# Patient Record
Sex: MALE | Race: BLACK OR AFRICAN AMERICAN | NOT HISPANIC OR LATINO | Employment: OTHER | URBAN - METROPOLITAN AREA
[De-identification: names, ages, dates, MRNs, and addresses within clinical notes are randomized per-mention and may not be internally consistent; named-entity substitution may affect disease eponyms.]

---

## 2017-01-26 ENCOUNTER — HOSPITAL ENCOUNTER (EMERGENCY)
Facility: MEDICAL CENTER | Age: 72
End: 2017-01-26
Attending: EMERGENCY MEDICINE
Payer: MEDICARE

## 2017-01-26 VITALS
HEART RATE: 84 BPM | DIASTOLIC BLOOD PRESSURE: 84 MMHG | WEIGHT: 143.08 LBS | RESPIRATION RATE: 17 BRPM | TEMPERATURE: 98.4 F | BODY MASS INDEX: 21.12 KG/M2 | SYSTOLIC BLOOD PRESSURE: 124 MMHG | OXYGEN SATURATION: 96 %

## 2017-01-26 DIAGNOSIS — Z76.0 MEDICATION REFILL: ICD-10-CM

## 2017-01-26 PROCEDURE — 99282 EMERGENCY DEPT VISIT SF MDM: CPT

## 2017-01-26 NOTE — ED AVS SNAPSHOT
1/26/2017          Fede Parikh  [address] [address]    Dear Fede:    [facility] wants to ensure your discharge home is safe and you or your loved ones have had all your questions answered regarding your care after you leave the hospital.    You may receive a telephone call within two days of your discharge.  This call is to make certain you understand your discharge instructions as well as ensure we provided you with the best care possible during your stay with us.     The call will only last approximately 3-5 minutes and will be done by a nurse.    Once again, we want to ensure your discharge home is safe and that you have a clear understanding of any next steps in your care.  If you have any questions or concerns, please do not hesitate to contact us, we are here for you.  Thank you for choosing [facility] for your healthcare needs.    Sincerely,    Inocencio Ojeda    [facility]

## 2017-01-26 NOTE — ED AVS SNAPSHOT
Chevia Access Code: [email or web address]  Expires: 2/25/2017 11:54 AM    Your email address is not on file at [facility].  Email Addresses are required for you to sign up for Chevia, please contact [phone] to verify your personal information and to provide your email address prior to attempting to register for Chevia.    Fede Parikh  [address] [place] [address], [place] [address]    [employer]  A secure, online tool to manage your health information     [facility]’s Chevia® is a secure, online tool that connects you to your personalized health information from the privacy of your home -- day or night - making it very easy for you to manage your healthcare. Once the activation process is completed, you can even access your medical information using the Chevia juan alberto, which is available for free in the Apple Juan Alberto store or Google Play store.     To learn more about Chevia, visit [email or web address]    There are two levels of access available (as shown below):   My Chart Features  [facility] Primary Care Doctor [facility]  Specialists [facility]  Urgent  Care Non-[facility] Primary Care Doctor   Email your healthcare team securely and privately 24/7 X X X    Manage appointments: schedule your next appointment; view details of past/upcoming appointments X      Request prescription refills. X      View recent personal medical records, including lab and immunizations X X X X   View health record, including health history, allergies, medications X X X X   Read reports about your outpatient visits, procedures, consult and ER notes X X X X   See your discharge summary, which is a recap of your hospital and/or ER visit that includes your diagnosis, lab results, and care plan X X  X     How to register for Chevia:  Once your e-mail address has been verified, follow the following steps to sign up for Chevia.     1. Go to  [email or web address]  2. Click on the Sign Up Now box, which takes you to the New Member Sign Up page. You  will need to provide the following information:  a. Enter your eGifter Access Code exactly as it appears at the top of this page. (You will not need to use this code after you’ve completed the sign-up process. If you do not sign up before the expiration date, you must request a new code.)   b. Enter your date of birth.   c. Enter your home email address.   d. Click Submit, and follow the next screen’s instructions.  3. Create a [email or web address] ID. This will be your eGifter login ID and cannot be changed, so think of one that is secure and easy to remember.  4. Create a eGifter password. You can change your password at any time.  5. Enter your Password Reset Question and Answer. This can be used at a later time if you forget your password.   6. Enter your e-mail address. This allows you to receive e-mail notifications when new information is available in eGifter.  7. Click Sign Up. You can now view your health information.    For assistance activating your eGifter account, call [phone]

## 2017-01-26 NOTE — ED NOTES
"All discharge instructions given to pt. Pt verbalized understanding of all discharge instructions.  All questions answered. Pt refusing VS on D/C states \"I got to go.\"    "

## 2017-01-26 NOTE — ED PROVIDER NOTES
ED Provider Note    CHIEF COMPLAINT  Chief Complaint   Patient presents with   • Medication Refill       HPI  Fede Parikh is a 71 y.o. male who presents to the emergency department asking for medication refills. The patient had an appointment scheduled on 12 January with the [facility] internal medicine residents. They called him and canceled his appointment due to the snow and scheduled him for February 18. The patient does not have any of his medication. He presents in a symptomatic state.    REVIEW OF SYSTEMS  No chest pain or difficulty breathing    PHYSICAL EXAM  VITAL SIGNS: /84 mmHg  Pulse 84  Temp(Src) 36.9 °C (98.4 °F)  Resp 17  Wt 64.9 kg (143 lb 1.3 oz)  SpO2 96%  In general the patient does not appear toxic  Pulmonary chest clear to auscultation bilaterally  Cardiovascular S1 and S2 with regular rate and rhythm  Neurologically the patient's grossly intact    COURSE & MEDICAL DECISION MAKING  Pertinent Labs & Imaging studies reviewed. (See chart for details)  This a 71-year-old male who presents to the emergency department asking for medication refills. The patient is on a lot of medication and I have contacted the [facility] internal medicine residents to come down and refill the medication that is appropriate for the patient as they cannot see him in the clinic until 16 February. The patient's a symptomatic at this time.     FINAL IMPRESSION  1. Medication refill      Electronically signed by: Jean Pierce, 1/26/2017 11:05 AM

## 2017-01-26 NOTE — ED AVS SNAPSHOT
After Visit Summary                                                                                                                Fede Parikh   MRN: [id number]    Department:  [facility], Emergency Dept   Date of Visit:  1/26/2017            [facility], Emergency Dept    [address]    [place] [address]    Phone:  [phone]      You were seen by     Jean Pierce M.D.      Your Diagnosis Was     Medication refill     Z76.0       Medication Information     Review all of your home medications and newly ordered medications with your primary doctor and/or pharmacist as soon as possible. Follow medication instructions as directed by your doctor and/or pharmacist.     Please keep your complete medication list with you and share with your physician. Update the information when medications are discontinued, doses are changed, or new medications (including over-the-counter products) are added; and carry medication information at all times in the event of emergency situations.               Medication List      ASK your doctor about these medications        Instructions    albuterol 108 (90 BASE) MCG/ACT Aers inhalation aerosol    Inhale 2 Puffs by mouth every 6 hours as needed for Shortness of Breath.   Dose:  2 Puff       amlodipine 5 MG Tabs   Commonly known as:  NORVASC    Take 5 mg by mouth every morning.   Dose:  5 mg       aspirin 81 MG Chew chewable tablet   Commonly known as:  ASA    Take 1 Tab by mouth every day.   Dose:  81 mg       beclomethasone 40 MCG/ACT inhaler   Commonly known as:  QVAR    Inhale 1 Puff by mouth 2 Times a Day.   Dose:  1 Puff       folic acid 1 MG Tabs   Commonly known as:  FOLVITE    Take 1 Tab by mouth every day.   Dose:  1 mg       loratadine 10 MG Tabs   Commonly known as:  CLARITIN    Take 1 Tab by mouth every day.   Dose:  10 mg       magnesium oxide 400 (241.3 MG) MG Tabs tablet   Commonly known as:  MAG-OX    Take 1 Tab by  mouth 2 Times a Day.   Dose:  400 mg       multivitamin Tabs    Take 1 Tab by mouth every day.   Dose:  1 Tab       omeprazole 20 MG delayed-release capsule   Commonly known as:  PRILOSEC    Take 20 mg by mouth every day.   Dose:  20 mg       pravastatin 20 MG Tabs   Commonly known as:  PRAVACHOL    Take 20 mg by mouth every evening.   Dose:  20 mg       thiamine 100 MG tablet   Commonly known as:  THIAMINE    Take 1 Tab by mouth every day.   Dose:  100 mg       tiotropium 18 MCG Caps   Commonly known as:  SPIRIVA    Inhale 1 Cap by mouth every day.   Dose:  18 mcg                 Discharge Instructions       Follow-up as per internal medicine          Patient Information     Patient Information    Following emergency treatment: all patient requiring follow-up care must return either to a private physician or a clinic if your condition worsens before you are able to obtain further medical attention, please return to the emergency room.     Billing Information    At [facility], we work to make the billing process streamlined for our patients.  Our Representatives are here to answer any questions you may have regarding your hospital bill.  If you have insurance coverage and have supplied your insurance information to us, we will submit a claim to your insurer on your behalf.  Should you have any questions regarding your bill, we can be reached online or by phone as follows:  Online: You are able pay your bills online or live chat with our representatives about any billing questions you may have. We are here to help Monday - Friday from 8:00am to 7:30pm and 9:00am - 12:00pm on Saturdays.  Please visit [email or web address]  for more information.   Phone:  [phone] or [phone]    Please note that your emergency physician, surgeon, pathologist, radiologist, anesthesiologist, and other specialists are not employed by [facility] and will therefore bill separately for their services.   Please contact them directly for any questions concerning their bills at the numbers below:     Emergency Physician Services:  [phone]  [facility]:  [phone]  [facility]:  [phone]  [facility]:  [phone]    1. Your final bill may vary from the amount quoted upon discharge if all procedures are not complete at that time, or if your doctor has additional procedures of which we are not aware. You will receive an additional bill if you return to the Emergency Department at [facility] for suture removal regardless of the facility of which the sutures were placed.     2. Please arrange for settlement of this account at the emergency registration.    3. All self-pay accounts are due in full at the time of treatment.  If you are unable to meet this obligation then payment is expected within 4-5 days.     4. If you have had radiology studies (CT, X-ray, Ultrasound, MRI), you have received a preliminary result during your emergency department visit. Please contact the radiology department [phone] to receive a copy of your final result. Please discuss the Final result with your primary physician or with the follow up physician provided.     Crisis Hotline:  [place] [facility] Crisis Hotline:  1-117-GJCASDZ or 1-462.783.8661  [place] Crisis Hotline:    1-180.529.8122 or 040-417-8670         ED Discharge Follow Up Questions    1. In order to provide you with very good care, we would like to follow up with a phone call in the next few days.  May we have your permission to contact you?     YES /  NO    2. What is the best phone number to call you? (       )_____-__________    3. What is the best time to call you?      Morning  /  Afternoon  /  Evening                   Patient Signature:  ____________________________________________________________    Date:  ____________________________________________________________      Your appointments     Feb 16, 2017  1:45  PM   New Patient with Mynor Mckay M.D.   [facility] - Internal Medicine (--)    [address] [place] [address]  [address]  [place] [id number] [phone]           Please bring Photo ID, Insurance Cards, All Medication Bottles and copies of any legal documents (such as Living Will, Power of ) If speaking a language besides English please bring an adult . Please arrive 30 minutes prior for check in and registration. You will be receiving a confirmation call a few days before your appointment from our automated call confirmation system.

## 2017-01-26 NOTE — ED NOTES
Chief Complaint   Patient presents with   • Medication Refill     Agree with triage RN. Chart up for ERP.

## 2017-01-26 NOTE — ED NOTES
Amb to triage w/ request for a med refill, ran out of his meds 3 days ago.  Pt reports that he has no PMD and would like assistance in finding one.  Pt has no medical c/o at this time.

## 2017-02-15 ENCOUNTER — OFFICE VISIT (OUTPATIENT)
Dept: INTERNAL MEDICINE | Facility: MEDICAL CENTER | Age: 72
End: 2017-02-15
Payer: MEDICARE

## 2017-02-15 VITALS
DIASTOLIC BLOOD PRESSURE: 74 MMHG | SYSTOLIC BLOOD PRESSURE: 114 MMHG | HEART RATE: 111 BPM | OXYGEN SATURATION: 97 % | HEIGHT: 68 IN | TEMPERATURE: 97.3 F | BODY MASS INDEX: 22.13 KG/M2 | WEIGHT: 146 LBS

## 2017-02-15 DIAGNOSIS — I10 ESSENTIAL HYPERTENSION: ICD-10-CM

## 2017-02-15 DIAGNOSIS — Z23 NEED FOR INFLUENZA VACCINATION: ICD-10-CM

## 2017-02-15 DIAGNOSIS — J44.9 CHRONIC OBSTRUCTIVE PULMONARY DISEASE, UNSPECIFIED COPD TYPE (HCC): ICD-10-CM

## 2017-02-15 DIAGNOSIS — B18.2 CHRONIC HEPATITIS C WITHOUT HEPATIC COMA (HCC): ICD-10-CM

## 2017-02-15 DIAGNOSIS — Z78.9 ALCOHOL USE: ICD-10-CM

## 2017-02-15 DIAGNOSIS — K80.21 CALCULUS OF GALLBLADDER WITH BILIARY OBSTRUCTION BUT WITHOUT CHOLECYSTITIS: ICD-10-CM

## 2017-02-15 DIAGNOSIS — Z23 NEED FOR TDAP VACCINATION: ICD-10-CM

## 2017-02-15 DIAGNOSIS — E04.2 MULTIPLE THYROID NODULES: ICD-10-CM

## 2017-02-15 DIAGNOSIS — L29.9 GENERALIZED PRURITUS: ICD-10-CM

## 2017-02-15 DIAGNOSIS — Z72.0 TOBACCO USE: ICD-10-CM

## 2017-02-15 DIAGNOSIS — K21.9 GASTROESOPHAGEAL REFLUX DISEASE, ESOPHAGITIS PRESENCE NOT SPECIFIED: ICD-10-CM

## 2017-02-15 PROBLEM — K80.20 CALCULUS OF GALLBLADDER WITHOUT CHOLECYSTITIS: Status: ACTIVE | Noted: 2017-02-15

## 2017-02-15 PROCEDURE — 90662 IIV NO PRSV INCREASED AG IM: CPT | Performed by: INTERNAL MEDICINE

## 2017-02-15 PROCEDURE — 4004F PT TOBACCO SCREEN RCVD TLK: CPT | Mod: 8P,GC | Performed by: INTERNAL MEDICINE

## 2017-02-15 PROCEDURE — 90715 TDAP VACCINE 7 YRS/> IM: CPT | Performed by: INTERNAL MEDICINE

## 2017-02-15 PROCEDURE — G8482 FLU IMMUNIZE ORDER/ADMIN: HCPCS | Mod: GC | Performed by: INTERNAL MEDICINE

## 2017-02-15 PROCEDURE — 1101F PT FALLS ASSESS-DOCD LE1/YR: CPT | Mod: GC | Performed by: INTERNAL MEDICINE

## 2017-02-15 PROCEDURE — G8432 DEP SCR NOT DOC, RNG: HCPCS | Mod: GC | Performed by: INTERNAL MEDICINE

## 2017-02-15 PROCEDURE — G8419 CALC BMI OUT NRM PARAM NOF/U: HCPCS | Mod: GC | Performed by: INTERNAL MEDICINE

## 2017-02-15 PROCEDURE — G0008 ADMIN INFLUENZA VIRUS VAC: HCPCS | Performed by: INTERNAL MEDICINE

## 2017-02-15 PROCEDURE — 99204 OFFICE O/P NEW MOD 45 MIN: CPT | Mod: 25,GC | Performed by: INTERNAL MEDICINE

## 2017-02-15 PROCEDURE — 4040F PNEUMOC VAC/ADMIN/RCVD: CPT | Mod: 8P,GC | Performed by: INTERNAL MEDICINE

## 2017-02-15 PROCEDURE — 90472 IMMUNIZATION ADMIN EACH ADD: CPT | Performed by: INTERNAL MEDICINE

## 2017-02-15 PROCEDURE — 3017F COLORECTAL CA SCREEN DOC REV: CPT | Mod: GC | Performed by: INTERNAL MEDICINE

## 2017-02-15 RX ORDER — OMEPRAZOLE 20 MG/1
20 CAPSULE, DELAYED RELEASE ORAL DAILY
Qty: 30 CAP | Refills: 5 | Status: SHIPPED | OUTPATIENT
Start: 2017-02-15 | End: 2017-08-27

## 2017-02-15 RX ORDER — PRAVASTATIN SODIUM 20 MG
20 TABLET ORAL DAILY
Qty: 30 TAB | Refills: 5 | Status: SHIPPED | OUTPATIENT
Start: 2017-02-15 | End: 2017-08-27

## 2017-02-15 RX ORDER — ALBUTEROL SULFATE 90 UG/1
2 AEROSOL, METERED RESPIRATORY (INHALATION) EVERY 6 HOURS PRN
Qty: 8.5 G | Refills: 3 | Status: SHIPPED | OUTPATIENT
Start: 2017-02-15 | End: 2017-07-05 | Stop reason: SDUPTHER

## 2017-02-15 RX ORDER — LORATADINE 10 MG/1
10 TABLET ORAL DAILY
Qty: 30 TAB | Refills: 3 | Status: SHIPPED | OUTPATIENT
Start: 2017-02-15 | End: 2017-07-05 | Stop reason: SDUPTHER

## 2017-02-15 RX ORDER — ASPIRIN 81 MG/1
81 TABLET, CHEWABLE ORAL DAILY
Qty: 30 TAB | Refills: 11 | Status: SHIPPED | OUTPATIENT
Start: 2017-02-15 | End: 2017-08-27

## 2017-02-15 RX ORDER — TIOTROPIUM BROMIDE 18 UG/1
18 CAPSULE ORAL; RESPIRATORY (INHALATION) DAILY
Qty: 30 CAP | Refills: 5 | Status: SHIPPED | OUTPATIENT
Start: 2017-02-15 | End: 2017-08-27

## 2017-02-15 NOTE — PATIENT INSTRUCTIONS
- Lab work (Hepatitis C genotype, B12, folate)  - Call endocrinologist at [phone]  - Surgeon will call you for gallstones  - Infectious doctor call you for Hep C  - Pulmonary function test. They will call to schedule.

## 2017-02-15 NOTE — MR AVS SNAPSHOT
"        Fede Parikh   2/15/2017 10:45 AM   Office Visit   MRN: [id number]    Department:  [facility] - Internal Med   Dept Phone:  [phone]    Description:  Male : 1945   Provider:  Cortes Dubose M.D.           Reason for Visit     Establish Care     Referral Needed Endocrinology    Medication Refill all medications    Itchy Everywhere. [id number]. Since he was in the hospital      Allergies as of 2/15/2017     No Known Allergies      You were diagnosed with     Generalized pruritus   [504785]       Chronic obstructive pulmonary disease, unspecified COPD type (CMS-HCC)   [2606946]       Gastroesophageal reflux disease, esophagitis presence not specified   [1276657]       Essential hypertension   [7092818]       Multiple thyroid nodules   [555884]       Chronic hepatitis C without hepatic coma (CMS-HCC)   [120124]       Tobacco use   [406824]       Alcohol use ([facility])   [887987]       Calculus of gallbladder with biliary obstruction but without cholecystitis   [8413889]       Health care maintenance   [413760]         Vital Signs     Blood Pressure Pulse Temperature Height Weight Body Mass Index    114/74 mmHg 111 36.3 °C (97.3 °F) 1.734 m (5' 8.27\") 66.225 kg (146 lb) 22.03 kg/m2    Oxygen Saturation Smoking Status                97% Current Every Day Smoker          Basic Information     Date Of Birth Sex Race Ethnicity Preferred Language    1945 Male Black or  Non- English      Your appointments     Mar 22, 2017 11:00 AM   Established Patient with Cortes Dubose M.D.   [facility] - Internal Medicine (--)    [address] [place] [id number] [phone]           You will be receiving a confirmation call a few days before your appointment from our automated call confirmation system.              Problem List              ICD-10-CM Priority Class Noted - Resolved    Generalized pruritus L29.9   2016 - Present    COPD (chronic " obstructive pulmonary disease) (CMS-HCC) J44.9   12/13/2016 - Present    GERD (gastroesophageal reflux disease) K21.9   12/13/2016 - Present    Hypertension I10   12/13/2016 - Present    Substance abuse F19.10   12/13/2016 - Present    Multiple thyroid nodules E04.2   12/14/2016 - Present    Chronic hepatitis C without hepatic coma (CMS-HCC) B18.2   2/15/2017 - Present    Tobacco use Z72.0   2/15/2017 - Present    Alcohol use (HCC) Z78.9   2/15/2017 - Present    Calculus of gallbladder without cholecystitis K80.20   2/15/2017 - Present      Health Maintenance        Date Due Completion Dates    IMM DTaP/Tdap/Td Vaccine (1 - Tdap) 2/13/1962 ---    COLONOSCOPY 2/13/1993 ---    IMM ZOSTER VACCINE 2/13/2003 ---    IMM PNEUMOCOCCAL 65+ (ADULT) LOW/MEDIUM RISK SERIES (1 of 2 - PCV13) 2/13/2008 ---    IMM INFLUENZA (1) 9/1/2016 11/4/2014            Current Immunizations     Influenza Vaccine Adult HD  Incomplete    Influenza Vaccine Quad Inj (Preserved) 11/4/2014    Tdap Vaccine  Incomplete      Below and/or attached are the medications your provider expects you to take. Review all of your home medications and newly ordered medications with your provider and/or pharmacist. Follow medication instructions as directed by your provider and/or pharmacist. Please keep your medication list with you and share with your provider. Update the information when medications are discontinued, doses are changed, or new medications (including over-the-counter products) are added; and carry medication information at all times in the event of emergency situations     Allergies:  No Known Allergies          Medications  Valid as of: February 15, 2017 - 11:52 AM    Generic Name Brand Name Tablet Size Instructions for use    Albuterol Sulfate (Aero Soln) albuterol 108 (90 BASE) MCG/ACT Inhale 2 Puffs by mouth every 6 hours as needed for Shortness of Breath.        AmLODIPine Besylate (Tab) NORVASC 5 MG Take 5 mg by mouth every morning.         Aspirin (Chew Tab) ASA 81 MG Take 1 Tab by mouth every day.        Beclomethasone Dipropionate (Aero Soln) QVAR 40 MCG/ACT Inhale 1 Puff by mouth 2 Times a Day.        Loratadine (Tab) CLARITIN 10 MG Take 1 Tab by mouth every day.        Omeprazole (CAPSULE DELAYED RELEASE) PRILOSEC 20 MG Take 20 mg by mouth every day.        Pravastatin Sodium (Tab) PRAVACHOL 20 MG Take 20 mg by mouth every evening.        Tiotropium Bromide Monohydrate (Cap) SPIRIVA 18 MCG Inhale 1 Cap by mouth every day.        .                 Medicines prescribed today were sent to:     [id number] - [place], [place] - [address]    [address] [place] [address]    Phone: [phone] Fax: [phone]    Open 24 Hours?: No      Medication refill instructions:       If your prescription bottle indicates you have medication refills left, it is not necessary to call your provider’s office. Please contact your pharmacy and they will refill your medication.    If your prescription bottle indicates you do not have any refills left, you may request refills at any time through one of the following ways: The online [employer] system (except Urgent Care), by calling your provider’s office, or by asking your pharmacy to contact your provider’s office with a refill request. Medication refills are processed only during regular business hours and may not be available until the next business day. Your provider may request additional information or to have a follow-up visit with you prior to refilling your medication.   *Please Note: Medication refills are assigned a new Rx number when refilled electronically. Your pharmacy may indicate that no refills were authorized even though a new prescription for the same medication is available at the pharmacy. Please request the medicine by name with the pharmacy before contacting your provider for a refill.        Your To Do List     Future Labs/Procedures Complete By Expires    FOLATE  As directed  2/15/2018    VITAMIN B12  As directed 2/15/2018      Referral     A referral request has been sent to our patient care coordination department. Please allow 3-5 business days for us to process this request and contact you either by phone or mail. If you do not hear from us by the 5th business day, please call us at [phone].        Instructions    - Lab work (Hepatitis C genotype, B12, folate)  - Call endocrinologist at [phone]  - Surgeon will call you for gallstones  - Infectious doctor call you for Hep C  - Pulmonary function test. They will call to schedule.          [employer] Access Code: [email or web address]  Expires: 2/25/2017 11:54 AM    [employer]  A secure, online tool to manage your health information     [facility]’s [employer]® is a secure, online tool that connects you to your personalized health information from the privacy of your home -- day or night - making it very easy for you to manage your healthcare. Once the activation process is completed, you can even access your medical information using the [employer] juan alberto, which is available for free in the Apple Juan Alberto store or Google Play store.     [employer] provides the following levels of access (as shown below):   My Chart Features   [facility] Primary [facility] Doctor [facility]  Specialists [facility]  Urgent  Care Non-[facility]  Primary Care  Doctor   Email your healthcare team securely and privately 24/7 X X X    Manage appointments: schedule your next appointment; view details of past/upcoming appointments X      Request prescription refills. X      View recent personal medical records, including lab and immunizations X X X X   View health record, including health history, allergies, medications X X X X   Read reports about your outpatient visits, procedures, consult and ER notes X X X X   See your discharge summary, which is a recap of your hospital and/or ER visit that includes your diagnosis, lab results, and care plan. X X       How to register for [employer]:  1. Go  to  [email or web address].  2. Click on the Sign Up Now box, which takes you to the New Member Sign Up page. You will need to provide the following information:  a. Enter your [employer] Access Code exactly as it appears at the top of this page. (You will not need to use this code after you’ve completed the sign-up process. If you do not sign up before the expiration date, you must request a new code.)   b. Enter your date of birth.   c. Enter your home email address.   d. Click Submit, and follow the next screen’s instructions.  3. Create a [employer] ID. This will be your [employer] login ID and cannot be changed, so think of one that is secure and easy to remember.  4. Create a Revel Toucht password. You can change your password at any time.  5. Enter your Password Reset Question and Answer. This can be used at a later time if you forget your password.   6. Enter your e-mail address. This allows you to receive e-mail notifications when new information is available in [employer].  7. Click Sign Up. You can now view your health information.    For assistance activating your [employer] account, call [phone]

## 2017-02-27 ENCOUNTER — HOSPITAL ENCOUNTER (OUTPATIENT)
Dept: LAB | Facility: MEDICAL CENTER | Age: 72
End: 2017-02-27
Attending: INTERNAL MEDICINE
Payer: MEDICARE

## 2017-02-27 DIAGNOSIS — Z78.9 ALCOHOL USE: ICD-10-CM

## 2017-02-27 LAB
FOLATE SERPL-MCNC: >23.6 NG/ML
VIT B12 SERPL-MCNC: 461 PG/ML (ref 211–911)

## 2017-02-27 PROCEDURE — 36415 COLL VENOUS BLD VENIPUNCTURE: CPT

## 2017-02-27 PROCEDURE — 82607 VITAMIN B-12: CPT

## 2017-02-27 PROCEDURE — 82746 ASSAY OF FOLIC ACID SERUM: CPT

## 2017-02-27 PROCEDURE — 87902 NFCT AGT GNTYP ALYS HEP C: CPT

## 2017-03-03 LAB — HCV GENTYP SERPL NAA+PROBE: NORMAL

## 2017-03-13 ENCOUNTER — OFFICE VISIT (OUTPATIENT)
Dept: INTERNAL MEDICINE | Facility: MEDICAL CENTER | Age: 72
End: 2017-03-13
Payer: MEDICARE

## 2017-03-13 VITALS
WEIGHT: 144.2 LBS | HEART RATE: 93 BPM | BODY MASS INDEX: 21.86 KG/M2 | TEMPERATURE: 97.6 F | HEIGHT: 68 IN | SYSTOLIC BLOOD PRESSURE: 110 MMHG | DIASTOLIC BLOOD PRESSURE: 82 MMHG | OXYGEN SATURATION: 96 %

## 2017-03-13 DIAGNOSIS — B18.2 CHRONIC HEPATITIS C WITHOUT HEPATIC COMA (HCC): ICD-10-CM

## 2017-03-13 PROCEDURE — 99203 OFFICE O/P NEW LOW 30 MIN: CPT | Performed by: INTERNAL MEDICINE

## 2017-03-13 ASSESSMENT — ENCOUNTER SYMPTOMS
ABDOMINAL PAIN: 0
CONSTIPATION: 0
WHEEZING: 1
WEIGHT LOSS: 1
WEAKNESS: 0
POLYDIPSIA: 0
FEVER: 0
SHORTNESS OF BREATH: 1
CHILLS: 0
HEARTBURN: 1
VOMITING: 0

## 2017-03-13 NOTE — MR AVS SNAPSHOT
"        Fede Parikh   3/13/2017 9:40 AM   Office Visit   MRN: [id number]    Department:  [facility] Med - Internal Med   Dept Phone:  [phone]    Description:  Male : 1945   Provider:  Kevon Bella M.D.           Reason for Visit     New Patient Hep C       Allergies as of 3/13/2017     No Known Allergies      You were diagnosed with     Chronic hepatitis C without hepatic coma (CMS-HCC)   [270891]         Vital Signs     Blood Pressure Pulse Temperature Height Weight Body Mass Index    110/82 mmHg 93 36.4 °C (97.6 °F) 1.734 m (5' 8.27\") 65.409 kg (144 lb 3.2 oz) 21.75 kg/m2    Oxygen Saturation Smoking Status                96% Current Every Day Smoker          Basic Information     Date Of Birth Sex Race Ethnicity Preferred Language    1945 Male Black or  Non- English      Your appointments     Mar 22, 2017 11:00 AM   Established Patient with Cortes Dubose M.D.   [facility] Med - Internal Medicine (--)    [address] [place] [address]  [address]  [facility] [address] [phone]           You will be receiving a confirmation call a few days before your appointment from our automated call confirmation system.            2017 10:00 AM   Established Patient with Kevon Bella M.D.   [facility] / [facility] - Internal Medicine (--)    [address]  [address]  [facility] [address] [phone]           You will be receiving a confirmation call a few days before your appointment from our automated call confirmation system.              Problem List              ICD-10-CM Priority Class Noted - Resolved    Generalized pruritus L29.9   2016 - Present    COPD (chronic obstructive pulmonary disease) (CMS-HCC) J44.9   2016 - Present    GERD (gastroesophageal reflux disease) K21.9   2016 - Present    Hypertension I10   2016 - Present    Substance abuse F19.10   2016 - Present    Multiple thyroid nodules E04.2   " 12/14/2016 - Present    Chronic hepatitis C without hepatic coma (CMS-HCC) B18.2   2/15/2017 - Present    Tobacco use Z72.0   2/15/2017 - Present    Alcohol use (HCC) Z78.9   2/15/2017 - Present    Calculus of gallbladder without cholecystitis K80.20   2/15/2017 - Present      Health Maintenance        Date Due Completion Dates    IMM ZOSTER VACCINE 2/13/2003 ---    IMM PNEUMOCOCCAL 65+ (ADULT) LOW/MEDIUM RISK SERIES (1 of 2 - PCV13) 2/13/2008 ---    COLONOSCOPY 7/7/2026 7/7/2016    IMM DTaP/Tdap/Td Vaccine (2 - Td) 2/15/2027 2/15/2017            Current Immunizations     Influenza Vaccine Adult HD 2/15/2017    Influenza Vaccine Quad Inj (Preserved) 11/4/2014    Tdap Vaccine 2/15/2017      Below and/or attached are the medications your provider expects you to take. Review all of your home medications and newly ordered medications with your provider and/or pharmacist. Follow medication instructions as directed by your provider and/or pharmacist. Please keep your medication list with you and share with your provider. Update the information when medications are discontinued, doses are changed, or new medications (including over-the-counter products) are added; and carry medication information at all times in the event of emergency situations     Allergies:  No Known Allergies          Medications  Valid as of: March 13, 2017 - 10:30 AM    Generic Name Brand Name Tablet Size Instructions for use    Albuterol Sulfate (Aero Soln) albuterol 108 (90 BASE) MCG/ACT Inhale 2 Puffs by mouth every 6 hours as needed for Shortness of Breath.        Aspirin (Chew Tab) ASA 81 MG Take 1 Tab by mouth every day.        Beclomethasone Dipropionate (Aero Soln) QVAR 40 MCG/ACT Inhale 1 Puff by mouth 2 Times a Day.        Loratadine (Tab) CLARITIN 10 MG Take 1 Tab by mouth every day.        Omeprazole (CAPSULE DELAYED RELEASE) PRILOSEC 20 MG Take 1 Cap by mouth every day.        Pravastatin Sodium (Tab) PRAVACHOL 20 MG Take 1 Tab by mouth  every day.        Tiotropium Bromide Monohydrate (Cap) SPIRIVA 18 MCG Inhale 1 Cap by mouth every day.        .                 Medicines prescribed today were sent to:     [id number] - [place], [place] - [address] [address]    Phone: [phone] Fax: [phone]    Open 24 Hours?: No      Medication refill instructions:       If your prescription bottle indicates you have medication refills left, it is not necessary to call your provider’s office. Please contact your pharmacy and they will refill your medication.    If your prescription bottle indicates you do not have any refills left, you may request refills at any time through one of the following ways: The online Technical Machine system (except Urgent Care), by calling your provider’s office, or by asking your pharmacy to contact your provider’s office with a refill request. Medication refills are processed only during regular business hours and may not be available until the next business day. Your provider may request additional information or to have a follow-up visit with you prior to refilling your medication.   *Please Note: Medication refills are assigned a new Rx number when refilled electronically. Your pharmacy may indicate that no refills were authorized even though a new prescription for the same medication is available at the pharmacy. Please request the medicine by name with the pharmacy before contacting your provider for a refill.        Your To Do List     Future Labs/Procedures Complete By Expires    AFP SERUM TUMOR MARKER  As directed 3/13/2018    CBC WITHOUT DIFFERENTIAL  As directed 9/13/2017    COMP METABOLIC PANEL  As directed 3/13/2018    GAMMA GT (GGT)  As directed 3/13/2018         Technical Machine Access Code: [email or web address]  Expires: 4/12/2017 10:30 AM    Technical Machine  A secure, online tool to manage your health information     [employer]’s Technical Machine® is a secure, online tool that connects you to your  personalized health information from the privacy of your home -- day or night - making it very easy for you to manage your healthcare. Once the activation process is completed, you can even access your medical information using the FilmMe juan alberto, which is available for free in the Apple Juan Alberto store or Google Play store.     FilmMe provides the following levels of access (as shown below):   My Chart Features   [facility] Doctor [facility]  Specialists [facility]  Urgent  Care Non-[facility]  Primary Care  Doctor   Email your healthcare team securely and privately 24/7 X X X    Manage appointments: schedule your next appointment; view details of past/upcoming appointments X      Request prescription refills. X      View recent personal medical records, including lab and immunizations X X X X   View health record, including health history, allergies, medications X X X X   Read reports about your outpatient visits, procedures, consult and ER notes X X X X   See your discharge summary, which is a recap of your hospital and/or ER visit that includes your diagnosis, lab results, and care plan. X X       How to register for FilmMe:  1. Go to  [email or web address].  2. Click on the Sign Up Now box, which takes you to the New Member Sign Up page. You will need to provide the following information:  a. Enter your FilmMe Access Code exactly as it appears at the top of this page. (You will not need to use this code after you’ve completed the sign-up process. If you do not sign up before the expiration date, you must request a new code.)   b. Enter your date of birth.   c. Enter your home email address.   d. Click Submit, and follow the next screen’s instructions.  3. Create a FilmMe ID. This will be your FilmMe login ID and cannot be changed, so think of one that is secure and easy to remember.  4. Create a FilmMe password. You can change your password at any time.  5. Enter your Password Reset Question and Answer. This can  be used at a later time if you forget your password.   6. Enter your e-mail address. This allows you to receive e-mail notifications when new information is available in Yu Rong.  7. Click Sign Up. You can now view your health information.    For assistance activating your Yu Rong account, call [phone]

## 2017-03-14 NOTE — PROGRESS NOTES
"Subjective:      Fede Parikh is a 72 y.o.AA male who presents for evaluation of newly diagnosed HCV. Was recently diagnosed ar [facility] in December when admitted with abdominal pain.  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 Used iv drugs during this period. Has now been released, does drink. Is on parole. Has not been vaccinated . Feels fatigued Believes he can stop drinking   He is on PPI for acid reflux, may be related to his drinking.  PMH- COPD /htn/ acid reflux/gallbldder disease   MEDS- reviewed with patient           HPI    Review of Systems   Constitutional: Positive for weight loss and malaise/fatigue. Negative for fever and chills.   Respiratory: Positive for shortness of breath and wheezing.    Cardiovascular: Negative for leg swelling.   Gastrointestinal: Positive for heartburn. Negative for vomiting, abdominal pain and constipation.   Skin: Negative for itching and rash.   Neurological: Negative for weakness.   Endo/Heme/Allergies: Negative for polydipsia.          Objective:     /82 mmHg  Pulse 93  Temp(Src) 36.4 °C (97.6 °F)  Ht 1.734 m (5' 8.27\")  Wt 65.409 kg (144 lb 3.2 oz)  BMI 21.75 kg/m2  SpO2 96%     Physical Exam   Constitutional: He is oriented to person, place, and time.   Cardiovascular: Normal rate and regular rhythm.    Pulmonary/Chest:   I do not hear any wheezing   Abdominal: Soft. Bowel sounds are normal.   Liver edge palpable, no spleen, no ascites   Neurological: He is alert and oriented to person, place, and time.   Skin: Skin is warm. No rash noted. No erythema.   Psychiatric: He has a normal mood and affect. His behavior is normal.   Vitals reviewed.         Results for FEDE PARIKH (MRN [id number]) as of 3/13/2017 20:47   Ref. Range 12/14/2016 03:40   WBC Latest Ref Range: 4.8-10.8 K/uL 3.5 (L)   RBC Latest Ref Range: 4.70-6.10 M/uL 3.96 (L)   Hemoglobin Latest Ref Range: 14.0-18.0 g/dL 13.2 (L)   Hematocrit Latest Ref " Range: 42.0-52.0 % 36.7 (L)   MCV Latest Ref Range: 81.4-97.8 fL 92.7   MCH Latest Ref Range: 27.0-33.0 pg 33.3 (H)   MCHC Latest Ref Range: 33.7-35.3 g/dL 36.0 (H)   RDW Latest Ref Range: 35.9-50.0 fL 40.8   Platelet Count Latest Ref Range: 164-446 K/uL 131 (L)   MPV Latest Ref Range: 9.0-12.9 fL 11.0   Results for SADE ISSA (MRN [id number]) as of 3/13/2017 20:47   Ref. Range 12/13/2016 08:31 12/14/2016 03:40 2/27/2017 08:05   Free T-4 Latest Ref Range: 0.53-1.43 ng/dL  0.91    Hepatitis A Virus Ab, IgM Latest Ref Range: Negative  Negative     Hepatitis B Surface Antigen Latest Ref Range: Negative  Negative     Hepatitis B Cors Ab,IgM Latest Ref Range: Negative  Negative     EER HCV RNA Qnt, PCR Unknown See Note     Hepatitis C Antibody Latest Ref Range: Negative  Reactive (A)     Hepatitis C Genotyping Unknown   1a or 1b   Hepatitis C Rna By Pcr, Quanti Latest Units: IU/mL 1,900,000     HCV RNA PCR Qnt Log Latest Units: log IU 6.3     HCV RNA PCR Qnt Int Latest Ref Range: Not Detected  Detected (A)     HIV Ag/Ab Combo Assay Latest Ref Range: Non Reactive  Non Reactive       Ultrasound :The liver is normal in contour. There is no evidence of solid mass lesion. The liver measures 9.25 cm. The liver appears echogenic.    The gallbladder is contracted. Multiple shadowing stones are seen. The gallbladder wall thickness measures 4.1 mm.  There is no pericholecystic fluid.    The common duct measures 0.79 cm.   Results for SADE ISSA (MRN [id number]) as of 3/13/2017 20:47   Ref. Range 12/13/2016 20:02 12/14/2016 03:40   Bun Latest Ref Range: 8-22 mg/dL  17   Creatinine Latest Ref Range: 0.50-1.40 mg/dL  1.41 (H)   GFR If  Latest Ref Range: >60 mL/min/1.73 m 2  59 (A)   GFR If Non  Latest Ref Range: >60 mL/min/1.73 m 2  49 (A)   Calcium Latest Ref Range: 8.5-10.5 mg/dL  9.2   AST(SGOT) Latest Ref Range: 12-45 U/L  65 (H)   ALT(SGPT) Latest Ref Range: 2-50 U/L  41   Alkaline  Phosphatase Latest Ref Range: 30-99 U/L  57   Total Bilirubin Latest Ref Range: 0.1-1.5 mg/dL  1.2   Albumin Latest Ref Range: 3.2-4.9 g/dL  2.8 (L)   Total Protein Latest Ref Range: 6.0-8.2 g/dL  5.9 (L)   Globulin Latest Ref Range: 1.9-3.5 g/dL  3.1   A-G Ratio Latest Units: g/dL  0.9   Magnesium Latest Ref Range: 1.5-2.5 mg/dL  1.5     Assessment/Plan:     1. 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  He understands he must be completely abstinent of alcohol if he wants to live and to be treated for HCV.  Told him to return in 6 months, will get labs before hand to see if still drinking and document liver Fibrosis status, consider treatment at that tikme if alcohol free . He agrees     - COMP METABOLIC PANEL; Future  - AFP SERUM TUMOR MARKER; Future  - GAMMA GT (GGT); Future  - CBC WITHOUT DIFFERENTIAL; Future  - HCV FIBROSURE

## 2017-03-22 ENCOUNTER — OFFICE VISIT (OUTPATIENT)
Dept: INTERNAL MEDICINE | Facility: MEDICAL CENTER | Age: 72
End: 2017-03-22
Payer: MEDICARE

## 2017-03-22 VITALS
OXYGEN SATURATION: 99 % | DIASTOLIC BLOOD PRESSURE: 80 MMHG | SYSTOLIC BLOOD PRESSURE: 106 MMHG | HEART RATE: 89 BPM | WEIGHT: 139.4 LBS | HEIGHT: 68 IN | TEMPERATURE: 96 F | BODY MASS INDEX: 21.13 KG/M2 | RESPIRATION RATE: 16 BRPM

## 2017-03-22 DIAGNOSIS — B18.2 CHRONIC HEPATITIS C WITHOUT HEPATIC COMA (HCC): ICD-10-CM

## 2017-03-22 DIAGNOSIS — E04.2 MULTIPLE THYROID NODULES: ICD-10-CM

## 2017-03-22 DIAGNOSIS — J44.9 CHRONIC OBSTRUCTIVE PULMONARY DISEASE, UNSPECIFIED COPD TYPE (HCC): ICD-10-CM

## 2017-03-22 DIAGNOSIS — Z78.9 ALCOHOL USE: ICD-10-CM

## 2017-03-22 DIAGNOSIS — K80.21 CALCULUS OF GALLBLADDER WITH BILIARY OBSTRUCTION BUT WITHOUT CHOLECYSTITIS: ICD-10-CM

## 2017-03-22 DIAGNOSIS — Z72.0 TOBACCO USE: ICD-10-CM

## 2017-03-22 DIAGNOSIS — Z23 NEED FOR ZOSTER VACCINE: ICD-10-CM

## 2017-03-22 DIAGNOSIS — K21.9 GASTROESOPHAGEAL REFLUX DISEASE, ESOPHAGITIS PRESENCE NOT SPECIFIED: ICD-10-CM

## 2017-03-22 PROCEDURE — 99214 OFFICE O/P EST MOD 30 MIN: CPT | Mod: 25,GC | Performed by: INTERNAL MEDICINE

## 2017-03-22 PROCEDURE — G0009 ADMIN PNEUMOCOCCAL VACCINE: HCPCS | Performed by: INTERNAL MEDICINE

## 2017-03-22 PROCEDURE — 90670 PCV13 VACCINE IM: CPT | Performed by: INTERNAL MEDICINE

## 2017-03-22 ASSESSMENT — PATIENT HEALTH QUESTIONNAIRE - PHQ9: CLINICAL INTERPRETATION OF PHQ2 SCORE: 1

## 2017-03-22 NOTE — MR AVS SNAPSHOT
"        Fede Parikh   3/22/2017 11:00 AM   Office Visit   MRN: [id number]    Department:  [facility] Med - Internal Med   Dept Phone:  [phone]    Description:  Male : 1945   Provider:  Cortes Dubose M.D.           Reason for Visit     Thyroid Problem No call re: Endo       Allergies as of 3/22/2017     No Known Allergies      You were diagnosed with     Multiple thyroid nodules   [331612]       Chronic obstructive pulmonary disease, unspecified COPD type (CMS-HCC)   [3442417]       Need for zoster vaccine   [254282]         Vital Signs     Blood Pressure Pulse Temperature Respirations Height Weight    106/80 mmHg 89 35.6 °C (96 °F) 16 1.727 m (5' 7.99\") 63.231 kg (139 lb 6.4 oz)    Body Mass Index Oxygen Saturation Smoking Status             21.20 kg/m2 99% Current Every Day Smoker         Basic Information     Date Of Birth Sex Race Ethnicity Preferred Language    1945 Male Black or  Non- English      Your appointments     2017 11:00 AM   Established Patient with Cortes Dubose M.D.   [facility] Med - Internal Medicine (--)    [address] [place] [address]  [address]  [facility] [address] [phone]           You will be receiving a confirmation call a few days before your appointment from our automated call confirmation system.            2017 10:00 AM   Established Patient with Kevon Bella M.D.   [facility] (--)    [address]  [address]  [facility] [address] [phone]           You will be receiving a confirmation call a few days before your appointment from our automated call confirmation system.              Problem List              ICD-10-CM Priority Class Noted - Resolved    Generalized pruritus L29.9   2016 - Present    COPD (chronic obstructive pulmonary disease) (CMS-HCC) J44.9   2016 - Present    GERD (gastroesophageal reflux disease) K21.9   2016 - Present   " Hypertension I10   12/13/2016 - Present    Substance abuse F19.10   12/13/2016 - Present    Multiple thyroid nodules E04.2   12/14/2016 - Present    Chronic hepatitis C without hepatic coma (CMS-HCC) B18.2   2/15/2017 - Present    Tobacco use Z72.0   2/15/2017 - Present    Alcohol use (HCC) Z78.9   2/15/2017 - Present    Calculus of gallbladder without cholecystitis K80.20   2/15/2017 - Present      Health Maintenance        Date Due Completion Dates    IMM ZOSTER VACCINE 2/13/2003 ---    IMM PNEUMOCOCCAL 65+ (ADULT) LOW/MEDIUM RISK SERIES (1 of 2 - PCV13) 2/13/2008 ---    COLONOSCOPY 7/7/2026 7/7/2016    IMM DTaP/Tdap/Td Vaccine (2 - Td) 2/15/2027 2/15/2017            Current Immunizations     13-VALENT PCV PREVNAR  Incomplete    Influenza Vaccine Adult HD 2/15/2017    Influenza Vaccine Quad Inj (Preserved) 11/4/2014    Tdap Vaccine 2/15/2017      Below and/or attached are the medications your provider expects you to take. Review all of your home medications and newly ordered medications with your provider and/or pharmacist. Follow medication instructions as directed by your provider and/or pharmacist. Please keep your medication list with you and share with your provider. Update the information when medications are discontinued, doses are changed, or new medications (including over-the-counter products) are added; and carry medication information at all times in the event of emergency situations     Allergies:  No Known Allergies          Medications  Valid as of: March 22, 2017 - 11:34 AM    Generic Name Brand Name Tablet Size Instructions for use    Albuterol Sulfate (Aero Soln) albuterol 108 (90 BASE) MCG/ACT Inhale 2 Puffs by mouth every 6 hours as needed for Shortness of Breath.        Aspirin (Chew Tab) ASA 81 MG Take 1 Tab by mouth every day.        Beclomethasone Dipropionate (Aero Soln) QVAR 40 MCG/ACT Inhale 1 Puff by mouth 2 Times a Day.        Loratadine (Tab) CLARITIN 10 MG Take 1 Tab by mouth every  day.        Omeprazole (CAPSULE DELAYED RELEASE) PRILOSEC 20 MG Take 1 Cap by mouth every day.        Pravastatin Sodium (Tab) PRAVACHOL 20 MG Take 1 Tab by mouth every day.        Tiotropium Bromide Monohydrate (Cap) SPIRIVA 18 MCG Inhale 1 Cap by mouth every day.        .                 Medicines prescribed today were sent to:     [facility] - [place], [place] - [address] [place] [address]    Phone: [phone] Fax: [phone]    Open 24 Hours?: No      Medication refill instructions:       If your prescription bottle indicates you have medication refills left, it is not necessary to call your provider’s office. Please contact your pharmacy and they will refill your medication.    If your prescription bottle indicates you do not have any refills left, you may request refills at any time through one of the following ways: The online [employer] system (except Urgent Care), by calling your provider’s office, or by asking your pharmacy to contact your provider’s office with a refill request. Medication refills are processed only during regular business hours and may not be available until the next business day. Your provider may request additional information or to have a follow-up visit with you prior to refilling your medication.   *Please Note: Medication refills are assigned a new Rx number when refilled electronically. Your pharmacy may indicate that no refills were authorized even though a new prescription for the same medication is available at the pharmacy. Please request the medicine by name with the pharmacy before contacting your provider for a refill.        Referral     A referral request has been sent to our patient care coordination department. Please allow 3-5 business days for us to process this request and contact you either by phone or mail. If you do not hear from us by the 5th business day, please call us at [phone].        Instructions      I recommend a  Zostavax (shingles vaccine).  Get at pharmacy.             iKoa Access Code: [email or web address]  Expires: 4/12/2017 10:30 AM    iKoa  A secure, online tool to manage your health information     [employer]’s iKoa® is a secure, online tool that connects you to your personalized health information from the privacy of your home -- day or night - making it very easy for you to manage your healthcare. Once the activation process is completed, you can even access your medical information using the iKoa juan alberto, which is available for free in the Apple Juan Alberto store or Google Play store.     iKoa provides the following levels of access (as shown below):   My Chart Features   [facility] Primary [facility] Doctor [facility]  Specialists [facility]  Urgent  Care Non-[facility]  Primary Care  Doctor   Email your healthcare team securely and privately 24/7 X X X    Manage appointments: schedule your next appointment; view details of past/upcoming appointments X      Request prescription refills. X      View recent personal medical records, including lab and immunizations X X X X   View health record, including health history, allergies, medications X X X X   Read reports about your outpatient visits, procedures, consult and ER notes X X X X   See your discharge summary, which is a recap of your hospital and/or ER visit that includes your diagnosis, lab results, and care plan. X X       How to register for iKoa:  1. Go to  [email or web address].  2. Click on the Sign Up Now box, which takes you to the New Member Sign Up page. You will need to provide the following information:  a. Enter your iKoa Access Code exactly as it appears at the top of this page. (You will not need to use this code after you’ve completed the sign-up process. If you do not sign up before the expiration date, you must request a new code.)   b. Enter your date of birth.   c. Enter your home email address.   d. Click Submit, and follow the next screen’s  instructions.  3. Create a [facility] ID. This will be your TapDog login ID and cannot be changed, so think of one that is secure and easy to remember.  4. Create a [facility] password. You can change your password at any time.  5. Enter your Password Reset Question and Answer. This can be used at a later time if you forget your password.   6. Enter your e-mail address. This allows you to receive e-mail notifications when new information is available in TapDog.  7. Click Sign Up. You can now view your health information.    For assistance activating your TapDog account, call [phone]        Quit Tobacco Information     Do you want to quit using tobacco?    Quitting tobacco decreases risks of cancer, heart and lung disease, increases life expectancy, improves sense of taste and smell, and increases spending money, among other benefits.    If you are thinking about quitting, we can help.  • [facility] Quit Tobacco Program: 678.168.1865  o Program occurs weekly for four weeks and includes pharmacist consultation on products to support quitting smoking or chewing tobacco. A provider referral is needed for pharmacist consultation.  • Tobacco Users Help Hotline: 9-520-QUIT-NOW (263-3866) or https://[place].quitlogix.org/  o Free, confidential telephone and online coaching for [place] residents. Sessions are designed on a schedule that is convenient for you. Eligible clients receive free nicotine replacement therapy.  • Nationally: www.smokefree.gov  o Information and professional assistance to support both immediate and long-term needs as you become, and remain, a non-smoker. Smokefree.gov allows you to choose the help that best fits your needs.

## 2017-03-22 NOTE — PROGRESS NOTES
Established Patient    Fede presents today with the following:    CC: 5 week follow up    HPI: Mr. Parikh is a 72 year old male that presents for a 5 week follow up for management of his chronic conditions. He has no acute complaints at this time and feels well.    Chronic Hep C/Cirrhosis of liver/Alcohol use: Pt was seen by Dr. Bella on 3/13 for management of his Hep C. He was told that he needs to stop consuming alcohol for a minimum of 6 months before treatment would be considered. He was able to decrease his consumption of alcohol from 1/2 pint per day to 2 shots of whiskey per day. Dr. Bella ordered a CMP, AFP, GGT, CBC, HCV fibrosure to be drawn before his next appt on 6/26. He was offered help quitting alcohol at [place] at today's visit, but refused.    Tobacco use: Continues to smoke 3 cigarettes per day. Understands the risks associated with tobacco use and counseled to quit.    COPD: Under good control with his scheduled and PRN inhalers. Did not get his PFT's yet. Encouraged to schedule that test.    B/l thyroid nodule: A referral was placed to endocrinology upon hospital discharge from [facility] on 12/14. He has not heard anything or received a call about this referral. No overt symptoms of hyper/hypothyroid at todays visit.    Cholelithiasis: He was seen by Dr. White (General surgery) on 3/3 for assessment of cholelithiasis and possible elective cholecystectomy. He has experienced acute RUQ 2/2 cholelithiasis twice in the past and at this most recent visit with Dr. White was asymptomatic. No indication for elective, outpatient cholecystectomy at this time, but was instructed to follow up if he experiences any more episodes of abdominal pain.    Patient Active Problem List    Diagnosis Date Noted   • Chronic hepatitis C without hepatic coma (CMS-HCC) 02/15/2017   • Tobacco use 02/15/2017   • Alcohol use (HCC) 02/15/2017   • Calculus of gallbladder without cholecystitis 02/15/2017   • Multiple  "thyroid nodules 12/14/2016   • Generalized pruritus 12/13/2016   • COPD (chronic obstructive pulmonary disease) (CMS-[facility]) 12/13/2016   • GERD (gastroesophageal reflux disease) 12/13/2016       Current Outpatient Prescriptions   Medication Sig Dispense Refill   • aspirin (ASA) 81 MG Chew Tab chewable tablet Take 1 Tab by mouth every day. 30 Tab 11   • loratadine (CLARITIN) 10 MG Tab Take 1 Tab by mouth every day. 30 Tab 3   • albuterol 108 (90 BASE) MCG/ACT Aero Soln inhalation aerosol Inhale 2 Puffs by mouth every 6 hours as needed for Shortness of Breath. 8.5 g 3   • beclomethasone (QVAR) 40 MCG/ACT inhaler Inhale 1 Puff by mouth 2 Times a Day. 2 Inhaler 5   • tiotropium (SPIRIVA) 18 MCG Cap Inhale 1 Cap by mouth every day. 30 Cap 5   • omeprazole (PRILOSEC) 20 MG delayed-release capsule Take 1 Cap by mouth every day. 30 Cap 5   • pravastatin (PRAVACHOL) 20 MG Tab Take 1 Tab by mouth every day. 30 Tab 5     No current facility-administered medications for this visit.       ROS: As per HPI. Additional pertinent symptoms as noted below.      /80 mmHg  Pulse 89  Temp(Src) 35.6 °C (96 °F)  Resp 16  Ht 1.727 m (5' 7.99\")  Wt 63.231 kg (139 lb 6.4 oz)  BMI 21.20 kg/m2  SpO2 99%    Physical Exam  General:  male, Alert and oriented, No apparent distress.  Eyes: Pupils equal and reactive. No scleral icterus. EOMI, conjunctiva white non-injected  Throat: Clear no erythema or exudates noted.  Neck: Supple. No lymphadenopathy noted. Multiple b/l thyroid nodules palpated.  Lungs: Clear to auscultation and percussion bilaterally. Decreased BS b/l with mild wheezing at base left lung.  Cardiovascular: Regular rate and rhythm. No murmurs, rubs or gallops.  Abdomen:  Benign. No rebound or guarding noted.  Extremities: No clubbing, cyanosis, edema. Scratch marks from itching, non-painful left knee lump.  Skin: Clear. No rash or suspicious skin lesions noted.      Assessment and Plan    1. Chronic " hepatitis C  - Hx IV drug use, released from group home 3 years ago, on parole  - Hep C RNA 1,900,000 on 12/13  - Hep C genotype 1a or 1b  -  on admit to hospital on 12/13. Dropped to 65 at discharge.  - US liver 12/13 showed fatty liver vs fibrosis  - HIV negative  - Seen by Dr. Bella (ID) on 3/13 with a follow up appt on 6/26. Draw CBC, CMP, AFP, Hep C fibrosure before appt with Dr. Bella.   - Counseled complete alcohol cessation X 6 months before Hep C treatment will be initiated. Offered help quitting at [place], but he declined at this time.    2. Alcohol use (HCC)  - Used to drink half pint per day. Now down to 2 shots whiskey per day.  - , ALT 77 on 12/13  - Albumin 2.8, likely malnourished + cirrhotic  - Counseled complete cessation  - Refused help quitting at [place]    3. Multiple thyroid nodules  - TSH 0.28, T4 0.91 on 12/14  - US thyroid showed numerous b/l thyroid nodules, easily palpated on physical exam  - Endocrinology referral previously placed, but has not heard from anyone. Will re-order today.  - Will likely need a FNA of thyroid    4. Chronic obstructive pulmonary disease  - Continue tiotropium and beclomethasone inhalers scheduled  - Continue albuterol inhaler  - Did not get PFT's. Encouraged to set up appt.  - Prevnar given 3/22/17. Give pneumovax in 1 year.    - Counseled smoking cessation    5. Calculus of gallbladder  - Asymptomatic at present  - 2 episodes of RUQ pain 2/2 cholelithiasis in past  - US liver 12/13 showed cholelithiasis, gallbladder wall thickening, CBD dilation, and fatty liver vs fibrosis  - Assessed by Dr. White on 3/3. No indication for surgery at this time, but return to surgical office if his abdominal pain recurs  - Surgery     6. Gastroesophageal reflux disease  - Continue omeprazole  - Stable    7. Tobacco use  - Has smoked on/off for the past 50 years  - Down to 3 cigarettes per day  - Counseled cessation  - Harm reduction plan discussed  -  Encouraged patient to quit    8. Health care maintenance  - Tdap, influenza given 2/15  - Prevnar given today, 3/22  - Colonoscopy 6-7 months ago. Will have to track down these records.  - Lacks immunizations for Hep A, Hep B  - Instructed to get Zoster vaccine at pharmacy      PLAN: F/u in 3 months

## 2017-05-30 ENCOUNTER — OFFICE VISIT (OUTPATIENT)
Dept: ENDOCRINOLOGY | Facility: MEDICAL CENTER | Age: 72
End: 2017-05-30
Payer: MEDICARE

## 2017-05-30 ENCOUNTER — TELEPHONE (OUTPATIENT)
Dept: INTERNAL MEDICINE | Facility: MEDICAL CENTER | Age: 72
End: 2017-05-30

## 2017-05-30 VITALS
DIASTOLIC BLOOD PRESSURE: 72 MMHG | HEART RATE: 96 BPM | OXYGEN SATURATION: 95 % | WEIGHT: 141 LBS | BODY MASS INDEX: 21.37 KG/M2 | HEIGHT: 68 IN | SYSTOLIC BLOOD PRESSURE: 108 MMHG

## 2017-05-30 DIAGNOSIS — E04.2 MULTIPLE THYROID NODULES: ICD-10-CM

## 2017-05-30 DIAGNOSIS — E03.8 HYPOTHYROIDISM DUE TO HASHIMOTO'S THYROIDITIS: ICD-10-CM

## 2017-05-30 DIAGNOSIS — E06.3 HYPOTHYROIDISM DUE TO HASHIMOTO'S THYROIDITIS: ICD-10-CM

## 2017-05-30 PROCEDURE — G8432 DEP SCR NOT DOC, RNG: HCPCS | Performed by: INTERNAL MEDICINE

## 2017-05-30 PROCEDURE — 4004F PT TOBACCO SCREEN RCVD TLK: CPT | Performed by: INTERNAL MEDICINE

## 2017-05-30 PROCEDURE — 4040F PNEUMOC VAC/ADMIN/RCVD: CPT | Performed by: INTERNAL MEDICINE

## 2017-05-30 PROCEDURE — 99204 OFFICE O/P NEW MOD 45 MIN: CPT | Performed by: INTERNAL MEDICINE

## 2017-05-30 PROCEDURE — 3017F COLORECTAL CA SCREEN DOC REV: CPT | Performed by: INTERNAL MEDICINE

## 2017-05-30 PROCEDURE — 1101F PT FALLS ASSESS-DOCD LE1/YR: CPT | Performed by: INTERNAL MEDICINE

## 2017-05-30 PROCEDURE — G8420 CALC BMI NORM PARAMETERS: HCPCS | Performed by: INTERNAL MEDICINE

## 2017-05-30 NOTE — MR AVS SNAPSHOT
"        Fede Parikh   2017 8:30 AM   Office Visit   MRN: [id number]    Department:  Endocrinology [facility]   Dept Phone:  [phone]    Description:  Male : 1945   Provider:  Zak Guidry M.D.           Allergies as of 2017     No Known Allergies      You were diagnosed with     Multiple thyroid nodules   [[id number]]         Vital Signs     Blood Pressure Pulse Height Weight Body Mass Index Oxygen Saturation    108/72 mmHg 96 1.727 m (5' 7.99\") 63.957 kg (141 lb) 21.44 kg/m2 95%    Smoking Status                   Current Every Day Smoker           Basic Information     Date Of Birth Sex Race Ethnicity Preferred Language    1945 Male Black or  Non- English      Your appointments     2017 11:00 AM   Established Patient with Cortes Dubose M.D.   [facility] Med - Internal Medicine (--)    [address] [place] [address]  [address]  [facility] [address] [phone]           You will be receiving a confirmation call a few days before your appointment from our automated call confirmation system.            2017 10:00 AM   Established Patient with Kevon Bella M.D.   [facility] Med - Internal Medicine (--)    [address]  [address]  [facility] [address] [phone]           You will be receiving a confirmation call a few days before your appointment from our automated call confirmation system.            Aug 21, 2017 12:50 PM   Established Patient with Zak Guidry M.D.   [facility] End[facility]    [address] [facility], [address]  [facility] [address] [phone]           You will be receiving a confirmation call a few days before your appointment from our automated call confirmation system.              Problem List              ICD-10-CM Priority Class Noted - Resolved    Generalized pruritus L29.9   2016 - Present    COPD (chronic obstructive pulmonary disease) " (CMS-HCC) J44.9   12/13/2016 - Present    GERD (gastroesophageal reflux disease) K21.9   12/13/2016 - Present    Multiple thyroid nodules E04.2   12/14/2016 - Present    Chronic hepatitis C without hepatic coma (CMS-HCC) B18.2   2/15/2017 - Present    Tobacco use Z72.0   2/15/2017 - Present    Alcohol use Z78.9   2/15/2017 - Present    Calculus of gallbladder without cholecystitis K80.20   2/15/2017 - Present      Health Maintenance        Date Due Completion Dates    IMM ZOSTER VACCINE 2/13/2003 ---    IMM PNEUMOCOCCAL 65+ (ADULT) LOW/MEDIUM RISK SERIES (2 of 2 - PPSV23) 3/22/2018 3/22/2017    COLONOSCOPY 7/7/2026 7/7/2016    IMM DTaP/Tdap/Td Vaccine (2 - Td) 2/15/2027 2/15/2017            Current Immunizations     13-VALENT PCV PREVNAR 3/22/2017    Influenza Vaccine Adult HD 2/15/2017    Influenza Vaccine Quad Inj (Preserved) 11/4/2014    Tdap Vaccine 2/15/2017      Below and/or attached are the medications your provider expects you to take. Review all of your home medications and newly ordered medications with your provider and/or pharmacist. Follow medication instructions as directed by your provider and/or pharmacist. Please keep your medication list with you and share with your provider. Update the information when medications are discontinued, doses are changed, or new medications (including over-the-counter products) are added; and carry medication information at all times in the event of emergency situations     Allergies:  No Known Allergies          Medications  Valid as of: May 30, 2017 -  8:49 AM    Generic Name Brand Name Tablet Size Instructions for use    Albuterol Sulfate (Aero Soln) albuterol 108 (90 BASE) MCG/ACT Inhale 2 Puffs by mouth every 6 hours as needed for Shortness of Breath.        Aspirin (Chew Tab) ASA 81 MG Take 1 Tab by mouth every day.        Beclomethasone Dipropionate (Aero Soln) QVAR 40 MCG/ACT Inhale 1 Puff by mouth 2 Times a Day.        Loratadine (Tab) CLARITIN 10 MG Take 1  Tab by mouth every day.        Omeprazole (CAPSULE DELAYED RELEASE) PRILOSEC 20 MG Take 1 Cap by mouth every day.        Pravastatin Sodium (Tab) PRAVACHOL 20 MG Take 1 Tab by mouth every day.        Tiotropium Bromide Monohydrate (Cap) SPIRIVA 18 MCG Inhale 1 Cap by mouth every day.        .                 Medicines prescribed today were sent to:     [id number] - [place], [place] - [address] [place] [address]    Phone: [phone] Fax: [phone]    Open 24 Hours?: No      Medication refill instructions:       If your prescription bottle indicates you have medication refills left, it is not necessary to call your provider’s office. Please contact your pharmacy and they will refill your medication.    If your prescription bottle indicates you do not have any refills left, you may request refills at any time through one of the following ways: The online LearnSprout system (except Urgent Care), by calling your provider’s office, or by asking your pharmacy to contact your provider’s office with a refill request. Medication refills are processed only during regular business hours and may not be available until the next business day. Your provider may request additional information or to have a follow-up visit with you prior to refilling your medication.   *Please Note: Medication refills are assigned a new Rx number when refilled electronically. Your pharmacy may indicate that no refills were authorized even though a new prescription for the same medication is available at the pharmacy. Please request the medicine by name with the pharmacy before contacting your provider for a refill.        Your To Do List     Future Labs/Procedures Complete By Expires    FREE THYROXINE  As directed 5/30/2018    THYROID PEROXIDASE  (TPO) AB  As directed 5/30/2018    TSH  As directed 5/30/2018    US-NEEDLE BX-THYROID  As directed 5/30/2018         LearnSprout Access Code: [email or web address]  Expires:  6/29/2017  8:49 AM    [employer]  A secure, online tool to manage your health information     [employer]’s LynxFit for Google Glass® is a secure, online tool that connects you to your personalized health information from the privacy of your home -- day or night - making it very easy for you to manage your healthcare. Once the activation process is completed, you can even access your medical information using the LynxFit for Google Glass juan alberto, which is available for free in the Apple Juan Alberto store or Google Play store.     LynxFit for Google Glass provides the following levels of access (as shown below):   My Chart Features   [facility] Doctor [facility]  Specialists [facility]  Urgent  Care [facility]  Primary Care  Doctor   Email your healthcare team securely and privately 24/7 X X X    Manage appointments: schedule your next appointment; view details of past/upcoming appointments X      Request prescription refills. X      View recent personal medical records, including lab and immunizations X X X X   View health record, including health history, allergies, medications X X X X   Read reports about your outpatient visits, procedures, consult and ER notes X X X X   See your discharge summary, which is a recap of your hospital and/or ER visit that includes your diagnosis, lab results, and care plan. X X       How to register for LynxFit for Google Glass:  1. Go to  [email or web address].  2. Click on the Sign Up Now box, which takes you to the New Member Sign Up page. You will need to provide the following information:  a. Enter your LynxFit for Google Glass Access Code exactly as it appears at the top of this page. (You will not need to use this code after you’ve completed the sign-up process. If you do not sign up before the expiration date, you must request a new code.)   b. Enter your date of birth.   c. Enter your home email address.   d. Click Submit, and follow the next screen’s instructions.  3. Create a LynxFit for Google Glass ID. This will be your LynxFit for Google Glass login ID and cannot be changed, so think of one  that is secure and easy to remember.  4. Create a [employer] password. You can change your password at any time.  5. Enter your Password Reset Question and Answer. This can be used at a later time if you forget your password.   6. Enter your e-mail address. This allows you to receive e-mail notifications when new information is available in Olacabs.  7. Click Sign Up. You can now view your health information.    For assistance activating your [employer] account, call [phone]        Quit Tobacco Information     Do you want to quit using tobacco?    Quitting tobacco decreases risks of cancer, heart and lung disease, increases life expectancy, improves sense of taste and smell, and increases spending money, among other benefits.    If you are thinking about quitting, we can help.  • [facility] Quit Tobacco Program: 895.110.5025  o Program occurs weekly for four weeks and includes pharmacist consultation on products to support quitting smoking or chewing tobacco. A provider referral is needed for pharmacist consultation.  • Tobacco Users Help Hotline: 5-800QUIT-NOW (710-1146) or https://[place].quitlogix.org/  o Free, confidential telephone and online coaching for [place] residents. Sessions are designed on a schedule that is convenient for you. Eligible clients receive free nicotine replacement therapy.  • Nationally: www.smokefree.gov  o Information and professional assistance to support both immediate and long-term needs as you become, and remain, a non-smoker. Smokefree.gov allows you to choose the help that best fits your needs.

## 2017-05-30 NOTE — TELEPHONE ENCOUNTER
----- Message from Chuy Rider Ass't sent at 3/28/2017  8:38 AM PDT -----  Regarding: NP referral  Hi Dr. Dubose,    Dr. Brown did review this NP referral and he want to speak to you.    He will be in the office mon-wed from 9 am - 5 pm.    Our Tel number is [phone]    Thank You  Lyndsey

## 2017-05-30 NOTE — PROGRESS NOTES
New Patient Consult Note  Primary care physician: Cortes Dubose M.D.    Reason for consult: Multiple thyroid nodules    HPI:  Fede Parikh is a 72 y.o. old patient who comes in today for evaluation of multiple thyroid nodules.    Patient had an ultrasound done in December 2016 that showed multiple thyroid nodules. The nodule on the right side is 3 cm, on the left side is 1.4 cm and in the isthmus it is 1.5 cm in the biggest dimension.  Patient does not have any symptoms of hypothyroidism. Patient does have history of smoking and continues to smoke.    ROS:  Constitutional: No weight loss, clothes smell of cigarette smoking  Thyroid : nodules are not clearly palpable although there is anirregular lumpy bumpy feeling on the exam   Cardiac: No palpitations or racing heart  Resp: No shortness of breath  Neuro: No numbness or tinging in feet  Endo: No heat or cold intolerance, no polyuria or polydipsia  All other systems were reviewed and were negative.    Past Medical History:  Patient Active Problem List    Diagnosis Date Noted   • Chronic hepatitis C without hepatic coma (CMS-HCC) 02/15/2017   • Tobacco use 02/15/2017   • Alcohol use 02/15/2017   • Calculus of gallbladder without cholecystitis 02/15/2017   • Multiple thyroid nodules 12/14/2016   • Generalized pruritus 12/13/2016   • COPD (chronic obstructive pulmonary disease) (CMS-HCC) 12/13/2016   • GERD (gastroesophageal reflux disease) 12/13/2016       Past Surgical History:  History reviewed. No pertinent past surgical history.    Allergies:  Review of patient's allergies indicates no known allergies.    Social History:  Social History     Social History   • Marital Status: Single     Spouse Name: N/A   • Number of Children: N/A   • Years of Education: N/A     Occupational History   • Not on file.     Social History Main Topics   • Smoking status: Current Every Day Smoker -- 0.25 packs/day     Types: Cigarettes   • Smokeless tobacco: Never Used   •  "Alcohol Use: Yes      Comment: 2/day    • Drug Use: No   • Sexual Activity: Not on file     Other Topics Concern   • Not on file     Social History Narrative       Family History:  Family History   Problem Relation Age of Onset   • Heart Disease Mother    • Cancer Mother    • Diabetes Brother        Medications:    Current outpatient prescriptions:   •  aspirin (ASA) 81 MG Chew Tab chewable tablet, Take 1 Tab by mouth every day., Disp: 30 Tab, Rfl: 11  •  loratadine (CLARITIN) 10 MG Tab, Take 1 Tab by mouth every day., Disp: 30 Tab, Rfl: 3  •  albuterol 108 (90 BASE) MCG/ACT Aero Soln inhalation aerosol, Inhale 2 Puffs by mouth every 6 hours as needed for Shortness of Breath., Disp: 8.5 g, Rfl: 3  •  beclomethasone (QVAR) 40 MCG/ACT inhaler, Inhale 1 Puff by mouth 2 Times a Day., Disp: 2 Inhaler, Rfl: 5  •  tiotropium (SPIRIVA) 18 MCG Cap, Inhale 1 Cap by mouth every day., Disp: 30 Cap, Rfl: 5  •  omeprazole (PRILOSEC) 20 MG delayed-release capsule, Take 1 Cap by mouth every day., Disp: 30 Cap, Rfl: 5  •  pravastatin (PRAVACHOL) 20 MG Tab, Take 1 Tab by mouth every day., Disp: 30 Tab, Rfl: 5    Labs:    Physical Examination:  Vital signs: /72 mmHg  Pulse 96  Ht 1.727 m (5' 7.99\")  Wt 63.957 kg (141 lb)  BMI 21.44 kg/m2  SpO2 95%  General: No apparent distress, cooperative  Eyes: No scleral icterus or discharge  ENMT: Normal on external inspection of nose, lips  Neck: No abnormal masses on inspection  Resp: Normal effort, clear to auscultation bilaterally   CVS: Regular rate and rhythm, S1 S2 normal, no murmur   Extremities: No edema  Neuro: Alert and oriented  Skin: No rash  Psych: Normal mood and affect    Assessment and Plan:    1. Multiple thyroid nodules    -The dominant nodule on the right side is 3 cm on the left side is 1.4 cm  And in isthmus it is 1.5 cm . 3 nodules with have to be biopsied to rule out thyroid cancer.  US-NEEDLE BX-THYROID; Future  - TSH; Future  - FREE THYROXINE; Future  - " THYROID PEROXIDASE  (TPO) AB; Future    2. Hypothyroidism due to Hashimoto's thyroiditis  Considering multiple enlarged nodules will also rule out hypothyroidism.      Return in about 2 months (around 7/30/2017).    Thank you for allowing me to participate in the care of this patient.    Zak Guidry M.D.  05/30/2017    CC:   Cortes Dubose M.D.    This note was created using voice recognition software (Dragon). The accuracy of the dictation is limited by the abilities of the software. I have reviewed the note prior to signing, however some errors in grammar and context are still possible. If you have any questions related to this note please do not hesitate to contact our office.

## 2017-06-13 ENCOUNTER — HOSPITAL ENCOUNTER (OUTPATIENT)
Dept: LAB | Facility: MEDICAL CENTER | Age: 72
End: 2017-06-13
Attending: INTERNAL MEDICINE
Payer: MEDICARE

## 2017-06-13 DIAGNOSIS — B18.2 CHRONIC HEPATITIS C WITHOUT HEPATIC COMA (HCC): ICD-10-CM

## 2017-06-13 LAB
ALBUMIN SERPL BCP-MCNC: 3.9 G/DL (ref 3.2–4.9)
ALBUMIN/GLOB SERPL: 1 G/DL
ALP SERPL-CCNC: 90 U/L (ref 30–99)
ALT SERPL-CCNC: 51 U/L (ref 2–50)
ANION GAP SERPL CALC-SCNC: 7 MMOL/L (ref 0–11.9)
AST SERPL-CCNC: 74 U/L (ref 12–45)
BILIRUB SERPL-MCNC: 0.9 MG/DL (ref 0.1–1.5)
BUN SERPL-MCNC: 15 MG/DL (ref 8–22)
CALCIUM SERPL-MCNC: 9.6 MG/DL (ref 8.5–10.5)
CHLORIDE SERPL-SCNC: 104 MMOL/L (ref 96–112)
CO2 SERPL-SCNC: 28 MMOL/L (ref 20–33)
CREAT SERPL-MCNC: 1.49 MG/DL (ref 0.5–1.4)
ERYTHROCYTE [DISTWIDTH] IN BLOOD BY AUTOMATED COUNT: 43.8 FL (ref 35.9–50)
GFR SERPL CREATININE-BSD FRML MDRD: 46 ML/MIN/1.73 M 2
GGT SERPL-CCNC: 151 U/L (ref 7–51)
GLOBULIN SER CALC-MCNC: 3.8 G/DL (ref 1.9–3.5)
GLUCOSE SERPL-MCNC: 120 MG/DL (ref 65–99)
HCT VFR BLD AUTO: 43.9 % (ref 42–52)
HGB BLD-MCNC: 15.2 G/DL (ref 14–18)
MCH RBC QN AUTO: 32 PG (ref 27–33)
MCHC RBC AUTO-ENTMCNC: 34.6 G/DL (ref 33.7–35.3)
MCV RBC AUTO: 92.4 FL (ref 81.4–97.8)
PLATELET # BLD AUTO: 158 K/UL (ref 164–446)
PLATELET # BLD AUTO: 167 K/UL (ref 164–446)
PMV BLD AUTO: 11.1 FL (ref 9–12.9)
POTASSIUM SERPL-SCNC: 4 MMOL/L (ref 3.6–5.5)
PROT SERPL-MCNC: 7.7 G/DL (ref 6–8.2)
RBC # BLD AUTO: 4.75 M/UL (ref 4.7–6.1)
SODIUM SERPL-SCNC: 139 MMOL/L (ref 135–145)
WBC # BLD AUTO: 4 K/UL (ref 4.8–10.8)

## 2017-06-13 PROCEDURE — 85027 COMPLETE CBC AUTOMATED: CPT

## 2017-06-13 PROCEDURE — 84520 ASSAY OF UREA NITROGEN: CPT | Mod: 59

## 2017-06-13 PROCEDURE — 85049 AUTOMATED PLATELET COUNT: CPT | Mod: 59

## 2017-06-13 PROCEDURE — 84460 ALANINE AMINO (ALT) (SGPT): CPT | Mod: 59

## 2017-06-13 PROCEDURE — 36415 COLL VENOUS BLD VENIPUNCTURE: CPT

## 2017-06-13 PROCEDURE — 82977 ASSAY OF GGT: CPT | Mod: 91

## 2017-06-13 PROCEDURE — 82105 ALPHA-FETOPROTEIN SERUM: CPT

## 2017-06-13 PROCEDURE — 80053 COMPREHEN METABOLIC PANEL: CPT

## 2017-06-13 PROCEDURE — 82977 ASSAY OF GGT: CPT

## 2017-06-13 PROCEDURE — 84450 TRANSFERASE (AST) (SGOT): CPT | Mod: 59

## 2017-06-13 PROCEDURE — 83883 ASSAY NEPHELOMETRY NOT SPEC: CPT

## 2017-06-14 LAB — AFP-TM SERPL-MCNC: 19 NG/ML (ref 0–9)

## 2017-06-15 LAB
A2 MACROGLOB SERPL-MCNC: 300 MG/DL (ref 131–293)
ALT SERPL-CCNC: 59 U/L (ref 5–50)
ANNOTATION COMMENT IMP: ABNORMAL
AST SERPL-CCNC: 87 U/L (ref 9–50)
BUN SERPL-SCNC: 14 MG/DL (ref 7–20)
CIRRHOMETER PT SCORE Q4850: 0.43
FIBROSIS STAGE SERPL QL: ABNORMAL
GGT SERPL-CCNC: 159 U/L (ref 7–51)
INFLAMETER META CLASS Q4853: ABNORMAL
INFLAMETER PT SCORE Q4852: 0.58
LIVER FIBR SCORE SERPL CALC.FIBROMETER: 0.94
PATHOLOGY STUDY: ABNORMAL
PROTHROM ACT/NOR PPP: 90 % (ref 90–120)

## 2017-06-22 ENCOUNTER — HOSPITAL ENCOUNTER (OUTPATIENT)
Dept: RADIOLOGY | Facility: MEDICAL CENTER | Age: 72
End: 2017-06-22
Attending: INTERNAL MEDICINE
Payer: MEDICARE

## 2017-06-22 DIAGNOSIS — E04.2 MULTIPLE THYROID NODULES: ICD-10-CM

## 2017-06-22 PROCEDURE — 88112 CYTOPATH CELL ENHANCE TECH: CPT

## 2017-06-22 PROCEDURE — 76942 ECHO GUIDE FOR BIOPSY: CPT

## 2017-06-22 PROCEDURE — 10022 HCHG FINE NEEDLE ASP W/IMAGING GUIDANCE: CPT

## 2017-06-22 NOTE — PROGRESS NOTES
US Procedure RN's Note:    US guided multiple thyroid nodules fine needle aspiration done by Dr. Dunbar; LEFT, MID, and RIGHT anterior aspect of neck access site; x3 jar of cytolyt obtained and sent to pathology lab; pt tolerated the procedure well; pt hemodynamically stable pre/intra/post procedure; all questions and concerns answered prior to being d/c; pt d/c home.

## 2017-06-26 ENCOUNTER — OFFICE VISIT (OUTPATIENT)
Dept: INTERNAL MEDICINE | Facility: MEDICAL CENTER | Age: 72
End: 2017-06-26
Payer: MEDICARE

## 2017-06-26 VITALS
OXYGEN SATURATION: 97 % | DIASTOLIC BLOOD PRESSURE: 74 MMHG | SYSTOLIC BLOOD PRESSURE: 102 MMHG | HEART RATE: 80 BPM | BODY MASS INDEX: 21.25 KG/M2 | WEIGHT: 140.2 LBS | TEMPERATURE: 97 F | HEIGHT: 68 IN

## 2017-06-26 DIAGNOSIS — B19.20 COMPENSATED CIRRHOSIS RELATED TO HEPATITIS C VIRUS (HCV) (HCC): ICD-10-CM

## 2017-06-26 DIAGNOSIS — K74.69 COMPENSATED CIRRHOSIS RELATED TO HEPATITIS C VIRUS (HCV) (HCC): ICD-10-CM

## 2017-06-26 PROCEDURE — 99213 OFFICE O/P EST LOW 20 MIN: CPT | Performed by: INTERNAL MEDICINE

## 2017-06-26 RX ORDER — RIBAVIRIN 200 MG/1
400 TABLET, FILM COATED ORAL 2 TIMES DAILY
Qty: 112 TAB | Refills: 2 | Status: SHIPPED | OUTPATIENT
Start: 2017-06-26 | End: 2018-01-30

## 2017-06-26 RX ORDER — LEDIPASVIR AND SOFOSBUVIR 90; 400 MG/1; MG/1
90 TABLET, FILM COATED ORAL DAILY
Qty: 28 TAB | Refills: 2 | Status: SHIPPED | OUTPATIENT
Start: 2017-06-26 | End: 2018-01-30

## 2017-06-26 ASSESSMENT — ENCOUNTER SYMPTOMS
WEIGHT LOSS: 1
ABDOMINAL PAIN: 0
VOMITING: 0
CONSTIPATION: 0
HEARTBURN: 1
CHILLS: 0
SHORTNESS OF BREATH: 1
FEVER: 0
WHEEZING: 1
POLYDIPSIA: 0
WEAKNESS: 0

## 2017-06-26 NOTE — MR AVS SNAPSHOT
"        Fede Parikh   2017 10:00 AM   Office Visit   MRN: [id number]    Department:  [facility] Med - Internal Med   Dept Phone:  [phone]    Description:  Male : 1945   Provider:  Kevon Bella M.D.           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   [facility] Med - Internal Medicine (--)    [address] [place] [address]  [address]  [facility] [address] [phone]           You will be receiving a confirmation call a few days before your appointment from our automated call confirmation system.            Aug 21, 2017 12:50 PM   Established Patient with Zak Guidry M.D.   [facility]    [address], [address]  [facility] [address] [phone]           You will be receiving a confirmation call a few days before your appointment from our automated call confirmation system.              Problem List              ICD-10-CM Priority Class Noted - Resolved    Generalized pruritus L29.9   2016 - Present    COPD (chronic obstructive pulmonary disease) (CMS-HCC) J44.9   2016 - Present    GERD (gastroesophageal reflux disease) K21.9   2016 - Present    Multiple thyroid nodules E04.2   2016 - Present    Chronic hepatitis C without hepatic coma (CMS-HCC) B18.2   " 2/15/2017 - Present    Tobacco use Z72.0   2/15/2017 - Present    Alcohol use Z78.9   2/15/2017 - Present    Calculus of gallbladder without cholecystitis K80.20   2/15/2017 - Present      Health Maintenance        Date Due Completion Dates    IMM ZOSTER VACCINE 2/13/2003 ---    IMM PNEUMOCOCCAL 65+ (ADULT) LOW/MEDIUM RISK SERIES (2 of 2 - PPSV23) 3/22/2018 3/22/2017    COLONOSCOPY 7/7/2026 7/7/2016    IMM DTaP/Tdap/Td Vaccine (2 - Td) 2/15/2027 2/15/2017            Current Immunizations     13-VALENT PCV PREVNAR 3/22/2017    Influenza Vaccine Adult HD 2/15/2017    Influenza Vaccine Quad Inj (Preserved) 11/4/2014    Tdap Vaccine 2/15/2017      Below and/or attached are the medications your provider expects you to take. Review all of your home medications and newly ordered medications with your provider and/or pharmacist. Follow medication instructions as directed by your provider and/or pharmacist. Please keep your medication list with you and share with your provider. Update the information when medications are discontinued, doses are changed, or new medications (including over-the-counter products) are added; and carry medication information at all times in the event of emergency situations     Allergies:  No Known Allergies          Medications  Valid as of: June 26, 2017 - 10:25 AM    Generic Name Brand Name Tablet Size Instructions for use    Albuterol Sulfate (Aero Soln) albuterol 108 (90 BASE) MCG/ACT Inhale 2 Puffs by mouth every 6 hours as needed for Shortness of Breath.        Aspirin (Chew Tab) ASA 81 MG Take 1 Tab by mouth every day.        Beclomethasone Dipropionate (Aero Soln) QVAR 40 MCG/ACT Inhale 1 Puff by mouth 2 Times a Day.        Ledipasvir-Sofosbuvir (Tab) Ledipasvir-Sofosbuvir  MG Take 90 mg by mouth every day.        Loratadine (Tab) CLARITIN 10 MG Take 1 Tab by mouth every day.        Omeprazole (CAPSULE DELAYED RELEASE) PRILOSEC 20 MG Take 1 Cap by mouth every day.        Pravastatin  Sodium (Tab) PRAVACHOL 20 MG Take 1 Tab by mouth every day.        Ribavirin (Tab) COPEGUS 200 MG Take 2 Tabs by mouth 2 times a day.        Tiotropium Bromide Monohydrate (Cap) SPIRIVA 18 MCG Inhale 1 Cap by mouth every day.        .                 Medicines prescribed today were sent to:     [facility] - [place], [place] - [address]    [address] [place] [address]    Phone: [phone] Fax: [phone]    Open 24 Hours?: No    [facility], [place] - [address] [place] [address]    [address] [address]    Phone: [phone] Fax: [phone]    Open 24 Hours?: No      Medication refill instructions:       If your prescription bottle indicates you have medication refills left, it is not necessary to call your provider’s office. Please contact your pharmacy and they will refill your medication.    If your prescription bottle indicates you do not have any refills left, you may request refills at any time through one of the following ways: The online bepretty system (except Urgent Care), by calling your provider’s office, or by asking your pharmacy to contact your provider’s office with a refill request. Medication refills are processed only during regular business hours and may not be available until the next business day. Your provider may request additional information or to have a follow-up visit with you prior to refilling your medication.   *Please Note: Medication refills are assigned a new Rx number when refilled electronically. Your pharmacy may indicate that no refills were authorized even though a new prescription for the same medication is available at the pharmacy. Please request the medicine by name with the pharmacy before contacting your provider for a refill.           MyChart Status: Patient Declined        Quit Tobacco Information     Do you want to quit using tobacco?    Quitting tobacco decreases risks of cancer, heart and lung  disease, increases life expectancy, improves sense of taste and smell, and increases spending money, among other benefits.    If you are thinking about quitting, we can help.  • [facility] Quit Tobacco Program: [phone]  o Program occurs weekly for four weeks and includes pharmacist consultation on products to support quitting smoking or chewing tobacco. A provider referral is needed for pharmacist consultation.  • Tobacco Users Help Hotline: 6-500-QUIT-NOW (464-5782) or https://[place].quitlogix.org/  o Free, confidential telephone and online coaching for [place] residents. Sessions are designed on a schedule that is convenient for you. Eligible clients receive free nicotine replacement therapy.  • Nationally: www.smokefree.gov  o Information and professional assistance to support both immediate and long-term needs as you become, and remain, a non-smoker. Smokefree.gov allows you to choose the help that best fits your needs.

## 2017-06-26 NOTE — PROGRESS NOTES
"Subjective:      Fede Parikh is a 72 y.o.AA male who presents in f/u  for evaluation of newly diagnosed HCV. Was recently diagnosed ar [facility] in December when admitted with abdominal pain.  Believes he was infected during his greater than 30 year time in [facility]. Used iv drugs during this period. Has now been released, has completely stopped drinking 6 months ago. Has not been vaccinated . Feels fatigued   He is on PPI for acid reflux- can stop if needed  PMH- COPD /htn/ acid reflux/gallbldder disease   MEDS- reviewed with patient           HPI      Review of Systems   Constitutional: Positive for weight loss and malaise/fatigue. Negative for fever and chills.   Respiratory: Positive for shortness of breath and wheezing.    Cardiovascular: Negative for leg swelling.   Gastrointestinal: Positive for heartburn. Negative for vomiting, abdominal pain and constipation.   Skin: Negative for itching and rash.   Neurological: Negative for weakness.   Endo/Heme/Allergies: Negative for polydipsia.          Objective:     /74 mmHg  Pulse 80  Temp(Src) 36.1 °C (97 °F)  Ht 1.727 m (5' 7.99\")  Wt 63.594 kg (140 lb 3.2 oz)  BMI 21.32 kg/m2  SpO2 97%     Physical Exam   Constitutional: He is oriented to person, place, and time.   Cardiovascular: Normal rate and regular rhythm.    Pulmonary/Chest:   I do not hear any wheezing   Abdominal: Soft. Bowel sounds are normal.   Liver edge palpable, no spleen, no ascites   Neurological: He is alert and oriented to person, place, and time.   Skin: Skin is warm. No rash noted. No erythema.   Psychiatric: He has a normal mood and affect. His behavior is normal.   Vitals reviewed.    Results for FEDE PARIKH (MRN [id number]) as of 6/26/2017 10:28   Ref. Range 6/13/2017 09:05   WBC Latest Ref Range: 4.8-10.8 K/uL 4.0 (L)   RBC Latest Ref Range: 4.70-6.10 M/uL 4.75   Hemoglobin Latest Ref Range: 14.0-18.0 g/dL 15.2   Hematocrit Latest Ref Range: 42.0-52.0 % 43.9   MCV " Latest Ref Range: 81.4-97.8 fL 92.4   MCH Latest Ref Range: 27.0-33.0 pg 32.0   MCHC Latest Ref Range: 33.7-35.3 g/dL 34.6   RDW Latest Ref Range: 35.9-50.0 fL 43.8   Platelet Count Latest Ref Range: 164-446 K/uL 167   MPV Latest Ref Range: 9.0-12.9 fL 11.1   Results for SADE ISSA (MRN [id number]) as of 6/26/2017 10:28   Ref. Range 6/13/2017 09:05   Alpha-2 Macroglobulin Latest Ref Range: 131-293 mg/dL 300 (H)   Urea Nitrogen, Serum Latest Ref Range: 7-20 mg/dL 14   CirrhoMeter Patient Score Unknown 0.43   Fibrosis Metavir Classification Unknown F3[F3-F4]   InflaMeter Patient Score Unknown 0.58   InflaMeter Metavir Classification Unknown A1/A2   FibroMeter Interpretation Unknown See Report   EER Ribrometer Report Unknown See Note   FibroMeter Patient Score Unknown 0.94   Results for SADE ISSA (MRN [id number]) as of 6/26/2017 10:28   Ref. Range 2/27/2017 08:05   Hepatitis C Genotyping Unknown 1a or 1b       Results for SADE ISSA (MRN [id number]) as of 6/26/2017 10:28   Ref. Range 12/13/2016 08:31   Hepatitis C Rna By Pcr, Quanti Latest Units: IU/mL 1,900,000   HCV RNA PCR Qnt Log Latest Units: log IU 6.3   HCV RNA PCR Qnt Int Latest Ref Range: Not Detected  Detected (A)   Results for SADE ISSA (MRN [id number]) as of 6/26/2017 11:06   Ref. Range 6/13/2017 09:05   Sodium Latest Ref Range: 135-145 mmol/L 139   Potassium Latest Ref Range: 3.6-5.5 mmol/L 4.0   Chloride Latest Ref Range:  mmol/L 104   Co2 Latest Ref Range: 20-33 mmol/L 28   Anion Gap Latest Ref Range: 0.0-11.9  7.0   Glucose Latest Ref Range: 65-99 mg/dL 120 (H)   Bun Latest Ref Range: 8-22 mg/dL 15   Creatinine Latest Ref Range: 0.50-1.40 mg/dL 1.49 (H)   GFR If  Latest Ref Range: >60 mL/min/1.73 m 2 56 (A)   GFR If Non  Latest Ref Range: >60 mL/min/1.73 m 2 46 (A)   Calcium Latest Ref Range: 8.5-10.5 mg/dL 9.6   AST(SGOT) Latest Ref Range: 12-45 U/L 74 (H)   ALT(SGPT) Latest Ref  Range: 2-50 U/L 51 (H)   Alkaline Phosphatase Latest Ref Range: 30-99 U/L 90   Total Bilirubin Latest Ref Range: 0.1-1.5 mg/dL 0.9   Albumin Latest Ref Range: 3.2-4.9 g/dL 3.9   Total Protein Latest Ref Range: 6.0-8.2 g/dL 7.7   Globulin Latest Ref Range: 1.9-3.5 g/dL 3.8 (H)   A-G Ratio Latest Units: g/dL 1.0   Gamma Gt Latest Ref Range: 7-51 U/L 151 (H)     Impression          1.  Cholelithiasis and gallbladder wall thickening, however the gallbladder appears contracted and wall thickening is likely related to contracted gallbladder state.  2.  Common bile duct dilatation, concerning for distal obstructing stone, stenosis, or ampullary lesion. Could be further evaluated with ERCP as clinically appropriate.  3.  Echogenic liver, compatible with fatty change versus fibrosis.           Reading Provider Reading Date     Aline Sellers M.D. Dec 14, 2016         Signing Provider Signing Date Signing Time     Aline Sellers M.D. Dec 14, 2016  2:16 AM       Lab Information        Assessment/Plan:     1. 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 and elevated APRI score.  He understands he must  Remain totally abstinent of alcohol if he wants to live and to be treated for HCV.  Will place Rx for Harvoni and ribavirin 400mg BID for 12 weeks.    - COMP METABOLIC PANEL; Future  - AFP SERUM TUMOR MARKER; Future  - GAMMA GT (GGT); Future  - CBC WITHOUT DIFFERENTIAL; Future  - HCV FIBROSURE

## 2017-06-27 ENCOUNTER — TELEPHONE (OUTPATIENT)
Dept: INTERNAL MEDICINE | Facility: MEDICAL CENTER | Age: 72
End: 2017-06-27

## 2017-06-27 NOTE — TELEPHONE ENCOUNTER
DOCUMENTATION OF PAR STATUS:    1. Name of Medication & Dose: Harvoni/Ribavirin     2. Name of Prescription Coverage Company & phone #: [facility]. THEY WILL HELP GET AUTHORIZATION. STEVEN FROM [facility] CALLED AND REQUESTED CLINICALS/LABS/IMAGING/DEMO/INSUR. TO BE FAXED TO #[phone]    3. Date Prior Auth Submitted: 06/27/17    4. What information was given to obtain insurance decision? YES    5. Prior Auth Letter Approved or Denied? PENDING. AWAITING FOR [facility]    6. Action Taken: Pharmacy/Patient Notified: Yes    FAXED EVERYTHING THAT WAS REQUESTED TO "[employer]. 06/27/17

## 2017-06-29 NOTE — TELEPHONE ENCOUNTER
Received a call from Pati at [facility]. She asked if pt is going to receive medication at the clinic or at home. #[phone] Opt1 Opt 2    Called [facility] and spoke with Domingo. Notified him pt should receive medication from his home. He said they already spoke with the pt and scheduled delivery. Pt should be receiving it by tomorrow. He said they notified pt to not to start until he gets dr's approval.     Called and spoke with pt. Scheduled pt for Monday with Dr Bella to discuss medication. I told pt do not take medication yet. He understood

## 2017-07-03 ENCOUNTER — OFFICE VISIT (OUTPATIENT)
Dept: INTERNAL MEDICINE | Facility: MEDICAL CENTER | Age: 72
End: 2017-07-03
Payer: MEDICARE

## 2017-07-03 VITALS
SYSTOLIC BLOOD PRESSURE: 119 MMHG | BODY MASS INDEX: 21.22 KG/M2 | TEMPERATURE: 98.1 F | OXYGEN SATURATION: 95 % | RESPIRATION RATE: 16 BRPM | WEIGHT: 140 LBS | DIASTOLIC BLOOD PRESSURE: 74 MMHG | HEART RATE: 99 BPM | HEIGHT: 68 IN

## 2017-07-03 VITALS
BODY MASS INDEX: 21.4 KG/M2 | HEIGHT: 68 IN | OXYGEN SATURATION: 94 % | TEMPERATURE: 97.7 F | WEIGHT: 141.2 LBS | DIASTOLIC BLOOD PRESSURE: 64 MMHG | SYSTOLIC BLOOD PRESSURE: 104 MMHG | HEART RATE: 80 BPM

## 2017-07-03 DIAGNOSIS — J44.9 CHRONIC OBSTRUCTIVE PULMONARY DISEASE, UNSPECIFIED COPD TYPE (HCC): ICD-10-CM

## 2017-07-03 DIAGNOSIS — Z78.9 ALCOHOL USE: ICD-10-CM

## 2017-07-03 DIAGNOSIS — B18.2 CHRONIC HEPATITIS C WITHOUT HEPATIC COMA (HCC): Primary | ICD-10-CM

## 2017-07-03 DIAGNOSIS — N18.30 CKD (CHRONIC KIDNEY DISEASE), STAGE III (HCC): ICD-10-CM

## 2017-07-03 DIAGNOSIS — E04.2 MULTIPLE THYROID NODULES: ICD-10-CM

## 2017-07-03 DIAGNOSIS — B18.2 CHRONIC HEPATITIS C WITHOUT HEPATIC COMA (HCC): ICD-10-CM

## 2017-07-03 DIAGNOSIS — Z72.0 TOBACCO USE: ICD-10-CM

## 2017-07-03 DIAGNOSIS — K21.9 GASTROESOPHAGEAL REFLUX DISEASE, ESOPHAGITIS PRESENCE NOT SPECIFIED: ICD-10-CM

## 2017-07-03 PROCEDURE — 99213 OFFICE O/P EST LOW 20 MIN: CPT | Performed by: INTERNAL MEDICINE

## 2017-07-03 PROCEDURE — 99214 OFFICE O/P EST MOD 30 MIN: CPT | Mod: GC | Performed by: HOSPITALIST

## 2017-07-03 ASSESSMENT — ENCOUNTER SYMPTOMS
CHILLS: 0
WEIGHT LOSS: 1
VOMITING: 0
CONSTIPATION: 0
FEVER: 0
POLYDIPSIA: 0
ABDOMINAL PAIN: 0
SHORTNESS OF BREATH: 1
WEAKNESS: 0
HEARTBURN: 1
WHEEZING: 1

## 2017-07-03 NOTE — MR AVS SNAPSHOT
"        Fede Parikh   7/3/2017 2:00 PM   Office Visit   MRN: [id number]    Department:  [facility] Med - Internal Med   Dept Phone:  [phone]    Description:  Male : 1945   Provider:  Cortes Dubose M.D.           Reason for Visit     Follow-Up doing well check up      Allergies as of 7/3/2017     No Known Allergies      You were diagnosed with     CKD (chronic kidney disease), stage III   [943938]       Chronic obstructive pulmonary disease, unspecified COPD type (CMS-HCC)   [9987067]       Gastroesophageal reflux disease, esophagitis presence not specified   [4565080]       Multiple thyroid nodules   [756084]       Chronic hepatitis C without hepatic coma (CMS-HCC)   [626060]       Tobacco use   [862821]       Alcohol use   [751834]         Vital Signs     Blood Pressure Pulse Temperature Respirations Height Weight    119/74 mmHg 99 36.7 °C (98.1 °F) 16 1.727 m (5' 8\") 63.504 kg (140 lb)    Body Mass Index Oxygen Saturation Smoking Status             21.29 kg/m2 95% Current Every Day Smoker         Basic Information     Date Of Birth Sex Race Ethnicity Preferred Language    1945 Male Black or  Non- English      Your appointments     2017 10:40 AM   Established Patient with Kevon Bella M.D.   [facility] Med - Internal Medicine (--)    [address] [place] [address]  [address]  [facility] [address] [phone]           You will be receiving a confirmation call a few days before your appointment from our automated call confirmation system.            Aug 21, 2017 12:50 PM   Established Patient with Zak Guidry M.D.   [facility] End[facility]    [address], [address]  [facility] [address] [phone]           You will be receiving a confirmation call a few days before your appointment from our automated call confirmation system.            Oct 16, 2017 11:00 AM   Established Patient with Cortes Dubose, " M.D.   [facility] Med - Internal Medicine (--)    [address]  [address]  [place] [address] [phone]           You will be receiving a confirmation call a few days before your appointment from our automated call confirmation system.              Problem List              ICD-10-CM Priority Class Noted - Resolved    Generalized pruritus L29.9   12/13/2016 - Present    COPD (chronic obstructive pulmonary disease) (CMS-HCC) J44.9   12/13/2016 - Present    GERD (gastroesophageal reflux disease) K21.9   12/13/2016 - Present    Multiple thyroid nodules E04.2   12/14/2016 - Present    Chronic hepatitis C without hepatic coma (CMS-HCC) B18.2   2/15/2017 - Present    Tobacco use Z72.0   2/15/2017 - Present    Alcohol use Z78.9   2/15/2017 - Present    Calculus of gallbladder without cholecystitis K80.20   2/15/2017 - Present    CKD (chronic kidney disease), stage III N18.3   7/3/2017 - Present      Health Maintenance        Date Due Completion Dates    IMM ZOSTER VACCINE 2/13/2003 ---    IMM INFLUENZA (1) 9/1/2017 2/15/2017, 11/4/2014    IMM PNEUMOCOCCAL 65+ (ADULT) LOW/MEDIUM RISK SERIES (2 of 2 - PPSV23) 3/22/2018 3/22/2017    COLONOSCOPY 7/7/2026 7/7/2016    IMM DTaP/Tdap/Td Vaccine (2 - Td) 2/15/2027 2/15/2017            Current Immunizations     13-VALENT PCV PREVNAR 3/22/2017    Influenza Vaccine Adult HD 2/15/2017    Influenza Vaccine Quad Inj (Preserved) 11/4/2014    Tdap Vaccine 2/15/2017      Below and/or attached are the medications your provider expects you to take. Review all of your home medications and newly ordered medications with your provider and/or pharmacist. Follow medication instructions as directed by your provider and/or pharmacist. Please keep your medication list with you and share with your provider. Update the information when medications are discontinued, doses are changed, or new medications (including over-the-counter products) are added; and carry medication  information at all times in the event of emergency situations     Allergies:  No Known Allergies          Medications  Valid as of: July 03, 2017 -  2:24 PM    Generic Name Brand Name Tablet Size Instructions for use    Albuterol Sulfate (Aero Soln) albuterol 108 (90 BASE) MCG/ACT Inhale 2 Puffs by mouth every 6 hours as needed for Shortness of Breath.        Aspirin (Chew Tab) ASA 81 MG Take 1 Tab by mouth every day.        Beclomethasone Dipropionate (Aero Soln) QVAR 40 MCG/ACT Inhale 1 Puff by mouth 2 Times a Day.        Ledipasvir-Sofosbuvir (Tab) Ledipasvir-Sofosbuvir  MG Take 90 mg by mouth every day.        Loratadine (Tab) CLARITIN 10 MG Take 1 Tab by mouth every day.        Omeprazole (CAPSULE DELAYED RELEASE) PRILOSEC 20 MG Take 1 Cap by mouth every day.        Pravastatin Sodium (Tab) PRAVACHOL 20 MG Take 1 Tab by mouth every day.        Ribavirin (Tab) COPEGUS 200 MG Take 2 Tabs by mouth 2 times a day.        Tiotropium Bromide Monohydrate (Cap) SPIRIVA 18 MCG Inhale 1 Cap by mouth every day.        .                 Medicines prescribed today were sent to:     [facility] - [place], [place] - [address] [address]    Phone: [phone] Fax: [phone]    Open 24 Hours?: No    [facility] [address] [place] [address]    [address] [address]    Phone: [phone] Fax: [phone]    Open 24 Hours?: No      Medication refill instructions:       If your prescription bottle indicates you have medication refills left, it is not necessary to call your provider’s office. Please contact your pharmacy and they will refill your medication.    If your prescription bottle indicates you do not have any refills left, you may request refills at any time through one of the following ways: The online Wego system (except Urgent Care), by calling your provider’s office, or by asking your pharmacy to contact your  provider’s office with a refill request. Medication refills are processed only during regular business hours and may not be available until the next business day. Your provider may request additional information or to have a follow-up visit with you prior to refilling your medication.   *Please Note: Medication refills are assigned a new Rx number when refilled electronically. Your pharmacy may indicate that no refills were authorized even though a new prescription for the same medication is available at the pharmacy. Please request the medicine by name with the pharmacy before contacting your provider for a refill.        Referral     A referral request has been sent to our patient care coordination department. Please allow 3-5 business days for us to process this request and contact you either by phone or mail. If you do not hear from us by the 5th business day, please call us at [phone].           MyChart Status: Patient Declined        Quit Tobacco Information     Do you want to quit using tobacco?    Quitting tobacco decreases risks of cancer, heart and lung disease, increases life expectancy, improves sense of taste and smell, and increases spending money, among other benefits.    If you are thinking about quitting, we can help.  • [facility] Quit Tobacco Program: 170.770.7468  o Program occurs weekly for four weeks and includes pharmacist consultation on products to support quitting smoking or chewing tobacco. A provider referral is needed for pharmacist consultation.  • Tobacco Users Help Hotline: 3-346-QUIT-NOW (207-1299) or https://[place].quitlogix.org/  o Free, confidential telephone and online coaching for [place] residents. Sessions are designed on a schedule that is convenient for you. Eligible clients receive free nicotine replacement therapy.  • Nationally: www.smokefree.gov  o Information and professional assistance to support both immediate and long-term needs as you become, and remain, a  non-smoker. Smokefree.gov allows you to choose the help that best fits your needs.

## 2017-07-03 NOTE — PROGRESS NOTES
Established Patient    Fede presents today with the following:    CC: Clinic follow up    HPI: 72 year old male with a PMH of chronic Hep C currently being treated, COPD, GERD, benign thyroid nodules, and heavy alcohol use presents for a clinic follow up.    Chronic Hep C causing cirrhosis: Patient was diagnosed with Hep C while hospitalized this past December 2016. He has since been referred to infectious disease and is currently being followed by Dr. Bella for treatment of his Hep C. He was seen on 3/13, 6/26, and 7/3 by Dr. Bella and recently got approved for treatment with harvoni and ribavirin 400mg BID X 12 weeks and stated that he starts his treatment for that today. He denies any recent alcohol use and states that pruritis is still an issue. He is to follow up with Dr. Bella in 1 month for labs.    COPD 2/2 tobacco use: Continue to smoke around 3 cigarettes per day. Not interesting in any help with smoking cessation at this time.    Cholelithiasis: Seen previously by Dr. White who stated that there is currently no indication for surgery unless his pain occurs again. His abdominal pain has not recurred since December 2016    GERD: Told by Dr. Bella to stop taking omeprazole for GERD.    Multiple thyroid nodules: Seen and assessed by Dr. Guidry on 5/30 who ordered thyroid studies and performed a fine needle biopsy which showed benign follicular cells.      Patient Active Problem List    Diagnosis Date Noted   • CKD (chronic kidney disease), stage III 07/03/2017   • Chronic hepatitis C without hepatic coma (CMS-HCC) 02/15/2017   • Tobacco use 02/15/2017   • Alcohol use 02/15/2017   • Calculus of gallbladder without cholecystitis 02/15/2017   • Multiple thyroid nodules 12/14/2016   • Generalized pruritus 12/13/2016   • COPD (chronic obstructive pulmonary disease) (CMS-HCC) 12/13/2016   • GERD (gastroesophageal reflux disease) 12/13/2016       Current Outpatient Prescriptions   Medication Sig  "Dispense Refill   • Ledipasvir-Sofosbuvir (HARVONI)  MG Tab Take 90 mg by mouth every day. 28 Tab 2   • ribavirin (COPEGUS) 200 MG tablet Take 2 Tabs by mouth 2 times a day. 112 Tab 2   • aspirin (ASA) 81 MG Chew Tab chewable tablet Take 1 Tab by mouth every day. 30 Tab 11   • loratadine (CLARITIN) 10 MG Tab Take 1 Tab by mouth every day. 30 Tab 3   • albuterol 108 (90 BASE) MCG/ACT Aero Soln inhalation aerosol Inhale 2 Puffs by mouth every 6 hours as needed for Shortness of Breath. 8.5 g 3   • beclomethasone (QVAR) 40 MCG/ACT inhaler Inhale 1 Puff by mouth 2 Times a Day. 2 Inhaler 5   • tiotropium (SPIRIVA) 18 MCG Cap Inhale 1 Cap by mouth every day. 30 Cap 5   • omeprazole (PRILOSEC) 20 MG delayed-release capsule Take 1 Cap by mouth every day. 30 Cap 5   • pravastatin (PRAVACHOL) 20 MG Tab Take 1 Tab by mouth every day. 30 Tab 5     No current facility-administered medications for this visit.       ROS: As per HPI. Additional pertinent symptoms as noted below.      /74 mmHg  Pulse 99  Temp(Src) 36.7 °C (98.1 °F)  Resp 16  Ht 1.727 m (5' 8\")  Wt 63.504 kg (140 lb)  BMI 21.29 kg/m2  SpO2 95%    Physical Exam   General:  male, Alert and oriented, No apparent distress.  Eyes: Pupils equal and reactive. No scleral icterus. EOMI, conjunctiva white non-injected  Throat: Clear no erythema or exudates noted.  Neck: Supple. No lymphadenopathy noted. Multiple b/l thyroid nodules palpated.  Lungs: Clear to auscultation and percussion bilaterally. Decreased BS b/l with mild wheezing at base left lung.  Cardiovascular: Regular rate and rhythm. No murmurs, rubs or gallops.  Abdomen:  Benign. No rebound or guarding noted.  Extremities: No clubbing, cyanosis, edema. Scratch marks from itching, non-painful left knee lump.  Skin: Clear. No rash or suspicious skin lesions noted.      Assessment and Plan    1. Chronic hepatitis C with cirrhosis  - Hx IV drug use, released from prison 3 years ago, on " parole  - Hep C RNA 1,900,000 on 12/13  - Hep C genotype 1a or 1b  -  -->65-->74-->87. Trend in 1 month.  - US liver 12/13 showed fatty liver vs fibrosis  - HIV negative  - Seen by Dr. Bella (ID) on 3/13, 6/26, 7/3  - Initiated harvoni and ribavirin 400mg BID per ID. Patient starts course today.  - Complete alcohol cessation    2. CKD stage III  - Does have a history of HTN although he currently does not need treatment for this, tobacco use, Hx heavy alcohol use since quit. May be associated with chronic Hep C.  - Cr 1.49. GFR 56  - Referral to nephrologist for management of CKD stage III    3. Chronic obstructive pulmonary disease  - Continue tiotropium and beclomethasone inhalers scheduled  - Continue albuterol inhaler  - Did not get PFT's. Encouraged to set up appt.  - Prevnar given 3/22/17  - Refused pneumovax at today's visit  - Counseled smoking cessation    4. Gastroesophageal reflux disease  - Cannot take omeprazole with concurrent Hep C treatment  - Counseled TUMS in the meantime  - EGD while in care home with no abnormalities    5. Multiple benign Follicular nodules  - No symptoms of hypo/hyperthyroidism  - TSH 0.28, T4 0.91 on 12/14  - US thyroid showed numerous b/l thyroid nodules, easily palpated on physical exam  - Seen by endocrinologist on 5/30, Dr. Guidry  - Thyroid biopsy 6/22 showed benign follicular cells  - Follow up with endocrinology on 8/21/17    6. Tobacco use  - Has smoked on/off for the past 50 years  - Down to 3 cigarettes per day  - Counseled cessation  - Harm reduction plan discussed  - Encouraged patient to quit. Not interested in cessation at this time.    7. Alcohol use  - Denies use      PLAN: Nephrology referral for CKD stage III, pneumococcal in 3/2018, re-order PFT's. Follow up in 3 months.      Signed by: Cortes Dubose M.D.

## 2017-07-03 NOTE — MR AVS SNAPSHOT
"Fede Parikh   7/3/2017 9:20 AM   Office Visit   MRN: [id number]    Department:  [facility] - Internal Med   Dept Phone:  [phone]    Description:  Male : 1945   Provider:  Kevon Bella M.D.           Reason for Visit     Hepatitis C F/u     Results Pathology thyroid nodules       Allergies as of 7/3/2017     No Known Allergies      Vital Signs     Blood Pressure Pulse Temperature Height Weight Body Mass Index    104/64 mmHg 80 36.5 °C (97.7 °F) 1.727 m (5' 8\") 64.048 kg (141 lb 3.2 oz) 21.47 kg/m2    Oxygen Saturation Smoking Status                94% Current Every Day Smoker          Basic Information     Date Of Birth Sex Race Ethnicity Preferred Language    1945 Male Black or  Non- English      Your appointments     2017  2:00 PM   Established Patient with Cortes Dubose M.D.   [facility] - Internal Medicine (--)    [address] [place] [address]  [address]  [facility] [address] [phone]           You will be receiving a confirmation call a few days before your appointment from our automated call confirmation system.            2017 10:40 AM   Established Patient with Kevon Bella M.D.   [facility] - Internal Medicine (--)    [address] [place] [address]  [address]  [facility] [address] [phone]           You will be receiving a confirmation call a few days before your appointment from our automated call confirmation system.            Aug 21, 2017 12:50 PM   Established Patient with Zak Guidry M.D.   [facility])    [address] [facility], [address]  [facility] [address] [phone]           You will be receiving a confirmation call a few days before your appointment from our automated call confirmation system.              Problem List              ICD-10-CM Priority Class Noted - Resolved    Generalized pruritus L29.9   2016 - Present    COPD (chronic " obstructive pulmonary disease) (CMS-HCC) J44.9   12/13/2016 - Present    GERD (gastroesophageal reflux disease) K21.9   12/13/2016 - Present    Multiple thyroid nodules E04.2   12/14/2016 - Present    Chronic hepatitis C without hepatic coma (CMS-HCC) B18.2   2/15/2017 - Present    Tobacco use Z72.0   2/15/2017 - Present    Alcohol use Z78.9   2/15/2017 - Present    Calculus of gallbladder without cholecystitis K80.20   2/15/2017 - Present      Health Maintenance        Date Due Completion Dates    IMM ZOSTER VACCINE 2/13/2003 ---    IMM INFLUENZA (1) 9/1/2017 2/15/2017, 11/4/2014    IMM PNEUMOCOCCAL 65+ (ADULT) LOW/MEDIUM RISK SERIES (2 of 2 - PPSV23) 3/22/2018 3/22/2017    COLONOSCOPY 7/7/2026 7/7/2016    IMM DTaP/Tdap/Td Vaccine (2 - Td) 2/15/2027 2/15/2017            Current Immunizations     13-VALENT PCV PREVNAR 3/22/2017    Influenza Vaccine Adult HD 2/15/2017    Influenza Vaccine Quad Inj (Preserved) 11/4/2014    Tdap Vaccine 2/15/2017      Below and/or attached are the medications your provider expects you to take. Review all of your home medications and newly ordered medications with your provider and/or pharmacist. Follow medication instructions as directed by your provider and/or pharmacist. Please keep your medication list with you and share with your provider. Update the information when medications are discontinued, doses are changed, or new medications (including over-the-counter products) are added; and carry medication information at all times in the event of emergency situations     Allergies:  No Known Allergies          Medications  Valid as of: July 03, 2017 -  9:47 AM    Generic Name Brand Name Tablet Size Instructions for use    Albuterol Sulfate (Aero Soln) albuterol 108 (90 BASE) MCG/ACT Inhale 2 Puffs by mouth every 6 hours as needed for Shortness of Breath.        Aspirin (Chew Tab) ASA 81 MG Take 1 Tab by mouth every day.        Beclomethasone Dipropionate (Aero Soln) QVAR 40 MCG/ACT  Inhale 1 Puff by mouth 2 Times a Day.        Ledipasvir-Sofosbuvir (Tab) Ledipasvir-Sofosbuvir  MG Take 90 mg by mouth every day.        Loratadine (Tab) CLARITIN 10 MG Take 1 Tab by mouth every day.        Omeprazole (CAPSULE DELAYED RELEASE) PRILOSEC 20 MG Take 1 Cap by mouth every day.        Pravastatin Sodium (Tab) PRAVACHOL 20 MG Take 1 Tab by mouth every day.        Ribavirin (Tab) COPEGUS 200 MG Take 2 Tabs by mouth 2 times a day.        Tiotropium Bromide Monohydrate (Cap) SPIRIVA 18 MCG Inhale 1 Cap by mouth every day.        .                 Medicines prescribed today were sent to:     [id number] - [place], [place] - [address] [address]    Phone: [phone] Fax: [phone]    Open 24 Hours?: No    [facility] [address] [place] [address]    [address] [address]    Phone: [phone] Fax: [phone]    Open 24 Hours?: No      Medication refill instructions:       If your prescription bottle indicates you have medication refills left, it is not necessary to call your provider’s office. Please contact your pharmacy and they will refill your medication.    If your prescription bottle indicates you do not have any refills left, you may request refills at any time through one of the following ways: The online [employer] system (except Urgent Care), by calling your provider’s office, or by asking your pharmacy to contact your provider’s office with a refill request. Medication refills are processed only during regular business hours and may not be available until the next business day. Your provider may request additional information or to have a follow-up visit with you prior to refilling your medication.   *Please Note: Medication refills are assigned a new Rx number when refilled electronically. Your pharmacy may indicate that no refills were authorized even though a new prescription for the same  medication is available at the pharmacy. Please request the medicine by name with the pharmacy before contacting your provider for a refill.           MyChart Status: Patient Declined        Quit Tobacco Information     Do you want to quit using tobacco?    Quitting tobacco decreases risks of cancer, heart and lung disease, increases life expectancy, improves sense of taste and smell, and increases spending money, among other benefits.    If you are thinking about quitting, we can help.  • [facility] Quit Tobacco Program: [phone]  o Program occurs weekly for four weeks and includes pharmacist consultation on products to support quitting smoking or chewing tobacco. A provider referral is needed for pharmacist consultation.  • Tobacco Users Help Hotline: 2-718-QUIT-NOW (522-3769) or https://[place].quitlogix.org/  o Free, confidential telephone and online coaching for [place] residents. Sessions are designed on a schedule that is convenient for you. Eligible clients receive free nicotine replacement therapy.  • Nationally: www.smokefree.gov  o Information and professional assistance to support both immediate and long-term needs as you become, and remain, a non-smoker. Smokefree.gov allows you to choose the help that best fits your needs.

## 2017-07-03 NOTE — PROGRESS NOTES
"Subjective:      Fede Parikh is a 72 y.o.AA male who presents in f/u  for evaluation of newly diagnosed HCV. He has now been approved for Harvoni and ribavirin  400 400mg BID. He was recently diagnosed ar [facility] in December,2016  when admitted with abdominal pain.  Believes he was infected during his greater than 30 year time in [place]. Used iv drugs during this period. Has now been released, has completely stopped drinking . Has not been vaccinated . Feels fatigued   He is prescribed a PPI for acid reflux-  However not taking it and symptoms have resolved   PMH- COPD /htn/ acid reflux/gallbldder disease   MEDS- reviewed with patient           HPI      Review of Systems   Constitutional: Positive for weight loss and malaise/fatigue. Negative for fever and chills.   Respiratory: Positive for shortness of breath and wheezing.    Cardiovascular: Negative for leg swelling.   Gastrointestinal: Positive for heartburn. Negative for vomiting, abdominal pain and constipation.   Skin: Negative for itching and rash.   Neurological: Negative for weakness.   Endo/Heme/Allergies: Negative for polydipsia.          Objective:     /64 mmHg  Pulse 80  Temp(Src) 36.5 °C (97.7 °F)  Ht 1.727 m (5' 8\")  Wt 64.048 kg (141 lb 3.2 oz)  BMI 21.47 kg/m2  SpO2 94%     Physical Exam   Constitutional: He is oriented to person, place, and time.   Cardiovascular: Normal rate and regular rhythm.    Pulmonary/Chest:   I do not hear any wheezing   Abdominal: Soft. Bowel sounds are normal.   Liver edge palpable, no spleen, no ascites   Neurological: He is alert and oriented to person, place, and time.   Skin: Skin is warm. No rash noted. No erythema.   Psychiatric: He has a normal mood and affect. His behavior is normal.   Vitals reviewed.    Results for FEDE PARIKH (MRN [id number]) as of 6/26/2017 10:28   Ref. Range 6/13/2017 09:05   WBC Latest Ref Range: 4.8-10.8 K/uL 4.0 (L)   RBC Latest Ref Range: 4.70-6.10 M/uL 4.75 "   Hemoglobin Latest Ref Range: 14.0-18.0 g/dL 15.2   Hematocrit Latest Ref Range: 42.0-52.0 % 43.9   MCV Latest Ref Range: 81.4-97.8 fL 92.4   MCH Latest Ref Range: 27.0-33.0 pg 32.0   MCHC Latest Ref Range: 33.7-35.3 g/dL 34.6   RDW Latest Ref Range: 35.9-50.0 fL 43.8   Platelet Count Latest Ref Range: 164-446 K/uL 167   MPV Latest Ref Range: 9.0-12.9 fL 11.1   Results for SADE ISSA (MRN [id number]) as of 6/26/2017 10:28   Ref. Range 6/13/2017 09:05   Alpha-2 Macroglobulin Latest Ref Range: 131-293 mg/dL 300 (H)   Urea Nitrogen, Serum Latest Ref Range: 7-20 mg/dL 14   CirrhoMeter Patient Score Unknown 0.43   Fibrosis Metavir Classification Unknown F3[F3-F4]   InflaMeter Patient Score Unknown 0.58   InflaMeter Metavir Classification Unknown A1/A2   FibroMeter Interpretation Unknown See Report   EER Ribrometer Report Unknown See Note   FibroMeter Patient Score Unknown 0.94   Results for SADE ISSA (MRN [id number]) as of 6/26/2017 10:28   Ref. Range 2/27/2017 08:05   Hepatitis C Genotyping Unknown 1a or 1b       Results for SADE ISSA (MRN [id number]) as of 6/26/2017 10:28   Ref. Range 12/13/2016 08:31   Hepatitis C Rna By Pcr, Quanti Latest Units: IU/mL 1,900,000   HCV RNA PCR Qnt Log Latest Units: log IU 6.3   HCV RNA PCR Qnt Int Latest Ref Range: Not Detected  Detected (A)   Results for SADE ISSA (MRN [id number]) as of 6/26/2017 11:06   Ref. Range 6/13/2017 09:05   Sodium Latest Ref Range: 135-145 mmol/L 139   Potassium Latest Ref Range: 3.6-5.5 mmol/L 4.0   Chloride Latest Ref Range:  mmol/L 104   Co2 Latest Ref Range: 20-33 mmol/L 28   Anion Gap Latest Ref Range: 0.0-11.9  7.0   Glucose Latest Ref Range: 65-99 mg/dL 120 (H)   Bun Latest Ref Range: 8-22 mg/dL 15   Creatinine Latest Ref Range: 0.50-1.40 mg/dL 1.49 (H)   GFR If  Latest Ref Range: >60 mL/min/1.73 m 2 56 (A)   GFR If Non  Latest Ref Range: >60 mL/min/1.73 m 2 46 (A)   Calcium  Latest Ref Range: 8.5-10.5 mg/dL 9.6   AST(SGOT) Latest Ref Range: 12-45 U/L 74 (H)   ALT(SGPT) Latest Ref Range: 2-50 U/L 51 (H)   Alkaline Phosphatase Latest Ref Range: 30-99 U/L 90   Total Bilirubin Latest Ref Range: 0.1-1.5 mg/dL 0.9   Albumin Latest Ref Range: 3.2-4.9 g/dL 3.9   Total Protein Latest Ref Range: 6.0-8.2 g/dL 7.7   Globulin Latest Ref Range: 1.9-3.5 g/dL 3.8 (H)   A-G Ratio Latest Units: g/dL 1.0   Gamma Gt Latest Ref Range: 7-51 U/L 151 (H)     Impression          1.  Cholelithiasis and gallbladder wall thickening, however the gallbladder appears contracted and wall thickening is likely related to contracted gallbladder state.  2.  Common bile duct dilatation, concerning for distal obstructing stone, stenosis, or ampullary lesion. Could be further evaluated with ERCP as clinically appropriate.  3.  Echogenic liver, compatible with fatty change versus fibrosis.           Reading Provider Reading Date     Aline Sellers M.D. Dec 14, 2016         Signing Provider Signing Date Signing Time     Aline Sellers M.D. Dec 14, 2016  2:16 AM       Lab Information        Assessment/Plan:     1. 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 Genotype  1a/b, and elevated APRI score.  He understands he must  Remain totally abstinent of alcohol if he wants to live and to be treated for HCV. Has now been approved for therapy. We went over in detail compliance and how to take his meds, will take Harvoni plus 2 ribavirin in A.M. 2 ribavirin at dinner. Will call before this supply is done to get additional refills. Will return 4 weeks from today to get labs and see me. Will start Tuesday July 4th.   .

## 2017-07-05 RX ORDER — LORATADINE 10 MG/1
TABLET ORAL
Qty: 30 TAB | Refills: 2 | Status: SHIPPED | OUTPATIENT
Start: 2017-07-05 | End: 2017-08-27

## 2017-07-05 RX ORDER — ALBUTEROL SULFATE 90 UG/1
AEROSOL, METERED RESPIRATORY (INHALATION)
Qty: 18 G | Refills: 2 | Status: SHIPPED | OUTPATIENT
Start: 2017-07-05 | End: 2017-08-03 | Stop reason: SDUPTHER

## 2017-07-24 ENCOUNTER — OFFICE VISIT (OUTPATIENT)
Dept: NEPHROLOGY | Facility: MEDICAL CENTER | Age: 72
End: 2017-07-24
Payer: MEDICARE

## 2017-07-24 VITALS
SYSTOLIC BLOOD PRESSURE: 116 MMHG | WEIGHT: 142 LBS | TEMPERATURE: 98 F | DIASTOLIC BLOOD PRESSURE: 72 MMHG | HEIGHT: 69 IN | RESPIRATION RATE: 14 BRPM | BODY MASS INDEX: 21.03 KG/M2 | HEART RATE: 80 BPM

## 2017-07-24 DIAGNOSIS — N18.30 CKD (CHRONIC KIDNEY DISEASE), STAGE III (HCC): ICD-10-CM

## 2017-07-24 PROCEDURE — 99203 OFFICE O/P NEW LOW 30 MIN: CPT | Performed by: INTERNAL MEDICINE

## 2017-07-24 ASSESSMENT — ENCOUNTER SYMPTOMS
FEVER: 0
PALPITATIONS: 0
CHILLS: 0

## 2017-07-24 NOTE — PROGRESS NOTES
"Subjective:      Fede Parikh is a 72 y.o. male who presents with New Patient            HPI     72 year old with a history of CKD III, referred for evaluation. He has a history of Hepatitis C from IV drug use, currently on treatment. Has a history of HTN in the past but not on any BP medications. History of smoking since he was 23, but quit in the middle for about 14 years. No problems urinating. Sister with kidney problems. Has some thyroid nodules for which he is seen by endocrine.     Review of Systems   Constitutional: Negative for fever and chills.   Cardiovascular: Negative for chest pain and palpitations.   All other systems reviewed and are negative.         Objective:     /72 mmHg  Pulse 80  Temp(Src) 36.7 °C (98 °F) (Temporal)  Resp 14  Ht 1.753 m (5' 9\")  Wt 64.411 kg (142 lb)  BMI 20.96 kg/m2     Physical Exam   Constitutional: He is oriented to person, place, and time. He appears well-developed and well-nourished.   Cardiovascular: Normal rate and regular rhythm.    Pulmonary/Chest: Effort normal and breath sounds normal.   Neurological: He is alert and oriented to person, place, and time.   Skin: Skin is warm and dry.   Psychiatric: He has a normal mood and affect. His behavior is normal.               Assessment/Plan:     1. CKD (chronic kidney disease), stage III  CKD III, unclear cause but has a history of HTN and Hep C for which he is on tx. Furthermore, he is a smoker which increases the rate of CKD decline.     Plan at this point, is to wait for completion of the Hep C tx, then start with a 24 hour urine. Will check Renal US, has had a liver and biliary US showing R kidney scarring but need both kidneys evaluated.        "

## 2017-07-24 NOTE — MR AVS SNAPSHOT
"        Fede Parikh   2017 10:15 AM   Office Visit   MRN: [id number]    Department:  [facility]   Dept Phone:  [phone]    Description:  Male : 1945   Provider:  Preston Young M.D.           Reason for Visit     New Patient           Allergies as of 2017     No Known Allergies      You were diagnosed with     CKD (chronic kidney disease), stage III   [076775]         Vital Signs     Blood Pressure Pulse Temperature Respirations Height Weight    116/72 mmHg 80 36.7 °C (98 °F) (Temporal) 14 1.753 m (5' 9\") 64.411 kg (142 lb)    Body Mass Index Smoking Status                20.96 kg/m2 Current Every Day Smoker          Basic Information     Date Of Birth Sex Race Ethnicity Preferred Language    1945 Male Black or  Non- English      Your appointments     2017 10:40 AM   Established Patient with Kevon Bella M.D.   [facility] (--)    [address] [place] [address]  [address]  [facility] [address] [phone]           You will be receiving a confirmation call a few days before your appointment from our automated call confirmation system.            Aug 21, 2017 12:50 PM   Established Patient with Zak Guidry M.D.   [facility]    [address], [address]  [facility] [address] [phone]           You will be receiving a confirmation call a few days before your appointment from our automated call confirmation system.            Oct 13, 2017 11:00 AM   Follow Up Visit with Preston Young M.D.   [facility] ([facility])    [address] [place] [address], [address]  [facility] [address] [phone]           You will be receiving a confirmation call a few days before your appointment from our automated call confirmation system.            Oct 16, 2017 11:00 AM   Established Patient with Cortes Dubose M.D.   [facility] - Internal Medicine (--)    [address]  [address]  [place] [address] [phone]           You will be receiving a confirmation call a few days before your appointment from our automated call confirmation system.              Problem List              ICD-10-CM Priority Class Noted - Resolved    Generalized pruritus L29.9   12/13/2016 - Present    COPD (chronic obstructive pulmonary disease) (CMS-HCC) J44.9   12/13/2016 - Present    GERD (gastroesophageal reflux disease) K21.9   12/13/2016 - Present    Multiple thyroid nodules E04.2   12/14/2016 - Present    Chronic hepatitis C without hepatic coma (CMS-HCC) B18.2   2/15/2017 - Present    Tobacco use Z72.0   2/15/2017 - Present    Alcohol use Z78.9   2/15/2017 - Present    Calculus of gallbladder without cholecystitis K80.20   2/15/2017 - Present    CKD (chronic kidney disease), stage III N18.3   7/3/2017 - Present      Health Maintenance        Date Due Completion Dates    IMM ZOSTER VACCINE 2/13/2003 ---    IMM INFLUENZA (1) 9/1/2017 2/15/2017, 11/4/2014    IMM PNEUMOCOCCAL 65+ (ADULT) LOW/MEDIUM RISK SERIES (2 of 2 - PPSV23) 3/22/2018 3/22/2017    COLONOSCOPY 7/7/2026 7/7/2016    IMM DTaP/Tdap/Td Vaccine (2 - Td) 2/15/2027 2/15/2017            Current Immunizations     13-VALENT PCV PREVNAR 3/22/2017    Influenza Vaccine Adult HD 2/15/2017    Influenza Vaccine Quad Inj (Preserved) 11/4/2014    Tdap Vaccine 2/15/2017      Below and/or attached are the medications your provider expects you to take. Review all of your home medications and newly ordered medications with your provider and/or pharmacist. Follow medication instructions as directed by your provider and/or pharmacist. Please keep your medication list with you and share with your provider. Update the information when medications are discontinued, doses are changed, or new medications (including over-the-counter products) are added; and carry medication information at all times in  the event of emergency situations     Allergies:  No Known Allergies          Medications  Valid as of: July 24, 2017 - 10:22 AM    Generic Name Brand Name Tablet Size Instructions for use    Albuterol Sulfate (Aero Soln) VENTOLIN  (90 BASE) MCG/ACT INHALE TWO PUFFS BY MOUTH EVERY SIX HOURS AS NEEDED FOR SHORTNESS OF BREATH        Aspirin (Chew Tab) ASA 81 MG Take 1 Tab by mouth every day.        Beclomethasone Dipropionate (Aero Soln) QVAR 40 MCG/ACT Inhale 1 Puff by mouth 2 Times a Day.        Ledipasvir-Sofosbuvir (Tab) Ledipasvir-Sofosbuvir  MG Take 90 mg by mouth every day.        Loratadine (Tab) CLARITIN 10 MG TAKE ONE TABLET BY MOUTH ONE TIME DAILY        Omeprazole (CAPSULE DELAYED RELEASE) PRILOSEC 20 MG Take 1 Cap by mouth every day.        Pravastatin Sodium (Tab) PRAVACHOL 20 MG Take 1 Tab by mouth every day.        Ribavirin (Tab) COPEGUS 200 MG Take 2 Tabs by mouth 2 times a day.        Tiotropium Bromide Monohydrate (Cap) SPIRIVA 18 MCG Inhale 1 Cap by mouth every day.        .                 Medicines prescribed today were sent to:     [facility] - [place], [place] - [address] [address]    Phone: [phone] Fax: [phone]    Open 24 Hours?: No    [facility] [address] [place] [address]    [address] [address]    Phone: [phone] Fax: [phone]    Open 24 Hours?: No      Medication refill instructions:       If your prescription bottle indicates you have medication refills left, it is not necessary to call your provider’s office. Please contact your pharmacy and they will refill your medication.    If your prescription bottle indicates you do not have any refills left, you may request refills at any time through one of the following ways: The online [employer] system (except Urgent Care), by calling your provider’s office, or by asking your pharmacy to contact your provider’s office  with a refill request. Medication refills are processed only during regular business hours and may not be available until the next business day. Your provider may request additional information or to have a follow-up visit with you prior to refilling your medication.   *Please Note: Medication refills are assigned a new Rx number when refilled electronically. Your pharmacy may indicate that no refills were authorized even though a new prescription for the same medication is available at the pharmacy. Please request the medicine by name with the pharmacy before contacting your provider for a refill.        Your To Do List     Future Labs/Procedures Complete By Expires    BASIC METABOLIC PANEL  As directed 1/21/2018    CBC WITHOUT DIFFERENTIAL  As directed 1/21/2018    CREATININE CLEARANCE  As directed 1/21/2018    MICROALBUMIN CREAT RATIO URINE  As directed 1/23/2018    PTH INTACT (PTH ONLY)  As directed 7/25/2018    URINE CREATININE 24 HR  As directed 7/25/2018    US-RENAL  As directed 1/21/2018    VITAMIN D,25 HYDROXY  As directed 1/24/2018         MyChart Status: Patient Declined        Quit Tobacco Information     Do you want to quit using tobacco?    Quitting tobacco decreases risks of cancer, heart and lung disease, increases life expectancy, improves sense of taste and smell, and increases spending money, among other benefits.    If you are thinking about quitting, we can help.  • [facility] Quit Tobacco Program: 496.203.7706  o Program occurs weekly for four weeks and includes pharmacist consultation on products to support quitting smoking or chewing tobacco. A provider referral is needed for pharmacist consultation.  • Tobacco Users Help Hotline: 2-232-QUIT-NOW (716-7807) or https://[place].quitlogix.org/  o Free, confidential telephone and online coaching for [place] residents. Sessions are designed on a schedule that is convenient for you. Eligible clients receive free nicotine replacement  therapy.  • Nationally: www.smokefree.gov  o Information and professional assistance to support both immediate and long-term needs as you become, and remain, a non-smoker. Smokefree.gov allows you to choose the help that best fits your needs.

## 2017-07-31 ENCOUNTER — TELEPHONE (OUTPATIENT)
Dept: INTERNAL MEDICINE | Facility: MEDICAL CENTER | Age: 72
End: 2017-07-31

## 2017-07-31 ENCOUNTER — OFFICE VISIT (OUTPATIENT)
Dept: INTERNAL MEDICINE | Facility: MEDICAL CENTER | Age: 72
End: 2017-07-31
Payer: MEDICARE

## 2017-07-31 VITALS
HEIGHT: 69 IN | BODY MASS INDEX: 20.81 KG/M2 | HEART RATE: 96 BPM | SYSTOLIC BLOOD PRESSURE: 126 MMHG | TEMPERATURE: 98.2 F | OXYGEN SATURATION: 97 % | DIASTOLIC BLOOD PRESSURE: 78 MMHG | WEIGHT: 140.5 LBS

## 2017-07-31 DIAGNOSIS — Z79.899 HIGH RISK MEDICATION USE: ICD-10-CM

## 2017-07-31 DIAGNOSIS — B18.2 CHRONIC HEPATITIS C WITHOUT HEPATIC COMA (HCC): ICD-10-CM

## 2017-07-31 PROCEDURE — 99213 OFFICE O/P EST LOW 20 MIN: CPT | Performed by: INTERNAL MEDICINE

## 2017-07-31 ASSESSMENT — ENCOUNTER SYMPTOMS
CONSTIPATION: 0
ABDOMINAL PAIN: 0
WEAKNESS: 0
WHEEZING: 1
CHILLS: 0
WEIGHT LOSS: 1
FEVER: 0
HEARTBURN: 1
VOMITING: 0
SHORTNESS OF BREATH: 1
POLYDIPSIA: 0

## 2017-07-31 NOTE — MR AVS SNAPSHOT
"        Fede Parikh   2017 10:40 AM   Office Visit   MRN: [id number]    Department:  [facility] - Internal Med   Dept Phone:  [phone]    Description:  Male : 1945   Provider:  Kevon Bella M.D.           Reason for Visit     Medication Management To go over HCV Medication      Allergies as of 2017     No Known Allergies      You were diagnosed with     Chronic hepatitis C without hepatic coma (CMS-HCC)   [969008]         Vital Signs     Blood Pressure Pulse Temperature Height Weight Body Mass Index    126/78 mmHg 96 36.8 °C (98.2 °F) 1.753 m (5' 9.02\") 63.73 kg (140 lb 8 oz) 20.74 kg/m2    Oxygen Saturation Smoking Status                97% Current Every Day Smoker          Basic Information     Date Of Birth Sex Race Ethnicity Preferred Language    1945 Male Black or  Non- English      Your appointments     Aug 02, 2017 11:00 AM   US BODY 30 with [address] US 2   [facility] - ULTRASOUND - [address]    [address]  [place] [address] [phone]            Aug 21, 2017 12:50 PM   Established Patient with Zak Guidry M.D.   [facility])    [address], [address]  [place] [address] [phone]           You will be receiving a confirmation call a few days before your appointment from our automated call confirmation system.            Aug 28, 2017 10:00 AM   Established Patient with Kevon Bella M.D.   [facility] - Internal Medicine (--)    [address] [place] [address]  [address]  [place] [address] [phone]           You will be receiving a confirmation call a few days before your appointment from our automated call confirmation system.            Oct 13, 2017 11:00 AM   Follow Up Visit with Preston Young M.D.   [facility] ([address])    [address]  [place] [address] [phone]          " You will be receiving a confirmation call a few days before your appointment from our automated call confirmation system.            Oct 16, 2017 11:00 AM   Established Patient with Cortes Dubose M.D.   [facility] - Internal Medicine (--)    [address] [place] [address]  [address]  [place] [address]   [phone]           You will be receiving a confirmation call a few days before your appointment from our automated call confirmation system.              Problem List              ICD-10-CM Priority Class Noted - Resolved    Generalized pruritus L29.9   12/13/2016 - Present    COPD (chronic obstructive pulmonary disease) (CMS-HCC) J44.9   12/13/2016 - Present    GERD (gastroesophageal reflux disease) K21.9   12/13/2016 - Present    Multiple thyroid nodules E04.2   12/14/2016 - Present    Chronic hepatitis C without hepatic coma (CMS-HCC) B18.2   2/15/2017 - Present    Tobacco use Z72.0   2/15/2017 - Present    Alcohol use Z78.9   2/15/2017 - Present    Calculus of gallbladder without cholecystitis K80.20   2/15/2017 - Present    CKD (chronic kidney disease), stage III N18.3   7/3/2017 - Present      Health Maintenance        Date Due Completion Dates    IMM ZOSTER VACCINE 2/13/2003 ---    IMM INFLUENZA (1) 9/1/2017 2/15/2017, 11/4/2014    IMM PNEUMOCOCCAL 65+ (ADULT) LOW/MEDIUM RISK SERIES (2 of 2 - PPSV23) 3/22/2018 3/22/2017    COLONOSCOPY 7/7/2026 7/7/2016    IMM DTaP/Tdap/Td Vaccine (2 - Td) 2/15/2027 2/15/2017            Current Immunizations     13-VALENT PCV PREVNAR 3/22/2017    Influenza Vaccine Adult HD 2/15/2017    Influenza Vaccine Quad Inj (Preserved) 11/4/2014    Tdap Vaccine 2/15/2017      Below and/or attached are the medications your provider expects you to take. Review all of your home medications and newly ordered medications with your provider and/or pharmacist. Follow medication instructions as directed by your provider and/or pharmacist. Please keep your medication list with you  and share with your provider. Update the information when medications are discontinued, doses are changed, or new medications (including over-the-counter products) are added; and carry medication information at all times in the event of emergency situations     Allergies:  No Known Allergies          Medications  Valid as of: July 31, 2017 - 11:03 AM    Generic Name Brand Name Tablet Size Instructions for use    Albuterol Sulfate (Aero Soln) VENTOLIN  (90 BASE) MCG/ACT INHALE TWO PUFFS BY MOUTH EVERY SIX HOURS AS NEEDED FOR SHORTNESS OF BREATH        Aspirin (Chew Tab) ASA 81 MG Take 1 Tab by mouth every day.        Beclomethasone Dipropionate (Aero Soln) QVAR 40 MCG/ACT Inhale 1 Puff by mouth 2 Times a Day.        Ledipasvir-Sofosbuvir (Tab) Ledipasvir-Sofosbuvir  MG Take 90 mg by mouth every day.        Loratadine (Tab) CLARITIN 10 MG TAKE ONE TABLET BY MOUTH ONE TIME DAILY        Omeprazole (CAPSULE DELAYED RELEASE) PRILOSEC 20 MG Take 1 Cap by mouth every day.        Pravastatin Sodium (Tab) PRAVACHOL 20 MG Take 1 Tab by mouth every day.        Ribavirin (Tab) COPEGUS 200 MG Take 2 Tabs by mouth 2 times a day.        Tiotropium Bromide Monohydrate (Cap) SPIRIVA 18 MCG Inhale 1 Cap by mouth every day.        .                 Medicines prescribed today were sent to:     [facility] - [place], [place] - [address] [address]    Phone: [phone] Fax: [phone]    Open 24 Hours?: No    [place] - [address] [place] [address]    [address] [address]    Phone: [phone] Fax: [phone]    Open 24 Hours?: No      Medication refill instructions:       If your prescription bottle indicates you have medication refills left, it is not necessary to call your provider’s office. Please contact your pharmacy and they will refill your medication.    If your prescription bottle indicates you do not have any refills  left, you may request refills at any time through one of the following ways: The online [employer] system (except Urgent Care), by calling your provider’s office, or by asking your pharmacy to contact your provider’s office with a refill request. Medication refills are processed only during regular business hours and may not be available until the next business day. Your provider may request additional information or to have a follow-up visit with you prior to refilling your medication.   *Please Note: Medication refills are assigned a new Rx number when refilled electronically. Your pharmacy may indicate that no refills were authorized even though a new prescription for the same medication is available at the pharmacy. Please request the medicine by name with the pharmacy before contacting your provider for a refill.        Your To Do List     Future Labs/Procedures Complete By Expires    CBC WITHOUT DIFFERENTIAL  As directed 1/31/2018    COMP METABOLIC PANEL  As directed 7/31/2018         [employer] Complete Status: Patient Declined        Quit Tobacco Information     Do you want to quit using tobacco?    Quitting tobacco decreases risks of cancer, heart and lung disease, increases life expectancy, improves sense of taste and smell, and increases spending money, among other benefits.    If you are thinking about quitting, we can help.  • [facility] Quit Tobacco Program: [phone]  o Program occurs weekly for four weeks and includes pharmacist consultation on products to support quitting smoking or chewing tobacco. A provider referral is needed for pharmacist consultation.  • Tobacco Users Help Hotline: 0-401-QUIT-NOW (672-1012) or https://[place].quitlogix.org/  o Free, confidential telephone and online coaching for [place] residents. Sessions are designed on a schedule that is convenient for you. Eligible clients receive free nicotine replacement therapy.  • Nationally: www.smokefree.gov  o Information and professional assistance  to support both immediate and long-term needs as you become, and remain, a non-smoker. Smokefree.gov allows you to choose the help that best fits your needs.

## 2017-07-31 NOTE — TELEPHONE ENCOUNTER
1. Caller Name: Fede                      Call Back Number:[phone] (home)       2. Message: Pt is requesting a Emergency inhaler. Pt is out of his Ventolin inhaler and can't get his refill until 08/05/17. Pt is out    3. Patient approves office to leave a detailed voicemail/MyChart message: N\A

## 2017-07-31 NOTE — PROGRESS NOTES
"Subjective:      Fede Parikh is a 72 y.o.AA male who presents in f/u  For active treatment of  newly diagnosed HCV with cirrhosis. He was  approved for Harvoni and ribavirin   400mg BID. He was recently diagnosed at [facility] in December,2016  when admitted with abdominal pain.  Believes he was infected during his greater than 30 year time in retirement. Used iv drugs during this period. Has now been released, is on probation,  has completely stopped drinking . Has not been vaccinated . Feels fatigued   He is prescribed a PPI for acid reflux-  However not taking it and symptoms have resolved . Has remained off this   PMH- COPD /htn/ acid reflux/gallbldder disease   MEDS- reviewed with patient   On July 4 he started the regimen, States has been fully compliant and tolerating treatment. Is somewhat SOB, attributes this to his COPD. Using ventolin inhaler        HPI      Review of Systems   Constitutional: Positive for weight loss and malaise/fatigue. Negative for fever and chills.   Respiratory: Positive for shortness of breath and wheezing.    Cardiovascular: Negative for leg swelling.   Gastrointestinal: Positive for heartburn. Negative for vomiting, abdominal pain and constipation.   Skin: Negative for itching and rash.   Neurological: Negative for weakness.   Endo/Heme/Allergies: Negative for polydipsia.          Objective:     /78 mmHg  Pulse 96  Temp(Src) 36.8 °C (98.2 °F)  Ht 1.753 m (5' 9.02\")  Wt 63.73 kg (140 lb 8 oz)  BMI 20.74 kg/m2  SpO2 97%     Physical Exam   Constitutional: He is oriented to person, place, and time.   Cardiovascular: Normal rate and regular rhythm.    Pulmonary/Chest:   I do not hear any wheezing   Abdominal: Soft. Bowel sounds are normal.   Liver edge palpable, no spleen, no ascites   Neurological: He is alert and oriented to person, place, and time.   Skin: Skin is warm. No rash noted. No erythema.   Psychiatric: He has a normal mood and affect. His behavior is normal. "   Vitals reviewed.    BaselineResults for SADE ISSA (MRN [id number]) as of 6/26/2017 10:28   Ref. Range 6/13/2017 09:05   WBC Latest Ref Range: 4.8-10.8 K/uL 4.0 (L)   RBC Latest Ref Range: 4.70-6.10 M/uL 4.75   Hemoglobin Latest Ref Range: 14.0-18.0 g/dL 15.2   Hematocrit Latest Ref Range: 42.0-52.0 % 43.9   MCV Latest Ref Range: 81.4-97.8 fL 92.4   MCH Latest Ref Range: 27.0-33.0 pg 32.0   MCHC Latest Ref Range: 33.7-35.3 g/dL 34.6   RDW Latest Ref Range: 35.9-50.0 fL 43.8   Platelet Count Latest Ref Range: 164-446 K/uL 167   MPV Latest Ref Range: 9.0-12.9 fL 11.1   Results for SADE ISSA (MRN [id number]) as of 6/26/2017 10:28   Ref. Range 6/13/2017 09:05   Alpha-2 Macroglobulin Latest Ref Range: 131-293 mg/dL 300 (H)   Urea Nitrogen, Serum Latest Ref Range: 7-20 mg/dL 14   CirrhoMeter Patient Score Unknown 0.43   Fibrosis Metavir Classification Unknown F3[F3-F4]   InflaMeter Patient Score Unknown 0.58   InflaMeter Metavir Classification Unknown A1/A2   FibroMeter Interpretation Unknown See Report   EER Ribrometer Report Unknown See Note   FibroMeter Patient Score Unknown 0.94   Results for SADE ISSA (MRN [id number]) as of 6/26/2017 10:28   Ref. Range 2/27/2017 08:05   Hepatitis C Genotyping Unknown 1a or 1b       Results for SADE ISSA (MRN [id number]) as of 6/26/2017 10:28   Ref. Range 12/13/2016 08:31   Hepatitis C Rna By Pcr, Quanti Latest Units: IU/mL 1,900,000   HCV RNA PCR Qnt Log Latest Units: log IU 6.3   HCV RNA PCR Qnt Int Latest Ref Range: Not Detected  Detected (A)   Results for SADE ISSA (MRN [id number]) as of 6/26/2017 11:06   Ref. Range 6/13/2017 09:05   Sodium Latest Ref Range: 135-145 mmol/L 139   Potassium Latest Ref Range: 3.6-5.5 mmol/L 4.0   Chloride Latest Ref Range:  mmol/L 104   Co2 Latest Ref Range: 20-33 mmol/L 28   Anion Gap Latest Ref Range: 0.0-11.9  7.0   Glucose Latest Ref Range: 65-99 mg/dL 120 (H)   Bun Latest Ref Range: 8-22 mg/dL  15   Creatinine Latest Ref Range: 0.50-1.40 mg/dL 1.49 (H)   GFR If  Latest Ref Range: >60 mL/min/1.73 m 2 56 (A)   GFR If Non  Latest Ref Range: >60 mL/min/1.73 m 2 46 (A)   Calcium Latest Ref Range: 8.5-10.5 mg/dL 9.6   AST(SGOT) Latest Ref Range: 12-45 U/L 74 (H)   ALT(SGPT) Latest Ref Range: 2-50 U/L 51 (H)   Alkaline Phosphatase Latest Ref Range: 30-99 U/L 90   Total Bilirubin Latest Ref Range: 0.1-1.5 mg/dL 0.9   Albumin Latest Ref Range: 3.2-4.9 g/dL 3.9   Total Protein Latest Ref Range: 6.0-8.2 g/dL 7.7   Globulin Latest Ref Range: 1.9-3.5 g/dL 3.8 (H)   A-G Ratio Latest Units: g/dL 1.0   Gamma Gt Latest Ref Range: 7-51 U/L 151 (H)     Impression          1.  Cholelithiasis and gallbladder wall thickening, however the gallbladder appears contracted and wall thickening is likely related to contracted gallbladder state.  2.  Common bile duct dilatation, concerning for distal obstructing stone, stenosis, or ampullary lesion. Could be further evaluated with ERCP as clinically appropriate.  3.  Echogenic liver, compatible with fatty change versus fibrosis.           Reading Provider Reading Date     Aline Sellers M.D. Dec 14, 2016         Signing Provider Signing Date Signing Time     Aline Sellers M.D. Dec 14, 2016  2:16 AM       Lab Information        Assessment/Plan:     1. 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 Genotype  1a/b, and elevated APRI score.  He is now on therapy. Will need to monitor for hepatic decompensation or development of hepatoma  2. High risk medication-Appear to be tolerating well, will need to get CBC to look for anemia from the ribavirin, to check chem panel to monitor transaminases, and HCV RNA quant to check treatment response.   P: order labs today and call with results. F/u 4 weeks . 12 week total therapy

## 2017-08-02 ENCOUNTER — HOSPITAL ENCOUNTER (OUTPATIENT)
Dept: RADIOLOGY | Facility: MEDICAL CENTER | Age: 72
End: 2017-08-02
Attending: INTERNAL MEDICINE
Payer: MEDICARE

## 2017-08-02 DIAGNOSIS — N18.30 CKD (CHRONIC KIDNEY DISEASE), STAGE III (HCC): ICD-10-CM

## 2017-08-02 PROCEDURE — 76775 US EXAM ABDO BACK WALL LIM: CPT

## 2017-08-03 RX ORDER — ALBUTEROL SULFATE 90 UG/1
2 AEROSOL, METERED RESPIRATORY (INHALATION) EVERY 6 HOURS PRN
Qty: 18 G | Refills: 3 | Status: SHIPPED | OUTPATIENT
Start: 2017-08-03 | End: 2018-01-30 | Stop reason: SDUPTHER

## 2017-08-15 ENCOUNTER — TELEPHONE (OUTPATIENT)
Dept: INTERNAL MEDICINE | Facility: MEDICAL CENTER | Age: 72
End: 2017-08-15

## 2017-08-15 NOTE — TELEPHONE ENCOUNTER
Pt was due for his 4 week labs after starting Harvoni/Ribivarin on July 04th.  Pt was due the week of 07/31.     I called and spoke with pt. Asked if he got his labs test done for his 4 week after starting his Hep Medication. He said he is planning to do when he gets back from [place] on 08/21.

## 2017-08-21 ENCOUNTER — HOSPITAL ENCOUNTER (OUTPATIENT)
Dept: LAB | Facility: MEDICAL CENTER | Age: 72
End: 2017-08-21
Attending: INTERNAL MEDICINE
Payer: MEDICARE

## 2017-08-21 DIAGNOSIS — B18.2 CHRONIC HEPATITIS C WITHOUT HEPATIC COMA (HCC): ICD-10-CM

## 2017-08-21 LAB
ALBUMIN SERPL BCP-MCNC: 3.7 G/DL (ref 3.2–4.9)
ALBUMIN/GLOB SERPL: 1.1 G/DL
ALP SERPL-CCNC: 55 U/L (ref 30–99)
ALT SERPL-CCNC: 8 U/L (ref 2–50)
ANION GAP SERPL CALC-SCNC: 7 MMOL/L (ref 0–11.9)
AST SERPL-CCNC: 18 U/L (ref 12–45)
BILIRUB SERPL-MCNC: 1.4 MG/DL (ref 0.1–1.5)
BUN SERPL-MCNC: 14 MG/DL (ref 8–22)
CALCIUM SERPL-MCNC: 9.5 MG/DL (ref 8.5–10.5)
CHLORIDE SERPL-SCNC: 105 MMOL/L (ref 96–112)
CO2 SERPL-SCNC: 25 MMOL/L (ref 20–33)
CREAT SERPL-MCNC: 1.44 MG/DL (ref 0.5–1.4)
ERYTHROCYTE [DISTWIDTH] IN BLOOD BY AUTOMATED COUNT: 52.2 FL (ref 35.9–50)
GFR SERPL CREATININE-BSD FRML MDRD: 48 ML/MIN/1.73 M 2
GLOBULIN SER CALC-MCNC: 3.4 G/DL (ref 1.9–3.5)
GLUCOSE SERPL-MCNC: 89 MG/DL (ref 65–99)
HCT VFR BLD AUTO: 35.8 % (ref 42–52)
HGB BLD-MCNC: 11.5 G/DL (ref 14–18)
MCH RBC QN AUTO: 32.3 PG (ref 27–33)
MCHC RBC AUTO-ENTMCNC: 32.1 G/DL (ref 33.7–35.3)
MCV RBC AUTO: 100.6 FL (ref 81.4–97.8)
PLATELET # BLD AUTO: 192 K/UL (ref 164–446)
PMV BLD AUTO: 9.9 FL (ref 9–12.9)
POTASSIUM SERPL-SCNC: 4.1 MMOL/L (ref 3.6–5.5)
PROT SERPL-MCNC: 7.1 G/DL (ref 6–8.2)
RBC # BLD AUTO: 3.56 M/UL (ref 4.7–6.1)
SODIUM SERPL-SCNC: 137 MMOL/L (ref 135–145)
WBC # BLD AUTO: 4.4 K/UL (ref 4.8–10.8)

## 2017-08-21 PROCEDURE — 85027 COMPLETE CBC AUTOMATED: CPT

## 2017-08-21 PROCEDURE — 87522 HEPATITIS C REVRS TRNSCRPJ: CPT

## 2017-08-21 PROCEDURE — 80053 COMPREHEN METABOLIC PANEL: CPT

## 2017-08-21 PROCEDURE — 36415 COLL VENOUS BLD VENIPUNCTURE: CPT

## 2017-08-24 NOTE — TELEPHONE ENCOUNTER
Please reduce the  ribavirin to one tablet  twice  A day because more anemic from it. Continue one harvoni daily.  Do not push yourself hard now because of that His  Blood test look great- liver working much better  Finish off  12 weeks of therapy. The anemia will clear soon after, he should then  feel more energetic. Then 12 weeks later get HCV RNA , chem panel and AFP  test to see if cured.

## 2017-08-25 LAB
HCV RNA SERPL NAA+PROBE-ACNC: NORMAL IU/ML
TEST INFO  93644: NORMAL

## 2017-08-25 NOTE — TELEPHONE ENCOUNTER
Called and spoke with pt. Was trying to explain on about his Ribavirin.But pt could not understand what I was trying to tell him. Then pt said he has bump and welts and is itching. I told him, let me call Dr Bella and ask and tell him about this. Pt said he received this 2 weeks after taking medication.    I called and spoke with Dr Bella. Dr Bella said he saw pt 4 weeks after tx and he was fine and didn't say anything about this. That he will see pt on Monday and explain this to pt.     Called and spoke with pt. Notified pt of this. I told him he should go to  to evaluate his bumps. Pt declined. He will come here on Monday

## 2017-08-27 ENCOUNTER — RESOLUTE PROFESSIONAL BILLING HOSPITAL PROF FEE (OUTPATIENT)
Dept: HOSPITALIST | Facility: MEDICAL CENTER | Age: 72
End: 2017-08-27
Payer: MEDICARE

## 2017-08-27 ENCOUNTER — APPOINTMENT (OUTPATIENT)
Dept: RADIOLOGY | Facility: MEDICAL CENTER | Age: 72
End: 2017-08-27
Attending: EMERGENCY MEDICINE
Payer: MEDICARE

## 2017-08-27 ENCOUNTER — HOSPITAL ENCOUNTER (OUTPATIENT)
Facility: MEDICAL CENTER | Age: 72
End: 2017-08-27
Attending: EMERGENCY MEDICINE | Admitting: INTERNAL MEDICINE
Payer: MEDICARE

## 2017-08-27 VITALS
DIASTOLIC BLOOD PRESSURE: 70 MMHG | TEMPERATURE: 98.4 F | BODY MASS INDEX: 20.9 KG/M2 | SYSTOLIC BLOOD PRESSURE: 150 MMHG | WEIGHT: 141.09 LBS | OXYGEN SATURATION: 94 % | HEIGHT: 69 IN | RESPIRATION RATE: 19 BRPM | HEART RATE: 82 BPM

## 2017-08-27 DIAGNOSIS — R07.9 CHEST PAIN, UNSPECIFIED TYPE: ICD-10-CM

## 2017-08-27 PROBLEM — L29.9 PRURITUS: Status: ACTIVE | Noted: 2017-08-27

## 2017-08-27 PROBLEM — R07.89 NON-CARDIAC CHEST PAIN: Status: ACTIVE | Noted: 2017-08-27

## 2017-08-27 LAB
ALBUMIN SERPL BCP-MCNC: 3.9 G/DL (ref 3.2–4.9)
ALBUMIN/GLOB SERPL: 1.1 G/DL
ALP SERPL-CCNC: 61 U/L (ref 30–99)
ALT SERPL-CCNC: 9 U/L (ref 2–50)
ANION GAP SERPL CALC-SCNC: 9 MMOL/L (ref 0–11.9)
APTT PPP: 27.7 SEC (ref 24.7–36)
AST SERPL-CCNC: 25 U/L (ref 12–45)
BASOPHILS # BLD AUTO: 0.2 % (ref 0–1.8)
BASOPHILS # BLD: 0.01 K/UL (ref 0–0.12)
BILIRUB SERPL-MCNC: 1.7 MG/DL (ref 0.1–1.5)
BNP SERPL-MCNC: 32 PG/ML (ref 0–100)
BUN SERPL-MCNC: 17 MG/DL (ref 8–22)
CALCIUM SERPL-MCNC: 9.8 MG/DL (ref 8.5–10.5)
CHLORIDE SERPL-SCNC: 104 MMOL/L (ref 96–112)
CO2 SERPL-SCNC: 22 MMOL/L (ref 20–33)
CREAT SERPL-MCNC: 1.38 MG/DL (ref 0.5–1.4)
EKG IMPRESSION: NORMAL
EOSINOPHIL # BLD AUTO: 0.06 K/UL (ref 0–0.51)
EOSINOPHIL NFR BLD: 1.1 % (ref 0–6.9)
ERYTHROCYTE [DISTWIDTH] IN BLOOD BY AUTOMATED COUNT: 51.5 FL (ref 35.9–50)
GFR SERPL CREATININE-BSD FRML MDRD: 50 ML/MIN/1.73 M 2
GLOBULIN SER CALC-MCNC: 3.7 G/DL (ref 1.9–3.5)
GLUCOSE SERPL-MCNC: 91 MG/DL (ref 65–99)
HCT VFR BLD AUTO: 35 % (ref 42–52)
HGB BLD-MCNC: 11.6 G/DL (ref 14–18)
IMM GRANULOCYTES # BLD AUTO: 0.01 K/UL (ref 0–0.11)
IMM GRANULOCYTES NFR BLD AUTO: 0.2 % (ref 0–0.9)
INR PPP: 1.1 (ref 0.87–1.13)
LIPASE SERPL-CCNC: 19 U/L (ref 11–82)
LYMPHOCYTES # BLD AUTO: 2.42 K/UL (ref 1–4.8)
LYMPHOCYTES NFR BLD: 42.4 % (ref 22–41)
MCH RBC QN AUTO: 32.5 PG (ref 27–33)
MCHC RBC AUTO-ENTMCNC: 33.1 G/DL (ref 33.7–35.3)
MCV RBC AUTO: 98 FL (ref 81.4–97.8)
MONOCYTES # BLD AUTO: 0.58 K/UL (ref 0–0.85)
MONOCYTES NFR BLD AUTO: 10.2 % (ref 0–13.4)
NEUTROPHILS # BLD AUTO: 2.63 K/UL (ref 1.82–7.42)
NEUTROPHILS NFR BLD: 45.9 % (ref 44–72)
NRBC # BLD AUTO: 0 K/UL
NRBC BLD AUTO-RTO: 0 /100 WBC
PLATELET # BLD AUTO: 191 K/UL (ref 164–446)
PMV BLD AUTO: 9.5 FL (ref 9–12.9)
POTASSIUM SERPL-SCNC: 3.8 MMOL/L (ref 3.6–5.5)
PROT SERPL-MCNC: 7.6 G/DL (ref 6–8.2)
PROTHROMBIN TIME: 14.6 SEC (ref 12–14.6)
RBC # BLD AUTO: 3.57 M/UL (ref 4.7–6.1)
SODIUM SERPL-SCNC: 135 MMOL/L (ref 135–145)
TROPONIN I SERPL-MCNC: <0.01 NG/ML (ref 0–0.04)
WBC # BLD AUTO: 5.7 K/UL (ref 4.8–10.8)

## 2017-08-27 PROCEDURE — 99220 PR INITIAL OBSERVATION CARE,LEVL III: CPT | Performed by: INTERNAL MEDICINE

## 2017-08-27 PROCEDURE — 80053 COMPREHEN METABOLIC PANEL: CPT

## 2017-08-27 PROCEDURE — 83880 ASSAY OF NATRIURETIC PEPTIDE: CPT

## 2017-08-27 PROCEDURE — 99284 EMERGENCY DEPT VISIT MOD MDM: CPT

## 2017-08-27 PROCEDURE — 83690 ASSAY OF LIPASE: CPT

## 2017-08-27 PROCEDURE — 85025 COMPLETE CBC W/AUTO DIFF WBC: CPT

## 2017-08-27 PROCEDURE — 85610 PROTHROMBIN TIME: CPT

## 2017-08-27 PROCEDURE — G0378 HOSPITAL OBSERVATION PER HR: HCPCS

## 2017-08-27 PROCEDURE — 93005 ELECTROCARDIOGRAM TRACING: CPT

## 2017-08-27 PROCEDURE — 84484 ASSAY OF TROPONIN QUANT: CPT

## 2017-08-27 PROCEDURE — 71010 DX-CHEST-PORTABLE (1 VIEW): CPT

## 2017-08-27 PROCEDURE — 93005 ELECTROCARDIOGRAM TRACING: CPT | Performed by: EMERGENCY MEDICINE

## 2017-08-27 PROCEDURE — 700101 HCHG RX REV CODE 250: Performed by: EMERGENCY MEDICINE

## 2017-08-27 PROCEDURE — 94640 AIRWAY INHALATION TREATMENT: CPT

## 2017-08-27 PROCEDURE — 85730 THROMBOPLASTIN TIME PARTIAL: CPT

## 2017-08-27 RX ORDER — GABAPENTIN 100 MG/1
100 CAPSULE ORAL 2 TIMES DAILY
Qty: 30 CAP | Refills: 0 | Status: SHIPPED | OUTPATIENT
Start: 2017-08-27 | End: 2018-01-30

## 2017-08-27 RX ORDER — GABAPENTIN 100 MG/1
100 CAPSULE ORAL 2 TIMES DAILY
Status: DISCONTINUED | OUTPATIENT
Start: 2017-08-27 | End: 2017-08-27 | Stop reason: HOSPADM

## 2017-08-27 RX ADMIN — ALBUTEROL SULFATE 2.5 MG: 2.5 SOLUTION RESPIRATORY (INHALATION) at 09:00

## 2017-08-27 NOTE — ED PROVIDER NOTES
ED Provider Note    CHIEF COMPLAINT  Chief Complaint   Patient presents with   • Chest Pain     pt brought in by ambulance for itching once in triage pt is now complaining of chest pain and itching for 3 wks now states took a nitro prior to arrival    • Shortness of Breath   • Neck Pain       HPI  Fede Parikh Jr. is a 72 y.o. male who presents with chest pain, or about a month, pain is off and on, several episodes a day, woke him up last night chest pain normally last 2 minutes or so moderate, no radiation does have shortness of breath no nausea. Does complain of itching both arms both legs trunk and head. Does take aspirin does not take nitroglycerin. Never gets chest pain with exercise.  Evidently he was told recently that he may have hepatitis.  REVIEW OF SYSTEMS  See HPI for further details. History of COPD,Denies other G.I., G.U.. endrocine, cardiovascular, respriatory or neurological problems.  All other systems are negative.     PAST MEDICAL HISTORY  Past Medical History:   Diagnosis Date   • Pulmonary emphysema (CMS-HCC)        FAMILY HISTORY  Family History   Problem Relation Age of Onset   • Heart Disease Mother    • Cancer Mother    • Diabetes Brother        SOCIAL HISTORYFormer smoker  Social History     Social History   • Marital status: Single     Spouse name: N/A   • Number of children: N/A   • Years of education: N/A     Social History Main Topics   • Smoking status: Current Every Day Smoker     Packs/day: 0.25     Types: Cigarettes   • Smokeless tobacco: Never Used   • Alcohol use No      Comment: 2/day    • Drug use: No   • Sexual activity: Not on file     Other Topics Concern   • Not on file     Social History Narrative   • No narrative on file       SURGICAL HISTORY  No past surgical history on file.    CURRENT MEDICATIONS  Home Medications    **Home medications have not yet been reviewed for this encounter**         ALLERGIES  No Known Allergies    PHYSICAL EXAM  VITAL SIGNS: /70   " Pulse 85   Temp 36.9 °C (98.4 °F)   Resp 16   Ht 1.753 m (5' 9\")   Wt 64 kg (141 lb 1.5 oz)   SpO2 98%   BMI 20.84 kg/m²    Constitutional: Well developed, Well nourished, No acute distress, Non-toxic appearance.   HENT: Normocephalic, Atraumatic, Bilateral external ears normal, Oropharynx moist, No oral exudates, Nose normal.   Eyes: PERRL, EOMI, Conjunctiva normal, No discharge.   Neck: Normal range of motion, No tenderness, Supple, No stridor.   Lymphatic: No lymphadenopathy noted.   Cardiovascular: Normal heart rate, Normal rhythm, No murmurs, No rubs, No gallops.   Thorax & Lungs: Normal breath sounds, No respiratory distress, No wheezing, No chest tenderness.   Abdomen:  No tenderness, no guarding no rigidity and the abdomen is soft.  No masses, No pulsatile masses.  Skin: Warm, Dry, No erythema, No rash.   Back: No tenderness, No CVA tenderness.   Extremities: Intact distal pulses, No edema, No tenderness, No cyanosis, No clubbing.   Musculoskeletal: Good range of motion in all major joints. No tenderness to palpation or major deformities noted.   Neurologic: Alert & oriented x 3, Normal motor function, Normal sensory function, No focal deficits noted.   Psychiatric: Affect normal, Judgment normal, Mood normal.   EKG Interpretation    Interpreted by me    Rhythm: normal sinus   Rate: normal  Axis: -44     Ectopy: none  Conduction: normal  ST Segments: Slight ST elevation in leads V2 and V3 previously seen on other EKGs     T Waves: no acute change  Q Waves: none    Clinical Impression: I do have an old EKG to compare to and I do not see significant change      RADIOLOGY/PROCEDURES  Myocardial Perfusion   Report   NUCLEAR IMAGING INTERPRETATION   No evidence of significant jeopardized viable myocardium or prior myocardial    infarction.   Normal left ventricular size, ejection fraction, and wall motion.      SADE ISSA      MRN:    [id number]         Gender:    M      Exam Date: 12/14/2016 " 06:50  DX-CHEST-PORTABLE (1 VIEW)   Final Result         1.  No acute cardiopulmonary disease.   2.  Atherosclerosis          COURSE & MEDICAL DECISION MAKING  Pertinent Labs & Imaging studies reviewed. (See chart for details)  Recent hepatitis C testing: August 21  Component Value Ref Range & Units Status   Hepatitis C Rna By Pcr, Quanti SEE BELOW IU/mL Final   Comment:   HCV Not Detected   Test Info      White count normal hematocrit 35 platelet count normal. There is no shift electrolytes BUN creatinine all normal liver function test normal except bilirubin elevated at 1.7, troponin normal, BNP normal clotting studies normal    Having chest pain for a month. Episodes last only a few minutes however and not finding evidence of recent workup    I will speak WITH the [facility] about admission  FINAL IMPRESSION  1.   1. Chest pain, unspecified type        2.   3.     Disposition  I have talked with the [facility] about admission  Electronically signed by: Pastor Velasquez, 8/27/2017 4:46 AM

## 2017-08-27 NOTE — ED NOTES
Assumed pt care. Pt resting on [facility]. Pt informed RN he did take his morning medications. Pt informed to not take his own medications without informing staff so he doesn't get double doses. Pt asking for breakfast.

## 2017-08-27 NOTE — ED NOTES
Pt discharged home as ordered by [facility]. Pt instructed to follow up tomorrow as planned. Pt given RX. Pt encouraged to return here as needed. Pt verbalized understanding and left ambulating independently

## 2017-08-27 NOTE — ED NOTES
.  Chief Complaint   Patient presents with   • Chest Pain     pt brought in by ambulance for itching once in triage pt is now complaining of chest pain and itching for 3 wks now states took a nitro prior to arrival      Pt to room for ekg

## 2017-08-27 NOTE — CONSULTS
ATTENDING : Dr. Yoo  TEAM : [employer]  SR. RESIDENT : Dr. René GARCIA RESIDENT: Dr. Tierney    Reason for Consult:   1) Chest Pain    Chief Complaint  1) Itching  2) Atypical chest pain    This 72-year-old gentleman presented to ER with few weeks history of generalized itching and intermittent chest pain for 1-2 months.    Chest pain - neck pain, and all across upper chest, on and off for 1-2 months, lasts 30 secs, no radiation, no associated nausea, no vomiting, no diaphoresis. Sometimes SOB due to COPD, relieved with inhalers. Has GERD, unable to use PPI due to interaction with anti-HCV medication.    Itching - intermittent past two months. Diagnosed with HCV cirrhosis in Dec 2016 (prior IV drug abuse while in [facility]) - started on Ribavirin and Harnovi 2 months ago. Contacted Dr. Bella's office regarding itching and is due to be seen tomorrow in Dr. Bella's clinic. Was told by the office that it's related a virus. Patient felt he had another viral infection accounting for the itch, in adiditon to his Hep C, and hence he came to ER.     No abdominal pain / current chest pain / change in bowel habit / vomiting / visual disturbances / dizziness / focal weakness    PMH:   COPD - inhalers, not on home oxygen  HCV Cirrhosis - ribavirin & Harnovi - since 4th July, 2017  CKD stage III - under Dr. Young  Thyroid nodules - benign on recent FNA  Hypertension    Denies surgeries in the past    Meds : Inhalers - albuterol & ipratropium  Commenced on Gabapentin as per Dr. Alcocer's advice today    O/E vitals - stable, no pain now. Last episode earlier this morning.  Patient in no distress, comfortable   alert, oriented   was initially confused about a potential second viral infection casuing itch following conversation with Dr. Bella's office 3 days ago, now keen to go home    No pedal edema, no scleral icterus    LUNGS : clear, normal vesicular breath sounds  CVS : S1, S2, no added sounds / murmurs / gallop  / rub  ABD : soft, non-tender, not distended    Labs : Trop (8/27) < 0.01, BUN - 17, Creat - 1.38, Na-135, K-3.8, Josse 1.7, AST 25, ALT 9, ALP 61, Hb-11.6, WBC -5.7, Plt - 191  EKG (8/27)  - nil acute, similar to one from Dec 2016  CXR (8/27) - looks grossly normal, similar to CXR from Dec 2016  NM Cardiac stress in Dec 2016  - no e/o jeopardized viable myocardium, EF 76%    Reviewed by Dr. Yoo and Dr. Merritt.    Agree nil acute. Atypical chest pain. Itching is the main symptom patient is here for.     Plan:  - Gabapentin for 2 weeks, refill if he has benefited from it  - Discharge  - To keep his appointment with Dr. Bella for tomorrow 8/28

## 2017-08-27 NOTE — DISCHARGE SUMMARY
CHIEF COMPLAINT ON ADMISSION  Chief Complaint   Patient presents with   • Chest Pain     pt brought in by ambulance for itching once in triage pt is now complaining of chest pain and itching for 3 wks now states took a nitro prior to arrival    • Shortness of Breath   • Neck Pain       CODE STATUS  Prior      DISCHARGE PROBLEM LIST  Active Problems:    COPD (chronic obstructive pulmonary disease) (CMS-[facility]) POA: Unknown    Pruritus POA: Unknown    Non-cardiac chest pain POA: Unknown  Resolved Problems:    * No resolved hospital problems. *      FOLLOW UP  Future Appointments  Date Time Provider Department Center   8/28/2017 10:00 AM Kevon eBlla M.D. [facility] None   10/13/2017 11:00 AM Preston Young M.D. [facility] [address]   10/16/2017 11:00 AM Cortes Dubose M.D. [facility] None     No follow-up provider specified.    MEDICATIONS ON DISCHARGE   Fede Parikh Jr.   Home Medication Instructions ISIDRO:[id number]    Printed on:08/27/17 1117   Medication Information                      albuterol (VENTOLIN HFA) 108 (90 BASE) MCG/ACT Aero Soln inhalation aerosol  Inhale 2 Puffs by mouth every 6 hours as needed.             gabapentin (NEURONTIN) 100 MG Cap  Take 1 Cap by mouth 2 Times a Day.             Ledipasvir-Sofosbuvir (HARVONI)  MG Tab  Take 90 mg by mouth every day.             ribavirin (COPEGUS) 200 MG tablet  Take 2 Tabs by mouth 2 times a day.                 DIET  No orders of the defined types were placed in this encounter.      ACTIVITY  As tolerated           LABORATORY  Lab Results   Component Value Date/Time    SODIUM 135 08/27/2017 04:32 AM    POTASSIUM 3.8 08/27/2017 04:32 AM    CHLORIDE 104 08/27/2017 04:32 AM    CO2 22 08/27/2017 04:32 AM    GLUCOSE 91 08/27/2017 04:32 AM    BUN 17 08/27/2017 04:32 AM    CREATININE 1.38 08/27/2017 04:32 AM    CREATININE 1.4 08/23/2005 04:00 PM        Lab Results   Component Value Date/Time    WBC 5.7 08/27/2017 04:32 AM    HEMOGLOBIN 11.6 (L) 08/27/2017  04:32 AM    HEMATOCRIT 35.0 (L) 08/27/2017 04:32 AM    PLATELETCT 191 08/27/2017 04:32 AM        Total time of the discharge process exceeds 30 minutes       The patient was admitted for observation and discharged from the ER by the [facility] internal medicine team     [facility] INTERNAL MEDICINE ATTENDING NOTE:   Melchor Yoo MD        Visit Time:   Attending/resident bedside rounds 9-11:30 AM     PATIENT ID    Name:             Fede Parikh   YOB: 1945  Age:                 72 y.o.  male               MRN:               [id number]  Admit:                                  8/27/2017     The patient was evaluated with the resident staff.  I reviewed the resident's note and agree with the resident's findings and plan as documented in the resident's note except as documented in the attending note. Please reference resident daily note for complete information.     The chart was reviewed and summarized.  Available labs, imaging, O2 sats ,  EKGs were reviewed. Available nursing, consultant, and resident notes were reviewed. I am actively involved in the patient's care.                                                                          ________________________________________________________________________                 72(   )  INTERVAL:  Chart reviewed/summarized,       Aug 27AM: VSS, no distress, no chest pain, main complaint is itching (HEP C treatment)     Impression:   ** noncardiac chest pain, resolved, neg EKG changes, neg trop, atypical pain, MPI: Dec 2017 nonischemic -- GERD s/s -- HEP C meds preclude PPI -- antacids prn  ** itching, cholestasis, Hep C, no rash or eczema apparent -- dry skin (eucerin), low dose neurotin trial to reduce neuropathic symptoms  ** chronic HEP C, hepatitis improved, BR < 2, itching, no stigmata of chronic liver failure, JUDE A -- no acute liver failure or complications of HEPC apparent (no PCT, no vasculitis etc)   ** dispo: offered further cardiac  assessment, wants to be discharged, low risk, DCd from ER, will see Dr. Bella/ID next week to f/u on Hep C management and probably associaed purititis      CORE:  Code Status (  FULL  --------------------------------------------------------------------------------------------------  Hospital Summary/ Patient System Review      NP:   *admit(  alert, nonfocal, no psychosis      EENT:   *admit(  neg, no phayrngitis      MSK/PAIN:   *admit(  neg     CVS:   *admit(  chest pain, sharp, < 1minute,  not exertional, resolved by time seen in ER, +GERD s/s /70, HR 85,  neg  cardiac exam, no edema, EKG: LAD, ST changes , MPI Dec 2016 negative, trop eng, BNP 32 , INR 1.10 , EKG: Sr, AVB, LAFB, delayed progression (MPI: no evidence for Ant MI)         MPI :  DEC 2016 76%, no ischemia      PULM:   *admit(  clear, pCXR negative   Impression: COPD , chronic , stable      2016: CT: no PE, bronchtiis, ASVD, LLL atelectasis, thyromegaly, bilateral renal scar     GI:   *admit(  Hep C neg, BMI  21, AST/ALT/ALP wnl (improved) , BR 1.7 (1.4) , glob 3.7 (3.8), lipase 19 , ALP 19   Impression: HEP C (remote IVDA) cirrhosis JUDE A (fibrometer: F3, A1A2) 1a/1B , Hep C PCR (2016 POS) -->undergoing treatment, likely esophageal related chest pain (symptomatic)      2016 US: gallstones, GB thick, contracted, CBD 79mm, echogenic liver      RENAL:   *admit(  Na 135, K 3.8, CO2 22, BS 91, BUN 17, c R1.38 (144), CA 9.8, ALB 3.9 , US: mild atropy/echogenicity     HEME/ONC:   *admit(  WBC 5.7, BH 11.6,       2017: B12 461, Folate > 24     ENDO:   *admit(  BS wnl      2017: thyroid nodules: POS US, neg bx (follicular)      DERM:   *admit(  pururitis x 3 weeks, no eczema /urticaria or other rash, mild dry skin   Impression: Hep C related cholestasis, dry skin component  Plan: eucerin, avoid prolonged hot baths/showers, good hydration, low dose neurontin trial      ID:   *admit(  no SIRS or other infectious findings   Impression: chronic  HEP C (pruritis, under treatment, no prophyria cutanea tardea, no vasculiits apparent)  Plan: ID/[facility] f/u on meds      2016: HIV neg, Hep C PCR POS             []Hide copied text  []Hover for attribution information  ATTENDING : Dr. Yoo  TEAM : [employer] Team  SR. RESIDENT : Dr. René GARCIA RESIDENT: Dr. Tierney     Reason for Consult:   1) Chest Pain     Chief Complaint  1) Itching  2) Atypical chest pain     This 72-year-old gentleman presented to ER with few weeks history of generalized itching and intermittent chest pain for 1-2 months.     Chest pain - neck pain, and all across upper chest, on and off for 1-2 months, lasts 30 secs, no radiation, no associated nausea, no vomiting, no diaphoresis. Sometimes SOB due to COPD, relieved with inhalers. Has GERD, unable to use PPI due to interaction with anti-HCV medication.     Itching - intermittent past two months. Diagnosed with HCV cirrhosis in Dec 2016 (prior IV drug abuse while in [facility]) - started on Ribavirin and Harnovi 2 months ago. Contacted Dr. Bella's office regarding itching and is due to be seen tomorrow in Dr. Bella's clinic. Was told by the office that it's related a virus. Patient felt he had another viral infection accounting for the itch, in adiditon to his Hep C, and hence he came to ER.      No abdominal pain / current chest pain / change in bowel habit / vomiting / visual disturbances / dizziness / focal weakness     PMH:   COPD - inhalers, not on home oxygen  HCV Cirrhosis - ribavirin & Harnovi - since 4th July, 2017  CKD stage III - under Dr. Young  Thyroid nodules - benign on recent FNA  Hypertension     Denies surgeries in the past     Meds : Inhalers - albuterol & ipratropium  Commenced on Gabapentin as per Dr. Alcocer's advice today     O/E vitals - stable, no pain now. Last episode earlier this morning.  Patient in no distress, comfortable   alert, oriented   was initially confused about a potential second viral infection  casuing itch following conversation with Dr. Bella's office 3 days ago, now keen to go home     No pedal edema, no scleral icterus     LUNGS : clear, normal vesicular breath sounds  CVS : S1, S2, no added sounds / murmurs / gallop / rub  ABD : soft, non-tender, not distended     Labs : Trop (8/27) < 0.01, BUN - 17, Creat - 1.38, Na-135, K-3.8, Josse 1.7, AST 25, ALT 9, ALP 61, Hb-11.6, WBC -5.7, Plt - 191  EKG (8/27)  - nil acute, similar to one from Dec 2016  CXR (8/27) - looks grossly normal, similar to CXR from Dec 2016  NM Cardiac stress in Dec 2016  - no e/o jeopardized viable myocardium, EF 76%     Reviewed by Dr. Yoo and Dr. Merritt.     Agree nil acute. Atypical chest pain. Itching is the main symptom patient is here for.      Plan:  - Gabapentin for 2 weeks, refill if he has benefited from it  - Discharge  - To keep his appointment with Dr. Bella for tomorrow 8/28               Cosigned by: Melchor Yoo M.D. at 8/27/2017 11:16 AM

## 2017-08-27 NOTE — H&P
The patient was admitted for observation and discharged from the ER by the [facility] internal medicine team     [facility] INTERNAL MEDICINE ATTENDING NOTE:   Melchor Yoo MD        Visit Time:   Attending/resident bedside rounds 9-11:30 AM     PATIENT ID    Name:             Fede Parikh   YOB: 1945  Age:                 72 y.o.  male               MRN:               [id number]  Admit:                                  8/27/2017     The patient was evaluated with the resident staff.  I reviewed the resident's note and agree with the resident's findings and plan as documented in the resident's note except as documented in the attending note. Please reference resident daily note for complete information.     The chart was reviewed and summarized.  Available labs, imaging, O2 sats ,  EKGs were reviewed. Available nursing, consultant, and resident notes were reviewed. I am actively involved in the patient's care.                                                                          ________________________________________________________________________                 72(   )  INTERVAL:  Chart reviewed/summarized,       Aug 27AM: VSS, no distress, no chest pain, main complaint is itching (HEP C treatment)     Impression:   ** noncardiac chest pain, resolved, neg EKG changes, neg trop, atypical pain, MPI: Dec 2017 nonischemic -- GERD s/s -- HEP C meds preclude PPI -- antacids prn  ** itching, cholestasis, Hep C, no rash or eczema apparent -- dry skin (eucerin), low dose neurotin trial to reduce neuropathic symptoms  ** chronic HEP C, hepatitis improved, BR < 2, itching, no stigmata of chronic liver failure, JUDE A -- no acute liver failure or complications of HEPC apparent (no PCT, no vasculitis etc)   ** dispo: offered further cardiac assessment, wants to be discharged, low risk, DCd from ER, will see Dr. Bella/ID next week to f/u on Hep C management and probably associaed purititis      CORE:   Code Status (  FULL  --------------------------------------------------------------------------------------------------  Hospital Summary/ Patient System Review      NP:   *admit(  alert, nonfocal, no psychosis      EENT:   *admit(  neg, no phayrngitis      MSK/PAIN:   *admit(  neg     CVS:   *admit(  chest pain, sharp, < 1minute,  not exertional, resolved by time seen in ER, +GERD s/s /70, HR 85,  neg  cardiac exam, no edema, EKG: LAD, ST changes , MPI Dec 2016 negative, trop eng, BNP 32 , INR 1.10 , EKG: Sr, AVB, LAFB, delayed progression (MPI: no evidence for Ant MI)         MPI :  DEC 2016 76%, no ischemia      PULM:   *admit(  clear, pCXR negative   Impression: COPD , chronic , stable      2016: CT: no PE, bronchtiis, ASVD, LLL atelectasis, thyromegaly, bilateral renal scar     GI:   *admit(  Hep C neg, BMI  21, AST/ALT/ALP wnl (improved) , BR 1.7 (1.4) , glob 3.7 (3.8), lipase 19 , ALP 19   Impression: HEP C (remote IVDA) cirrhosis JUDE A (fibrometer: F3, A1A2) 1a/1B , Hep C PCR (2016 POS) -->undergoing treatment, likely esophageal related chest pain (symptomatic)      2016 US: gallstones, GB thick, contracted, CBD 79mm, echogenic liver      RENAL:   *admit(  Na 135, K 3.8, CO2 22, BS 91, BUN 17, c R1.38 (144), CA 9.8, ALB 3.9 , US: mild atropy/echogenicity     HEME/ONC:   *admit(  WBC 5.7, BH 11.6,       2017: B12 461, Folate > 24     ENDO:   *admit(  BS wnl      2017: thyroid nodules: POS US, neg bx (follicular)      DERM:   *admit(  pururitis x 3 weeks, no eczema /urticaria or other rash, mild dry skin   Impression: Hep C related cholestasis, dry skin component  Plan: eucerin, avoid prolonged hot baths/showers, good hydration, low dose neurontin trial      ID:   *admit(  no SIRS or other infectious findings   Impression: chronic HEP C (pruritis, under treatment, no prophyria cutanea tardea, no vasculiits apparent)  Plan: ID/[facility] f/u on meds      2016: HIV neg, Hep C PCR POS              []Hide copied text  []Hover for attribution information  ATTENDING : Dr. Yoo  TEAM : [employer] Team  SR. RESIDENT : Dr. René GARCIA RESIDENT: Dr. Tierney     Reason for Consult:   1) Chest Pain     Chief Complaint  1) Itching  2) Atypical chest pain     This 72-year-old gentleman presented to ER with few weeks history of generalized itching and intermittent chest pain for 1-2 months.     Chest pain - neck pain, and all across upper chest, on and off for 1-2 months, lasts 30 secs, no radiation, no associated nausea, no vomiting, no diaphoresis. Sometimes SOB due to COPD, relieved with inhalers. Has GERD, unable to use PPI due to interaction with anti-HCV medication.     Itching - intermittent past two months. Diagnosed with HCV cirrhosis in Dec 2016 ([place] drug abuse [place] [facility]) - started on Ribavirin and Harnovi 2 months ago. Contacted Dr. Bella's office regarding itching and is due to be seen tomorrow in Dr. Bella's clinic. Was told by the office that it's related a virus. Patient felt he had another viral infection accounting for the itch, in adiditon to his Hep C, and hence he came to ER.      No abdominal pain / current chest pain / change in bowel habit / vomiting / visual disturbances / dizziness / focal weakness     PMH:   COPD - inhalers, not on home oxygen  HCV Cirrhosis - ribavirin & Harnovi - since 4th July, 2017  CKD stage III - under Dr. Young  Thyroid nodules - benign on recent FNA  Hypertension     Denies surgeries in the past     Meds : Inhalers - albuterol & ipratropium  Commenced on Gabapentin as per Dr. Alcocer's advice today     O/E vitals - stable, no pain now. Last episode earlier this morning.  Patient in no distress, comfortable   alert, oriented   was initially confused about a potential second viral infection casuing itch following conversation with Dr. Bella's office 3 days ago, now keen to go home     No pedal edema, no scleral icterus     LUNGS : clear, normal  vesicular breath sounds  CVS : S1, S2, no added sounds / murmurs / gallop / rub  ABD : soft, non-tender, not distended     Labs : Trop (8/27) < 0.01, BUN - 17, Creat - 1.38, Na-135, K-3.8, Josse 1.7, AST 25, ALT 9, ALP 61, Hb-11.6, WBC -5.7, Plt - 191  EKG (8/27)  - nil acute, similar to one from Dec 2016  CXR (8/27) - looks grossly normal, similar to CXR from Dec 2016  NM Cardiac stress in Dec 2016  - no e/o jeopardized viable myocardium, EF 76%     Reviewed by Dr. Yoo and Dr. Merritt.     Agree nil acute. Atypical chest pain. Itching is the main symptom patient is here for.      Plan:  - Gabapentin for 2 weeks, refill if he has benefited from it  - Discharge  - To keep his appointment with Dr. Bella for tomorrow 8/28               Cosigned by: Melchor Yoo M.D. at 8/27/2017 11:16 AM

## 2017-08-28 ENCOUNTER — OFFICE VISIT (OUTPATIENT)
Dept: INTERNAL MEDICINE | Facility: MEDICAL CENTER | Age: 72
End: 2017-08-28
Payer: MEDICARE

## 2017-08-28 VITALS
WEIGHT: 140.8 LBS | TEMPERATURE: 98 F | DIASTOLIC BLOOD PRESSURE: 80 MMHG | BODY MASS INDEX: 21.34 KG/M2 | HEART RATE: 91 BPM | SYSTOLIC BLOOD PRESSURE: 110 MMHG | OXYGEN SATURATION: 98 % | HEIGHT: 68 IN

## 2017-08-28 DIAGNOSIS — Z79.899 HIGH RISK MEDICATION USE: ICD-10-CM

## 2017-08-28 DIAGNOSIS — B18.2 CHRONIC HEPATITIS C WITHOUT HEPATIC COMA (HCC): ICD-10-CM

## 2017-08-28 PROCEDURE — 99213 OFFICE O/P EST LOW 20 MIN: CPT | Performed by: INTERNAL MEDICINE

## 2017-08-28 ASSESSMENT — ENCOUNTER SYMPTOMS
VOMITING: 0
WHEEZING: 1
WEAKNESS: 0
WEIGHT LOSS: 0
HEARTBURN: 0
CHILLS: 0
CONSTIPATION: 0
FEVER: 0
ABDOMINAL PAIN: 0
POLYDIPSIA: 0
SHORTNESS OF BREATH: 1

## 2017-08-28 NOTE — PROGRESS NOTES
"Subjective:      Fede Parikh is a 72 y.o.AA male who presents in f/u  for active treatment of  newly diagnosed HCV genotype 1a/b with cirrhosis. He was  approved for Harvoni and ribavirin   400mg BID. He was recently diagnosed at [facility] in December,2016  when admitted with abdominal pain.  Believes he was infected during his greater than 30 year time in residential. Used iv drugs during this period. Has now been released, is on probation,  has completely stopped drinking . Has not been vaccinated . Feels fatigued   He is prescribed a PPI for acid reflux-  However not taking it and symptoms have resolved . Has remained off this   PMH- COPD /htn/ acid reflux/gallbldder disease   MEDS- reviewed with patient   On July 4 he started the regimen, States has been fully compliant and tolerating treatment. Is somewhat SOB, attributes this to his COPD. Using ventolin inhaler. He is now here for f/u of his labs, got them approx week 5 of therapy.   He states fully compliant with the regimen, he is about  To start week 9 on therapy.   He does c/o dry,itchy skin with multiple small bumps. No fever or chills         HPI      Review of Systems   Constitutional: Negative for chills, fever, malaise/fatigue and weight loss.   Respiratory: Positive for shortness of breath and wheezing.    Cardiovascular: Negative for leg swelling.   Gastrointestinal: Negative for abdominal pain, constipation, heartburn and vomiting.   Skin: Negative for itching and rash.   Neurological: Negative for weakness.   Endo/Heme/Allergies: Negative for polydipsia.          Objective:     /80   Pulse 91   Temp 36.7 °C (98 °F)   Ht 1.727 m (5' 8\")   Wt 63.9 kg (140 lb 12.8 oz)   SpO2 98%   BMI 21.41 kg/m²       Physical Exam   Constitutional: He is oriented to person, place, and time.   Cardiovascular: Normal rate and regular rhythm.    Pulmonary/Chest:   I do not hear any wheezing   Abdominal: Soft. Bowel sounds are normal.   Liver edge palpable, " no spleen, no ascites   Neurological: He is alert and oriented to person, place, and time.   Skin: Skin is warm and dry. Rash noted. No erythema.   Dry, bumpy skin    Psychiatric: He has a normal mood and affect. His behavior is normal.   Vitals reviewed.    Results for SADE ISSA JR. (MRN [id number]) as of 8/28/2017 09:51   Ref. Range 8/27/2017 04:32   Sodium Latest Ref Range: 135 - 145 mmol/L 135   Potassium Latest Ref Range: 3.6 - 5.5 mmol/L 3.8   Chloride Latest Ref Range: 96 - 112 mmol/L 104   Co2 Latest Ref Range: 20 - 33 mmol/L 22   Anion Gap Latest Ref Range: 0.0 - 11.9  9.0   Glucose Latest Ref Range: 65 - 99 mg/dL 91   Bun Latest Ref Range: 8 - 22 mg/dL 17   Creatinine Latest Ref Range: 0.50 - 1.40 mg/dL 1.38   GFR If  Latest Ref Range: >60 mL/min/1.73 m 2 >60   GFR If Non  Latest Ref Range: >60 mL/min/1.73 m 2 50 (A)   Calcium Latest Ref Range: 8.5 - 10.5 mg/dL 9.8   AST(SGOT) Latest Ref Range: 12 - 45 U/L 25   ALT(SGPT) Latest Ref Range: 2 - 50 U/L 9   Alkaline Phosphatase Latest Ref Range: 30 - 99 U/L 61   Total Bilirubin Latest Ref Range: 0.1 - 1.5 mg/dL 1.7 (H)   Albumin Latest Ref Range: 3.2 - 4.9 g/dL 3.9   Total Protein Latest Ref Range: 6.0 - 8.2 g/dL 7.6   Globulin Latest Ref Range: 1.9 - 3.5 g/dL 3.7 (H)   A-G Ratio Latest Units: g/dL 1.1   Lipase Latest Ref Range: 11 - 82 U/L 19     Hepatitis C Rna By Pcr, Quanti SEE BELOW IU/mL Final   Comment:   HCV Not Detected   Test Info SEE BELOW  Final   Comment:   The quantitative range of this assay is 15 IU/mL to 100 million IU/mL.   Narrative     Request patient fas       Results for SADE ISSA JR. (MRN [id number]) as of 8/28/2017 09:51   Ref. Range 8/27/2017 04:32   WBC Latest Ref Range: 4.8 - 10.8 K/uL 5.7   RBC Latest Ref Range: 4.70 - 6.10 M/uL 3.57 (L)   Hemoglobin Latest Ref Range: 14.0 - 18.0 g/dL 11.6 (L)   Hematocrit Latest Ref Range: 42.0 - 52.0 % 35.0 (L)   MCV Latest Ref Range: 81.4 - 97.8  fL 98.0 (H)   MCH Latest Ref Range: 27.0 - 33.0 pg 32.5   MCHC Latest Ref Range: 33.7 - 35.3 g/dL 33.1 (L)   RDW Latest Ref Range: 35.9 - 50.0 fL 51.5 (H)   Platelet Count Latest Ref Range: 164 - 446 K/uL 191   MPV Latest Ref Range: 9.0 - 12.9 fL 9.5         Assessment/Plan:     1. 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 Genotype  1a/b, and elevated APRI score.  He is now on therapy with full compliance. Doing well, however now has increased anemia from the ribavirin. His creatinine is elevated so clearance likely reduced worsening the anemia  2. High risk medication-Appear to be tolerating well, but is anemic, will therefore reduce the ribavirin to 400mg daily   P: reduce ribavirin to 400mg daily, cont Harvoni for 4 more weeks.  Apply oinment to dry skin. After 12 weeks off therapy get labs to check SVR12  and see me the  Following week

## 2017-11-25 ENCOUNTER — HOSPITAL ENCOUNTER (EMERGENCY)
Facility: MEDICAL CENTER | Age: 72
End: 2017-11-25
Attending: EMERGENCY MEDICINE
Payer: MEDICARE

## 2017-11-25 ENCOUNTER — APPOINTMENT (OUTPATIENT)
Dept: RADIOLOGY | Facility: MEDICAL CENTER | Age: 72
End: 2017-11-25
Attending: EMERGENCY MEDICINE
Payer: MEDICARE

## 2017-11-25 VITALS
HEART RATE: 87 BPM | OXYGEN SATURATION: 97 % | TEMPERATURE: 97.8 F | DIASTOLIC BLOOD PRESSURE: 95 MMHG | WEIGHT: 143.08 LBS | RESPIRATION RATE: 14 BRPM | BODY MASS INDEX: 21.19 KG/M2 | HEIGHT: 69 IN | SYSTOLIC BLOOD PRESSURE: 153 MMHG

## 2017-11-25 DIAGNOSIS — J40 BRONCHITIS: ICD-10-CM

## 2017-11-25 LAB
ALBUMIN SERPL BCP-MCNC: 3.7 G/DL (ref 3.2–4.9)
ALBUMIN/GLOB SERPL: 0.9 G/DL
ALP SERPL-CCNC: 63 U/L (ref 30–99)
ALT SERPL-CCNC: 7 U/L (ref 2–50)
ANION GAP SERPL CALC-SCNC: 8 MMOL/L (ref 0–11.9)
AST SERPL-CCNC: 17 U/L (ref 12–45)
BASOPHILS # BLD AUTO: 0.6 % (ref 0–1.8)
BASOPHILS # BLD: 0.04 K/UL (ref 0–0.12)
BILIRUB SERPL-MCNC: 0.4 MG/DL (ref 0.1–1.5)
BNP SERPL-MCNC: 35 PG/ML (ref 0–100)
BUN SERPL-MCNC: 16 MG/DL (ref 8–22)
CALCIUM SERPL-MCNC: 9.8 MG/DL (ref 8.5–10.5)
CHLORIDE SERPL-SCNC: 106 MMOL/L (ref 96–112)
CO2 SERPL-SCNC: 25 MMOL/L (ref 20–33)
CREAT SERPL-MCNC: 1.35 MG/DL (ref 0.5–1.4)
EOSINOPHIL # BLD AUTO: 0.1 K/UL (ref 0–0.51)
EOSINOPHIL NFR BLD: 1.5 % (ref 0–6.9)
ERYTHROCYTE [DISTWIDTH] IN BLOOD BY AUTOMATED COUNT: 41.3 FL (ref 35.9–50)
FLUAV RNA SPEC QL NAA+PROBE: NEGATIVE
FLUBV RNA SPEC QL NAA+PROBE: NEGATIVE
GFR SERPL CREATININE-BSD FRML MDRD: 52 ML/MIN/1.73 M 2
GLOBULIN SER CALC-MCNC: 4 G/DL (ref 1.9–3.5)
GLUCOSE SERPL-MCNC: 97 MG/DL (ref 65–99)
HCT VFR BLD AUTO: 44.9 % (ref 42–52)
HGB BLD-MCNC: 15.4 G/DL (ref 14–18)
IMM GRANULOCYTES # BLD AUTO: 0.02 K/UL (ref 0–0.11)
IMM GRANULOCYTES NFR BLD AUTO: 0.3 % (ref 0–0.9)
LYMPHOCYTES # BLD AUTO: 2.45 K/UL (ref 1–4.8)
LYMPHOCYTES NFR BLD: 36.4 % (ref 22–41)
MCH RBC QN AUTO: 31.8 PG (ref 27–33)
MCHC RBC AUTO-ENTMCNC: 34.3 G/DL (ref 33.7–35.3)
MCV RBC AUTO: 92.8 FL (ref 81.4–97.8)
MONOCYTES # BLD AUTO: 0.97 K/UL (ref 0–0.85)
MONOCYTES NFR BLD AUTO: 14.4 % (ref 0–13.4)
NEUTROPHILS # BLD AUTO: 3.15 K/UL (ref 1.82–7.42)
NEUTROPHILS NFR BLD: 46.8 % (ref 44–72)
NRBC # BLD AUTO: 0 K/UL
NRBC BLD AUTO-RTO: 0 /100 WBC
PLATELET # BLD AUTO: 169 K/UL (ref 164–446)
PMV BLD AUTO: 9.8 FL (ref 9–12.9)
POTASSIUM SERPL-SCNC: 4.3 MMOL/L (ref 3.6–5.5)
PROT SERPL-MCNC: 7.7 G/DL (ref 6–8.2)
RBC # BLD AUTO: 4.84 M/UL (ref 4.7–6.1)
SODIUM SERPL-SCNC: 139 MMOL/L (ref 135–145)
WBC # BLD AUTO: 6.7 K/UL (ref 4.8–10.8)

## 2017-11-25 PROCEDURE — 80053 COMPREHEN METABOLIC PANEL: CPT

## 2017-11-25 PROCEDURE — 94640 AIRWAY INHALATION TREATMENT: CPT

## 2017-11-25 PROCEDURE — 71020 DX-CHEST-2 VIEWS: CPT

## 2017-11-25 PROCEDURE — 700101 HCHG RX REV CODE 250: Performed by: EMERGENCY MEDICINE

## 2017-11-25 PROCEDURE — 99284 EMERGENCY DEPT VISIT MOD MDM: CPT

## 2017-11-25 PROCEDURE — 83880 ASSAY OF NATRIURETIC PEPTIDE: CPT

## 2017-11-25 PROCEDURE — 85025 COMPLETE CBC W/AUTO DIFF WBC: CPT

## 2017-11-25 PROCEDURE — 700111 HCHG RX REV CODE 636 W/ 250 OVERRIDE (IP): Performed by: EMERGENCY MEDICINE

## 2017-11-25 PROCEDURE — 96374 THER/PROPH/DIAG INJ IV PUSH: CPT

## 2017-11-25 PROCEDURE — 87502 INFLUENZA DNA AMP PROBE: CPT

## 2017-11-25 RX ORDER — METHYLPREDNISOLONE SODIUM SUCCINATE 125 MG/2ML
125 INJECTION, POWDER, LYOPHILIZED, FOR SOLUTION INTRAMUSCULAR; INTRAVENOUS ONCE
Status: COMPLETED | OUTPATIENT
Start: 2017-11-25 | End: 2017-11-25

## 2017-11-25 RX ORDER — PREDNISONE 20 MG/1
40 TABLET ORAL DAILY
Qty: 8 TAB | Refills: 0 | Status: SHIPPED | OUTPATIENT
Start: 2017-11-25 | End: 2017-11-29

## 2017-11-25 RX ORDER — AZITHROMYCIN 250 MG/1
TABLET, FILM COATED ORAL
Qty: 6 TAB | Refills: 0 | Status: SHIPPED | OUTPATIENT
Start: 2017-11-25 | End: 2018-01-30

## 2017-11-25 RX ORDER — IPRATROPIUM BROMIDE AND ALBUTEROL SULFATE 2.5; .5 MG/3ML; MG/3ML
3 SOLUTION RESPIRATORY (INHALATION)
Status: COMPLETED | OUTPATIENT
Start: 2017-11-25 | End: 2017-11-25

## 2017-11-25 RX ADMIN — METHYLPREDNISOLONE SODIUM SUCCINATE 125 MG: 125 INJECTION, POWDER, FOR SOLUTION INTRAMUSCULAR; INTRAVENOUS at 07:40

## 2017-11-25 RX ADMIN — IPRATROPIUM BROMIDE AND ALBUTEROL SULFATE 3 ML: .5; 3 SOLUTION RESPIRATORY (INHALATION) at 07:24

## 2017-11-25 ASSESSMENT — PAIN SCALES - GENERAL
PAINLEVEL_OUTOF10: 8
PAINLEVEL_OUTOF10: 5

## 2017-11-25 NOTE — ED PROVIDER NOTES
ED Provider Note    Scribed for Escobar Vargas M.D. by Jannette Shahid. 11/25/2017  6:55 AM    Primary care provider: Cortes Dubose M.D.  Means of arrival: walk in   History obtained from: patient   History limited by: none       CHIEF COMPLAINT  Chief Complaint   Patient presents with   • Cough     x3 weeks +yellow sputum   • Headache       HPI  Fede Parikh Jr. is a 72 y.o. male with a history of pulmonary emphysema who presents to the Emergency Department for evaluation of an intermittent cough onset three days ago. His cough is productive of yellow sputum. Patient has been using his inhalers with no relief of his symptoms. He reports associated headache with coughing and subjective fevers. He denies recent use of steroids but reports frequent pneumonia infections.       REVIEW OF SYSTEMS  Pertinent positives include productive cough, headache.   Pertinent negatives include no fever.    All other systems reviewed and negative. C.       PAST MEDICAL HISTORY   has a past medical history of Hepatitis and Pulmonary emphysema (CMS-HCC).      SURGICAL HISTORY  patient denies any surgical history      SOCIAL HISTORY  Social History   Substance Use Topics   • Smoking status: Current Every Day Smoker     Packs/day: 0.25     Types: Cigarettes   • Smokeless tobacco: Never Used   • Alcohol use No      Comment: 2/day       History   Drug Use No       FAMILY HISTORY  Family History   Problem Relation Age of Onset   • Heart Disease Mother    • Cancer Mother    • Diabetes Brother        CURRENT MEDICATIONS  Home Medications     Reviewed by Canelo Fortune R.N. (Registered Nurse) on 11/25/17 at 0653  Med List Status: Not Addressed   Medication Last Dose Status   albuterol (VENTOLIN HFA) 108 (90 BASE) MCG/ACT Aero Soln inhalation aerosol  Active   gabapentin (NEURONTIN) 100 MG Cap hasn't started Active   Ledipasvir-Sofosbuvir (HARVONI)  MG Tab  Active   omeprazole (PRILOSEC) 20 MG delayed-release capsule   "Active   pravastatin (PRAVACHOL) 20 MG Tab  Active   ribavirin (COPEGUS) 200 MG tablet  Active                ALLERGIES  No Known Allergies        PHYSICAL EXAM  VITAL SIGNS: /95   Pulse 90   Temp 36.6 °C (97.8 °F)   Resp 18   Ht 1.753 m (5' 9\")   Wt 64.9 kg (143 lb 1.3 oz)   SpO2 99%   BMI 21.13 kg/m²   Constitutional: Well developed, Well nourished, No distress, Non-toxic appearance.   HENT: Normocephalic, Atraumatic, Bilateral external ears normal, Oropharynx moist, No oral exudates.   Eyes: PERRLA, EOMI, Conjunctiva normal, No discharge.   Neck: No tenderness, Supple, No stridor.   Lymphatic: No lymphadenopathy noted.   Cardiovascular: Normal heart rate, Normal rhythm.   Thorax & Lungs: Decreased breath sounds throughout. Crackles at the bilateral bases. No respiratory distress, No wheezing.  Abdomen: Soft, No tenderness, No masses, No pulsatile masses.   Skin: Warm, Dry, No erythema, No rash.   Extremities:, No lower extremity edema. No cyanosis.   Musculoskeletal: No tenderness to palpation or major deformities noted.  Intact distal pulses  Neurologic: Awake, alert. Moves all extremities spontaneously.  Psychiatric: Affect normal, Judgment normal, Mood normal.         LABS  Labs Reviewed   CBC WITH DIFFERENTIAL - Abnormal; Notable for the following:        Result Value    Monocytes 14.40 (*)     Monos (Absolute) 0.97 (*)     All other components within normal limits   COMP METABOLIC PANEL - Abnormal; Notable for the following:     Globulin 4.0 (*)     All other components within normal limits   ESTIMATED GFR - Abnormal; Notable for the following:     GFR If Non  52 (*)     All other components within normal limits   BTYPE NATRIURETIC PEPTIDE   INFLUENZA A/B BY PCR   All labs reviewed by me.        RADIOLOGY  DX-CHEST-2 VIEWS   Final Result         1.  No acute cardiopulmonary abnormality identified.      2.  chronic obstructive pulmonary disease      The radiologist's " interpretation of all radiological studies have been reviewed by me.        COURSE & MEDICAL DECISION MAKING  Pertinent Labs & Imaging studies reviewed. (See chart for details)    6:55 AM - Patient seen and examined at bedside. Patient will be treated with Duoneb 3 mL and Solumedrol 125 mg. Ordered DX chest, CBC, CMP, BNP, influenza rapid and influenza by PCR to evaluate his symptoms.     8:25 AM Patient reevaluated at bedside. The patient is sleeping. Discussed diagnostic results with the patient. He confirms improvement of his breathing following his breathing treatment. He agrees to discharge home with the below prescriptions. The patient will return for new or worsening symptoms and is stable at the time of discharge.      Decision Making:  Patient with URI type symptoms, flu was negative, chest x-ray is negative, patient feeling improved after breathing treatment. We'll discharge the patient home on steroids and Zithromax, have the patient return with worsening symptoms.    The patient is referred to a primary physician for blood pressure management, diabetic screening, and for all other preventative health concerns.      DISPOSITION:  Patient will be discharged home in stable condition.      FOLLOW UP:  [facility], Emergency Dept  [address] [place] [id number] [phone]    If symptoms worsen        OUTPATIENT MEDICATIONS:  Discharge Medication List as of 11/25/2017  8:33 AM      START taking these medications    Details   azithromycin (ZITHROMAX) 250 MG Tab Take two tabs by mouth on day one, then one tab by mouth daily on days 2-5., Disp-6 Tab, R-0, Normal      predniSONE (DELTASONE) 20 MG Tab Take 2 Tabs by mouth every day for 4 days., Disp-8 Tab, R-0, Normal               FINAL IMPRESSION  1. Bronchitis           Jannette CASTILLO (Ricardo), am scribing for, and in the presence of, Escobar Vargas M.D..  Electronically signed by: Jannette Campbell), 11/25/2017  ANNA  Escobar Vargas M.D. personally performed the services described in this documentation, as scribed by Jannette Shahid in my presence, and it is both accurate and complete.    The note accurately reflects work and decisions made by me.  Escobar Vargas  11/25/2017  1:37 PM

## 2017-11-25 NOTE — ED NOTES
Pt given discharge instructions/prescriptions sent to pharmacy of choice/ home care instructions explained, pt verbalized understanding of instructions given, pt ambulatory to [facility].

## 2017-11-25 NOTE — ED NOTES
"Fede Parikh Jr.    Chief Complaint   Patient presents with   • Cough     x3 weeks +yellow sputum   • Headache       Pt ambulatory to triage with above complaint.  S/Sx started 3 weeks ago.  Pt states SOB is baseline for patient. Pt able to speak in full sentences, no respiratory distress noted. Headache occurs when coughing.  PMH: COPD, emphysema. VSS. Current smoker.      Pt returned to [facility], educated on triage process, and to inform staff of any changes or concerns.    Blood Pressure : 153/95, Pulse: 90, Respiration: 18, Temperature: 36.6 °C (97.8 °F), Height: 175.3 cm (5' 9\"), Weight: 64.9 kg (143 lb 1.3 oz), Pulse Oximetry: 99 %, O2 Delivery: None (Room Air)      "

## 2017-11-25 NOTE — DISCHARGE INSTRUCTIONS
Please follow-up with your primary care provider for blood pressure management.        Bronchitis  Bronchitis is the body's way of reacting to injury and/or infection (inflammation) of the bronchi. Bronchi are the air tubes that extend from the windpipe into the lungs. If the inflammation becomes severe, it may cause shortness of breath.  CAUSES   Inflammation may be caused by:  · A virus.  · Germs (bacteria).  · Dust.  · Allergens.  · Pollutants and many other irritants.  The cells lining the bronchial tree are covered with tiny hairs (cilia). These constantly beat upward, away from the lungs, toward the mouth. This keeps the lungs free of pollutants. When these cells become too irritated and are unable to do their job, mucus begins to develop. This causes the characteristic cough of bronchitis. The cough clears the lungs when the cilia are unable to do their job. Without either of these protective mechanisms, the mucus would settle in the lungs. Then you would develop pneumonia.  Smoking is a common cause of bronchitis and can contribute to pneumonia. Stopping this habit is the single most important thing you can do to help yourself.  TREATMENT   · Your caregiver may prescribe an antibiotic if the cough is caused by bacteria. Also, medicines that open up your airways make it easier to breathe. Your caregiver may also recommend or prescribe an expectorant. It will loosen the mucus to be coughed up. Only take over-the-counter or prescription medicines for pain, discomfort, or fever as directed by your caregiver.  · Removing whatever causes the problem (smoking, for example) is critical to preventing the problem from getting worse.  · Cough suppressants may be prescribed for relief of cough symptoms.  · Inhaled medicines may be prescribed to help with symptoms now and to help prevent problems from returning.  · For those with recurrent (chronic) bronchitis, there may be a need for steroid medicines.  SEEK IMMEDIATE  MEDICAL CARE IF:   · During treatment, you develop more pus-like mucus (purulent sputum).  · You have a fever.  · Your baby is older than 3 months with a rectal temperature of 102° F (38.9° C) or higher.  · Your baby is 3 months old or younger with a rectal temperature of 100.4° F (38° C) or higher.  · You become progressively more ill.  · You have increased difficulty breathing, wheezing, or shortness of breath.  It is necessary to seek immediate medical care if you are elderly or sick from any other disease.  MAKE SURE YOU:   · Understand these instructions.  · Will watch your condition.  · Will get help right away if you are not doing well or get worse.  Document Released: 12/18/2006 Document Revised: 03/11/2013 Document Reviewed: 10/27/2009  OMGP[employer]® Patient Information ©2014 [employer].

## 2017-11-26 ENCOUNTER — PATIENT OUTREACH (OUTPATIENT)
Dept: HEALTH INFORMATION MANAGEMENT | Facility: OTHER | Age: 72
End: 2017-11-26

## 2018-01-04 DIAGNOSIS — B18.2 CHRONIC HEPATITIS C WITHOUT HEPATIC COMA (HCC): ICD-10-CM

## 2018-01-08 ENCOUNTER — OFFICE VISIT (OUTPATIENT)
Dept: INTERNAL MEDICINE | Facility: MEDICAL CENTER | Age: 73
End: 2018-01-08
Payer: MEDICARE

## 2018-01-08 ENCOUNTER — HOSPITAL ENCOUNTER (OUTPATIENT)
Dept: LAB | Facility: MEDICAL CENTER | Age: 73
End: 2018-01-08
Attending: INTERNAL MEDICINE
Payer: MEDICARE

## 2018-01-08 VITALS
OXYGEN SATURATION: 97 % | BODY MASS INDEX: 21.09 KG/M2 | HEIGHT: 69 IN | HEART RATE: 86 BPM | SYSTOLIC BLOOD PRESSURE: 142 MMHG | WEIGHT: 142.4 LBS | TEMPERATURE: 98.2 F | DIASTOLIC BLOOD PRESSURE: 92 MMHG

## 2018-01-08 DIAGNOSIS — Z23 INFLUENZA VACCINE NEEDED: Primary | ICD-10-CM

## 2018-01-08 DIAGNOSIS — B18.2 CHRONIC HEPATITIS C WITHOUT HEPATIC COMA (HCC): ICD-10-CM

## 2018-01-08 LAB
ALBUMIN SERPL BCP-MCNC: 4.4 G/DL (ref 3.2–4.9)
ALBUMIN/GLOB SERPL: 1.2 G/DL
ALP SERPL-CCNC: 58 U/L (ref 30–99)
ALT SERPL-CCNC: 11 U/L (ref 2–50)
ANION GAP SERPL CALC-SCNC: 6 MMOL/L (ref 0–11.9)
AST SERPL-CCNC: 25 U/L (ref 12–45)
BILIRUB SERPL-MCNC: 1.2 MG/DL (ref 0.1–1.5)
BUN SERPL-MCNC: 20 MG/DL (ref 8–22)
CALCIUM SERPL-MCNC: 10.1 MG/DL (ref 8.5–10.5)
CHLORIDE SERPL-SCNC: 105 MMOL/L (ref 96–112)
CO2 SERPL-SCNC: 28 MMOL/L (ref 20–33)
CREAT SERPL-MCNC: 1.69 MG/DL (ref 0.5–1.4)
GLOBULIN SER CALC-MCNC: 3.6 G/DL (ref 1.9–3.5)
GLUCOSE SERPL-MCNC: 106 MG/DL (ref 65–99)
POTASSIUM SERPL-SCNC: 4.7 MMOL/L (ref 3.6–5.5)
PROT SERPL-MCNC: 8 G/DL (ref 6–8.2)
SODIUM SERPL-SCNC: 139 MMOL/L (ref 135–145)

## 2018-01-08 PROCEDURE — 36415 COLL VENOUS BLD VENIPUNCTURE: CPT

## 2018-01-08 PROCEDURE — 99213 OFFICE O/P EST LOW 20 MIN: CPT | Performed by: INTERNAL MEDICINE

## 2018-01-08 PROCEDURE — 87522 HEPATITIS C REVRS TRNSCRPJ: CPT

## 2018-01-08 PROCEDURE — 80053 COMPREHEN METABOLIC PANEL: CPT

## 2018-01-08 ASSESSMENT — ENCOUNTER SYMPTOMS
WHEEZING: 0
HEARTBURN: 0
POLYDIPSIA: 0
WEAKNESS: 0
VOMITING: 0
CHILLS: 0
ABDOMINAL PAIN: 0
SHORTNESS OF BREATH: 0
WEIGHT LOSS: 0
FEVER: 0
CONSTIPATION: 0

## 2018-01-08 ASSESSMENT — PAIN SCALES - GENERAL: PAINLEVEL: NO PAIN

## 2018-01-08 NOTE — PROGRESS NOTES
"Subjective:      Fede Parikh is a 72 y.o.AA male who presents in f/u  of therapy  HCV genotype 1a/b with cirrhosis.   He started  Harvoni and ribavirin   400mg BID July and completed approx October 1.     HPI: . He was  Diagnosed with HCV  at [facility] in December,2016  when admitted with abdominal pain.  Believes he was infected during his greater than 30 year time in nursing home. Used iv drugs during this period. Has now been released, is on probation,  has completely stopped drinking . Has not been vaccinated .Feells welll, has used cocaine recently      [facility]- COPD /htn/ acid reflux/gallbldder disease     MEDS- reviewed with patient             HPI      Review of Systems   Constitutional: Negative for chills, fever, malaise/fatigue and weight loss.   Respiratory: Negative for shortness of breath and wheezing.    Cardiovascular: Negative for leg swelling.   Gastrointestinal: Negative for abdominal pain, constipation, heartburn and vomiting.   Skin: Positive for itching. Negative for rash.   Neurological: Negative for weakness.   Endo/Heme/Allergies: Negative for polydipsia.          Objective:     /92   Pulse 86   Temp 36.8 °C (98.2 °F)   Ht 1.753 m (5' 9\")   Wt 64.6 kg (142 lb 6.4 oz)   SpO2 97%   BMI 21.03 kg/m²      Physical Exam   Constitutional: He is oriented to person, place, and time.   Cardiovascular: Normal rate and regular rhythm.    Pulmonary/Chest:   I do not hear any wheezing   Abdominal: Soft. Bowel sounds are normal.   Liver edge not  palpable, no spleen, no ascites   Neurological: He is alert and oriented to person, place, and time.   Skin: Skin is warm and dry. Rash noted. No erythema.   Dry, bumpy skin   No zambrano erythema    Psychiatric: He has a normal mood and affect. His behavior is normal.   Vitals reviewed.    LABS- none done since completing therapy     Assessment/Plan:     1. Chronic hepatitis C without hepatic coma (CMS-HCC)- likely  Cirrhosis with thrombocytopenia, fibrosure " Stage 3-4. Genotype  1a/b, and elevated APRI score.  He has now completed  therapy with full compliance  Over 12 weeks ago.  PE remains normal    P: check today for SVR 12 with chem panel and HCV quantasure test.      Encouraged to abstain from drugs and alcohol      Agreed to influenza vaccine today       If cured- will f/u prn

## 2018-01-13 LAB
HCV RNA SERPL NAA+PROBE-ACNC: NORMAL IU/ML
TEST INFO  93644: NORMAL

## 2018-01-16 ENCOUNTER — TELEPHONE (OUTPATIENT)
Dept: INTERNAL MEDICINE | Facility: MEDICAL CENTER | Age: 73
End: 2018-01-16

## 2018-01-16 DIAGNOSIS — B18.2 CHRONIC HEPATITIS C WITHOUT HEPATIC COMA (HCC): ICD-10-CM

## 2018-01-16 NOTE — TELEPHONE ENCOUNTER
Message: Pt is due for his Last Labs for his hep c tx.  I see pt got lab work done. Please review. Thanks     Patient approves office to leave a detailed voicemail/MyChart message: N\A

## 2018-01-18 NOTE — TELEPHONE ENCOUNTER
Congratulations. You are cured of Hepatitis C!  Check in one more time in 6 months with Chem panel before

## 2018-01-24 DIAGNOSIS — B18.2 CHRONIC HEPATITIS C WITHOUT HEPATIC COMA (HCC): ICD-10-CM

## 2018-01-24 NOTE — TELEPHONE ENCOUNTER
Called and spoke with pt. Notified pt of this. He understood.  Mailed lab slip to pt. With a note reminder to schedule an appt in 6 month from now.

## 2018-01-30 ENCOUNTER — OFFICE VISIT (OUTPATIENT)
Dept: INTERNAL MEDICINE | Facility: MEDICAL CENTER | Age: 73
End: 2018-01-30
Payer: MEDICARE

## 2018-01-30 VITALS
DIASTOLIC BLOOD PRESSURE: 70 MMHG | HEART RATE: 88 BPM | OXYGEN SATURATION: 96 % | RESPIRATION RATE: 16 BRPM | BODY MASS INDEX: 20.88 KG/M2 | SYSTOLIC BLOOD PRESSURE: 122 MMHG | WEIGHT: 141 LBS | TEMPERATURE: 96.9 F | HEIGHT: 69 IN

## 2018-01-30 DIAGNOSIS — B18.2 CHRONIC HEPATITIS C WITHOUT HEPATIC COMA (HCC): ICD-10-CM

## 2018-01-30 DIAGNOSIS — K80.21 CALCULUS OF GALLBLADDER WITH BILIARY OBSTRUCTION BUT WITHOUT CHOLECYSTITIS: ICD-10-CM

## 2018-01-30 DIAGNOSIS — Z72.0 TOBACCO USE: ICD-10-CM

## 2018-01-30 DIAGNOSIS — J44.9 CHRONIC OBSTRUCTIVE PULMONARY DISEASE, UNSPECIFIED COPD TYPE (HCC): ICD-10-CM

## 2018-01-30 DIAGNOSIS — K21.9 GASTROESOPHAGEAL REFLUX DISEASE, ESOPHAGITIS PRESENCE NOT SPECIFIED: ICD-10-CM

## 2018-01-30 DIAGNOSIS — N18.30 CKD (CHRONIC KIDNEY DISEASE), STAGE III (HCC): ICD-10-CM

## 2018-01-30 PROCEDURE — 99214 OFFICE O/P EST MOD 30 MIN: CPT | Mod: GC | Performed by: INTERNAL MEDICINE

## 2018-01-30 RX ORDER — TIOTROPIUM BROMIDE 18 UG/1
18 CAPSULE ORAL; RESPIRATORY (INHALATION) DAILY
Qty: 30 CAP | Refills: 3 | Status: SHIPPED | OUTPATIENT
Start: 2018-01-30 | End: 2018-06-14 | Stop reason: SDUPTHER

## 2018-01-30 RX ORDER — OMEPRAZOLE 20 MG/1
CAPSULE, DELAYED RELEASE ORAL
Qty: 30 CAP | Refills: 1 | Status: SHIPPED | OUTPATIENT
Start: 2018-01-30 | End: 2018-05-08 | Stop reason: SDUPTHER

## 2018-01-30 RX ORDER — SUCRALFATE 1 G/1
1 TABLET ORAL 2 TIMES DAILY
Qty: 90 TAB | Refills: 0 | Status: SHIPPED | OUTPATIENT
Start: 2018-01-30 | End: 2018-05-18

## 2018-01-30 RX ORDER — TIOTROPIUM BROMIDE 18 UG/1
18 CAPSULE ORAL; RESPIRATORY (INHALATION) DAILY
COMMUNITY
End: 2018-01-30 | Stop reason: SDUPTHER

## 2018-01-30 RX ORDER — ALBUTEROL SULFATE 90 UG/1
2 AEROSOL, METERED RESPIRATORY (INHALATION) EVERY 6 HOURS PRN
Qty: 18 G | Refills: 3 | Status: SHIPPED | OUTPATIENT
Start: 2018-01-30 | End: 2018-05-29 | Stop reason: SDUPTHER

## 2018-01-30 NOTE — PROGRESS NOTES
Established Patient    Fede presents today with the following:    CC: Management of COPD, GERD    HPI: 72 year old male with a PMH of Hep C s/p treatment with cure, COPD, GERD, benign thyroid nodules, and previous heavy alcohol use presents for a clinic follow up.    History of Hep C causing cirrhosis s/p treatment:  Patient recently finished treatment with harvoni and ribavirin and according to his last ID note has been cured of hep C. He denies any recent alcohol use. A CMP and ultrasound abdomen has been ordered and is pending by Dr. Bella.     COPD 2/2 tobacco use: Continue to smoke around 6 cigarettes per day which has improved from his previous use of 20 cigarettes per day. He is requesting refills of his COPD medications at todays visit and states that since he has run of out of his inhalers he has developed a cough. All smoking cessation options were reviewed with patient with a strong recommendation to stop using tobacco. He states that he smokes in response to stress and is particularly concerned about a female significant other causing stress in his life. He is working to resolve this issue. He is still not interesting in any help with smoking cessation at this time. Patient denies any wheezing, hemoptysis, sputum, pleuritic chest pain.    GERD: Told by Dr. Bella to stop taking omeprazole for GERD while he is being treated for hep C, but continues to complain of dull abdominal discomfort relieved with eating.    CKDIII: Was previously seen by Dr. Young for an evaluation and he wanted to perform a kidney ultrasound and 24 hour urine collections after he was successful treated with hep C. He was asked to contact their office to schedule an appt as he is already an existing appt and stated that he would do so.      Patient Active Problem List    Diagnosis Date Noted   • Pruritus 08/27/2017   • Non-cardiac chest pain 08/27/2017   • CKD (chronic kidney disease), stage III 07/03/2017   • Chronic  "hepatitis C without hepatic coma (CMS-HCC) 02/15/2017   • Tobacco use 02/15/2017   • Alcohol use 02/15/2017   • Calculus of gallbladder without cholecystitis 02/15/2017   • Multiple thyroid nodules 12/14/2016   • COPD (chronic obstructive pulmonary disease) (CMS-HCC) 12/13/2016   • GERD (gastroesophageal reflux disease) 12/13/2016       Current Outpatient Prescriptions   Medication Sig Dispense Refill   • tiotropium (SPIRIVA HANDIHALER) 18 MCG Cap Inhale 1 Cap by mouth every day. 30 Cap 3   • aspirin EC (ECOTRIN) 81 MG Tablet Delayed Response Take 1 Tab by mouth every day. 30 Tab 11   • beclomethasone (QVAR) 40 MCG/ACT inhaler Inhale 1 Puff by mouth 2 Times a Day. 1 Inhaler 3   • omeprazole (PRILOSEC) 20 MG delayed-release capsule TAKE ONE CAPSULE BY MOUTH ONE TIME DAILY 30 Cap 1   • albuterol (VENTOLIN HFA) 108 (90 Base) MCG/ACT Aero Soln inhalation aerosol Inhale 2 Puffs by mouth every 6 hours as needed. 18 g 3   • sucralfate (CARAFATE) 1 GM Tab Take 1 Tab by mouth 2 Times a Day. 90 Tab 0   • pravastatin (PRAVACHOL) 20 MG Tab TAKE ONE TABLET BY MOUTH ONE TIME DAILY 30 Tab 11     No current facility-administered medications for this visit.        ROS: As per HPI. Additional pertinent symptoms as noted below.      /70   Pulse 88   Temp 36.1 °C (96.9 °F)   Resp 16   Ht 1.753 m (5' 9.02\")   Wt 64 kg (141 lb)   SpO2 96%   BMI 20.81 kg/m²     Physical Exam   General:  male, Alert and oriented, No apparent distress.  Eyes: Pupils equal and reactive. No scleral icterus. EOMI, conjunctiva jaundiced.  Throat: Clear no erythema or exudates noted.  Neck: Supple. No lymphadenopathy noted. Multiple b/l thyroid nodules palpated.  Lungs: Clear to auscultation and percussion bilaterally. Decreased BS b/l with no wheezing or other adventitious sounds.  Cardiovascular: Regular rate and rhythm. No murmurs, rubs or gallops.  Abdomen:  Benign. No rebound or guarding noted.  Extremities: No clubbing, " cyanosis, edema. Scratch marks from itching, non-painful left knee lump.  Skin: Clear. No rash or suspicious skin lesions noted.      Assessment and Plan    1. Chronic hepatitis C with cirrhosis  - Hx IV drug use, released from senior living 4 years ago, on parole  - Hep C RNA 1,900,000 on 12/13  - Hep C RNA  Not detected on 1/8/2018 after treatment  - Resolution of transaminitis  - US liver 12/13 showed fatty liver vs fibrosis  - HIV negative  - Followed by ID, Dr. Bella  - S/p harvoni and ribavirin  - Complete alcohol cessation  - Follow up CMP, abd US pending     2. CKD stage III  - Does have a history of HTN although he currently does not need treatment for this, tobacco use, previous heavy alcohol use, cured hep C  - Cr worsening  - Previously established with nephrology. Asked to reschedule an appt for a kidney US and urine tests.  - Nephrology following, Dr. Young     3. Chronic obstructive pulmonary disease  - Continue albuterol, tiotropium and beclomethasone inhalers. Sent all Rx to pharmacy  - Did not get PFT's. Encouraged to set up appt.  - Prevnar given 3/22/17  - Refused pneumovax  - Counseled smoking cessation     4. Gastroesophageal reflux disease  - Complains of abd pain improved with food. Has recently been initiated on aspirin. May be indiicative of a duodenal ulcer, duodenitis.  - EGD while in senior living with no abnormalities  - Re-initiate omeprazole now that patient is off hep C treatment  - Add sucralfate for 6 weeks  - Check H. Pylori stool    5. Cholelithiasis   - Seen on US 12/2016  - Seen previously by Dr. White who stated that there is currently no indication for surgery unless his pain occurs again  - Does have abd pain, but it is not calssic for cholelithiasis and likely represents a different process.   - US abd ordered by Dr. Bella  - Monitor     6. Tobacco use  - Has smoked on/off for the past 50 years  - Down to 6 cigarettes per day  - Counseled cessation  - Harm reduction plan  discussed  - Encouraged patient to quit. Not interested in cessation at this time.     7. Alcohol use  - Denies recent use     8. Health maintenance  - Influenza shot 1/8  - Colonsocopy 12/2016  - Prevnar 3/2017, reassess next step in 3 months  - Shingles 4/2017  - Tdap 2/2017  - Check lipid panel       PLAN: Return to nephrology clinic for CKD stage III, lipid panel, stool H. Pylori peding, all medications sent to pharmacy.        Signed by: Cortes Dubose M.D.

## 2018-01-30 NOTE — PATIENT INSTRUCTIONS
-  Lipid panel  - Follow up with Dr. Young  - Add sucralfate for gastrointestinal ulcer  - Follow up in 3 months

## 2018-02-26 ENCOUNTER — TELEPHONE (OUTPATIENT)
Dept: INTERNAL MEDICINE | Facility: MEDICAL CENTER | Age: 73
End: 2018-02-26

## 2018-02-26 NOTE — TELEPHONE ENCOUNTER
"DOCUMENTATION OF PAR STATUS:    1. Name of Medication & Dose: QVar 40 mcg/act     2. Name of Prescription Coverage Company & phone #: Medicare    3. Date Prior Auth Submitted:     4. What information was given to obtain insurance decision?     5. Prior Auth Letter Approved or Denied? Denied    6. Action Taken: Pharmacy/Patient Notified: N/A    Patient has to try and fail either Arnuity Ellipta, Flovent Diskus, or Flovent HFA    Also I received a fax from pharmacy stating that \"the original Qvar is not available from manufacture.\" they wanted it to be a redihaler.   "

## 2018-02-28 ENCOUNTER — TELEPHONE (OUTPATIENT)
Dept: INTERNAL MEDICINE | Facility: MEDICAL CENTER | Age: 73
End: 2018-02-28

## 2018-03-01 RX ORDER — FLUTICASONE PROPIONATE 44 UG/1
2 AEROSOL, METERED RESPIRATORY (INHALATION) 2 TIMES DAILY
Qty: 1 INHALER | Refills: 3 | Status: SHIPPED | OUTPATIENT
Start: 2018-03-01 | End: 2018-08-29

## 2018-04-10 ENCOUNTER — APPOINTMENT (OUTPATIENT)
Dept: INTERNAL MEDICINE | Facility: MEDICAL CENTER | Age: 73
End: 2018-04-10
Payer: MEDICARE

## 2018-04-13 ENCOUNTER — OFFICE VISIT (OUTPATIENT)
Dept: INTERNAL MEDICINE | Facility: MEDICAL CENTER | Age: 73
End: 2018-04-13
Payer: MEDICARE

## 2018-04-13 ENCOUNTER — HOSPITAL ENCOUNTER (OUTPATIENT)
Dept: LAB | Facility: MEDICAL CENTER | Age: 73
End: 2018-04-13
Attending: INTERNAL MEDICINE
Payer: MEDICARE

## 2018-04-13 VITALS
HEIGHT: 69 IN | SYSTOLIC BLOOD PRESSURE: 158 MMHG | DIASTOLIC BLOOD PRESSURE: 88 MMHG | TEMPERATURE: 96.7 F | WEIGHT: 142 LBS | HEART RATE: 88 BPM | RESPIRATION RATE: 14 BRPM | OXYGEN SATURATION: 98 % | BODY MASS INDEX: 21.03 KG/M2

## 2018-04-13 DIAGNOSIS — Z78.9 ALCOHOL USE: ICD-10-CM

## 2018-04-13 DIAGNOSIS — J44.9 CHRONIC OBSTRUCTIVE PULMONARY DISEASE, UNSPECIFIED COPD TYPE (HCC): ICD-10-CM

## 2018-04-13 DIAGNOSIS — L29.9 PRURITUS: ICD-10-CM

## 2018-04-13 DIAGNOSIS — Z72.0 TOBACCO USE: ICD-10-CM

## 2018-04-13 DIAGNOSIS — B18.2 CHRONIC HEPATITIS C WITHOUT HEPATIC COMA (HCC): ICD-10-CM

## 2018-04-13 DIAGNOSIS — R53.1 WEAKNESS: ICD-10-CM

## 2018-04-13 DIAGNOSIS — K21.9 GASTROESOPHAGEAL REFLUX DISEASE, ESOPHAGITIS PRESENCE NOT SPECIFIED: ICD-10-CM

## 2018-04-13 DIAGNOSIS — N18.30 CKD (CHRONIC KIDNEY DISEASE), STAGE III (HCC): ICD-10-CM

## 2018-04-13 LAB
ALBUMIN SERPL BCP-MCNC: 4 G/DL (ref 3.2–4.9)
ALBUMIN/GLOB SERPL: 1.2 G/DL
ALP SERPL-CCNC: 59 U/L (ref 30–99)
ALT SERPL-CCNC: 7 U/L (ref 2–50)
ANION GAP SERPL CALC-SCNC: 6 MMOL/L (ref 0–11.9)
AST SERPL-CCNC: 16 U/L (ref 12–45)
BASOPHILS # BLD AUTO: 0.6 % (ref 0–1.8)
BASOPHILS # BLD: 0.04 K/UL (ref 0–0.12)
BILIRUB SERPL-MCNC: 0.5 MG/DL (ref 0.1–1.5)
BUN SERPL-MCNC: 16 MG/DL (ref 8–22)
CALCIUM SERPL-MCNC: 9.5 MG/DL (ref 8.5–10.5)
CHLORIDE SERPL-SCNC: 105 MMOL/L (ref 96–112)
CHOLEST SERPL-MCNC: 195 MG/DL (ref 100–199)
CO2 SERPL-SCNC: 27 MMOL/L (ref 20–33)
CREAT SERPL-MCNC: 1.54 MG/DL (ref 0.5–1.4)
EOSINOPHIL # BLD AUTO: 0.18 K/UL (ref 0–0.51)
EOSINOPHIL NFR BLD: 2.9 % (ref 0–6.9)
ERYTHROCYTE [DISTWIDTH] IN BLOOD BY AUTOMATED COUNT: 44 FL (ref 35.9–50)
GLOBULIN SER CALC-MCNC: 3.3 G/DL (ref 1.9–3.5)
GLUCOSE SERPL-MCNC: 85 MG/DL (ref 65–99)
HCT VFR BLD AUTO: 45.5 % (ref 42–52)
HDLC SERPL-MCNC: 76 MG/DL
HGB BLD-MCNC: 15.3 G/DL (ref 14–18)
IMM GRANULOCYTES # BLD AUTO: 0.01 K/UL (ref 0–0.11)
IMM GRANULOCYTES NFR BLD AUTO: 0.2 % (ref 0–0.9)
LDLC SERPL CALC-MCNC: 102 MG/DL
LYMPHOCYTES # BLD AUTO: 2.07 K/UL (ref 1–4.8)
LYMPHOCYTES NFR BLD: 33.5 % (ref 22–41)
MCH RBC QN AUTO: 31.5 PG (ref 27–33)
MCHC RBC AUTO-ENTMCNC: 33.6 G/DL (ref 33.7–35.3)
MCV RBC AUTO: 93.6 FL (ref 81.4–97.8)
MONOCYTES # BLD AUTO: 0.74 K/UL (ref 0–0.85)
MONOCYTES NFR BLD AUTO: 12 % (ref 0–13.4)
NEUTROPHILS # BLD AUTO: 3.14 K/UL (ref 1.82–7.42)
NEUTROPHILS NFR BLD: 50.8 % (ref 44–72)
NRBC # BLD AUTO: 0 K/UL
NRBC BLD-RTO: 0 /100 WBC
PLATELET # BLD AUTO: 154 K/UL (ref 164–446)
PMV BLD AUTO: 11.1 FL (ref 9–12.9)
POTASSIUM SERPL-SCNC: 4.5 MMOL/L (ref 3.6–5.5)
PROT SERPL-MCNC: 7.3 G/DL (ref 6–8.2)
RBC # BLD AUTO: 4.86 M/UL (ref 4.7–6.1)
SODIUM SERPL-SCNC: 138 MMOL/L (ref 135–145)
T4 FREE SERPL-MCNC: 0.79 NG/DL (ref 0.53–1.43)
TRIGL SERPL-MCNC: 86 MG/DL (ref 0–149)
TSH SERPL DL<=0.005 MIU/L-ACNC: 0.75 UIU/ML (ref 0.38–5.33)
WBC # BLD AUTO: 6.2 K/UL (ref 4.8–10.8)

## 2018-04-13 PROCEDURE — 84443 ASSAY THYROID STIM HORMONE: CPT

## 2018-04-13 PROCEDURE — 80061 LIPID PANEL: CPT | Mod: GA

## 2018-04-13 PROCEDURE — 36415 COLL VENOUS BLD VENIPUNCTURE: CPT

## 2018-04-13 PROCEDURE — 85025 COMPLETE CBC W/AUTO DIFF WBC: CPT

## 2018-04-13 PROCEDURE — 84439 ASSAY OF FREE THYROXINE: CPT

## 2018-04-13 PROCEDURE — 80053 COMPREHEN METABOLIC PANEL: CPT

## 2018-04-13 PROCEDURE — 99214 OFFICE O/P EST MOD 30 MIN: CPT | Mod: GC | Performed by: INTERNAL MEDICINE

## 2018-04-13 ASSESSMENT — PATIENT HEALTH QUESTIONNAIRE - PHQ9: CLINICAL INTERPRETATION OF PHQ2 SCORE: 0

## 2018-04-13 NOTE — PATIENT INSTRUCTIONS
- Gets labwork: complete blood count, metabolic panel, lipid panel, thyroid  - Get pulmonary function test  - Get abdominal ultrasound to re-evaluate liver and gallbladder  - H. Pylori stool test  - Needs pneumovax  - Make appt with nephrology, [facility], Dr. Young, [phone]-

## 2018-04-13 NOTE — PROGRESS NOTES
Established Patient    Fede presents today with the following:    CC: Abdominal bloating, nausea, weakness    HPI: 72 year old male with a PMH of Hep C s/p treatment, COPD, GERD, benign thyroid nodules, tobacco use, and previous heavy alcohol use presents for a clinic follow up.      History of Hep C causing cirrhosis s/p treatment:  Finished course of harvoni and ribavirin. Patient has not gotten his follow up liver ultrasound or CMP yet. Still complains of general pruritis worst at his b/l antecubital fossa where there has been some recent skin changes.     COPD 2/2 tobacco use: Continues to smoke 6 cigarettes per day de to boredom. Patient is compliant with his inhalers and has no pulmonary complaints at today's visit. Pt denies cough, wheezing, hemoptysis, sputum, pleuritic chest pain.     GERD: Has been compliant with his omeprazole and sucralfate regimen which does seeem to be helping, however, patient is complaining of a mild nausea and abdominal bloating at today's visit.      CKDIII: Previously seen by Dr. Young who recommended a kidney ultrasound and 24 hour urine collections after hep C treatment. He stated that he would make an appt with their office today.    Patient Active Problem List    Diagnosis Date Noted   • Pruritus 08/27/2017   • Non-cardiac chest pain 08/27/2017   • CKD (chronic kidney disease), stage III 07/03/2017   • Chronic hepatitis C without hepatic coma (CMS-HCC) 02/15/2017   • Tobacco use 02/15/2017   • Alcohol use 02/15/2017   • Calculus of gallbladder without cholecystitis 02/15/2017   • Multiple thyroid nodules 12/14/2016   • COPD (chronic obstructive pulmonary disease) (CMS-HCC) 12/13/2016   • GERD (gastroesophageal reflux disease) 12/13/2016       Current Outpatient Prescriptions   Medication Sig Dispense Refill   • fluticasone (FLOVENT HFA) 44 MCG/ACT Aerosol Inhale 2 Puffs by mouth 2 times a day. 1 Inhaler 3   • tiotropium (SPIRIVA HANDIHALER) 18 MCG Cap Inhale 1 Cap by  "mouth every day. 30 Cap 3   • aspirin EC (ECOTRIN) 81 MG Tablet Delayed Response Take 1 Tab by mouth every day. 30 Tab 11   • omeprazole (PRILOSEC) 20 MG delayed-release capsule TAKE ONE CAPSULE BY MOUTH ONE TIME DAILY 30 Cap 1   • albuterol (VENTOLIN HFA) 108 (90 Base) MCG/ACT Aero Soln inhalation aerosol Inhale 2 Puffs by mouth every 6 hours as needed. 18 g 3   • sucralfate (CARAFATE) 1 GM Tab Take 1 Tab by mouth 2 Times a Day. 90 Tab 0   • pravastatin (PRAVACHOL) 20 MG Tab TAKE ONE TABLET BY MOUTH ONE TIME DAILY 30 Tab 11     No current facility-administered medications for this visit.        ROS: As per HPI. Additional pertinent symptoms as noted below.      /88   Pulse 88   Temp 35.9 °C (96.7 °F)   Resp 14   Ht 1.753 m (5' 9.02\")   Wt 64.4 kg (142 lb)   SpO2 98%   BMI 20.96 kg/m²     Physical Exam   General:  male, Alert and oriented, No apparent distress.  Eyes: Pupils equal and reactive. No scleral icterus. EOMI, conjunctiva jaundiced.  Throat: Clear no erythema or exudates noted.  Neck: Supple. No lymphadenopathy noted. Multiple b/l thyroid nodules palpated.  Lungs: Clear to auscultation and percussion bilaterally. Decreased BS b/l with no wheezing or other adventitious sounds.  Cardiovascular: Regular rate and rhythm. No murmurs, rubs or gallops.  Abdomen:  Benign. No rebound or guarding noted.  Extremities: No clubbing, cyanosis, edema. Scratch marks from itching, non-painful left knee lump.  Skin: Darkening of his skin in his b/l antecubital fossa      Assessment and Plan    1. Chronic hepatitis C with cirrhosis  - S/p harvoni and ribavirin  - Hx IV drug use, released from prison 5 years ago, on parole  - Hep C RNA 1,900,000 on 12/13  - Hep C RNA not detected, resolution of trasnsaminitis on 1/8/2018 after treatment  - US liver 12/13 showed fatty liver vs fibrosis  - Followed by ID, Dr. Bella  - Recommend complete alcohol cessation  - Follow up CMP, abd US pending     2. CKD " stage III  - Does have a history of HTN although he currently does not need treatment for this, tobacco use, previous heavy alcohol use, hep C s/p treatment  - Cr worsening. Recheck CMP.  - Check renal US  - Previously established with nephrology. Asked to reschedule an appt. With Dr. Young  - Nephrology following, Dr. Young     3. Chronic obstructive pulmonary disease  - Continue albuterol, tiotropium inhalers  - Did not get PFT's. Encouraged to set up appt.  - Needs pneumovax (get at pharmacy). Prevnar given 3/22/17  - Counseled smoking cessation     4. Gastroesophageal reflux disease  - On ASA, abd pain resolved, some mild nausea + bloating  - EGD while in detention with no abnormalities  - On a course of PPI, sucralfate with some improvment  - Check H. Pylori stool     5. Cholelithiasis   - Seen on US 12/2016  - Seen previously by Dr. White who stated that there is currently no indication for surgery unless his pain occurs again  - US abd pending  - Monitor     6. Weakness  - Check CBC, CMP, TSH/T4    7. Tobacco use  - Has smoked on/off for the past 50 years  - Down to 6 cigarettes per day  - Counseled cessation  - Harm reduction plan discussed  - Encouraged patient to quit. Not interested in cessation at this time.     8. Alcohol use  - Some use, strongly recommended cessation     9. Health maintenance  - Influenza shot 1/8  - Colonsocopy 12/2016  - Prevnar 3/2017, needs pneumovax  - Shingles 4/2017  - Tdap 2/2017  - Check lipid panel        PLAN: Return to nephrology clinic for CKD stage III, CBC, CMP, lipid panel, stool H. Pylori, abd/renal US pending.            Signed by: Cortes Dubose M.D.

## 2018-04-24 ENCOUNTER — TELEPHONE (OUTPATIENT)
Dept: INTERNAL MEDICINE | Facility: MEDICAL CENTER | Age: 73
End: 2018-04-24

## 2018-04-24 RX ORDER — ATORVASTATIN CALCIUM 40 MG/1
40 TABLET, FILM COATED ORAL DAILY
Qty: 30 TAB | Refills: 11 | Status: SHIPPED | OUTPATIENT
Start: 2018-04-24 | End: 2018-11-05 | Stop reason: SDUPTHER

## 2018-04-25 NOTE — TELEPHONE ENCOUNTER
Patient called and informed of recent lipid panel. He states that he has not been taking his pravastatin. His ASCVD based on his recent lipid panel was ~18% with recommendation for high dose statin therapy. At this time will D/C pravastatin and start atorvastatin 40mg daily with plans to see how he is tolerating it at his next appt on 5/18. May also need BP management at that time.

## 2018-05-18 ENCOUNTER — OFFICE VISIT (OUTPATIENT)
Dept: INTERNAL MEDICINE | Facility: MEDICAL CENTER | Age: 73
End: 2018-05-18
Payer: MEDICARE

## 2018-05-18 VITALS
HEART RATE: 85 BPM | HEIGHT: 69 IN | OXYGEN SATURATION: 96 % | WEIGHT: 140.2 LBS | TEMPERATURE: 98 F | SYSTOLIC BLOOD PRESSURE: 126 MMHG | BODY MASS INDEX: 20.76 KG/M2 | DIASTOLIC BLOOD PRESSURE: 71 MMHG

## 2018-05-18 DIAGNOSIS — Z72.0 TOBACCO USE: ICD-10-CM

## 2018-05-18 DIAGNOSIS — N18.30 CKD (CHRONIC KIDNEY DISEASE), STAGE III (HCC): ICD-10-CM

## 2018-05-18 DIAGNOSIS — Z86.19 HISTORY OF HEPATITIS C: ICD-10-CM

## 2018-05-18 DIAGNOSIS — K21.9 GASTROESOPHAGEAL REFLUX DISEASE, ESOPHAGITIS PRESENCE NOT SPECIFIED: ICD-10-CM

## 2018-05-18 DIAGNOSIS — J44.9 CHRONIC OBSTRUCTIVE PULMONARY DISEASE, UNSPECIFIED COPD TYPE (HCC): ICD-10-CM

## 2018-05-18 PROCEDURE — 99213 OFFICE O/P EST LOW 20 MIN: CPT | Mod: GE | Performed by: INTERNAL MEDICINE

## 2018-05-18 ASSESSMENT — PAIN SCALES - GENERAL: PAINLEVEL: NO PAIN

## 2018-05-18 NOTE — PROGRESS NOTES
Established Patient    Fede presents today with the following:    CC: 5 week follow up for management of GERD, COPD, CKDIII, review labs    HPI: 72 year old male with a PMH of Hep C s/p treatment, COPD, GERD, benign thyroid nodules, tobacco use, and previous heavy alcohol use presents for a clinic follow up.     History of Hep C causing cirrhosis s/p treatment:  S/p harvoni and ribavirin. Patient scheduled his US for today. b/l antecubital fossa skin changes improved, still mild pruritic.     COPD 2/2 tobacco use: Continues to smoke a few cigarettes per day, but states that he knows that he needs to quit and is excited at the prospect of singing. Patient is compliant with his inhalers and has no pulmonary complaints at today's visit.     GERD: Still states that he is compliant with his omeprazole and sucralfate. He still has some mild bloating and nausea and scheduled his abdominal US for today     CKDIII: Patient was able to schedule a nephrology appt with Dr. Young for today after his ultrasound appt. Labs returned which showed stable renal function with a creatinine of 1.54.    Patient Active Problem List    Diagnosis Date Noted   • Pruritus 08/27/2017   • Non-cardiac chest pain 08/27/2017   • CKD (chronic kidney disease), stage III 07/03/2017   • Chronic hepatitis C without hepatic coma (HCC) 02/15/2017   • Tobacco use 02/15/2017   • Alcohol use 02/15/2017   • Calculus of gallbladder without cholecystitis 02/15/2017   • Multiple thyroid nodules 12/14/2016   • COPD (chronic obstructive pulmonary disease) (HCC) 12/13/2016   • GERD (gastroesophageal reflux disease) 12/13/2016       Current Outpatient Prescriptions   Medication Sig Dispense Refill   • omeprazole (PRILOSEC) 20 MG delayed-release capsule TAKE ONE CAPSULE BY MOUTH ONE TIME DAILY 30 Cap 3   • atorvastatin (LIPITOR) 40 MG Tab Take 1 Tab by mouth every day. 30 Tab 11   • fluticasone (FLOVENT HFA) 44 MCG/ACT Aerosol Inhale 2 Puffs by mouth 2 times a  "day. 1 Inhaler 3   • tiotropium (SPIRIVA HANDIHALER) 18 MCG Cap Inhale 1 Cap by mouth every day. 30 Cap 3   • aspirin EC (ECOTRIN) 81 MG Tablet Delayed Response Take 1 Tab by mouth every day. 30 Tab 11   • albuterol (VENTOLIN HFA) 108 (90 Base) MCG/ACT Aero Soln inhalation aerosol Inhale 2 Puffs by mouth every 6 hours as needed. 18 g 3     No current facility-administered medications for this visit.        ROS: As per HPI. Additional pertinent symptoms as noted below.      /71   Pulse 85   Temp 36.7 °C (98 °F)   Ht 1.753 m (5' 9\")   Wt 63.6 kg (140 lb 3.2 oz)   SpO2 96%   BMI 20.70 kg/m²     Physical Exam   General:  male, Alert and oriented, No apparent distress.  Eyes: Pupils equal and reactive. No scleral icterus. EOMI, conjunctiva jaundiced.  Throat: Clear no erythema or exudates noted.  Neck: Supple. No lymphadenopathy noted. Multiple b/l thyroid nodules palpated.  Lungs: Clear to auscultation and percussion bilaterally. Decreased BS b/l with no wheezing or other adventitious sounds.  Cardiovascular: Regular rate and rhythm. No murmurs, rubs or gallops.  Abdomen:  Benign. No rebound or guarding noted.  Extremities: No clubbing, cyanosis, edema. Scratch marks from itching, non-painful left knee lump.  Skin: Darkening of his skin in his b/l antecubital fossa      Assessment and Plan    1. Chronic hepatitis C with cirrhosis  - S/p harvoni and ribavirin  - Hx IV drug use, released from prison 5 years ago, on parole  - Hep C RNA 1,900,000 on 12/13  - Hep C RNA not detected, resolution of transaminitis after treatment  - US liver 12/13 showed fatty liver vs fibrosis  - Followed by ID, Dr. Bella  - Recommend complete alcohol cessation  - Follow up abd US pending     2. CKD stage III  - Does have a history of HTN although he currently does not need treatment for this, tobacco use, previous heavy alcohol use, hep C s/p treatment  - Cr stable  - Check renal US,scheduled for today  - " Previously established with nephrology. Asked to reschedule an appt. With Dr. Young. Has appt for today.  - Nephrology following, Dr. Young     3. Chronic obstructive pulmonary disease  - Continue albuterol, tiotropium inhalers  - Did not get PFT's. Encouraged to set up appt.  - Encouraged to get pneumovax at pharmacy. Prevnar given 3/22/17.  - Counseled smoking cessation. Working on cutting down.     4. Gastroesophageal reflux disease  - On ASA, abd pain resolved, some mild nausea + bloating  - EGD while in [facility] with no abnormalities  - On a course of PPI, sucralfate with improvment     5. Cholelithiasis   - Seen on US 12/2016  - Seen previously by Dr. White who stated that there is currently no indication for surgery unless his pain occurs again  - US abd pending today  - Monitor     6. Tobacco use  - Has smoked on/off for the past 50 years  - Down to a few cigarettes per day  - Counseled cessation  - Harm reduction plan discussed  - Encouraged patient to quit     7. Alcohol use  - Some use, strongly recommended cessation     8. Health maintenance  - Influenza shot 1/8  - Colonsocopy 12/2016  - Prevnar 3/2017, needs pneumovax (get at pharmacy)  - Shingles 4/2017  - Tdap 2/2017     PLAN: Follow up with nephrology today, renal/abd US pending, doing well, follow up in 3 months.      Signed by: Cortes Dubose M.D.

## 2018-05-29 NOTE — TELEPHONE ENCOUNTER
Was the patient seen in the last year in this department? Yes    Last seen: 05/18/18 by Dr. Dubose  Next appt: 08/27/18 with Dr. Dubose      Does patient have an active prescription for medications requested? No     Received Request Via: Patient-Pt called and l/m. Requesting a refill on his Albuterol inhaler. He is out. [facility]

## 2018-05-30 RX ORDER — ALBUTEROL SULFATE 90 UG/1
2 AEROSOL, METERED RESPIRATORY (INHALATION) EVERY 6 HOURS PRN
Qty: 18 G | Refills: 2 | Status: SHIPPED | OUTPATIENT
Start: 2018-05-30 | End: 2018-08-01 | Stop reason: SDUPTHER

## 2018-05-30 NOTE — TELEPHONE ENCOUNTER
Called pt and l/m. Notifying pt Dr Dubose refilled his medication he has requested and sent it to your pharmacy.

## 2018-08-01 RX ORDER — ALBUTEROL SULFATE 90 UG/1
AEROSOL, METERED RESPIRATORY (INHALATION)
Qty: 18 G | Refills: 1 | Status: SHIPPED | OUTPATIENT
Start: 2018-08-01 | End: 2018-08-29 | Stop reason: SDUPTHER

## 2018-08-01 NOTE — TELEPHONE ENCOUNTER
Was the patient seen in the last year in this department? Yes   Last seen: 05/18/18 by Dr. Dubose  Next appt: 08/27/18 with Dr. Dubose      Does patient have an active prescription for medications requested? No     Received Request Via: Pharmacy

## 2018-08-10 ENCOUNTER — OFFICE VISIT (OUTPATIENT)
Dept: NEPHROLOGY | Facility: MEDICAL CENTER | Age: 73
End: 2018-08-10
Payer: MEDICARE

## 2018-08-10 ENCOUNTER — HOSPITAL ENCOUNTER (OUTPATIENT)
Facility: MEDICAL CENTER | Age: 73
End: 2018-08-10
Attending: INTERNAL MEDICINE
Payer: MEDICARE

## 2018-08-10 VITALS — WEIGHT: 137 LBS | BODY MASS INDEX: 20.23 KG/M2

## 2018-08-10 DIAGNOSIS — N18.30 CKD (CHRONIC KIDNEY DISEASE), STAGE III (HCC): ICD-10-CM

## 2018-08-10 LAB — H PYLORI AG STL QL IA: DETECTED

## 2018-08-10 PROCEDURE — 87338 HPYLORI STOOL AG IA: CPT

## 2018-08-14 ENCOUNTER — HOSPITAL ENCOUNTER (OUTPATIENT)
Dept: RADIOLOGY | Facility: MEDICAL CENTER | Age: 73
End: 2018-08-14
Attending: INTERNAL MEDICINE
Payer: MEDICARE

## 2018-08-14 ENCOUNTER — HOSPITAL ENCOUNTER (EMERGENCY)
Facility: MEDICAL CENTER | Age: 73
End: 2018-08-14
Payer: MEDICARE

## 2018-08-14 VITALS
RESPIRATION RATE: 17 BRPM | HEIGHT: 69 IN | WEIGHT: 137.35 LBS | HEART RATE: 89 BPM | BODY MASS INDEX: 20.34 KG/M2 | TEMPERATURE: 98 F | OXYGEN SATURATION: 98 % | SYSTOLIC BLOOD PRESSURE: 151 MMHG | DIASTOLIC BLOOD PRESSURE: 93 MMHG

## 2018-08-14 DIAGNOSIS — N18.30 CKD (CHRONIC KIDNEY DISEASE), STAGE III (HCC): ICD-10-CM

## 2018-08-14 PROCEDURE — 76775 US EXAM ABDO BACK WALL LIM: CPT

## 2018-08-14 PROCEDURE — 302449 STATCHG TRIAGE ONLY (STATISTIC)

## 2018-08-14 NOTE — ED TRIAGE NOTES
Pt to triage .  Chief Complaint   Patient presents with   • Cough     x 1 month    • Sore Throat   pt states I am just worried that maybe I have cancer because I have smoked for a long time

## 2018-08-27 ENCOUNTER — OFFICE VISIT (OUTPATIENT)
Dept: INTERNAL MEDICINE | Facility: MEDICAL CENTER | Age: 73
End: 2018-08-27
Payer: MEDICARE

## 2018-08-27 VITALS
TEMPERATURE: 97 F | OXYGEN SATURATION: 93 % | WEIGHT: 135.6 LBS | BODY MASS INDEX: 20.08 KG/M2 | HEART RATE: 85 BPM | SYSTOLIC BLOOD PRESSURE: 150 MMHG | HEIGHT: 69 IN | DIASTOLIC BLOOD PRESSURE: 98 MMHG

## 2018-08-27 DIAGNOSIS — Z86.19 HISTORY OF HEPATITIS C: ICD-10-CM

## 2018-08-27 PROCEDURE — 99213 OFFICE O/P EST LOW 20 MIN: CPT | Performed by: INTERNAL MEDICINE

## 2018-08-27 ASSESSMENT — ENCOUNTER SYMPTOMS
ABDOMINAL PAIN: 0
HEARTBURN: 0
WHEEZING: 0
CONSTIPATION: 0
CHILLS: 0
POLYDIPSIA: 0
WEAKNESS: 0
WEIGHT LOSS: 0
FEVER: 0
SHORTNESS OF BREATH: 0
VOMITING: 0

## 2018-08-27 NOTE — PROGRESS NOTES
"Subjective:      eFde Parikh is a 73 y.o.AA male seen  in f/u  of therapy  HCV genotype 1a/b with cirrhosis.   Treated with   Harvoni and ribavirin   400mg BID  For 12 weeks ending  October 1, 2017.    HPI: . He was  Diagnosed with HCV  at [facility] in December,2016  when admitted with abdominal pain.  Believes he was infected during his greater than 30 year time in [facility]. Used iv drugs during this period. He  has completely stopped drinking . Has not been vaccinated .Feells well, has used cocaine recently      [facility]- COPD /htn/ acid reflux/gallbldder disease                 Renal insuff- apparently saw Nephrology last week but not able to view report   MEDS- reviewed with patient             HPI      Review of Systems   Constitutional: Negative for chills, fever, malaise/fatigue and weight loss.   Respiratory: Negative for shortness of breath and wheezing.    Cardiovascular: Negative for leg swelling.   Gastrointestinal: Negative for abdominal pain, constipation, heartburn and vomiting.   Genitourinary:        Has concerns about his kidneys    Skin: Positive for itching. Negative for rash.   Neurological: Negative for weakness.   Endo/Heme/Allergies: Negative for polydipsia.          Objective:     /98   Pulse 85   Temp 36.1 °C (97 °F)   Ht 1.753 m (5' 9\")   Wt 61.5 kg (135 lb 9.6 oz)   SpO2 93%   BMI 20.02 kg/m²      Physical Exam   Constitutional: He is oriented to person, place, and time.   Cardiovascular: Normal rate and regular rhythm.    Pulmonary/Chest:    some  Wheezing with expiration    Abdominal: Soft. Bowel sounds are normal.   Liver edge not  palpable, no spleen, no ascites   Neurological: He is alert and oriented to person, place, and time.   Skin: Skin is warm and dry. No rash noted. No erythema.   Dry, bumpy skin   No zambrano erythema    Psychiatric: He has a normal mood and affect. His behavior is normal.   Vitals reviewed.    LABs-   Hepatitis C Rna By Pcr, Quanti SEE BELOW  IU/mL " Final   Comment:   HCV Not Detected   Test Info SEE BELOW   Final   Comment:   The quantitative range of this assay is 15 IU/mL to 100 million IU/mL.     Results for SADE ISSA JR. (MRN [id number]) as of 8/27/2018 08:14   Ref. Range 4/13/2018 12:14   Sodium Latest Ref Range: 135 - 145 mmol/L 138   Potassium Latest Ref Range: 3.6 - 5.5 mmol/L 4.5   Chloride Latest Ref Range: 96 - 112 mmol/L 105   Co2 Latest Ref Range: 20 - 33 mmol/L 27   Anion Gap Latest Ref Range: 0.0 - 11.9  6.0   Glucose Latest Ref Range: 65 - 99 mg/dL 85   Bun Latest Ref Range: 8 - 22 mg/dL 16   Creatinine Latest Ref Range: 0.50 - 1.40 mg/dL 1.54 (H)   GFR If  Latest Ref Range: >60 mL/min/1.73 m 2 54 (A)   GFR If Non  Latest Ref Range: >60 mL/min/1.73 m 2 44 (A)   Calcium Latest Ref Range: 8.5 - 10.5 mg/dL 9.5   AST(SGOT) Latest Ref Range: 12 - 45 U/L 16   ALT(SGPT) Latest Ref Range: 2 - 50 U/L 7   Alkaline Phosphatase Latest Ref Range: 30 - 99 U/L 59   Total Bilirubin Latest Ref Range: 0.1 - 1.5 mg/dL 0.5   Albumin Latest Ref Range: 3.2 - 4.9 g/dL 4.0   Total Protein Latest Ref Range: 6.0 - 8.2 g/dL 7.3   Globulin Latest Ref Range: 1.9 - 3.5 g/dL 3.3   A-G Ratio Latest Units: g/dL 1.2     Assessment/Plan:     1. 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 Genotype  1a/b, and elevated APRI score. Has now completed  therapy His HCV RNA was non-detectable at week 12 and CMP is completely normal at month 6. He is cured of HCV and does not need further testing or f/u for this  P:  Encouraged to abstain from drinking         Reassured him that he is cured          Discharge from my clinic

## 2018-08-29 ENCOUNTER — OFFICE VISIT (OUTPATIENT)
Dept: INTERNAL MEDICINE | Facility: MEDICAL CENTER | Age: 73
End: 2018-08-29
Payer: MEDICARE

## 2018-08-29 ENCOUNTER — TELEPHONE (OUTPATIENT)
Dept: INTERNAL MEDICINE | Facility: MEDICAL CENTER | Age: 73
End: 2018-08-29

## 2018-08-29 VITALS
HEART RATE: 92 BPM | HEIGHT: 69 IN | WEIGHT: 136.2 LBS | TEMPERATURE: 97.6 F | BODY MASS INDEX: 20.17 KG/M2 | DIASTOLIC BLOOD PRESSURE: 90 MMHG | OXYGEN SATURATION: 93 % | SYSTOLIC BLOOD PRESSURE: 140 MMHG

## 2018-08-29 DIAGNOSIS — Z86.19 HISTORY OF HEPATITIS C: ICD-10-CM

## 2018-08-29 DIAGNOSIS — K25.3 ACUTE GASTRIC ULCER DUE TO HELICOBACTER PYLORI: ICD-10-CM

## 2018-08-29 DIAGNOSIS — Z72.0 TOBACCO USE: ICD-10-CM

## 2018-08-29 DIAGNOSIS — J44.9 CHRONIC OBSTRUCTIVE PULMONARY DISEASE, UNSPECIFIED COPD TYPE (HCC): ICD-10-CM

## 2018-08-29 DIAGNOSIS — B96.81 ACUTE GASTRIC ULCER DUE TO HELICOBACTER PYLORI: ICD-10-CM

## 2018-08-29 DIAGNOSIS — K21.9 GASTROESOPHAGEAL REFLUX DISEASE, ESOPHAGITIS PRESENCE NOT SPECIFIED: ICD-10-CM

## 2018-08-29 DIAGNOSIS — I10 ESSENTIAL HYPERTENSION: ICD-10-CM

## 2018-08-29 DIAGNOSIS — N18.30 CKD (CHRONIC KIDNEY DISEASE), STAGE III (HCC): ICD-10-CM

## 2018-08-29 PROCEDURE — 99214 OFFICE O/P EST MOD 30 MIN: CPT | Mod: GC | Performed by: INTERNAL MEDICINE

## 2018-08-29 RX ORDER — TIOTROPIUM BROMIDE 18 UG/1
CAPSULE ORAL; RESPIRATORY (INHALATION)
Qty: 90 CAP | Refills: 2 | Status: SHIPPED | OUTPATIENT
Start: 2018-08-29 | End: 2018-11-05 | Stop reason: SDUPTHER

## 2018-08-29 RX ORDER — AMOXICILLIN 500 MG/1
500 CAPSULE ORAL 2 TIMES DAILY
Qty: 28 CAP | Refills: 0 | Status: SHIPPED | OUTPATIENT
Start: 2018-08-29 | End: 2018-09-12

## 2018-08-29 RX ORDER — ALBUTEROL SULFATE 90 UG/1
AEROSOL, METERED RESPIRATORY (INHALATION)
Qty: 1 INHALER | Refills: 3 | Status: SHIPPED | OUTPATIENT
Start: 2018-08-29 | End: 2018-11-05 | Stop reason: SDUPTHER

## 2018-08-29 RX ORDER — AMLODIPINE BESYLATE 5 MG/1
5 TABLET ORAL DAILY
Qty: 30 TAB | Refills: 11 | Status: SHIPPED | OUTPATIENT
Start: 2018-08-29 | End: 2019-06-05 | Stop reason: SDUPTHER

## 2018-08-29 RX ORDER — CLARITHROMYCIN 500 MG/1
500 TABLET, COATED ORAL 2 TIMES DAILY
Qty: 28 TAB | Refills: 0 | Status: SHIPPED | OUTPATIENT
Start: 2018-08-29 | End: 2018-09-12

## 2018-08-29 RX ORDER — OMEPRAZOLE 20 MG/1
CAPSULE, DELAYED RELEASE ORAL
Qty: 30 CAP | Refills: 3 | Status: SHIPPED | OUTPATIENT
Start: 2018-08-29 | End: 2019-01-06 | Stop reason: SDUPTHER

## 2018-08-29 NOTE — PROGRESS NOTES
Established Patient    Fede presents today with the following:    CC: 3 month follow up after renal ultrasound, h. Pylori stool test, mgmt COPD    HPI: 72 year old male with a PMH of Hep C s/p treatment, COPD, GERD, benign thyroid nodules, tobacco use, and previous heavy alcohol use presents for a clinic follow up.     History of Hep C causing cirrhosis s/p treatment:  S/p harvoni and ribavirin. Seen by Dr. Bella on 8/27/18 and discharged from ID clinic with no further testing recommended.     COPD 2/2 tobacco use: Continues to smoke a few cigarettes per day. Have been working with Mr. Parikh for a long time, but he has unable to quit. His significant other still smokes and uses meth which makes it more difficult for him to quit. He does enjoy singing and this motivating him to quit. Patient is using his albuterol HFA more often this month as the air quality has been poor. He is compliant with his tiotropium daily. His beclomethasone inhaler was recently refused by his insurance. He does note some worsening of his dyspnea recently.     Peptic ulcer: Compliant with his omeprazole for the past few months. He still has some mild bloating and nausea and previously complained of abdominal pain. He was able to get his stool H. Pylori which returned positive.     CKDIII: Renal function stable with a creatinine of 1.54 in 4/2018. Renal US showed b/l reduction in kidney size worse than previous exam in 8/2017. He was strongly encouraged to follow up with his nephrologist for mgmt.    HTN: Patient's BP's have been elevated over our past few visit running 150's/90's consistently. Currently on no medications for HTN.    Patient Active Problem List    Diagnosis Date Noted   • Pruritus 08/27/2017   • Non-cardiac chest pain 08/27/2017   • CKD (chronic kidney disease), stage III 07/03/2017   • History of hepatitis C 02/15/2017   • Tobacco use 02/15/2017   • Alcohol use 02/15/2017   • Calculus of gallbladder without  "cholecystitis 02/15/2017   • Multiple thyroid nodules 12/14/2016   • COPD (chronic obstructive pulmonary disease) ([facility]) 12/13/2016   • GERD (gastroesophageal reflux disease) 12/13/2016       Current Outpatient Prescriptions   Medication Sig Dispense Refill   • omeprazole (PRILOSEC) 20 MG delayed-release capsule TAKE ONE CAPSULE BY MOUTH ONE TIME DAILY for heartburn 30 Cap 3   • amLODIPine (NORVASC) 5 MG Tab Take 1 Tab by mouth every day. For high blood pressure 30 Tab 11   • amoxicillin (AMOXIL) 500 MG Cap Take 1 Cap by mouth 2 times a day for 14 days. For stomach infection 28 Cap 0   • albuterol (VENTOLIN HFA) 108 (90 Base) MCG/ACT Aero Soln inhalation aerosol INHALE TWO PUFFS BY MOUTH EVERY SIX HOURS AS NEEDED 1 Inhaler 3   • tiotropium (SPIRIVA HANDIHALER) 18 MCG Cap INHALE 1 CAPSULE VIA HANDIHALER ONCE DAILY 90 Cap 2   • clarithromycin (BIAXIN) 500 MG Tab Take 1 Tab by mouth 2 times a day for 14 days. For stomach infection 28 Tab 0   • Fluticasone Furoate-Vilanterol (BREO ELLIPTA) 100-25 MCG/INH AEROSOL POWDER, BREATH ACTIVATED Inhale 1 Puff by mouth every day. 1 Each 2   • atorvastatin (LIPITOR) 40 MG Tab Take 1 Tab by mouth every day. 30 Tab 11   • aspirin EC (ECOTRIN) 81 MG Tablet Delayed Response Take 1 Tab by mouth every day. 30 Tab 11     No current facility-administered medications for this visit.        ROS: As per HPI. Additional pertinent symptoms as noted below.      /90   Pulse 92   Temp 36.4 °C (97.6 °F)   Ht 1.753 m (5' 9\")   Wt 61.8 kg (136 lb 3.2 oz)   SpO2 93%   BMI 20.11 kg/m²     Physical Exam   General:  male, Alert and oriented, No apparent distress.  Eyes: Pupils equal and reactive. No scleral icterus. EOMI, conjunctiva jaundiced.  Throat: Clear no erythema or exudates noted.  Neck: Supple. No lymphadenopathy noted. Multiple b/l thyroid nodules palpated.  Lungs: Clear to auscultation and percussion bilaterally. Decreased BS b/l with no wheezing or other " adventitious sounds.  Cardiovascular: Regular rate and rhythm. No murmurs, rubs or gallops.  Abdomen:  Benign. No rebound or guarding noted.  Extremities: No clubbing, cyanosis, edema. Scratch marks from itching, non-painful left knee lump.  Skin: Darkening of his skin in his b/l antecubital fossa      Assessment and Plan    1. Chronic hepatitis C with cirrhosis  - Cured according to ID  - S/p harvoni and ribavirin  - Hx IV drug use, [place] [facility] [place], on parole  - Hep C RNA 1,900,000 on 12/13. Hep C RNA not detected on redraw after treamtent, resolution of transaminitis after treatment.  - Discharged from ID clinic. No further testing.  - Recommend complete alcohol cessation     2. CKD stage III  - Does have a history of HTN, tobacco use, previous heavy alcohol use, hep C s/p treatment  - Cr stable  - Renal US shows b/l reduction in kidney size  - Control BP, amlodipine added today  - Previously established with nephrology. Needs appt with Dr. Young.  - Nephrology following, Dr. Young     3. Chronic obstructive pulmonary disease  - Continue albuterol, tiotropium inhalers  - Did not get PFT's. Encouraged to set up appt.  - Encouraged to get pneumovax at pharmacy. Prevnar given 3/22/17.  - Counseled smoking cessation. Working on cutting down.  - Add breo elipta inhaler today     4. Acute gastric ulcer due to Helicobacter pylori  - Previously had abd pain resolved with PPI, some mild nausea + bloating  - [place] [facility] with no abnormalities  - On a course of PPI, sucralfate with improvement  - H. Pylori stool ag positive. Treat with 14 weeks of clarithromycin and amoxicillin, continue PPI.     5. Essential hypertension  - Hx of HTN, hasnt needed treatment recently  - This month his BP has been elevated  - Avoid ACE-I, HCTZ given kidney fxn  - Start amlodipine  - Given BP log. Asked to trend pressures at home and brng log in at next visit.  - Counseled on watching out for hypotension    6.  Tobacco use  - Has smoked on/off for the past 50 years  - Down to a few cigarettes per day  - Counseled cessation  - Harm reduction plan discussed  - Encouraged patient to quit     7. Alcohol use  - Some use, strongly recommended cessation     8. Health maintenance  - Influenza shot 1/8  - Colonsocopy 12/2016  - Prevnar 3/2017, needs pneumovax (get at pharmacy)  - Shingles 4/2017  - Tdap 2/2017     PLAN: Rx amoxicillin, clarithromycin, continue PPI. Initiate breo ellipta for COPD.                  Signed by: Cortes Dubose M.D.

## 2018-08-29 NOTE — TELEPHONE ENCOUNTER
Pharmacy Name: [facility]    Pharmacy Phone#: [phone]    Pharmacy Fax#: [phone]    Request received via: fax    Message:  Pharmacy askinfg to verify sig Breo is usually 1 puff q 24h Please send new rx if appropriate

## 2018-10-03 ENCOUNTER — APPOINTMENT (OUTPATIENT)
Dept: INTERNAL MEDICINE | Facility: MEDICAL CENTER | Age: 73
End: 2018-10-03
Payer: MEDICARE

## 2018-11-05 ENCOUNTER — OFFICE VISIT (OUTPATIENT)
Dept: INTERNAL MEDICINE | Facility: MEDICAL CENTER | Age: 73
End: 2018-11-05
Payer: MEDICARE

## 2018-11-05 VITALS
TEMPERATURE: 97.7 F | BODY MASS INDEX: 20.88 KG/M2 | DIASTOLIC BLOOD PRESSURE: 68 MMHG | HEART RATE: 88 BPM | HEIGHT: 69 IN | SYSTOLIC BLOOD PRESSURE: 116 MMHG | OXYGEN SATURATION: 94 % | WEIGHT: 141 LBS

## 2018-11-05 DIAGNOSIS — Z72.0 TOBACCO USE: ICD-10-CM

## 2018-11-05 DIAGNOSIS — N18.30 CKD (CHRONIC KIDNEY DISEASE), STAGE III (HCC): ICD-10-CM

## 2018-11-05 DIAGNOSIS — L29.9 PRURITUS: ICD-10-CM

## 2018-11-05 DIAGNOSIS — J44.9 CHRONIC OBSTRUCTIVE PULMONARY DISEASE, UNSPECIFIED COPD TYPE (HCC): ICD-10-CM

## 2018-11-05 DIAGNOSIS — Z23 NEED FOR INFLUENZA VACCINATION: ICD-10-CM

## 2018-11-05 DIAGNOSIS — I10 ESSENTIAL HYPERTENSION: ICD-10-CM

## 2018-11-05 DIAGNOSIS — K21.9 GASTROESOPHAGEAL REFLUX DISEASE, ESOPHAGITIS PRESENCE NOT SPECIFIED: ICD-10-CM

## 2018-11-05 DIAGNOSIS — Z86.19 HISTORY OF HEPATITIS C: ICD-10-CM

## 2018-11-05 PROCEDURE — 90662 IIV NO PRSV INCREASED AG IM: CPT | Performed by: INTERNAL MEDICINE

## 2018-11-05 PROCEDURE — G0008 ADMIN INFLUENZA VIRUS VAC: HCPCS | Performed by: INTERNAL MEDICINE

## 2018-11-05 PROCEDURE — 99214 OFFICE O/P EST MOD 30 MIN: CPT | Mod: GC,25 | Performed by: INTERNAL MEDICINE

## 2018-11-05 RX ORDER — TIOTROPIUM BROMIDE 18 UG/1
CAPSULE ORAL; RESPIRATORY (INHALATION)
Qty: 90 CAP | Refills: 3 | Status: SHIPPED | OUTPATIENT
Start: 2018-11-05 | End: 2019-06-05 | Stop reason: SDUPTHER

## 2018-11-05 RX ORDER — ATORVASTATIN CALCIUM 40 MG/1
40 TABLET, FILM COATED ORAL DAILY
Qty: 30 TAB | Refills: 11 | Status: SHIPPED | OUTPATIENT
Start: 2018-11-05 | End: 2019-06-05 | Stop reason: SDUPTHER

## 2018-11-05 RX ORDER — HYDROXYZINE HYDROCHLORIDE 25 MG/1
25 TABLET, FILM COATED ORAL 3 TIMES DAILY PRN
Qty: 30 TAB | Refills: 0 | Status: SHIPPED | OUTPATIENT
Start: 2018-11-05 | End: 2019-02-20 | Stop reason: SDUPTHER

## 2018-11-05 RX ORDER — ALBUTEROL SULFATE 90 UG/1
AEROSOL, METERED RESPIRATORY (INHALATION)
Qty: 1 INHALER | Refills: 3 | Status: SHIPPED | OUTPATIENT
Start: 2018-11-05 | End: 2019-03-06 | Stop reason: SDUPTHER

## 2018-11-05 NOTE — NON-PROVIDER
"Fede Parikh Jr. is a 73 y.o. male here for a non-provider visit for:   FLU HD    Reason for immunization: Overdue/Provider Recommended  Immunization records indicate need for vaccine: Yes, confirmed with Epic  Minimum interval has been met for this vaccine: Yes  ABN completed: Not Indicated    Order and dose verified by: SADA  VIS Dated  08/07/15 was given to patient: Yes  All IAC Questionnaire questions were answered \"No.\"    Patient tolerated injection and no adverse effects were observed or reported: Yes    Pt scheduled for next dose in series: Not Indicated  "

## 2018-11-05 NOTE — PROGRESS NOTES
Established Patient    Fede presents today with the following:    CC: Pruritis, need for influenza vaccine    HPI: 72 year old male with a PMH of Hep C s/p treatment, COPD, GERD, benign thyroid nodules, tobacco use, and previous heavy alcohol use presents for a clinic follow up.     History of Hep C causing cirrhosis s/p treatment:  S/p harvoni and ribavirin. Seen by Dr. Bella on 8/27/18 and discharged from ID clinic with no further testing recommended. Does still complain of pruritis.     COPD 2/2 tobacco use: Continues to smoke a few cigarettes per day. Patient is compliant with his albuterol HFA, and tiotropium daily. He was able to fill his new inhaler the breo ellipta and is happy with the results from this inhaler. He denies any acute worsening of his respiratory symptoms.     Peptic ulcer: Compliant with his omeprazole for the past few months. Recently finished course of antibiotics for H. Pylori, symptoms resolved.     CKDIII: Renal function stable with a creatinine of 1.54 in 4/2018. Renal US showed b/l reduction in kidney size worse than previous exam in 8/2017. Followed by nephro.     HTN: Compliant with amlodipine, no symptoms of hypotension, BP well controlled.    Patient Active Problem List    Diagnosis Date Noted   • Pruritus 08/27/2017   • Non-cardiac chest pain 08/27/2017   • CKD (chronic kidney disease), stage III (HCC) 07/03/2017   • History of hepatitis C 02/15/2017   • Tobacco use 02/15/2017   • Alcohol use 02/15/2017   • Calculus of gallbladder without cholecystitis 02/15/2017   • Multiple thyroid nodules 12/14/2016   • COPD (chronic obstructive pulmonary disease) (HCC) 12/13/2016   • GERD (gastroesophageal reflux disease) 12/13/2016       Current Outpatient Prescriptions   Medication Sig Dispense Refill   • albuterol (VENTOLIN HFA) 108 (90 Base) MCG/ACT Aero Soln inhalation aerosol INHALE TWO PUFFS BY MOUTH EVERY SIX HOURS AS NEEDED 1 Inhaler 3   • tiotropium (SPIRIVA HANDIHALER) 18 MCG  "Cap INHALE 1 CAPSULE VIA HANDIHALER ONCE DAILY 90 Cap 3   • Fluticasone Furoate-Vilanterol (BREO ELLIPTA) 100-25 MCG/INH AEROSOL POWDER, BREATH ACTIVATED Inhale 1 Puff by mouth every day. 1 Each 3   • atorvastatin (LIPITOR) 40 MG Tab Take 1 Tab by mouth every day. 30 Tab 11   • hydrOXYzine HCl (ATARAX) 25 MG Tab Take 1 Tab by mouth 3 times a day as needed for Itching. 30 Tab 0   • omeprazole (PRILOSEC) 20 MG delayed-release capsule TAKE ONE CAPSULE BY MOUTH ONE TIME DAILY for heartburn 30 Cap 3   • amLODIPine (NORVASC) 5 MG Tab Take 1 Tab by mouth every day. For high blood pressure 30 Tab 11   • aspirin EC (ECOTRIN) 81 MG Tablet Delayed Response Take 1 Tab by mouth every day. 30 Tab 11     No current facility-administered medications for this visit.        ROS: As per HPI. Additional pertinent symptoms as noted below.      /68 (BP Location: Right arm, Patient Position: Sitting, BP Cuff Size: Adult)   Pulse 88   Temp 36.5 °C (97.7 °F) (Temporal)   Ht 1.753 m (5' 9\")   Wt 64 kg (141 lb)   SpO2 94%   BMI 20.82 kg/m²     Physical Exam   General:  male, Alert and oriented, No apparent distress.  Eyes: Pupils equal and reactive. No scleral icterus. EOMI, conjunctiva jaundiced.  Throat: Clear no erythema or exudates noted.  Neck: Supple. No lymphadenopathy noted. Multiple b/l thyroid nodules palpated.  Lungs: Clear to auscultation and percussion bilaterally. Decreased BS b/l with no wheezing or other adventitious sounds.  Cardiovascular: Regular rate and rhythm. No murmurs, rubs or gallops.  Abdomen:  Benign. No rebound or guarding noted.  Extremities: No clubbing, cyanosis, edema. Scratch marks from itching, non-painful left knee lump.  Skin: Darkening and thickening of his skin in his b/l antecubital fossa.      Assessment and Plan    1. 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 Hep C RNA not detected on redraw after treamtent, resolution of transaminitis after treatment.  - Discharged from ID clinic. No further testing.  - Recommend complete alcohol cessation     2. CKD stage III  - Does have a history of HTN, tobacco use, previous heavy alcohol use, hep C s/p treatment  - Cr stable  - Renal US shows b/l reduction in kidney size  - Control BP, continue amlodipine  - Previously established with nephrology. Needs appt with Dr. Young.  - Nephrology following, Dr. Young     3. Chronic obstructive pulmonary disease  - Continue albuterol, tiotropium, breo elipta inhalers  - Did not get PFT's. Encouraged to set up appt.  - Encouraged to get pneumovax at pharmacy. Prevnar given 3/22/17.  - Counseled smoking cessation. Working on cutting down.     4. Acute gastric ulcer due to Helicobacter pylori  - Previously had abd pain resolved with PPI, some mild nausea + bloating  - EGD while in group home with no abnormalities  - On a course of PPI, sucralfate with improvement  - H. Pylori stool ag positive. s/p 14 days of clarithromycin and amoxicillin, continue PPI.  - Resolved, monitor     5. Essential hypertension  - Continue amlodipine  - Avoid ACE-I, HCTZ given kidney fxn  - Counseled on watching out for hypotension     6. Tobacco use  - Has smoked on/off for the past 50 years  - Down to a few cigarettes per day  - Counseled cessation  - Harm reduction plan discussed  - Encouraged patient to quit     7. Alcohol use  - Some use, strongly recommended cessation     8. Pruritus  - Lotion daily for dry skin  - Hydroxyzine PRN    9. Health maintenance  - Influenza shot 11/5/2018  - Colonsocopy 12/2016  - Prevnar 3/2017, needs pneumovax (get at pharmacy)  - Shingles 4/2017  - Tdap 2/2017     PLAN: Refill medications, give influenza vaccine        Signed by: Cortes Dubose M.D.

## 2019-02-20 ENCOUNTER — OFFICE VISIT (OUTPATIENT)
Dept: INTERNAL MEDICINE | Facility: MEDICAL CENTER | Age: 74
End: 2019-02-20
Payer: MEDICARE

## 2019-02-20 ENCOUNTER — TELEPHONE (OUTPATIENT)
Dept: NEPHROLOGY | Facility: MEDICAL CENTER | Age: 74
End: 2019-02-20

## 2019-02-20 VITALS
BODY MASS INDEX: 21.62 KG/M2 | OXYGEN SATURATION: 99 % | HEIGHT: 69 IN | DIASTOLIC BLOOD PRESSURE: 66 MMHG | WEIGHT: 146 LBS | SYSTOLIC BLOOD PRESSURE: 100 MMHG | TEMPERATURE: 97.1 F | HEART RATE: 88 BPM

## 2019-02-20 DIAGNOSIS — Z72.0 TOBACCO USE: ICD-10-CM

## 2019-02-20 DIAGNOSIS — K21.9 GASTROESOPHAGEAL REFLUX DISEASE, ESOPHAGITIS PRESENCE NOT SPECIFIED: ICD-10-CM

## 2019-02-20 DIAGNOSIS — N18.30 CKD (CHRONIC KIDNEY DISEASE), STAGE III (HCC): ICD-10-CM

## 2019-02-20 DIAGNOSIS — J44.9 CHRONIC OBSTRUCTIVE PULMONARY DISEASE, UNSPECIFIED COPD TYPE (HCC): ICD-10-CM

## 2019-02-20 DIAGNOSIS — L29.9 PRURITUS: ICD-10-CM

## 2019-02-20 DIAGNOSIS — Z86.19 HISTORY OF HEPATITIS C: ICD-10-CM

## 2019-02-20 PROCEDURE — 99214 OFFICE O/P EST MOD 30 MIN: CPT | Mod: GC | Performed by: INTERNAL MEDICINE

## 2019-02-20 RX ORDER — LORATADINE 10 MG/1
10 TABLET ORAL DAILY
Qty: 30 TAB | Refills: 5 | Status: SHIPPED | OUTPATIENT
Start: 2019-02-20 | End: 2020-09-24

## 2019-02-20 RX ORDER — HYDROXYZINE HYDROCHLORIDE 25 MG/1
25 TABLET, FILM COATED ORAL 2 TIMES DAILY PRN
Qty: 60 TAB | Refills: 1 | Status: SHIPPED | OUTPATIENT
Start: 2019-02-20 | End: 2019-06-05 | Stop reason: SDUPTHER

## 2019-02-20 ASSESSMENT — PATIENT HEALTH QUESTIONNAIRE - PHQ9: CLINICAL INTERPRETATION OF PHQ2 SCORE: 0

## 2019-02-20 NOTE — PROGRESS NOTES
Established Patient    Fede presents today with the following:    CC: Pruritis    HPI: 72 year old male with a PMH of Hep C s/p treatment, COPD, GERD, benign thyroid nodules, tobacco use, and previous heavy alcohol use presents for a clinic follow up.     Pruritis: Complains of itching for the past several months now. His pruritis was described as diffuse and he does have significant skin thickening and scratch marks on his back posterior to his axilla b/l and on his arms.    History of Hep C causing cirrhosis s/p treatment:  S/p harvoni and ribavirin. Seen by Dr. Bella on 8/27/18 and discharged from ID clinic with no further testing recommended. Does still complain of pruritis.     COPD 2/2 tobacco use: Continues to smoke a few cigarettes per day. Patient is compliant with his albuterol HFA, tiotropium and breo-ellipta. He denies any acute worsening of his respiratory symptoms.     CKDIII: Renal function stable with a creatinine of 1.54 in 4/2018. Renal US showed b/l reduction in kidney size worse than previous exam in 8/2017. Followed by nephro, but missed his last appt. Encouraged to set up another appt.     HTN: Compliant with amlodipine, no symptoms of hypotension, BP well controlled.    Patient Active Problem List    Diagnosis Date Noted   • Pruritus 08/27/2017   • Non-cardiac chest pain 08/27/2017   • CKD (chronic kidney disease), stage III (HCC) 07/03/2017   • History of hepatitis C 02/15/2017   • Tobacco use 02/15/2017   • Alcohol use 02/15/2017   • Calculus of gallbladder without cholecystitis 02/15/2017   • Multiple thyroid nodules 12/14/2016   • COPD (chronic obstructive pulmonary disease) (HCC) 12/13/2016   • GERD (gastroesophageal reflux disease) 12/13/2016       Current Outpatient Prescriptions   Medication Sig Dispense Refill   • hydrOXYzine HCl (ATARAX) 25 MG Tab Take 1 Tab by mouth 2 times a day as needed for Itching. 60 Tab 1   • loratadine (CLARITIN) 10 MG Tab Take 1 Tab by mouth every day.  "30 Tab 5   • fluocinonide (LIDEX) 0.05 % Cream Apply 1 Application to affected area(s) 2 times a day. 1 Tube 0   • ASPIRIN LOW DOSE 81 MG EC tablet TAKE ONE TABLET BY MOUTH ONE TIME DAILY 30 Tab 5   • omeprazole (PRILOSEC) 20 MG delayed-release capsule TAKE ONE CAPSULE BY MOUTH ONE TIME DAILY FOR HEARTBURN 30 Cap 2   • albuterol (VENTOLIN HFA) 108 (90 Base) MCG/ACT Aero Soln inhalation aerosol INHALE TWO PUFFS BY MOUTH EVERY SIX HOURS AS NEEDED 1 Inhaler 3   • tiotropium (SPIRIVA HANDIHALER) 18 MCG Cap INHALE 1 CAPSULE VIA HANDIHALER ONCE DAILY 90 Cap 3   • Fluticasone Furoate-Vilanterol (BREO ELLIPTA) 100-25 MCG/INH AEROSOL POWDER, BREATH ACTIVATED Inhale 1 Puff by mouth every day. 1 Each 3   • atorvastatin (LIPITOR) 40 MG Tab Take 1 Tab by mouth every day. 30 Tab 11   • amLODIPine (NORVASC) 5 MG Tab Take 1 Tab by mouth every day. For high blood pressure 30 Tab 11     No current facility-administered medications for this visit.        ROS: As per HPI. Additional pertinent symptoms as noted below.      /66 (BP Location: Left arm, Patient Position: Sitting, BP Cuff Size: Adult)   Pulse 88   Temp 36.2 °C (97.1 °F) (Temporal)   Ht 1.753 m (5' 9\")   Wt 66.2 kg (146 lb)   SpO2 99%   BMI 21.56 kg/m²     Physical Exam   General:  male, Alert and oriented, No apparent distress.  Eyes: Pupils equal and reactive. No scleral icterus. EOMI, conjunctiva jaundiced.  Throat: Clear no erythema or exudates noted.  Neck: Supple. No lymphadenopathy noted. Multiple b/l thyroid nodules palpated.  Lungs: Clear to auscultation and percussion bilaterally. Decreased BS b/l with no wheezing or other adventitious sounds.  Cardiovascular: Regular rate and rhythm. No murmurs, rubs or gallops.  Abdomen:  Benign. No rebound or guarding noted.  Extremities: No clubbing, cyanosis, edema. Scratch marks from itching, non-painful left knee lump.  Skin: Darkening and thickening of his skin in his b/l antecubital " fossa.      Assessment and Plan    1. Pruritus  - Has skin thickening on his arms, back posterior to b/l axilla  - Continue using skin lotion at least BID  - New rx cetirizine  - Prescribed hydroxyzine BID PRN  - Fluocinonide BID  - If unimproved by next visit, consider referral to derm     2. CKD stage III  - Does have a history of HTN, tobacco use, previous heavy alcohol use, hep C s/p treatment  - Cr stable  - Renal US shows b/l reduction in kidney size  - Control BP, continue amlodipine  - Previously established with nephrology. Needs appt with Dr. Young.  - Nephrology following, Dr. Young     3. Chronic obstructive pulmonary disease  - Continue albuterol, tiotropium, breo elipta inhalers  - Did not get PFT's. Encouraged to set up appt.  - Encouraged to get pneumovax at pharmacy. Prevnar given 3/22/17.  - Counseled smoking cessation. Working on cutting down.     4. Essential hypertension  - Continue amlodipine  - Avoid ACE-I, HCTZ given kidney fxn  - Counseled on watching out for hypotension     5. Tobacco use  - Has smoked on/off for the past 50 years  - Down to a few cigarettes per day  - Counseled cessation  - Harm reduction plan discussed  - Encouraged patient to quit     6. Chronic hepatitis C with cirrhosis - Resolved  - Cured according to ID  - S/p harvoni and ribavirin  - Hx IV drug use, released from [facility] 5 years ago, on parole  - Hep C RNA 1,900,000 on 12/13. Hep C RNA not detected on redraw after treamtent, resolution of transaminitis after treatment.  - Discharged from ID clinic. No further testing.  - Recommend complete alcohol cessation    7. Acute gastric ulcer due to Helicobacter pylori - Resolved  - Previously had abd pain resolved with PPI, some mild nausea + bloating  - EGD while in [facility] with no abnormalities  - On a course of PPI, sucralfate with improvement  - H. Pylori stool ag positive. s/p 14 days of clarithromycin and amoxicillin, continue PPI.  - Resolved, monitor    8. Health  maintenance  - Influenza shot 11/5/2018  - Colonsocopy 12/2016  - Prevnar 3/2017, needs pneumovax (get at pharmacy)  - Shingles 4/2017  - Tdap 2/2017     PLAN: Initiate loratidine, hydroxyzine, fluocinoline. Follow up in 10 weeks.      Signed by: Cortes Dubose M.D.

## 2019-02-20 NOTE — TELEPHONE ENCOUNTER
Pt has an appointment with you on 3/22/19. Would you like him to do any labs prior to his appointment? If so, can you please place the orders?    Thank you.

## 2019-02-21 RX ORDER — FLUOCINOLONE ACETONIDE 0.25 MG/G
1 CREAM TOPICAL 3 TIMES DAILY PRN
Qty: 1 TUBE | Refills: 0 | Status: SHIPPED | OUTPATIENT
Start: 2019-02-21 | End: 2019-03-22 | Stop reason: SDUPTHER

## 2019-02-22 DIAGNOSIS — N18.30 CKD (CHRONIC KIDNEY DISEASE), STAGE III (HCC): ICD-10-CM

## 2019-03-06 DIAGNOSIS — J44.9 CHRONIC OBSTRUCTIVE PULMONARY DISEASE, UNSPECIFIED COPD TYPE (HCC): ICD-10-CM

## 2019-03-06 RX ORDER — ALBUTEROL SULFATE 90 UG/1
AEROSOL, METERED RESPIRATORY (INHALATION)
Qty: 1 INHALER | Refills: 3 | Status: SHIPPED | OUTPATIENT
Start: 2019-03-06 | End: 2019-06-05 | Stop reason: SDUPTHER

## 2019-03-06 NOTE — TELEPHONE ENCOUNTER
Last seen: 02/20/19 by Dr. Dubose  Next appt: 05/01/19 with Dr. Dubose    Was the patient seen in the last year in this department? Yes   Does patient have an active prescription for medications requested? No   Received Request Via: Pharmacy

## 2019-03-20 ENCOUNTER — HOSPITAL ENCOUNTER (OUTPATIENT)
Dept: LAB | Facility: MEDICAL CENTER | Age: 74
End: 2019-03-20
Attending: INTERNAL MEDICINE
Payer: MEDICARE

## 2019-03-20 DIAGNOSIS — N18.30 CKD (CHRONIC KIDNEY DISEASE), STAGE III (HCC): ICD-10-CM

## 2019-03-20 LAB
25(OH)D3 SERPL-MCNC: 17 NG/ML (ref 30–100)
ANION GAP SERPL CALC-SCNC: 8 MMOL/L (ref 0–11.9)
BUN SERPL-MCNC: 17 MG/DL (ref 8–22)
CALCIUM SERPL-MCNC: 9.6 MG/DL (ref 8.5–10.5)
CHLORIDE SERPL-SCNC: 107 MMOL/L (ref 96–112)
CO2 SERPL-SCNC: 24 MMOL/L (ref 20–33)
CREAT SERPL-MCNC: 1.76 MG/DL (ref 0.5–1.4)
CREAT UR-MCNC: 89.4 MG/DL
ERYTHROCYTE [DISTWIDTH] IN BLOOD BY AUTOMATED COUNT: 46.4 FL (ref 35.9–50)
GLUCOSE SERPL-MCNC: 76 MG/DL (ref 65–99)
HCT VFR BLD AUTO: 40.1 % (ref 42–52)
HGB BLD-MCNC: 13.4 G/DL (ref 14–18)
MCH RBC QN AUTO: 30.7 PG (ref 27–33)
MCHC RBC AUTO-ENTMCNC: 33.4 G/DL (ref 33.7–35.3)
MCV RBC AUTO: 91.8 FL (ref 81.4–97.8)
MICROALBUMIN UR-MCNC: 1.1 MG/DL
MICROALBUMIN/CREAT UR: 12 MG/G (ref 0–30)
PHOSPHATE SERPL-MCNC: 3.2 MG/DL (ref 2.5–4.5)
PLATELET # BLD AUTO: 172 K/UL (ref 164–446)
PMV BLD AUTO: 10.3 FL (ref 9–12.9)
POTASSIUM SERPL-SCNC: 4.5 MMOL/L (ref 3.6–5.5)
PTH-INTACT SERPL-MCNC: 42.1 PG/ML (ref 14–72)
RBC # BLD AUTO: 4.37 M/UL (ref 4.7–6.1)
SODIUM SERPL-SCNC: 139 MMOL/L (ref 135–145)
WBC # BLD AUTO: 6.1 K/UL (ref 4.8–10.8)

## 2019-03-20 PROCEDURE — 84100 ASSAY OF PHOSPHORUS: CPT

## 2019-03-20 PROCEDURE — 82043 UR ALBUMIN QUANTITATIVE: CPT

## 2019-03-20 PROCEDURE — 36415 COLL VENOUS BLD VENIPUNCTURE: CPT

## 2019-03-20 PROCEDURE — 85027 COMPLETE CBC AUTOMATED: CPT

## 2019-03-20 PROCEDURE — 82570 ASSAY OF URINE CREATININE: CPT

## 2019-03-20 PROCEDURE — 82306 VITAMIN D 25 HYDROXY: CPT

## 2019-03-20 PROCEDURE — 83970 ASSAY OF PARATHORMONE: CPT

## 2019-03-20 PROCEDURE — 80048 BASIC METABOLIC PNL TOTAL CA: CPT

## 2019-03-22 ENCOUNTER — OFFICE VISIT (OUTPATIENT)
Dept: NEPHROLOGY | Facility: MEDICAL CENTER | Age: 74
End: 2019-03-22
Payer: MEDICARE

## 2019-03-22 VITALS
BODY MASS INDEX: 21.48 KG/M2 | DIASTOLIC BLOOD PRESSURE: 64 MMHG | WEIGHT: 145 LBS | HEART RATE: 78 BPM | TEMPERATURE: 97.6 F | HEIGHT: 69 IN | RESPIRATION RATE: 16 BRPM | OXYGEN SATURATION: 96 % | SYSTOLIC BLOOD PRESSURE: 118 MMHG

## 2019-03-22 DIAGNOSIS — Z72.0 TOBACCO USE: ICD-10-CM

## 2019-03-22 DIAGNOSIS — L29.9 PRURITUS: ICD-10-CM

## 2019-03-22 DIAGNOSIS — N18.30 CKD (CHRONIC KIDNEY DISEASE), STAGE III (HCC): ICD-10-CM

## 2019-03-22 PROCEDURE — 99213 OFFICE O/P EST LOW 20 MIN: CPT | Performed by: INTERNAL MEDICINE

## 2019-03-22 ASSESSMENT — ENCOUNTER SYMPTOMS
CHILLS: 0
FEVER: 0

## 2019-03-22 NOTE — TELEPHONE ENCOUNTER
----- Message from Oralia Almendarez sent at 3/22/2019 10:06 AM PDT -----  Regarding: Med Refill  Contact: [phone]  Patient stopped by clinic to ask for medication refill for Synalar, 0.025 cream. Thank you.

## 2019-03-22 NOTE — PROGRESS NOTES
"Subjective:      Fede Parikh Jr. is a 74 y.o. male who presents with Follow-Up            HPI  72 year old with a history of CKD III, referred for evaluation. He has a history of Hepatitis C from IV drug use, now treated. Has a history of HTN in the past but not on any BP medications. History of smoking since he was 23, but quit in the middle for about 14 years. No problems urinating. Sister with kidney problems.    He was asked to return to nephrology clinic.  Serum creatinine is 1.76.  He is having no problems urinating.  Negative microalbumin to creatinine ratio.  He continues to smoke.      Review of Systems   Constitutional: Negative for chills and fever.   Skin:        Notes a rash over the upper back on the right side   All other systems reviewed and are negative.         Objective:     /64 (BP Location: Right arm, Patient Position: Sitting, BP Cuff Size: Adult)   Pulse 78   Temp 36.4 °C (97.6 °F) (Temporal)   Resp 16   Ht 1.753 m (5' 9\")   Wt 65.8 kg (145 lb)   SpO2 96%   BMI 21.41 kg/m²      Physical Exam   Constitutional: He is oriented to person, place, and time. He appears well-developed and well-nourished.   HENT:   Head: Normocephalic and atraumatic.   Hard of hearing   Cardiovascular: Normal rate and regular rhythm.    Pulmonary/Chest: Effort normal and breath sounds normal.   Musculoskeletal: He exhibits no edema or deformity.   Neurological: He is alert and oriented to person, place, and time.   Skin: Skin is warm.   Dark discolored rash on upper back on the right side.  There are scratch marks noted.               Assessment/Plan:     1.  Chronic kidney disease stage III, likely as a result of hepatitis C, now appears to be stabilized.  Blood pressure appears to be controlled.      -At this point, the patient appears to be stable overall.  No evidence of progression and no proteinuria.  -I would consider adding a low-dose ACE inhibitor if blood pressure permits due to the " chronic kidney disease  -Otherwise, yearly follow-up is reasonable in this case

## 2019-03-23 RX ORDER — FLUOCINOLONE ACETONIDE 0.25 MG/G
1 CREAM TOPICAL 3 TIMES DAILY PRN
Qty: 1 TUBE | Refills: 0 | Status: SHIPPED | OUTPATIENT
Start: 2019-03-23 | End: 2019-06-05 | Stop reason: SDUPTHER

## 2019-05-01 ENCOUNTER — APPOINTMENT (OUTPATIENT)
Dept: INTERNAL MEDICINE | Facility: MEDICAL CENTER | Age: 74
End: 2019-05-01
Payer: MEDICARE

## 2019-06-05 ENCOUNTER — OFFICE VISIT (OUTPATIENT)
Dept: INTERNAL MEDICINE | Facility: MEDICAL CENTER | Age: 74
End: 2019-06-05
Payer: MEDICARE

## 2019-06-05 VITALS
HEART RATE: 95 BPM | OXYGEN SATURATION: 95 % | BODY MASS INDEX: 21.62 KG/M2 | SYSTOLIC BLOOD PRESSURE: 106 MMHG | HEIGHT: 69 IN | DIASTOLIC BLOOD PRESSURE: 60 MMHG | WEIGHT: 146 LBS | TEMPERATURE: 98.1 F

## 2019-06-05 DIAGNOSIS — M25.552 PAIN OF LEFT HIP JOINT: ICD-10-CM

## 2019-06-05 DIAGNOSIS — I10 ESSENTIAL HYPERTENSION: ICD-10-CM

## 2019-06-05 DIAGNOSIS — B96.81 ACUTE GASTRIC ULCER DUE TO HELICOBACTER PYLORI: ICD-10-CM

## 2019-06-05 DIAGNOSIS — L29.9 PRURITUS: ICD-10-CM

## 2019-06-05 DIAGNOSIS — Z72.0 TOBACCO USE: ICD-10-CM

## 2019-06-05 DIAGNOSIS — R21 RASH: ICD-10-CM

## 2019-06-05 DIAGNOSIS — K21.9 GASTROESOPHAGEAL REFLUX DISEASE, ESOPHAGITIS PRESENCE NOT SPECIFIED: ICD-10-CM

## 2019-06-05 DIAGNOSIS — K25.3 ACUTE GASTRIC ULCER DUE TO HELICOBACTER PYLORI: ICD-10-CM

## 2019-06-05 DIAGNOSIS — Z86.19 HISTORY OF HEPATITIS C: ICD-10-CM

## 2019-06-05 DIAGNOSIS — D50.8 IRON DEFICIENCY ANEMIA SECONDARY TO INADEQUATE DIETARY IRON INTAKE: ICD-10-CM

## 2019-06-05 DIAGNOSIS — N18.30 CKD (CHRONIC KIDNEY DISEASE), STAGE III (HCC): ICD-10-CM

## 2019-06-05 DIAGNOSIS — E55.9 VITAMIN D DEFICIENCY: ICD-10-CM

## 2019-06-05 DIAGNOSIS — J44.9 CHRONIC OBSTRUCTIVE PULMONARY DISEASE, UNSPECIFIED COPD TYPE (HCC): ICD-10-CM

## 2019-06-05 PROCEDURE — 99214 OFFICE O/P EST MOD 30 MIN: CPT | Mod: GC | Performed by: INTERNAL MEDICINE

## 2019-06-05 RX ORDER — ERGOCALCIFEROL 1.25 MG/1
50000 CAPSULE ORAL
Qty: 8 CAP | Refills: 0 | Status: SHIPPED | OUTPATIENT
Start: 2019-06-05 | End: 2022-02-17

## 2019-06-05 RX ORDER — OMEPRAZOLE 20 MG/1
CAPSULE, DELAYED RELEASE ORAL
Qty: 30 CAP | Refills: 5 | Status: SHIPPED | OUTPATIENT
Start: 2019-06-05 | End: 2021-12-15 | Stop reason: SDUPTHER

## 2019-06-05 RX ORDER — ALBUTEROL SULFATE 90 UG/1
AEROSOL, METERED RESPIRATORY (INHALATION)
Qty: 1 INHALER | Refills: 3 | Status: SHIPPED | OUTPATIENT
Start: 2019-06-05 | End: 2020-09-15 | Stop reason: SDUPTHER

## 2019-06-05 RX ORDER — HYDROXYZINE HYDROCHLORIDE 25 MG/1
25 TABLET, FILM COATED ORAL 2 TIMES DAILY PRN
Qty: 60 TAB | Refills: 1 | Status: SHIPPED | OUTPATIENT
Start: 2019-06-05 | End: 2021-11-01 | Stop reason: SDUPTHER

## 2019-06-05 RX ORDER — ASPIRIN 81 MG/1
TABLET ORAL
Qty: 30 TAB | Refills: 11 | Status: SHIPPED | OUTPATIENT
Start: 2019-06-05 | End: 2020-09-24

## 2019-06-05 RX ORDER — ATORVASTATIN CALCIUM 40 MG/1
40 TABLET, FILM COATED ORAL DAILY
Qty: 30 TAB | Refills: 11 | Status: SHIPPED | OUTPATIENT
Start: 2019-06-05 | End: 2020-09-24

## 2019-06-05 RX ORDER — AMLODIPINE BESYLATE 5 MG/1
5 TABLET ORAL DAILY
Qty: 30 TAB | Refills: 11 | Status: SHIPPED | OUTPATIENT
Start: 2019-06-05 | End: 2020-09-24

## 2019-06-05 RX ORDER — TIOTROPIUM BROMIDE 18 UG/1
CAPSULE ORAL; RESPIRATORY (INHALATION)
Qty: 90 CAP | Refills: 3 | Status: SHIPPED | OUTPATIENT
Start: 2019-06-05 | End: 2021-12-15 | Stop reason: SDUPTHER

## 2019-06-05 RX ORDER — FLUOCINOLONE ACETONIDE 0.25 MG/G
1 CREAM TOPICAL 3 TIMES DAILY PRN
Qty: 1 TUBE | Refills: 0 | Status: SHIPPED | OUTPATIENT
Start: 2019-06-05 | End: 2021-12-15

## 2019-06-05 NOTE — PROGRESS NOTES
Established Patient    Fede presents today with the following:    CC: 5 week follow up with rash    HPI: 72 year old male with a PMH of Hep C s/p treatment, COPD, GERD, benign thyroid nodules, tobacco use, and previous heavy alcohol use presents for a clinic follow up.     Pruritis: Complains of itching for the past several months now. His pruritis was described as diffuse and he does have significant skin thickening and scratch marks on his back posterior to his axilla b/l and on his arms. The steroid cream did seem to help his skin changes, but he is out of it currently. He did find benefit from hydroxyzine for itching as well, but he is out of that medication too.     History of Hep C causing cirrhosis s/p treatment:  S/p harvoni and ribavirin. Seen by Dr. Bella on 8/27/18 and discharged from ID clinic with no further testing recommended.     COPD 2/2 tobacco use: Continues to smoke a few cigarettes per day. Patient is compliant with his albuterol HFA, tiotropium and breo-ellipta. He denies any acute worsening of his respiratory symptoms.     CKDIII: Renal function stable with a creatinine of 1.54 in 4/2018. Renal US showed b/l reduction in kidney size worse than previous exam in 8/2017. Followed by nephro, but missed his last appt. Encouraged to set up another appt, he states that he will do so.     HTN: Compliant with amlodipine, no symptoms of hypotension, BP well controlled.      Patient Active Problem List    Diagnosis Date Noted   • Vitamin D deficiency 06/05/2019   • Iron deficiency anemia secondary to inadequate dietary iron intake 06/05/2019   • Pruritus 08/27/2017   • Non-cardiac chest pain 08/27/2017   • CKD (chronic kidney disease), stage III (HCC) 07/03/2017   • History of hepatitis C 02/15/2017   • Tobacco use 02/15/2017   • Alcohol use 02/15/2017   • Calculus of gallbladder without cholecystitis 02/15/2017   • Multiple thyroid nodules 12/14/2016   • COPD (chronic obstructive pulmonary  "disease) ([facility]) 12/13/2016   • GERD (gastroesophageal reflux disease) 12/13/2016       Current Outpatient Prescriptions   Medication Sig Dispense Refill   • fluocinolone (SYNALAR) 0.025 % cream Apply 1 Application to affected area(s) 3 times a day as needed. 1 Tube 0   • tiotropium (SPIRIVA HANDIHALER) 18 MCG Cap INHALE 1 CAPSULE VIA HANDIHALER ONCE DAILY 90 Cap 3   • Fluticasone Furoate-Vilanterol (BREO ELLIPTA) 100-25 MCG/INH AEROSOL POWDER, BREATH ACTIVATED Inhale 1 Puff by mouth every day. 1 Each 2   • albuterol (VENTOLIN HFA) 108 (90 Base) MCG/ACT Aero Soln inhalation aerosol INHALE TWO PUFFS BY MOUTH EVERY SIX HOURS AS NEEDED 1 Inhaler 3   • omeprazole (PRILOSEC) 20 MG delayed-release capsule TAKE ONE CAPSULE BY MOUTH ONE TIME DAILY 30 Cap 5   • aspirin (ASPIRIN LOW DOSE) 81 MG EC tablet TAKE ONE TABLET BY MOUTH ONE TIME DAILY 30 Tab 11   • hydrOXYzine HCl (ATARAX) 25 MG Tab Take 1 Tab by mouth 2 times a day as needed for Itching. 60 Tab 1   • atorvastatin (LIPITOR) 40 MG Tab Take 1 Tab by mouth every day. 30 Tab 11   • amLODIPine (NORVASC) 5 MG Tab Take 1 Tab by mouth every day. For high blood pressure 30 Tab 11   • ergocalciferol (DRISDOL) 89159 UNIT capsule Take 1 Cap by mouth every 7 days. 8 Cap 0   • loratadine (CLARITIN) 10 MG Tab Take 1 Tab by mouth every day. 30 Tab 5     No current facility-administered medications for this visit.        ROS: As per HPI. Additional pertinent symptoms as noted below.      /60 (BP Location: Left arm, Patient Position: Sitting, BP Cuff Size: Adult)   Pulse 95   Temp 36.7 °C (98.1 °F) (Temporal)   Ht 1.753 m (5' 9\")   Wt 66.2 kg (146 lb)   SpO2 95%   BMI 21.56 kg/m²     Physical Exam   General:  male, Alert and oriented, No apparent distress.  Eyes: Pupils equal and reactive. No scleral icterus. EOMI, conjunctiva jaundiced.  Throat: Clear no erythema or exudates noted.  Neck: Supple. No lymphadenopathy noted. Multiple b/l thyroid nodules " palpated.  Lungs: Clear to auscultation and percussion bilaterally. Decreased BS b/l with no wheezing or other adventitious sounds.  Cardiovascular: Regular rate and rhythm. No murmurs, rubs or gallops.  Abdomen:  Benign. No rebound or guarding noted.  Extremities: No clubbing, cyanosis, edema. Scratch marks from itching, non-painful left knee lump.  Skin: Darkening and thickening of his skin in his b/l antecubital fossa.      Assessment and Plan    1. Pruritus  - Has skin thickening on his arms, back posterior to b/l axilla  - Continue using skin lotion at least BID  - New rx cetirizine  - Prescribed hydroxyzine BID PRN, reorder today  - Fluocinonide BID, reorder today  - Referral to derm     2. CKD stage III  - Does have a history of HTN, tobacco use, previous heavy alcohol use, hep C s/p treatment  - Cr stable  - Renal US shows b/l reduction in kidney size  - Control BP, continue amlodipine  - Previously established with nephrology. Needs appt with Dr. Young.  - Nephrology following, Dr. Young     3. Chronic obstructive pulmonary disease  - Continue albuterol, tiotropium, breo elipta inhalers  - Did not get PFT's. Encouraged to set up appt.  - Encouraged to get pneumovax at pharmacy. Prevnar given 3/22/17.  - Counseled smoking cessation. Working on cutting down.     4. Essential hypertension  - Continue amlodipine  - Avoid ACE-I, HCTZ given kidney fxn  - Counseled on watching out for hypotension     5. Tobacco use  - Has smoked on/off for the past 50 years  - Down to a few cigarettes per day  - Counseled cessation  - Harm reduction plan discussed  - Encouraged patient to quit     6. Chronic hepatitis C with cirrhosis - Resolved  - Cured according to ID  - S/p harvoni and ribavirin  - Hx IV drug use, released from [facility] term 5 years ago, on parole  - Hep C RNA 1,900,000 on 12/13. Hep C RNA not detected on redraw after treamtent, resolution of transaminitis after treatment.  - Discharged from ID clinic. No  further testing.  - Recommend complete alcohol cessation     7. Acute gastric ulcer due to Helicobacter pylori - Resolved  - Previously had abd pain resolved with PPI, some mild nausea + bloating  - EGD while in longterm with no abnormalities  - On a course of PPI, sucralfate with improvement  - H. Pylori stool ag positive. s/p 14 days of clarithromycin and amoxicillin, continue PPI.  - Resolved, monitor    8. Vitamin D deficiency  - Vit D 17 in 3/2019  - Replete with 50,000 U qweek for 8 weeks  - Recheck after high dose repletion    9. Pain of left hip joint  - On/off pain  - Check xray  - Follow up after xray  - Consider course of PT    10. Iron deficiency anemia secondary to inadequate dietary iron intake  - Mildly low  - Does have kidney disease  - Check Fe studies, folate, B12    11. Health maintenance  - Influenza shot 11/5/2018  - Colonsocopy 12/2016  - Prevnar 3/2017, needs pneumovax (get at pharmacy)  - Shingles 4/2017  - Tdap 2/2017      Signed by: Cortes Dubose M.D.

## 2019-09-20 ENCOUNTER — HOSPITAL ENCOUNTER (OUTPATIENT)
Dept: LAB | Facility: MEDICAL CENTER | Age: 74
End: 2019-09-20
Attending: INTERNAL MEDICINE
Payer: MEDICARE

## 2019-09-20 ENCOUNTER — APPOINTMENT (OUTPATIENT)
Dept: NEPHROLOGY | Facility: MEDICAL CENTER | Age: 74
End: 2019-09-20
Payer: MEDICARE

## 2019-09-20 DIAGNOSIS — N18.30 CKD (CHRONIC KIDNEY DISEASE), STAGE III (HCC): ICD-10-CM

## 2019-09-20 LAB
25(OH)D3 SERPL-MCNC: 45 NG/ML (ref 30–100)
ANION GAP SERPL CALC-SCNC: 12 MMOL/L (ref 0–11.9)
BUN SERPL-MCNC: 17 MG/DL (ref 8–22)
CALCIUM SERPL-MCNC: 9.7 MG/DL (ref 8.5–10.5)
CHLORIDE SERPL-SCNC: 105 MMOL/L (ref 96–112)
CO2 SERPL-SCNC: 23 MMOL/L (ref 20–33)
CREAT SERPL-MCNC: 1.93 MG/DL (ref 0.5–1.4)
CREAT UR-MCNC: 153.5 MG/DL
ERYTHROCYTE [DISTWIDTH] IN BLOOD BY AUTOMATED COUNT: 48.7 FL (ref 35.9–50)
GLUCOSE SERPL-MCNC: 83 MG/DL (ref 65–99)
HCT VFR BLD AUTO: 42.9 % (ref 42–52)
HGB BLD-MCNC: 14.2 G/DL (ref 14–18)
MCH RBC QN AUTO: 30.6 PG (ref 27–33)
MCHC RBC AUTO-ENTMCNC: 33.1 G/DL (ref 33.7–35.3)
MCV RBC AUTO: 92.5 FL (ref 81.4–97.8)
MICROALBUMIN UR-MCNC: 1.7 MG/DL
MICROALBUMIN/CREAT UR: 11 MG/G (ref 0–30)
PLATELET # BLD AUTO: 191 K/UL (ref 164–446)
PMV BLD AUTO: 10.8 FL (ref 9–12.9)
POTASSIUM SERPL-SCNC: 4.6 MMOL/L (ref 3.6–5.5)
PTH-INTACT SERPL-MCNC: 61.2 PG/ML (ref 14–72)
RBC # BLD AUTO: 4.64 M/UL (ref 4.7–6.1)
SODIUM SERPL-SCNC: 140 MMOL/L (ref 135–145)
WBC # BLD AUTO: 5.6 K/UL (ref 4.8–10.8)

## 2019-09-20 PROCEDURE — 83970 ASSAY OF PARATHORMONE: CPT

## 2019-09-20 PROCEDURE — 36415 COLL VENOUS BLD VENIPUNCTURE: CPT

## 2019-09-20 PROCEDURE — 82570 ASSAY OF URINE CREATININE: CPT

## 2019-09-20 PROCEDURE — 85027 COMPLETE CBC AUTOMATED: CPT

## 2019-09-20 PROCEDURE — 82043 UR ALBUMIN QUANTITATIVE: CPT

## 2019-09-20 PROCEDURE — 82306 VITAMIN D 25 HYDROXY: CPT

## 2019-09-20 PROCEDURE — 80048 BASIC METABOLIC PNL TOTAL CA: CPT

## 2019-11-15 ENCOUNTER — OFFICE VISIT (OUTPATIENT)
Dept: NEPHROLOGY | Facility: MEDICAL CENTER | Age: 74
End: 2019-11-15
Payer: MEDICARE

## 2019-11-15 VITALS
HEART RATE: 97 BPM | RESPIRATION RATE: 16 BRPM | OXYGEN SATURATION: 95 % | TEMPERATURE: 98.7 F | HEIGHT: 69 IN | WEIGHT: 145 LBS | BODY MASS INDEX: 21.48 KG/M2 | SYSTOLIC BLOOD PRESSURE: 108 MMHG | DIASTOLIC BLOOD PRESSURE: 72 MMHG

## 2019-11-15 DIAGNOSIS — K74.69 COMPENSATED CIRRHOSIS RELATED TO HEPATITIS C VIRUS (HCV) (HCC): ICD-10-CM

## 2019-11-15 DIAGNOSIS — N18.30 CKD (CHRONIC KIDNEY DISEASE), STAGE III (HCC): ICD-10-CM

## 2019-11-15 DIAGNOSIS — B19.20 COMPENSATED CIRRHOSIS RELATED TO HEPATITIS C VIRUS (HCV) (HCC): ICD-10-CM

## 2019-11-15 DIAGNOSIS — J44.9 CHRONIC OBSTRUCTIVE PULMONARY DISEASE, UNSPECIFIED COPD TYPE (HCC): ICD-10-CM

## 2019-11-15 DIAGNOSIS — Z72.0 TOBACCO USE: ICD-10-CM

## 2019-11-15 PROCEDURE — 99214 OFFICE O/P EST MOD 30 MIN: CPT | Performed by: INTERNAL MEDICINE

## 2019-11-15 ASSESSMENT — ENCOUNTER SYMPTOMS
FEVER: 0
CHILLS: 0

## 2019-11-15 NOTE — PROGRESS NOTES
"Subjective:      Fede Parikh Jr. is a 74 y.o. male who presents with Follow-Up and Chronic Kidney Disease            HPI  72 year old with a history of CKD III, referred for evaluation. He has a history of Hepatitis C from IV drug use, now treated. Has a history of HTN in the past but not on any BP medications. History of smoking since he was 23, but quit in the middle for about 14 years. No problems urinating. Sister with kidney problems.      Notes that since last visit he has stopped drinking because he felt that it was causing harm to his kidney. He continues to smoke. Energy is a bit better, Feeling a bit stronger.    Review of Systems   Constitutional: Negative for chills and fever.   All other systems reviewed and are negative.         Objective:     /72 (BP Location: Right arm, Patient Position: Sitting, BP Cuff Size: Adult)   Pulse 97   Temp 37.1 °C (98.7 °F) (Temporal)   Resp 16   Ht 1.753 m (5' 9\")   Wt 65.8 kg (145 lb)   SpO2 95%   BMI 21.41 kg/m²      Physical Exam  Constitutional:       Appearance: Normal appearance. He is well-developed.   HENT:      Head: Normocephalic and atraumatic.   Cardiovascular:      Rate and Rhythm: Normal rate and regular rhythm.   Pulmonary:      Effort: Pulmonary effort is normal.      Breath sounds: Normal breath sounds.   Abdominal:      General: Bowel sounds are normal.      Palpations: Abdomen is soft.   Musculoskeletal:         General: No swelling, tenderness or deformity.   Skin:     General: Skin is warm and dry.   Neurological:      Mental Status: He is alert and oriented to person, place, and time.   Psychiatric:         Behavior: Behavior normal.                 Assessment/Plan:       1. CKD (chronic kidney disease), stage III (HCC)  Cr up slightly, to 1.9, energy level actually improved, BP is controlled.    2. Tobacco use  Continues to smoke, discussed cessation, does not want referral, will try to do on his own.    3. Chronic obstructive " pulmonary disease, unspecified COPD type (HCC)  Taking inhalers, states that breathing is stable, continues to smoke, recommended cessation.    4. 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 Last imaged 2016.

## 2020-01-07 ENCOUNTER — HOSPITAL ENCOUNTER (OUTPATIENT)
Dept: LAB | Facility: MEDICAL CENTER | Age: 75
End: 2020-01-07
Attending: INTERNAL MEDICINE
Payer: MEDICARE

## 2020-01-07 DIAGNOSIS — N18.30 CKD (CHRONIC KIDNEY DISEASE), STAGE III: ICD-10-CM

## 2020-01-07 LAB
ANION GAP SERPL CALC-SCNC: 10 MMOL/L (ref 0–11.9)
BUN SERPL-MCNC: 13 MG/DL (ref 8–22)
CALCIUM SERPL-MCNC: 9.4 MG/DL (ref 8.5–10.5)
CHLORIDE SERPL-SCNC: 107 MMOL/L (ref 96–112)
CO2 SERPL-SCNC: 25 MMOL/L (ref 20–33)
CREAT SERPL-MCNC: 1.49 MG/DL (ref 0.5–1.4)
CREAT UR-MCNC: 125.9 MG/DL
ERYTHROCYTE [DISTWIDTH] IN BLOOD BY AUTOMATED COUNT: 47.5 FL (ref 35.9–50)
GLUCOSE SERPL-MCNC: 88 MG/DL (ref 65–99)
HCT VFR BLD AUTO: 41.8 % (ref 42–52)
HGB BLD-MCNC: 14.2 G/DL (ref 14–18)
MCH RBC QN AUTO: 31.4 PG (ref 27–33)
MCHC RBC AUTO-ENTMCNC: 34 G/DL (ref 33.7–35.3)
MCV RBC AUTO: 92.5 FL (ref 81.4–97.8)
MICROALBUMIN UR-MCNC: 6.5 MG/DL
MICROALBUMIN/CREAT UR: 52 MG/G (ref 0–30)
PLATELET # BLD AUTO: 180 K/UL (ref 164–446)
PMV BLD AUTO: 11.2 FL (ref 9–12.9)
POTASSIUM SERPL-SCNC: 4.3 MMOL/L (ref 3.6–5.5)
RBC # BLD AUTO: 4.52 M/UL (ref 4.7–6.1)
SODIUM SERPL-SCNC: 142 MMOL/L (ref 135–145)
WBC # BLD AUTO: 5.8 K/UL (ref 4.8–10.8)

## 2020-01-07 PROCEDURE — 83970 ASSAY OF PARATHORMONE: CPT

## 2020-01-07 PROCEDURE — 80048 BASIC METABOLIC PNL TOTAL CA: CPT

## 2020-01-07 PROCEDURE — 85027 COMPLETE CBC AUTOMATED: CPT

## 2020-01-07 PROCEDURE — 36415 COLL VENOUS BLD VENIPUNCTURE: CPT

## 2020-01-07 PROCEDURE — 82570 ASSAY OF URINE CREATININE: CPT

## 2020-01-07 PROCEDURE — 82043 UR ALBUMIN QUANTITATIVE: CPT

## 2020-01-07 PROCEDURE — 82306 VITAMIN D 25 HYDROXY: CPT

## 2020-01-08 LAB
25(OH)D3 SERPL-MCNC: 34 NG/ML (ref 30–100)
PTH-INTACT SERPL-MCNC: 71 PG/ML (ref 14–72)

## 2020-09-03 ENCOUNTER — APPOINTMENT (OUTPATIENT)
Dept: RADIOLOGY | Facility: MEDICAL CENTER | Age: 75
DRG: 271 | End: 2020-09-03
Attending: EMERGENCY MEDICINE
Payer: MEDICARE

## 2020-09-03 ENCOUNTER — HOSPITAL ENCOUNTER (INPATIENT)
Facility: MEDICAL CENTER | Age: 75
LOS: 7 days | DRG: 271 | End: 2020-09-10
Attending: EMERGENCY MEDICINE | Admitting: INTERNAL MEDICINE
Payer: MEDICARE

## 2020-09-03 DIAGNOSIS — E16.2 HYPOGLYCEMIA: ICD-10-CM

## 2020-09-03 DIAGNOSIS — M79.604 PAIN OF RIGHT LOWER EXTREMITY: ICD-10-CM

## 2020-09-03 DIAGNOSIS — F10.920 ALCOHOLIC INTOXICATION WITHOUT COMPLICATION (HCC): ICD-10-CM

## 2020-09-03 DIAGNOSIS — I74.5 ILIAC ARTERY OCCLUSION, RIGHT (HCC): Chronic | ICD-10-CM

## 2020-09-03 DIAGNOSIS — N17.9 AKI (ACUTE KIDNEY INJURY) (HCC): ICD-10-CM

## 2020-09-03 DIAGNOSIS — I74.5 ILIAC ARTERY OCCLUSION (HCC): ICD-10-CM

## 2020-09-03 LAB
ABO + RH BLD: NORMAL
ABO GROUP BLD: NORMAL
ALBUMIN SERPL BCP-MCNC: 4.2 G/DL (ref 3.2–4.9)
ALBUMIN/GLOB SERPL: 1.6 G/DL
ALP SERPL-CCNC: 60 U/L (ref 30–99)
ALT SERPL-CCNC: 12 U/L (ref 2–50)
ANION GAP SERPL CALC-SCNC: 27 MMOL/L (ref 7–16)
APTT PPP: 22.9 SEC (ref 24.7–36)
AST SERPL-CCNC: 32 U/L (ref 12–45)
BASOPHILS # BLD AUTO: 0.4 % (ref 0–1.8)
BASOPHILS # BLD: 0.03 K/UL (ref 0–0.12)
BILIRUB SERPL-MCNC: 0.8 MG/DL (ref 0.1–1.5)
BLD GP AB SCN SERPL QL: NORMAL
BUN SERPL-MCNC: 17 MG/DL (ref 8–22)
CALCIUM SERPL-MCNC: 9.3 MG/DL (ref 8.5–10.5)
CHLORIDE SERPL-SCNC: 101 MMOL/L (ref 96–112)
CK SERPL-CCNC: 617 U/L (ref 0–154)
CO2 SERPL-SCNC: 10 MMOL/L (ref 20–33)
COVID ORDER STATUS COVID19: NORMAL
CREAT SERPL-MCNC: 1.51 MG/DL (ref 0.5–1.4)
CRP SERPL HS-MCNC: 0.04 MG/DL (ref 0–0.75)
EOSINOPHIL # BLD AUTO: 0.02 K/UL (ref 0–0.51)
EOSINOPHIL NFR BLD: 0.3 % (ref 0–6.9)
ERYTHROCYTE [DISTWIDTH] IN BLOOD BY AUTOMATED COUNT: 47.5 FL (ref 35.9–50)
ETHANOL BLD-MCNC: 123.1 MG/DL (ref 0–10.1)
GLOBULIN SER CALC-MCNC: 2.6 G/DL (ref 1.9–3.5)
GLUCOSE BLD-MCNC: 115 MG/DL (ref 65–99)
GLUCOSE BLD-MCNC: 117 MG/DL (ref 65–99)
GLUCOSE BLD-MCNC: 64 MG/DL (ref 65–99)
GLUCOSE BLD-MCNC: 69 MG/DL (ref 65–99)
GLUCOSE BLD-MCNC: 71 MG/DL (ref 65–99)
GLUCOSE SERPL-MCNC: 108 MG/DL (ref 65–99)
HCT VFR BLD AUTO: 37.1 % (ref 42–52)
HGB BLD-MCNC: 12.8 G/DL (ref 14–18)
IMM GRANULOCYTES # BLD AUTO: 0.03 K/UL (ref 0–0.11)
IMM GRANULOCYTES NFR BLD AUTO: 0.4 % (ref 0–0.9)
INR PPP: 1.01 (ref 0.87–1.13)
LYMPHOCYTES # BLD AUTO: 1.91 K/UL (ref 1–4.8)
LYMPHOCYTES NFR BLD: 25 % (ref 22–41)
MCH RBC QN AUTO: 32.4 PG (ref 27–33)
MCHC RBC AUTO-ENTMCNC: 34.5 G/DL (ref 33.7–35.3)
MCV RBC AUTO: 93.9 FL (ref 81.4–97.8)
MONOCYTES # BLD AUTO: 0.89 K/UL (ref 0–0.85)
MONOCYTES NFR BLD AUTO: 11.7 % (ref 0–13.4)
NEUTROPHILS # BLD AUTO: 4.75 K/UL (ref 1.82–7.42)
NEUTROPHILS NFR BLD: 62.2 % (ref 44–72)
NRBC # BLD AUTO: 0 K/UL
NRBC BLD-RTO: 0 /100 WBC
PLATELET # BLD AUTO: 164 K/UL (ref 164–446)
PMV BLD AUTO: 11 FL (ref 9–12.9)
POTASSIUM SERPL-SCNC: 4.2 MMOL/L (ref 3.6–5.5)
PROT SERPL-MCNC: 6.8 G/DL (ref 6–8.2)
PROTHROMBIN TIME: 13.6 SEC (ref 12–14.6)
RBC # BLD AUTO: 3.95 M/UL (ref 4.7–6.1)
RH BLD: NORMAL
SARS-COV-2 RNA RESP QL NAA+PROBE: NOTDETECTED
SODIUM SERPL-SCNC: 138 MMOL/L (ref 135–145)
SPECIMEN SOURCE: NORMAL
UFH PPP CHRO-ACNC: <0.1 IU/ML
WBC # BLD AUTO: 7.6 K/UL (ref 4.8–10.8)

## 2020-09-03 PROCEDURE — 86850 RBC ANTIBODY SCREEN: CPT

## 2020-09-03 PROCEDURE — 96366 THER/PROPH/DIAG IV INF ADDON: CPT

## 2020-09-03 PROCEDURE — 80307 DRUG TEST PRSMV CHEM ANLYZR: CPT

## 2020-09-03 PROCEDURE — 93922 UPR/L XTREMITY ART 2 LEVELS: CPT

## 2020-09-03 PROCEDURE — 85730 THROMBOPLASTIN TIME PARTIAL: CPT

## 2020-09-03 PROCEDURE — 96365 THER/PROPH/DIAG IV INF INIT: CPT

## 2020-09-03 PROCEDURE — 75635 CT ANGIO ABDOMINAL ARTERIES: CPT

## 2020-09-03 PROCEDURE — 82550 ASSAY OF CK (CPK): CPT

## 2020-09-03 PROCEDURE — 86140 C-REACTIVE PROTEIN: CPT

## 2020-09-03 PROCEDURE — 96376 TX/PRO/DX INJ SAME DRUG ADON: CPT

## 2020-09-03 PROCEDURE — 700111 HCHG RX REV CODE 636 W/ 250 OVERRIDE (IP): Performed by: EMERGENCY MEDICINE

## 2020-09-03 PROCEDURE — 86900 BLOOD TYPING SEROLOGIC ABO: CPT

## 2020-09-03 PROCEDURE — 700117 HCHG RX CONTRAST REV CODE 255: Performed by: EMERGENCY MEDICINE

## 2020-09-03 PROCEDURE — 85520 HEPARIN ASSAY: CPT

## 2020-09-03 PROCEDURE — C9803 HOPD COVID-19 SPEC COLLECT: HCPCS | Performed by: EMERGENCY MEDICINE

## 2020-09-03 PROCEDURE — U0003 INFECTIOUS AGENT DETECTION BY NUCLEIC ACID (DNA OR RNA); SEVERE ACUTE RESPIRATORY SYNDROME CORONAVIRUS 2 (SARS-COV-2) (CORONAVIRUS DISEASE [COVID-19]), AMPLIFIED PROBE TECHNIQUE, MAKING USE OF HIGH THROUGHPUT TECHNOLOGIES AS DESCRIBED BY CMS-2020-01-R: HCPCS

## 2020-09-03 PROCEDURE — 80053 COMPREHEN METABOLIC PANEL: CPT

## 2020-09-03 PROCEDURE — 82962 GLUCOSE BLOOD TEST: CPT | Mod: 91

## 2020-09-03 PROCEDURE — 96375 TX/PRO/DX INJ NEW DRUG ADDON: CPT

## 2020-09-03 PROCEDURE — 99285 EMERGENCY DEPT VISIT HI MDM: CPT

## 2020-09-03 PROCEDURE — 770020 HCHG ROOM/CARE - TELE (206)

## 2020-09-03 PROCEDURE — 85025 COMPLETE CBC W/AUTO DIFF WBC: CPT

## 2020-09-03 PROCEDURE — 93925 LOWER EXTREMITY STUDY: CPT

## 2020-09-03 PROCEDURE — 85610 PROTHROMBIN TIME: CPT

## 2020-09-03 PROCEDURE — 86901 BLOOD TYPING SEROLOGIC RH(D): CPT

## 2020-09-03 RX ORDER — HEPARIN SODIUM 5000 [USP'U]/100ML
0-30 INJECTION, SOLUTION INTRAVENOUS CONTINUOUS
Status: DISCONTINUED | OUTPATIENT
Start: 2020-09-03 | End: 2020-09-04

## 2020-09-03 RX ORDER — MORPHINE SULFATE 4 MG/ML
4 INJECTION, SOLUTION INTRAMUSCULAR; INTRAVENOUS ONCE
Status: COMPLETED | OUTPATIENT
Start: 2020-09-03 | End: 2020-09-03

## 2020-09-03 RX ORDER — HEPARIN SODIUM 1000 [USP'U]/ML
40 INJECTION, SOLUTION INTRAVENOUS; SUBCUTANEOUS PRN
Status: DISCONTINUED | OUTPATIENT
Start: 2020-09-03 | End: 2020-09-04

## 2020-09-03 RX ORDER — HEPARIN SODIUM 1000 [USP'U]/ML
80 INJECTION, SOLUTION INTRAVENOUS; SUBCUTANEOUS ONCE
Status: COMPLETED | OUTPATIENT
Start: 2020-09-03 | End: 2020-09-03

## 2020-09-03 RX ORDER — ONDANSETRON 2 MG/ML
4 INJECTION INTRAMUSCULAR; INTRAVENOUS ONCE
Status: COMPLETED | OUTPATIENT
Start: 2020-09-03 | End: 2020-09-03

## 2020-09-03 RX ORDER — HYDROMORPHONE HYDROCHLORIDE 1 MG/ML
1 INJECTION, SOLUTION INTRAMUSCULAR; INTRAVENOUS; SUBCUTANEOUS ONCE
Status: COMPLETED | OUTPATIENT
Start: 2020-09-03 | End: 2020-09-03

## 2020-09-03 RX ORDER — MORPHINE SULFATE 4 MG/ML
4 INJECTION, SOLUTION INTRAMUSCULAR; INTRAVENOUS ONCE
Status: ACTIVE | OUTPATIENT
Start: 2020-09-03 | End: 2020-09-04

## 2020-09-03 RX ORDER — MORPHINE SULFATE 4 MG/ML
2 INJECTION, SOLUTION INTRAMUSCULAR; INTRAVENOUS ONCE
Status: COMPLETED | OUTPATIENT
Start: 2020-09-03 | End: 2020-09-03

## 2020-09-03 RX ADMIN — HEPARIN SODIUM 5100 UNITS: 1000 INJECTION, SOLUTION INTRAVENOUS; SUBCUTANEOUS at 22:53

## 2020-09-03 RX ADMIN — MORPHINE SULFATE 4 MG: 4 INJECTION INTRAVENOUS at 19:35

## 2020-09-03 RX ADMIN — IOHEXOL 100 ML: 350 INJECTION, SOLUTION INTRAVENOUS at 22:42

## 2020-09-03 RX ADMIN — ONDANSETRON 4 MG: 2 INJECTION INTRAMUSCULAR; INTRAVENOUS at 18:26

## 2020-09-03 RX ADMIN — HYDROMORPHONE HYDROCHLORIDE 1 MG: 1 INJECTION, SOLUTION INTRAMUSCULAR; INTRAVENOUS; SUBCUTANEOUS at 21:39

## 2020-09-03 RX ADMIN — HEPARIN SODIUM 18 UNITS/KG/HR: 5000 INJECTION, SOLUTION INTRAVENOUS at 22:59

## 2020-09-03 RX ADMIN — MORPHINE SULFATE 2 MG: 4 INJECTION INTRAVENOUS at 18:26

## 2020-09-04 ENCOUNTER — ANESTHESIA (OUTPATIENT)
Dept: SURGERY | Facility: MEDICAL CENTER | Age: 75
DRG: 271 | End: 2020-09-04
Payer: MEDICARE

## 2020-09-04 ENCOUNTER — ANESTHESIA EVENT (OUTPATIENT)
Dept: SURGERY | Facility: MEDICAL CENTER | Age: 75
DRG: 271 | End: 2020-09-04
Payer: MEDICARE

## 2020-09-04 ENCOUNTER — APPOINTMENT (OUTPATIENT)
Dept: RADIOLOGY | Facility: MEDICAL CENTER | Age: 75
DRG: 271 | End: 2020-09-04
Attending: SURGERY
Payer: MEDICARE

## 2020-09-04 PROBLEM — I74.5 ILIAC ARTERY OCCLUSION, RIGHT (HCC): Chronic | Status: ACTIVE | Noted: 2020-09-04

## 2020-09-04 PROBLEM — E87.29 HIGH ANION GAP METABOLIC ACIDOSIS: Status: ACTIVE | Noted: 2020-09-04

## 2020-09-04 LAB
EKG IMPRESSION: NORMAL
GLUCOSE BLD-MCNC: 115 MG/DL (ref 65–99)
GLUCOSE BLD-MCNC: 155 MG/DL (ref 65–99)
UFH PPP CHRO-ACNC: >1.1 IU/ML

## 2020-09-04 PROCEDURE — C1894 INTRO/SHEATH, NON-LASER: HCPCS | Performed by: SURGERY

## 2020-09-04 PROCEDURE — C1876 STENT, NON-COA/NON-COV W/DEL: HCPCS | Performed by: SURGERY

## 2020-09-04 PROCEDURE — 501838 HCHG SUTURE GENERAL: Performed by: SURGERY

## 2020-09-04 PROCEDURE — 04UK3KZ SUPPLEMENT RIGHT FEMORAL ARTERY WITH NONAUTOLOGOUS TISSUE SUBSTITUTE, PERCUTANEOUS APPROACH: ICD-10-PCS | Performed by: SURGERY

## 2020-09-04 PROCEDURE — B41C1ZZ FLUOROSCOPY OF PELVIC ARTERIES USING LOW OSMOLAR CONTRAST: ICD-10-PCS | Performed by: SURGERY

## 2020-09-04 PROCEDURE — 700101 HCHG RX REV CODE 250: Performed by: ANESTHESIOLOGY

## 2020-09-04 PROCEDURE — 110454 HCHG SHELL REV 250: Performed by: SURGERY

## 2020-09-04 PROCEDURE — 700101 HCHG RX REV CODE 250

## 2020-09-04 PROCEDURE — 82962 GLUCOSE BLOOD TEST: CPT

## 2020-09-04 PROCEDURE — 700105 HCHG RX REV CODE 258: Performed by: INTERNAL MEDICINE

## 2020-09-04 PROCEDURE — 93005 ELECTROCARDIOGRAM TRACING: CPT | Performed by: SURGERY

## 2020-09-04 PROCEDURE — 85520 HEPARIN ASSAY: CPT

## 2020-09-04 PROCEDURE — 700117 HCHG RX CONTRAST REV CODE 255: Performed by: SURGERY

## 2020-09-04 PROCEDURE — 501445 HCHG STAPLER, SKIN DISP: Performed by: SURGERY

## 2020-09-04 PROCEDURE — 502240 HCHG MISC OR SUPPLY RC 0272: Performed by: SURGERY

## 2020-09-04 PROCEDURE — 700111 HCHG RX REV CODE 636 W/ 250 OVERRIDE (IP): Performed by: ANESTHESIOLOGY

## 2020-09-04 PROCEDURE — 99223 1ST HOSP IP/OBS HIGH 75: CPT | Mod: AI | Performed by: INTERNAL MEDICINE

## 2020-09-04 PROCEDURE — 160041 HCHG SURGERY MINUTES - EA ADDL 1 MIN LEVEL 4: Performed by: SURGERY

## 2020-09-04 PROCEDURE — C1725 CATH, TRANSLUMIN NON-LASER: HCPCS | Performed by: SURGERY

## 2020-09-04 PROCEDURE — 700111 HCHG RX REV CODE 636 W/ 250 OVERRIDE (IP): Performed by: SURGERY

## 2020-09-04 PROCEDURE — 041K4KJ BYPASS RIGHT FEMORAL ARTERY TO LEFT FEMORAL ARTERY WITH NONAUTOLOGOUS TISSUE SUBSTITUTE, PERCUTANEOUS ENDOSCOPIC APPROACH: ICD-10-PCS | Performed by: SURGERY

## 2020-09-04 PROCEDURE — 700111 HCHG RX REV CODE 636 W/ 250 OVERRIDE (IP): Performed by: INTERNAL MEDICINE

## 2020-09-04 PROCEDURE — 99406 BEHAV CHNG SMOKING 3-10 MIN: CPT

## 2020-09-04 PROCEDURE — 04CL3ZZ EXTIRPATION OF MATTER FROM LEFT FEMORAL ARTERY, PERCUTANEOUS APPROACH: ICD-10-PCS | Performed by: SURGERY

## 2020-09-04 PROCEDURE — 94640 AIRWAY INHALATION TREATMENT: CPT

## 2020-09-04 PROCEDURE — 770020 HCHG ROOM/CARE - TELE (206)

## 2020-09-04 PROCEDURE — A6454 SELF-ADHER BAND W>=3" <5"/YD: HCPCS | Performed by: SURGERY

## 2020-09-04 PROCEDURE — 700101 HCHG RX REV CODE 250: Performed by: INTERNAL MEDICINE

## 2020-09-04 PROCEDURE — 160002 HCHG RECOVERY MINUTES (STAT): Performed by: SURGERY

## 2020-09-04 PROCEDURE — C1769 GUIDE WIRE: HCPCS | Performed by: SURGERY

## 2020-09-04 PROCEDURE — 700105 HCHG RX REV CODE 258: Performed by: ANESTHESIOLOGY

## 2020-09-04 PROCEDURE — 160036 HCHG PACU - EA ADDL 30 MINS PHASE I: Performed by: SURGERY

## 2020-09-04 PROCEDURE — 700101 HCHG RX REV CODE 250: Performed by: SURGERY

## 2020-09-04 PROCEDURE — HZ2ZZZZ DETOXIFICATION SERVICES FOR SUBSTANCE ABUSE TREATMENT: ICD-10-PCS | Performed by: INTERNAL MEDICINE

## 2020-09-04 PROCEDURE — 93010 ELECTROCARDIOGRAM REPORT: CPT | Performed by: INTERNAL MEDICINE

## 2020-09-04 PROCEDURE — 700102 HCHG RX REV CODE 250 W/ 637 OVERRIDE(OP): Performed by: INTERNAL MEDICINE

## 2020-09-04 PROCEDURE — 04UL3KZ SUPPLEMENT LEFT FEMORAL ARTERY WITH NONAUTOLOGOUS TISSUE SUBSTITUTE, PERCUTANEOUS APPROACH: ICD-10-PCS | Performed by: SURGERY

## 2020-09-04 PROCEDURE — 160035 HCHG PACU - 1ST 60 MINS PHASE I: Performed by: SURGERY

## 2020-09-04 PROCEDURE — 047C3DZ DILATION OF RIGHT COMMON ILIAC ARTERY WITH INTRALUMINAL DEVICE, PERCUTANEOUS APPROACH: ICD-10-PCS | Performed by: SURGERY

## 2020-09-04 PROCEDURE — 04CK3ZZ EXTIRPATION OF MATTER FROM RIGHT FEMORAL ARTERY, PERCUTANEOUS APPROACH: ICD-10-PCS | Performed by: SURGERY

## 2020-09-04 PROCEDURE — 160009 HCHG ANES TIME/MIN: Performed by: SURGERY

## 2020-09-04 PROCEDURE — 36415 COLL VENOUS BLD VENIPUNCTURE: CPT

## 2020-09-04 PROCEDURE — 160029 HCHG SURGERY MINUTES - 1ST 30 MINS LEVEL 4: Performed by: SURGERY

## 2020-09-04 PROCEDURE — A9270 NON-COVERED ITEM OR SERVICE: HCPCS | Performed by: INTERNAL MEDICINE

## 2020-09-04 PROCEDURE — 501837 HCHG SUTURE CV: Performed by: SURGERY

## 2020-09-04 PROCEDURE — C1768 GRAFT, VASCULAR: HCPCS | Performed by: SURGERY

## 2020-09-04 PROCEDURE — 160048 HCHG OR STATISTICAL LEVEL 1-5: Performed by: SURGERY

## 2020-09-04 PROCEDURE — C1887 CATHETER, GUIDING: HCPCS | Performed by: SURGERY

## 2020-09-04 PROCEDURE — 3C1ZX8Z IRRIGATION OF INDWELLING DEVICE USING IRRIGATING SUBSTANCE, EXTERNAL APPROACH: ICD-10-PCS | Performed by: SURGERY

## 2020-09-04 PROCEDURE — C1757 CATH, THROMBECTOMY/EMBOLECT: HCPCS | Performed by: SURGERY

## 2020-09-04 DEVICE — PATCH .8X8CM XENOSURE BIOLOGIC VASCULAR---ORDER IN MULTIPLES OF 5---: Type: IMPLANTABLE DEVICE | Status: FUNCTIONAL

## 2020-09-04 DEVICE — GRAFT GORE PROPATEN 6MMX80CM: Type: IMPLANTABLE DEVICE | Status: FUNCTIONAL

## 2020-09-04 DEVICE — IMPLANTABLE DEVICE: Type: IMPLANTABLE DEVICE | Status: FUNCTIONAL

## 2020-09-04 RX ORDER — IPRATROPIUM BROMIDE AND ALBUTEROL SULFATE 2.5; .5 MG/3ML; MG/3ML
SOLUTION RESPIRATORY (INHALATION)
Status: COMPLETED
Start: 2020-09-04 | End: 2020-09-04

## 2020-09-04 RX ORDER — CEFAZOLIN SODIUM 1 G/3ML
INJECTION, POWDER, FOR SOLUTION INTRAMUSCULAR; INTRAVENOUS PRN
Status: DISCONTINUED | OUTPATIENT
Start: 2020-09-04 | End: 2020-09-04 | Stop reason: SURG

## 2020-09-04 RX ORDER — SCOLOPAMINE TRANSDERMAL SYSTEM 1 MG/1
1 PATCH, EXTENDED RELEASE TRANSDERMAL
Status: DISCONTINUED | OUTPATIENT
Start: 2020-09-04 | End: 2020-09-08

## 2020-09-04 RX ORDER — ALBUTEROL SULFATE 90 UG/1
2 AEROSOL, METERED RESPIRATORY (INHALATION)
Status: DISCONTINUED | OUTPATIENT
Start: 2020-09-04 | End: 2020-09-10 | Stop reason: HOSPADM

## 2020-09-04 RX ORDER — DEXAMETHASONE SODIUM PHOSPHATE 4 MG/ML
INJECTION, SOLUTION INTRA-ARTICULAR; INTRALESIONAL; INTRAMUSCULAR; INTRAVENOUS; SOFT TISSUE PRN
Status: DISCONTINUED | OUTPATIENT
Start: 2020-09-04 | End: 2020-09-04 | Stop reason: SURG

## 2020-09-04 RX ORDER — SODIUM CHLORIDE, SODIUM LACTATE, POTASSIUM CHLORIDE, CALCIUM CHLORIDE 600; 310; 30; 20 MG/100ML; MG/100ML; MG/100ML; MG/100ML
INJECTION, SOLUTION INTRAVENOUS CONTINUOUS
Status: DISCONTINUED | OUTPATIENT
Start: 2020-09-04 | End: 2020-09-04 | Stop reason: HOSPADM

## 2020-09-04 RX ORDER — DEXMEDETOMIDINE HYDROCHLORIDE 100 UG/ML
INJECTION, SOLUTION INTRAVENOUS PRN
Status: DISCONTINUED | OUTPATIENT
Start: 2020-09-04 | End: 2020-09-04 | Stop reason: SURG

## 2020-09-04 RX ORDER — ONDANSETRON 2 MG/ML
4 INJECTION INTRAMUSCULAR; INTRAVENOUS EVERY 4 HOURS PRN
Status: DISCONTINUED | OUTPATIENT
Start: 2020-09-04 | End: 2020-09-10 | Stop reason: HOSPADM

## 2020-09-04 RX ORDER — CLONIDINE HYDROCHLORIDE 0.1 MG/1
0.1 TABLET ORAL
Status: DISCONTINUED | OUTPATIENT
Start: 2020-09-04 | End: 2020-09-10 | Stop reason: HOSPADM

## 2020-09-04 RX ORDER — LORAZEPAM 2 MG/1
4 TABLET ORAL
Status: DISCONTINUED | OUTPATIENT
Start: 2020-09-04 | End: 2020-09-10 | Stop reason: HOSPADM

## 2020-09-04 RX ORDER — ENALAPRILAT 1.25 MG/ML
2.5 INJECTION INTRAVENOUS EVERY 6 HOURS PRN
Status: DISCONTINUED | OUTPATIENT
Start: 2020-09-04 | End: 2020-09-10 | Stop reason: HOSPADM

## 2020-09-04 RX ORDER — LORAZEPAM 2 MG/ML
1.5 INJECTION INTRAMUSCULAR
Status: DISCONTINUED | OUTPATIENT
Start: 2020-09-04 | End: 2020-09-10 | Stop reason: HOSPADM

## 2020-09-04 RX ORDER — LORAZEPAM 2 MG/ML
1 INJECTION INTRAMUSCULAR
Status: DISCONTINUED | OUTPATIENT
Start: 2020-09-04 | End: 2020-09-10 | Stop reason: HOSPADM

## 2020-09-04 RX ORDER — ONDANSETRON 2 MG/ML
4 INJECTION INTRAMUSCULAR; INTRAVENOUS
Status: DISCONTINUED | OUTPATIENT
Start: 2020-09-04 | End: 2020-09-04 | Stop reason: HOSPADM

## 2020-09-04 RX ORDER — BUPIVACAINE HYDROCHLORIDE 5 MG/ML
INJECTION, SOLUTION EPIDURAL; INTRACAUDAL
Status: DISCONTINUED | OUTPATIENT
Start: 2020-09-04 | End: 2020-09-04 | Stop reason: HOSPADM

## 2020-09-04 RX ORDER — MIDAZOLAM HYDROCHLORIDE 1 MG/ML
INJECTION INTRAMUSCULAR; INTRAVENOUS PRN
Status: DISCONTINUED | OUTPATIENT
Start: 2020-09-04 | End: 2020-09-04 | Stop reason: SURG

## 2020-09-04 RX ORDER — HYDROMORPHONE HYDROCHLORIDE 1 MG/ML
0.2 INJECTION, SOLUTION INTRAMUSCULAR; INTRAVENOUS; SUBCUTANEOUS
Status: DISCONTINUED | OUTPATIENT
Start: 2020-09-04 | End: 2020-09-04 | Stop reason: HOSPADM

## 2020-09-04 RX ORDER — LORAZEPAM 1 MG/1
1 TABLET ORAL EVERY 4 HOURS PRN
Status: DISCONTINUED | OUTPATIENT
Start: 2020-09-04 | End: 2020-09-10 | Stop reason: HOSPADM

## 2020-09-04 RX ORDER — SODIUM CHLORIDE, SODIUM LACTATE, POTASSIUM CHLORIDE, CALCIUM CHLORIDE 600; 310; 30; 20 MG/100ML; MG/100ML; MG/100ML; MG/100ML
INJECTION, SOLUTION INTRAVENOUS CONTINUOUS
Status: DISCONTINUED | OUTPATIENT
Start: 2020-09-04 | End: 2020-09-09 | Stop reason: ALTCHOICE

## 2020-09-04 RX ORDER — OMEPRAZOLE 20 MG/1
20 CAPSULE, DELAYED RELEASE ORAL DAILY
Status: DISCONTINUED | OUTPATIENT
Start: 2020-09-04 | End: 2020-09-10 | Stop reason: HOSPADM

## 2020-09-04 RX ORDER — BUDESONIDE AND FORMOTEROL FUMARATE DIHYDRATE 160; 4.5 UG/1; UG/1
2 AEROSOL RESPIRATORY (INHALATION)
Status: DISCONTINUED | OUTPATIENT
Start: 2020-09-04 | End: 2020-09-10 | Stop reason: HOSPADM

## 2020-09-04 RX ORDER — BISACODYL 10 MG
10 SUPPOSITORY, RECTAL RECTAL
Status: DISCONTINUED | OUTPATIENT
Start: 2020-09-04 | End: 2020-09-10 | Stop reason: HOSPADM

## 2020-09-04 RX ORDER — LORAZEPAM 1 MG/1
0.5 TABLET ORAL EVERY 4 HOURS PRN
Status: DISCONTINUED | OUTPATIENT
Start: 2020-09-04 | End: 2020-09-10 | Stop reason: HOSPADM

## 2020-09-04 RX ORDER — HYDROMORPHONE HYDROCHLORIDE 1 MG/ML
0.5 INJECTION, SOLUTION INTRAMUSCULAR; INTRAVENOUS; SUBCUTANEOUS
Status: DISCONTINUED | OUTPATIENT
Start: 2020-09-04 | End: 2020-09-08

## 2020-09-04 RX ORDER — HEPARIN SODIUM,PORCINE 1000/ML
VIAL (ML) INJECTION PRN
Status: DISCONTINUED | OUTPATIENT
Start: 2020-09-04 | End: 2020-09-04 | Stop reason: SURG

## 2020-09-04 RX ORDER — DIPHENHYDRAMINE HYDROCHLORIDE 50 MG/ML
25 INJECTION INTRAMUSCULAR; INTRAVENOUS EVERY 6 HOURS PRN
Status: DISCONTINUED | OUTPATIENT
Start: 2020-09-04 | End: 2020-09-08

## 2020-09-04 RX ORDER — ACETAMINOPHEN 325 MG/1
650 TABLET ORAL EVERY 6 HOURS PRN
Status: DISCONTINUED | OUTPATIENT
Start: 2020-09-04 | End: 2020-09-10 | Stop reason: HOSPADM

## 2020-09-04 RX ORDER — LIDOCAINE HYDROCHLORIDE 20 MG/ML
INJECTION, SOLUTION EPIDURAL; INFILTRATION; INTRACAUDAL; PERINEURAL PRN
Status: DISCONTINUED | OUTPATIENT
Start: 2020-09-04 | End: 2020-09-04 | Stop reason: SURG

## 2020-09-04 RX ORDER — MIDAZOLAM HYDROCHLORIDE 1 MG/ML
1 INJECTION INTRAMUSCULAR; INTRAVENOUS
Status: DISCONTINUED | OUTPATIENT
Start: 2020-09-04 | End: 2020-09-04 | Stop reason: HOSPADM

## 2020-09-04 RX ORDER — LORAZEPAM 2 MG/ML
2 INJECTION INTRAMUSCULAR
Status: DISCONTINUED | OUTPATIENT
Start: 2020-09-04 | End: 2020-09-10 | Stop reason: HOSPADM

## 2020-09-04 RX ORDER — OXYCODONE HCL 5 MG/5 ML
5 SOLUTION, ORAL ORAL
Status: DISCONTINUED | OUTPATIENT
Start: 2020-09-04 | End: 2020-09-04 | Stop reason: HOSPADM

## 2020-09-04 RX ORDER — AMLODIPINE BESYLATE 5 MG/1
5 TABLET ORAL DAILY
Status: DISCONTINUED | OUTPATIENT
Start: 2020-09-04 | End: 2020-09-10 | Stop reason: HOSPADM

## 2020-09-04 RX ORDER — SODIUM CHLORIDE 9 MG/ML
INJECTION, SOLUTION INTRAVENOUS EVERY 6 HOURS
Status: ACTIVE | OUTPATIENT
Start: 2020-09-04 | End: 2020-09-04

## 2020-09-04 RX ORDER — LORAZEPAM 2 MG/1
2 TABLET ORAL
Status: DISCONTINUED | OUTPATIENT
Start: 2020-09-04 | End: 2020-09-10 | Stop reason: HOSPADM

## 2020-09-04 RX ORDER — HALOPERIDOL 5 MG/ML
1 INJECTION INTRAMUSCULAR EVERY 6 HOURS PRN
Status: DISCONTINUED | OUTPATIENT
Start: 2020-09-04 | End: 2020-09-08

## 2020-09-04 RX ORDER — AMOXICILLIN 250 MG
2 CAPSULE ORAL 2 TIMES DAILY
Status: DISCONTINUED | OUTPATIENT
Start: 2020-09-04 | End: 2020-09-10 | Stop reason: HOSPADM

## 2020-09-04 RX ORDER — ONDANSETRON 2 MG/ML
INJECTION INTRAMUSCULAR; INTRAVENOUS PRN
Status: DISCONTINUED | OUTPATIENT
Start: 2020-09-04 | End: 2020-09-04 | Stop reason: SURG

## 2020-09-04 RX ORDER — OXYCODONE HCL 5 MG/5 ML
10 SOLUTION, ORAL ORAL
Status: DISCONTINUED | OUTPATIENT
Start: 2020-09-04 | End: 2020-09-04 | Stop reason: HOSPADM

## 2020-09-04 RX ORDER — POLYETHYLENE GLYCOL 3350 17 G/17G
1 POWDER, FOR SOLUTION ORAL
Status: DISCONTINUED | OUTPATIENT
Start: 2020-09-04 | End: 2020-09-10 | Stop reason: HOSPADM

## 2020-09-04 RX ORDER — IODIXANOL 270 MG/ML
INJECTION, SOLUTION INTRAVASCULAR
Status: DISCONTINUED | OUTPATIENT
Start: 2020-09-04 | End: 2020-09-04 | Stop reason: HOSPADM

## 2020-09-04 RX ORDER — HEPARIN SODIUM,PORCINE 1000/ML
VIAL (ML) INJECTION
Status: DISCONTINUED | OUTPATIENT
Start: 2020-09-04 | End: 2020-09-04 | Stop reason: HOSPADM

## 2020-09-04 RX ORDER — IPRATROPIUM BROMIDE AND ALBUTEROL SULFATE 2.5; .5 MG/3ML; MG/3ML
3 SOLUTION RESPIRATORY (INHALATION)
Status: DISCONTINUED | OUTPATIENT
Start: 2020-09-04 | End: 2020-09-04 | Stop reason: HOSPADM

## 2020-09-04 RX ORDER — PROTAMINE SULFATE 10 MG/ML
INJECTION, SOLUTION INTRAVENOUS PRN
Status: DISCONTINUED | OUTPATIENT
Start: 2020-09-04 | End: 2020-09-04 | Stop reason: SURG

## 2020-09-04 RX ORDER — HYDROMORPHONE HYDROCHLORIDE 1 MG/ML
0.4 INJECTION, SOLUTION INTRAMUSCULAR; INTRAVENOUS; SUBCUTANEOUS
Status: DISCONTINUED | OUTPATIENT
Start: 2020-09-04 | End: 2020-09-04 | Stop reason: HOSPADM

## 2020-09-04 RX ORDER — OXYCODONE HYDROCHLORIDE 5 MG/1
5 TABLET ORAL
Status: DISCONTINUED | OUTPATIENT
Start: 2020-09-04 | End: 2020-09-10 | Stop reason: HOSPADM

## 2020-09-04 RX ORDER — ROCURONIUM BROMIDE 10 MG/ML
INJECTION, SOLUTION INTRAVENOUS PRN
Status: DISCONTINUED | OUTPATIENT
Start: 2020-09-04 | End: 2020-09-04 | Stop reason: SURG

## 2020-09-04 RX ORDER — SODIUM CHLORIDE, SODIUM GLUCONATE, SODIUM ACETATE, POTASSIUM CHLORIDE AND MAGNESIUM CHLORIDE 526; 502; 368; 37; 30 MG/100ML; MG/100ML; MG/100ML; MG/100ML; MG/100ML
INJECTION, SOLUTION INTRAVENOUS
Status: DISCONTINUED | OUTPATIENT
Start: 2020-09-04 | End: 2020-09-04 | Stop reason: SURG

## 2020-09-04 RX ORDER — ATORVASTATIN CALCIUM 40 MG/1
40 TABLET, FILM COATED ORAL DAILY
Status: DISCONTINUED | OUTPATIENT
Start: 2020-09-04 | End: 2020-09-10 | Stop reason: HOSPADM

## 2020-09-04 RX ORDER — CLONIDINE HYDROCHLORIDE 0.1 MG/1
0.2 TABLET ORAL
Status: DISCONTINUED | OUTPATIENT
Start: 2020-09-04 | End: 2020-09-10 | Stop reason: HOSPADM

## 2020-09-04 RX ORDER — LORAZEPAM 2 MG/ML
0.5 INJECTION INTRAMUSCULAR EVERY 4 HOURS PRN
Status: DISCONTINUED | OUTPATIENT
Start: 2020-09-04 | End: 2020-09-10 | Stop reason: HOSPADM

## 2020-09-04 RX ORDER — DEXAMETHASONE SODIUM PHOSPHATE 4 MG/ML
4 INJECTION, SOLUTION INTRA-ARTICULAR; INTRALESIONAL; INTRAMUSCULAR; INTRAVENOUS; SOFT TISSUE
Status: DISCONTINUED | OUTPATIENT
Start: 2020-09-04 | End: 2020-09-08

## 2020-09-04 RX ORDER — LABETALOL HYDROCHLORIDE 5 MG/ML
INJECTION, SOLUTION INTRAVENOUS PRN
Status: DISCONTINUED | OUTPATIENT
Start: 2020-09-04 | End: 2020-09-04 | Stop reason: SURG

## 2020-09-04 RX ORDER — HALOPERIDOL 5 MG/ML
1 INJECTION INTRAMUSCULAR
Status: DISCONTINUED | OUTPATIENT
Start: 2020-09-04 | End: 2020-09-04 | Stop reason: HOSPADM

## 2020-09-04 RX ORDER — HYDROMORPHONE HYDROCHLORIDE 1 MG/ML
0.5 INJECTION, SOLUTION INTRAMUSCULAR; INTRAVENOUS; SUBCUTANEOUS
Status: DISCONTINUED | OUTPATIENT
Start: 2020-09-04 | End: 2020-09-04 | Stop reason: HOSPADM

## 2020-09-04 RX ADMIN — ONDANSETRON 4 MG: 2 INJECTION INTRAMUSCULAR; INTRAVENOUS at 15:40

## 2020-09-04 RX ADMIN — HYDROMORPHONE HYDROCHLORIDE 0.5 MG: 1 INJECTION, SOLUTION INTRAMUSCULAR; INTRAVENOUS; SUBCUTANEOUS at 02:17

## 2020-09-04 RX ADMIN — BUDESONIDE AND FORMOTEROL FUMARATE DIHYDRATE 2 PUFF: 160; 4.5 AEROSOL RESPIRATORY (INHALATION) at 07:49

## 2020-09-04 RX ADMIN — LABETALOL HYDROCHLORIDE 10 MG: 5 INJECTION, SOLUTION INTRAVENOUS at 15:52

## 2020-09-04 RX ADMIN — HYDROMORPHONE HYDROCHLORIDE 0.5 MG: 1 INJECTION, SOLUTION INTRAMUSCULAR; INTRAVENOUS; SUBCUTANEOUS at 07:35

## 2020-09-04 RX ADMIN — DEXMEDETOMIDINE HYDROCHLORIDE 10 MCG: 100 INJECTION, SOLUTION INTRAVENOUS at 14:44

## 2020-09-04 RX ADMIN — SODIUM CHLORIDE, POTASSIUM CHLORIDE, SODIUM LACTATE AND CALCIUM CHLORIDE: 600; 310; 30; 20 INJECTION, SOLUTION INTRAVENOUS at 12:48

## 2020-09-04 RX ADMIN — SODIUM CHLORIDE, SODIUM GLUCONATE, SODIUM ACETATE, POTASSIUM CHLORIDE AND MAGNESIUM CHLORIDE: 526; 502; 368; 37; 30 INJECTION, SOLUTION INTRAVENOUS at 15:35

## 2020-09-04 RX ADMIN — SODIUM CHLORIDE, POTASSIUM CHLORIDE, SODIUM LACTATE AND CALCIUM CHLORIDE: 600; 310; 30; 20 INJECTION, SOLUTION INTRAVENOUS at 02:42

## 2020-09-04 RX ADMIN — SODIUM CHLORIDE, POTASSIUM CHLORIDE, SODIUM LACTATE AND CALCIUM CHLORIDE: 600; 310; 30; 20 INJECTION, SOLUTION INTRAVENOUS at 20:49

## 2020-09-04 RX ADMIN — HEPARIN SODIUM 2000 UNITS: 1000 INJECTION, SOLUTION INTRAVENOUS; SUBCUTANEOUS at 15:13

## 2020-09-04 RX ADMIN — THIAMINE HYDROCHLORIDE 100 MG: 100 INJECTION, SOLUTION INTRAMUSCULAR; INTRAVENOUS at 04:53

## 2020-09-04 RX ADMIN — LIDOCAINE HYDROCHLORIDE 60 MG: 20 INJECTION, SOLUTION EPIDURAL; INFILTRATION; INTRACAUDAL at 12:55

## 2020-09-04 RX ADMIN — PROPOFOL 100 MG: 10 INJECTION, EMULSION INTRAVENOUS at 12:55

## 2020-09-04 RX ADMIN — FENTANYL CITRATE 50 MCG: 50 INJECTION INTRAMUSCULAR; INTRAVENOUS at 14:45

## 2020-09-04 RX ADMIN — IPRATROPIUM BROMIDE AND ALBUTEROL SULFATE 3 ML: .5; 3 SOLUTION RESPIRATORY (INHALATION) at 16:30

## 2020-09-04 RX ADMIN — CEFAZOLIN 2 G: 330 INJECTION, POWDER, FOR SOLUTION INTRAMUSCULAR; INTRAVENOUS at 12:55

## 2020-09-04 RX ADMIN — SODIUM CHLORIDE, POTASSIUM CHLORIDE, SODIUM LACTATE AND CALCIUM CHLORIDE: 600; 310; 30; 20 INJECTION, SOLUTION INTRAVENOUS at 18:01

## 2020-09-04 RX ADMIN — ROCURONIUM BROMIDE 70 MG: 10 INJECTION, SOLUTION INTRAVENOUS at 12:55

## 2020-09-04 RX ADMIN — IPRATROPIUM BROMIDE AND ALBUTEROL SULFATE 3 ML: 2.5; .5 SOLUTION RESPIRATORY (INHALATION) at 16:30

## 2020-09-04 RX ADMIN — GLYCOPYRROLATE 1 CAPSULE: 15.6 CAPSULE RESPIRATORY (INHALATION) at 20:49

## 2020-09-04 RX ADMIN — HYDROMORPHONE HYDROCHLORIDE 0.5 MG: 1 INJECTION, SOLUTION INTRAMUSCULAR; INTRAVENOUS; SUBCUTANEOUS at 10:48

## 2020-09-04 RX ADMIN — FENTANYL CITRATE 100 MCG: 50 INJECTION INTRAMUSCULAR; INTRAVENOUS at 12:55

## 2020-09-04 RX ADMIN — HEPARIN SODIUM 2000 UNITS: 1000 INJECTION, SOLUTION INTRAVENOUS; SUBCUTANEOUS at 13:33

## 2020-09-04 RX ADMIN — PROTAMINE SULFATE 50 MG: 10 INJECTION, SOLUTION INTRAVENOUS at 15:34

## 2020-09-04 RX ADMIN — DEXMEDETOMIDINE HYDROCHLORIDE 30 MCG: 100 INJECTION, SOLUTION INTRAVENOUS at 12:55

## 2020-09-04 RX ADMIN — SUGAMMADEX 200 MG: 100 INJECTION, SOLUTION INTRAVENOUS at 15:40

## 2020-09-04 RX ADMIN — FOLIC ACID 1 MG: 5 INJECTION, SOLUTION INTRAMUSCULAR; INTRAVENOUS; SUBCUTANEOUS at 02:09

## 2020-09-04 RX ADMIN — SODIUM CHLORIDE, SODIUM GLUCONATE, SODIUM ACETATE, POTASSIUM CHLORIDE AND MAGNESIUM CHLORIDE: 526; 502; 368; 37; 30 INJECTION, SOLUTION INTRAVENOUS at 14:20

## 2020-09-04 RX ADMIN — MIDAZOLAM HYDROCHLORIDE 2 MG: 1 INJECTION, SOLUTION INTRAMUSCULAR; INTRAVENOUS at 12:55

## 2020-09-04 RX ADMIN — GLYCOPYRROLATE 1 CAPSULE: 15.6 CAPSULE RESPIRATORY (INHALATION) at 07:49

## 2020-09-04 RX ADMIN — HEPARIN SODIUM 2000 UNITS: 1000 INJECTION, SOLUTION INTRAVENOUS; SUBCUTANEOUS at 14:21

## 2020-09-04 RX ADMIN — PHENYLEPHRINE HYDROCHLORIDE 75 MCG/MIN: 10 INJECTION INTRAVENOUS at 12:59

## 2020-09-04 RX ADMIN — ROCURONIUM BROMIDE 30 MG: 10 INJECTION, SOLUTION INTRAVENOUS at 14:22

## 2020-09-04 RX ADMIN — BUDESONIDE AND FORMOTEROL FUMARATE DIHYDRATE 2 PUFF: 160; 4.5 AEROSOL RESPIRATORY (INHALATION) at 20:49

## 2020-09-04 RX ADMIN — FENTANYL CITRATE 50 MCG: 50 INJECTION INTRAMUSCULAR; INTRAVENOUS at 15:14

## 2020-09-04 RX ADMIN — DEXAMETHASONE SODIUM PHOSPHATE 4 MG: 4 INJECTION, SOLUTION INTRA-ARTICULAR; INTRALESIONAL; INTRAMUSCULAR; INTRAVENOUS; SOFT TISSUE at 13:32

## 2020-09-04 RX ADMIN — HYDROMORPHONE HYDROCHLORIDE 0.5 MG: 1 INJECTION, SOLUTION INTRAMUSCULAR; INTRAVENOUS; SUBCUTANEOUS at 20:49

## 2020-09-04 SDOH — HEALTH STABILITY: MENTAL HEALTH: HOW MANY STANDARD DRINKS CONTAINING ALCOHOL DO YOU HAVE ON A TYPICAL DAY?: 5 OR 6

## 2020-09-04 SDOH — HEALTH STABILITY: MENTAL HEALTH: HOW OFTEN DO YOU HAVE A DRINK CONTAINING ALCOHOL?: 4 OR MORE TIMES A WEEK

## 2020-09-04 ASSESSMENT — LIFESTYLE VARIABLES
TOTAL SCORE: 3
PAROXYSMAL SWEATS: NO SWEAT VISIBLE
HOW MANY TIMES IN THE PAST YEAR HAVE YOU HAD 5 OR MORE DRINKS IN A DAY: 5
NAUSEA AND VOMITING: NO NAUSEA AND NO VOMITING
VISUAL DISTURBANCES: NOT PRESENT
PAROXYSMAL SWEATS: NO SWEAT VISIBLE
CONSUMPTION TOTAL: POSITIVE
TREMOR: NO TREMOR
HAVE YOU EVER FELT YOU SHOULD CUT DOWN ON YOUR DRINKING: YES
ORIENTATION AND CLOUDING OF SENSORIUM: ORIENTED AND CAN DO SERIAL ADDITIONS
VISUAL DISTURBANCES: NOT PRESENT
ORIENTATION AND CLOUDING OF SENSORIUM: ORIENTED AND CAN DO SERIAL ADDITIONS
AUDITORY DISTURBANCES: NOT PRESENT
HAVE PEOPLE ANNOYED YOU BY CRITICIZING YOUR DRINKING: YES
DOES PATIENT WANT TO STOP DRINKING: NO
AGITATION: NORMAL ACTIVITY
NAUSEA AND VOMITING: NO NAUSEA AND NO VOMITING
TOTAL SCORE: 0
TOTAL SCORE: 0
ORIENTATION AND CLOUDING OF SENSORIUM: ORIENTED AND CAN DO SERIAL ADDITIONS
AGITATION: NORMAL ACTIVITY
HEADACHE, FULLNESS IN HEAD: NOT PRESENT
HEADACHE, FULLNESS IN HEAD: MILD
ALCOHOL_USE: YES
ANXIETY: NO ANXIETY (AT EASE)
NAUSEA AND VOMITING: NO NAUSEA AND NO VOMITING
PAROXYSMAL SWEATS: NO SWEAT VISIBLE
AGITATION: NORMAL ACTIVITY
TOTAL SCORE: 4
AGITATION: NORMAL ACTIVITY
ON A TYPICAL DAY WHEN YOU DRINK ALCOHOL HOW MANY DRINKS DO YOU HAVE: 5
AUDITORY DISTURBANCES: NOT PRESENT
TOTAL SCORE: 4
NAUSEA AND VOMITING: NO NAUSEA AND NO VOMITING
AUDITORY DISTURBANCES: NOT PRESENT
ORIENTATION AND CLOUDING OF SENSORIUM: ORIENTED AND CAN DO SERIAL ADDITIONS
TOTAL SCORE: 0
SUBSTANCE_ABUSE: 1
TREMOR: NO TREMOR
ANXIETY: NO ANXIETY (AT EASE)
ANXIETY: MILDLY ANXIOUS
ANXIETY: NO ANXIETY (AT EASE)
HEADACHE, FULLNESS IN HEAD: NOT PRESENT
ORIENTATION AND CLOUDING OF SENSORIUM: ORIENTED AND CAN DO SERIAL ADDITIONS
AUDITORY DISTURBANCES: NOT PRESENT
ANXIETY: MILDLY ANXIOUS
AGITATION: NORMAL ACTIVITY
EVER FELT BAD OR GUILTY ABOUT YOUR DRINKING: YES
VISUAL DISTURBANCES: NOT PRESENT
TREMOR: NO TREMOR
TOTAL SCORE: 4
PAROXYSMAL SWEATS: NO SWEAT VISIBLE
TREMOR: NO TREMOR
TOTAL SCORE: 1
TREMOR: NO TREMOR
AVERAGE NUMBER OF DAYS PER WEEK YOU HAVE A DRINK CONTAINING ALCOHOL: 7
VISUAL DISTURBANCES: NOT PRESENT
VISUAL DISTURBANCES: NOT PRESENT
EVER HAD A DRINK FIRST THING IN THE MORNING TO STEADY YOUR NERVES TO GET RID OF A HANGOVER: YES
AUDITORY DISTURBANCES: NOT PRESENT
PAROXYSMAL SWEATS: NO SWEAT VISIBLE
NAUSEA AND VOMITING: NO NAUSEA AND NO VOMITING

## 2020-09-04 ASSESSMENT — COGNITIVE AND FUNCTIONAL STATUS - GENERAL
SUGGESTED CMS G CODE MODIFIER DAILY ACTIVITY: CH
MOVING FROM LYING ON BACK TO SITTING ON SIDE OF FLAT BED: A LOT
WALKING IN HOSPITAL ROOM: A LOT
STANDING UP FROM CHAIR USING ARMS: A LITTLE
MOBILITY SCORE: 16
MOVING TO AND FROM BED TO CHAIR: A LITTLE
SUGGESTED CMS G CODE MODIFIER MOBILITY: CK
CLIMB 3 TO 5 STEPS WITH RAILING: A LOT
DAILY ACTIVITIY SCORE: 24

## 2020-09-04 ASSESSMENT — PATIENT HEALTH QUESTIONNAIRE - PHQ9
2. FEELING DOWN, DEPRESSED, IRRITABLE, OR HOPELESS: NOT AT ALL
1. LITTLE INTEREST OR PLEASURE IN DOING THINGS: NOT AT ALL
SUM OF ALL RESPONSES TO PHQ9 QUESTIONS 1 AND 2: 0

## 2020-09-04 ASSESSMENT — ENCOUNTER SYMPTOMS
CARDIOVASCULAR NEGATIVE: 1
EYES NEGATIVE: 1
RESPIRATORY NEGATIVE: 1
CONSTITUTIONAL NEGATIVE: 1
GASTROINTESTINAL NEGATIVE: 1
NEUROLOGICAL NEGATIVE: 1

## 2020-09-04 ASSESSMENT — FIBROSIS 4 INDEX
FIB4 SCORE: 4.34
FIB4 SCORE: 4.34

## 2020-09-04 ASSESSMENT — PAIN DESCRIPTION - PAIN TYPE
TYPE: ACUTE PAIN

## 2020-09-04 ASSESSMENT — PAIN SCALES - GENERAL: PAIN_LEVEL: 0

## 2020-09-04 NOTE — ED NOTES
BG re-check was 69. Pt has juice and sandwich at bedside. Encouraged to eat/drink. Creatinine lab sent.   Pt still c/o leg pain- no relief with IV Morphine doses.

## 2020-09-04 NOTE — CONSULTS
DATE OF CONSULTATION: 9/3/2020     REFERRING PHYSICIAN: Hospitalist, MD.     CONSULTING PHYSICIAN: Jean-Claude Prado M.D.      REASON FOR CONSULTATION: Evaluate patient with painful right lower extremity      HISTORY OF PRESENT ILLNESS: The patient is a 75-year-old gentleman who was brought to the emergency room by EMS.  The patient was found down on the sidewalk earlier today.  He was noted to be severely hypoglycemic upon arrival.  Unknown how long the patient had been lying on the sidewalk.  Patient reports that he has pain in his right leg.  Patient is a fairly poor historian but does report that he has had chronic pain in that right leg with ambulation.  This is been going on for quite a while.  He reports that earlier today the pain worsened.  The patient does have motor function within both lower extremities and sensation is intact.  No tissue loss.  Work-up was initiated which included a noninvasive arterial study which demonstrated significant inflow disease bilaterally but more significant on the right.  There was the suggestion of a distal aortic occlusion.  A follow-up CTA was performed which demonstrates dense calcific disease throughout the distal aorta and iliac system.  The right iliac artery is occluded with reconstitution of the right common femoral artery from the profunda femoris artery.  On the left side there is a questionable high-grade stenosis versus occlusion of the common femoral artery with reconstitution of the external iliac artery.    PAST MEDICAL HISTORY:  has a past medical history of Hepatitis and Pulmonary emphysema (HCC).     PAST SURGICAL HISTORY: patient denies any surgical history     ALLERGIES: No Known Allergies     CURRENT MEDICATIONS:   Home Medications     Reviewed by Elise Clark (Pharmacy Tech) on 09/03/20 at 2233  Med List Status: Unable to Obtain   Medication Last Dose Status   albuterol (VENTOLIN HFA) 108 (90 Base) MCG/ACT Aero Soln inhalation aerosol  Active    amLODIPine (NORVASC) 5 MG Tab  Active   aspirin (ASPIRIN LOW DOSE) 81 MG EC tablet  Active   atorvastatin (LIPITOR) 40 MG Tab  Active   ergocalciferol (DRISDOL) 19164 UNIT capsule  Active   fluocinolone (SYNALAR) 0.025 % cream  Active   Fluticasone Furoate-Vilanterol (BREO ELLIPTA) 100-25 MCG/INH AEROSOL POWDER, BREATH ACTIVATED  Active   hydrOXYzine HCl (ATARAX) 25 MG Tab  Active   loratadine (CLARITIN) 10 MG Tab  Active   omeprazole (PRILOSEC) 20 MG delayed-release capsule  Active   tiotropium (SPIRIVA HANDIHALER) 18 MCG Cap  Active                FAMILY HISTORY:   Family History   Problem Relation Age of Onset   • Heart Disease Mother    • Cancer Mother    • Diabetes Brother         SOCIAL HISTORY:   Social History     Tobacco Use   • Smoking status: Current Some Day Smoker     Packs/day: 0.00     Types: Cigarettes   • Smokeless tobacco: Never Used   • Tobacco comment: pt slowly weaning down.   Substance and Sexual Activity   • Alcohol use: Yes     Comment: Occasional   • Drug use: No     Types: Inhaled   • Sexual activity: Not on file       Review of Systems:      Reports pain in the right lower extremity    PHYSICAL EXAMINATION:       Constitutional:   Elderly disheveled  HENMT:  Normocephalic, Atraumatic, Oropharynx moist mucous membranes, No oral exudates, Nose normal.  No thyromegaly.  Eyes:  EOMI, Conjunctiva normal, No discharge.  Neck:  Normal range of motion, No cervical tenderness,  no JVD.  Cardiovascular:  Normal heart rate, Normal rhythm, No murmurs, No rubs, No gallops.   Extremitites -absent femoral popliteal and pedal pulses bilaterally, motor function intact, sensation intact, no ulcerations present  Lungs:  Normal breath sounds, breath sounds clear to auscultation bilaterally,  no crackles, no wheezing.   Abdomen: Bowel sounds normal, Soft, No tenderness, No guarding, No rebound, No masses, No hepatosplenomegaly.  Skin: Warm, Dry, No erythema, No rash, no induration.  Neurologic: Alert &  oriented x 3, No focal deficits noted, cranial nerves II through X are grossly intact.  Psychiatric: Affect normal, Judgment normal, Mood normal.        LABORATORY VALUES:   Recent Labs     09/03/20  1723   WBC 7.6   RBC 3.95*   HEMOGLOBIN 12.8*   HEMATOCRIT 37.1*   MCV 93.9   MCH 32.4   MCHC 34.5   RDW 47.5   PLATELETCT 164   MPV 11.0     Recent Labs     09/03/20  1723   SODIUM 138   POTASSIUM 4.2   CHLORIDE 101   CO2 10*   GLUCOSE 108*   BUN 17   CREATININE 1.51*   CALCIUM 9.3     Recent Labs     09/03/20  1723   ASTSGOT 32   ALTSGPT 12   TBILIRUBIN 0.8   ALKPHOSPHAT 60   GLOBULIN 2.6            IMAGING:   US-DANIELE SINGLE LEVEL BILAT         US-EXTREMITY ARTERY LOWER BILAT   Final Result      CT-CTA AORTA-RO WITH & W/O-POST PROCESS    (Results Pending)       IMPRESSION AND PLAN:    1. Severe acute on chronic ischemia right lower extremity with resultant rest pain  2.  Severe peripheral arterial disease  3.  Severe hypertension  4.  History of alcoholism  5.  Hepatitis C  6.  COPD    Recommendation/plan-    Patient will be admitted by internal medicine  Medically optimize patient  N.p.o. after midnight  Heparin drip  Plan surgical revascularization tomorrow  We will attempt bilateral common femoral endarterectomies with retrograde thrombectomy and stenting.  Possible femoral to femoral bypass, possible aortobifemoral bypass.  Further surgical decisions will be made intraoperatively    ____________________________________   Jean-Claude Prado M.D.          DD: 9/3/2020   DT: 11:23 PM

## 2020-09-04 NOTE — CARE PLAN
Problem: Communication  Goal: The ability to communicate needs accurately and effectively will improve  Pt A&Ox4; notifies staff of needs appropriately.    Problem: Safety  Goal: Will remain free from falls  Pt high fall risk; bed alarm in use; pt calls prior to ambulating.     Problem: Mobility  Goal: Risk for activity intolerance will decrease  Outcome: PROGRESSING SLOWER THAN EXPECTED   Pt has BLE weakness; unsafe to stand/ambulate.

## 2020-09-04 NOTE — H&P
Hospital Medicine History & Physical Note    Date of Service  9/4/2020    Primary Care Physician  Cortes Dubose M.D.    Consultants  Vascular surgery    Code Status  Full Code    Chief Complaint  Chief Complaint   Patient presents with   • ALOC     pt bib ems found laying on the side walk denies trauma. has bgl of 46. was given 100ml's of D10, bgl went up to 167   • Leg Pain     bilateral leg pain       History of Presenting Illness  75 y.o. male who presented 9/3/2020 found by EMS on the sidewalk for unknown time and was found to be hypoglycemic. It is unknown how long patient was on the ground. Patient is a very poor historian and states that he brought himself to the ED for the right leg pain that has been going on for a long time. Patient is a poly substance abuser and drank 6 shots of hard liquor yesterday, cocaine 2 days prior, and is chronic smoker and marijuana user. In ED, due to the right lower extremity pain, workup of vascular disease was started where CTA showed calcific disease of distal aorta and iliac system. Right iliac artery found to be occluded. Heparin drip was started. Dr. Prado evaluated patient in the ED and will plan to take him to OR tomorrow.     Patient is not providing much history except that he was more morphine shots.     Review of Systems  Review of Systems   Constitutional: Negative.    HENT: Negative.    Eyes: Negative.    Respiratory: Negative.    Cardiovascular: Negative.    Gastrointestinal: Negative.    Genitourinary: Negative.    Musculoskeletal:        Right lower extremity pain   Skin: Negative.    Neurological: Negative.    Psychiatric/Behavioral: Positive for substance abuse.       Past Medical History   has a past medical history of Hepatitis, Hypertension, and Pulmonary emphysema (HCC).    Surgical History   has no past surgical history on file.     Family History  family history includes Cancer in his mother; Diabetes in his brother; Heart Disease in his mother.      Social History   reports that he has been smoking cigarettes. He has been smoking about 0.50 packs per day. He has never used smokeless tobacco. He reports current alcohol use. He reports current drug use. Drug: Inhaled.    Allergies  No Known Allergies    Medications  Prior to Admission Medications   Prescriptions Last Dose Informant Patient Reported? Taking?   Fluticasone Furoate-Vilanterol (BREO ELLIPTA) 100-25 MCG/INH AEROSOL POWDER, BREATH ACTIVATED   No No   Sig: Inhale 1 Puff by mouth every day.   albuterol (VENTOLIN HFA) 108 (90 Base) MCG/ACT Aero Soln inhalation aerosol   No No   Sig: INHALE TWO PUFFS BY MOUTH EVERY SIX HOURS AS NEEDED   amLODIPine (NORVASC) 5 MG Tab   No No   Sig: Take 1 Tab by mouth every day. For high blood pressure   aspirin (ASPIRIN LOW DOSE) 81 MG EC tablet   No No   Sig: TAKE ONE TABLET BY MOUTH ONE TIME DAILY   atorvastatin (LIPITOR) 40 MG Tab   No No   Sig: Take 1 Tab by mouth every day.   ergocalciferol (DRISDOL) 63199 UNIT capsule   No No   Sig: Take 1 Cap by mouth every 7 days.   fluocinolone (SYNALAR) 0.025 % cream   No No   Sig: Apply 1 Application to affected area(s) 3 times a day as needed.   hydrOXYzine HCl (ATARAX) 25 MG Tab   No No   Sig: Take 1 Tab by mouth 2 times a day as needed for Itching.   loratadine (CLARITIN) 10 MG Tab   No No   Sig: Take 1 Tab by mouth every day.   omeprazole (PRILOSEC) 20 MG delayed-release capsule   No No   Sig: TAKE ONE CAPSULE BY MOUTH ONE TIME DAILY   tiotropium (SPIRIVA HANDIHALER) 18 MCG Cap   No No   Sig: INHALE 1 CAPSULE VIA HANDIHALER ONCE DAILY      Facility-Administered Medications: None       Physical Exam  Temp:  [36.8 °C (98.2 °F)] 36.8 °C (98.2 °F)  Pulse:  [80-95] 88  Resp:  [18-24] 21  BP: (137-234)/() 196/88  SpO2:  [91 %-100 %] 91 %    Physical Exam  Vitals signs and nursing note reviewed.   Constitutional:       General: He is not in acute distress.     Appearance: He is normal weight. He is not ill-appearing.       Comments: disheveled   HENT:      Head: Normocephalic and atraumatic.      Mouth/Throat:      Mouth: Mucous membranes are moist.      Pharynx: Oropharynx is clear.   Eyes:      General: No scleral icterus.     Extraocular Movements: Extraocular movements intact.      Conjunctiva/sclera: Conjunctivae normal.      Pupils: Pupils are equal, round, and reactive to light.   Neck:      Musculoskeletal: Normal range of motion and neck supple.   Cardiovascular:      Rate and Rhythm: Regular rhythm. Tachycardia present.      Pulses: Normal pulses.      Heart sounds: Normal heart sounds. No murmur.   Pulmonary:      Effort: Pulmonary effort is normal. No respiratory distress.      Breath sounds: Normal breath sounds. No stridor. No wheezing or rhonchi.   Abdominal:      General: Abdomen is flat. Bowel sounds are normal. There is no distension.      Palpations: Abdomen is soft. There is no mass.      Tenderness: There is no abdominal tenderness.   Musculoskeletal:         General: No swelling or deformity.   Skin:     General: Skin is warm and dry.      Coloration: Skin is not jaundiced or pale.      Findings: No bruising or erythema.   Neurological:      Mental Status: He is alert and oriented to person, place, and time.   Psychiatric:      Comments: Drug seeking behavior         Laboratory:  Recent Labs     09/03/20  1723   WBC 7.6   RBC 3.95*   HEMOGLOBIN 12.8*   HEMATOCRIT 37.1*   MCV 93.9   MCH 32.4   MCHC 34.5   RDW 47.5   PLATELETCT 164   MPV 11.0     Recent Labs     09/03/20  1723   SODIUM 138   POTASSIUM 4.2   CHLORIDE 101   CO2 10*   GLUCOSE 108*   BUN 17   CREATININE 1.51*   CALCIUM 9.3     Recent Labs     09/03/20  1723   ALTSGPT 12   ASTSGOT 32   ALKPHOSPHAT 60   TBILIRUBIN 0.8   GLUCOSE 108*     Recent Labs     09/03/20  2250   APTT 22.9*   INR 1.01     No results for input(s): NTPROBNP in the last 72 hours.      No results for input(s): TROPONINT in the last 72 hours.    Imaging:  CT-CTA AORTA-RO WITH & W/O-POST  PROCESS   Final Result         CTA AORTA      1.  Occlusion of the distal aorta at the bifurcation extending in the right common and external iliac arteries. Reconstitution of the right common femoral artery is seen.   2.  Dense calcification of the origin of the left common iliac artery appears to result in proximal occlusion with reconstitution in the distal common iliac artery.   3.  Heterogeneous right thyroid lesion, recommend follow-up thyroid sonography for further characterization due to lesion size.   4.  Hepatomegaly      CTA LOWER EXTREMITIES      1.  Scattered atherosclerotic changes throughout the right lower extremity without 50% stenosis, the distal right lower cavity vessels are not visualized and there is 0 vessel runoff at the right ankle.   2.  70% stenosis at the origin of the left common femoral artery.   3.  Scattered atherosclerotic calcifications throughout the left lower extremity resulting in approximately 50% stenosis at the popliteal artery, there appears to be three-vessel runoff the left ankle.      3D angiographic/MIP images of the vasculature confirm the vascular findings as described above.      US-DANIELE SINGLE LEVEL BILAT         US-EXTREMITY ARTERY LOWER BILAT   Final Result            Assessment/Plan:  I anticipate this patient will require at least two midnights for appropriate medical management, necessitating inpatient admission.    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 Prado in ED and will plan for pre-op  -Heparin drip  -Pain control with opioids for now, however will need to reduce use post-op as there is component of drug seeking behavior   -Periop garcia score of 1.51%  -Will control BP overnight. There is no absolute contraindication to proposed surgery at this time.     High anion gap metabolic acidosis- (present on admission)  Assessment & Plan  -LR at 74cc/hr  -Likely due to ischemic limb  -Monitor BMP with  morning labs    Alcohol use- (present on admission)  Assessment & Plan  -Thiamine and folic acid to be supplemented  -CIWA protocol      Essential hypertension  Assessment & Plan  -May be pain driven and will control prior to surgery with opioids  -Continue Norvasc 5mg  -Will give hydralazine IV cautiously and monitor HR for tachy  -Patient used cocaine 2 days ago and will avoid beta blockers for control at this time    COPD (chronic obstructive pulmonary disease) (HCC)- (present on admission)  Assessment & Plan  -No acute respiratory issues  -Albuterol PRN  -BREO and seebri daily    History of hepatitis C- (present on admission)  Assessment & Plan  -Would recommend to DC patient with repeat US with or without AFP as patient has not had any active surveillance as of recent

## 2020-09-04 NOTE — PROGRESS NOTES
2 RN skin check complete with Maritza HANEY.  Devices in place oxygen tubing.  Skin assessed under devices YES.  Confirmed pressure ulcers found on NONE.  New potential pressure ulcers noted on NONE.  Wound consult placed N/A.  The following interventions in place pillows for support; bilat grey foam oxygen tube pads    Bilat ears intact. Bilat elbows dry/calloused/intact. Sacrum intact. Bilat heels dry/calloused/intact.

## 2020-09-04 NOTE — ED PROVIDER NOTES
ED Provider Note    This patient was cared for during the COVID-19 pandemic. History and physical exam may be limited/truncated by the inherent challenges of PPE and the need to decrease staff exposure to novel coronavirus. Some aspects of disease management may be different to protect staff and help slow the spread of disease. I verified that, if possible, the patient was wearing a mask and I was wearing appropriate PPE every time I encountered the patient.       CHIEF COMPLAINT  Chief Complaint   Patient presents with   • ALOC     pt bib ems found laying on the side walk denies trauma. has bgl of 46. was given 100ml's of D10, bgl went up to 167   • Leg Pain     bilateral leg pain       HPI  Fede Parikh Jr. is a 75 y.o. male who presents brought by EMS.  He apparently was found on the sidewalk he had a blood glucose of 46 he was given 100 mL's of D10 and his sugar went up to 167.  He is also complaining of leg pain.  Patient is a poor historian he says his right leg is worse than his left.  He says he was walking in the right leg gave out on him.  He really cannot tell me more about the pain just that it hurts.  He says that he has not eaten today.        REVIEW OF SYSTEMS  positive for leg pain, negative for fevers. All other systems are negative.     PAST MEDICAL HISTORY   has a past medical history of Hepatitis and Pulmonary emphysema (HCC).    SOCIAL HISTORY  Social History     Tobacco Use   • Smoking status: Current Some Day Smoker     Packs/day: 0.00     Types: Cigarettes   • Smokeless tobacco: Never Used   • Tobacco comment: pt slowly weaning down.   Substance and Sexual Activity   • Alcohol use: Yes     Comment: Occasional   • Drug use: No     Types: Inhaled   • Sexual activity: Not on file       SURGICAL HISTORY  patient denies any surgical history    CURRENT MEDICATIONS  Home Medications    **Home medications have not yet been reviewed for this encounter**         ALLERGIES  No Known  "Allergies    PHYSICAL EXAM  VITAL SIGNS: BP (!) 169/81   Pulse 80   Temp 36.8 °C (98.2 °F) (Temporal)   Resp 19   Ht 1.727 m (5' 8\")   Wt 63.5 kg (140 lb)   SpO2 98%   BMI 21.29 kg/m² .  Constitutional: Alert in no apparent distress.  HENT: No signs of trauma, Bilateral external ears normal, Nose normal.   Eyes: Pupils are equal and reactive, Conjunctiva normal, Non-icteric.   Neck: Normal range of motion, No tenderness, Supple, No stridor.   Cardiovascular: Regular rate and rhythm, no murmurs.   Thorax & Lungs: Normal breath sounds, No respiratory distress, No wheezing, No chest tenderness.   Abdomen: Bowel sounds normal, Soft, No tenderness, No masses, No peritoneal signs.  Skin: Warm, Dry, No erythema, No rash.   Musculoskeletal:  no major deformities noted.  No swelling or deformity is noted to specifically his right leg no erythema.  Neurologic: Alert,  No focal deficits noted.   Psychiatric: Affect normal, Judgment normal, Mood normal.       DIAGNOSTIC STUDIES / PROCEDURES      EKG  Results for orders placed or performed during the hospital encounter of 17   EKG (ER)   Result Value Ref Range    Report       [facility] Emergency Dept.    Test Date:  2017  Pt Name:    SADE ISSA               Department: ER  MRN:        [id number]                      Room:  Gender:     M                            Technician: [id number]  :        [id number]                   Requested By:ER TRIAGE PROTOCOL  Order #:    [id number]                    Reading MD:    Measurements  Intervals                                Axis  Rate:       79                           P:          77  ID:         [id number]                          QRS:        -44  QRSD:       72                           T:          66  QT:         372  QTc:        427    Interpretive Statements  SINUS RHYTHM  BORDERLINE AV CONDUCTION DELAY  LEFT ANTERIOR FASCICULAR BLOCK  ANTERIOR INFARCT, OLD  Compared to ECG 2016 23:27:56  No " significant changes           LABS  Labs Reviewed   CBC WITH DIFFERENTIAL - Abnormal; Notable for the following components:       Result Value    RBC 3.95 (*)     Hemoglobin 12.8 (*)     Hematocrit 37.1 (*)     Monos (Absolute) 0.89 (*)     All other components within normal limits   COMP METABOLIC PANEL - Abnormal; Notable for the following components:    Co2 10 (*)     Anion Gap 27.0 (*)     Glucose 108 (*)     Creatinine 1.51 (*)     All other components within normal limits   DIAGNOSTIC ALCOHOL - Abnormal; Notable for the following components:    Diagnostic Alcohol 123.1 (*)     All other components within normal limits   ESTIMATED GFR - Abnormal; Notable for the following components:    GFR If  54 (*)     GFR If Non  45 (*)     All other components within normal limits   ACCU-CHEK GLUCOSE - Abnormal; Notable for the following components:    Glucose - Accu-Ck 115 (*)     All other components within normal limits   CRP QUANTITIVE (NON-CARDIAC)   CREATINE KINASE   ACCU-CHEK GLUCOSE       RADIOLOGY  US-DANIELE SINGLE LEVEL BILAT    (Results Pending)       Medical records reviewed for continuity of care.  Prior records show patient is seen by nephrology last in November 2019.  He has a history of hep C and CKD 3.    Differential Diagnosis includes but is not limited to: Alcohol intoxication, hypoglycemia secondary to decreased p.o. intake, vascular disease, infection    COURSE & MEDICAL DECISION MAKING  Pertinent Labs & Imaging studies reviewed. (See chart for details)  This is a 75-year-old gentleman that presents with hyperglycemia.  Is also complaining of right leg pain.  He has no appreciable swelling or erythema concerning for cellulitis.  He does have multiple risk factors for having significant vascular disease and therefore vascular ultrasound was performed to evaluate for possible arterial disease.  Labs are obtained.  He does have an elevated alcohol level.  Previous notes  indicate that he had a history of alcohol abuse but was sober for a period of time.  It appears he has resumed drinking.  He has chronic renal insufficiency and his creatinine is at baseline.  CRP is normal therefore I doubt infection.  Patient has had fluctuating blood sugar he was given a log meal and he still has a slightly low blood sugar after the meal we are continue to monitor this.  Patient will be signed out to Dr. Christopher pending reevaluation after period of observation for his blood glucose.  Dr. Christopher also follow-up on the vascular ultrasound results.  Dispel will be determined by Dr. Christopher other suspect the patient will hopefully be able to be discharged home.      FINAL IMPRESSION  1. Pain of right lower extremity     2. Hypoglycemia     3. Alcoholic intoxication without complication (HCC)         2.   3.    This dictation has been creating using voice recognition software. The accuracy of the dictation is limited the abilities of the software.  I expect there may be some errors of grammar and possibly content. I made every attempt to manually correct the errors within my dictation. However errors related to this voice recognition software may still exist and should be interpreted within the appropriate context.      The note accurately reflects work and decisions made by me.  Yesi Eli M.D.  9/3/2020  8:32 PM

## 2020-09-04 NOTE — ASSESSMENT & PLAN NOTE
-No signs of alcohol withdrawal.  Continue to monitor with CIWA.  Continue thiamine, folate, and multivitamin supplements.

## 2020-09-04 NOTE — ANESTHESIA PREPROCEDURE EVALUATION
HTN, PAD, EtOH abuse/dependence, COPD/emphysema, CKD, Hep C. Patient was brought to hospital after being found down with glucose of 46. He has severe peripheral artery disease with right leg claudication. Nuclear stress test from 2016 reviewed (no ischemia).     Physical Exam    Airway   Mallampati: II  TM distance: >3 FB  Neck ROM: full       Cardiovascular - normal exam  Rhythm: regular  Rate: normal  (-) murmur     Dental       Very poor dentition   Pulmonary - normal exam  Breath sounds clear to auscultation     Abdominal    Neurological - normal exam                 Anesthesia Plan    ASA 3   ASA physical status 3 criteria: COPD and alcohol and/or substance dependence or abuse    Plan - general       Airway plan will be ETT  ([place], large PIV access)      Induction: intravenous    Postoperative Plan: Postoperative administration of opioids is intended.    Pertinent diagnostic labs and testing reviewed    Informed Consent:    Anesthetic plan and risks discussed with patient.    Use of blood products discussed with: patient whom consented to blood products.

## 2020-09-04 NOTE — CARE PLAN
Problem: Communication  Goal: The ability to communicate needs accurately and effectively will improve  Outcome: PROGRESSING AS EXPECTED     Problem: Safety  Goal: Will remain free from injury  Outcome: PROGRESSING AS EXPECTED   Hourly rounding in place, bed is locked in lowest position with call light within reach.   Problem: Safety  Goal: Will remain free from falls  Outcome: PROGRESSING AS EXPECTED     Problem: Infection  Goal: Will remain free from infection  Outcome: PROGRESSING AS EXPECTED     Problem: Venous Thromboembolism (VTW)/Deep Vein Thrombosis (DVT) Prevention:  Goal: Patient will participate in Venous Thrombosis (VTE)/Deep Vein Thrombosis (DVT)Prevention Measures  Outcome: PROGRESSING AS EXPECTED     Problem: Bowel/Gastric:  Goal: Normal bowel function is maintained or improved  Outcome: PROGRESSING AS EXPECTED

## 2020-09-04 NOTE — ASSESSMENT & PLAN NOTE
-Maintaining good control.    -Continue Norvasc 5 mg daily.  Monitor blood pressure trends closely with as needed oral Catapres, and IV Vasotec for significant hypertension.

## 2020-09-04 NOTE — ASSESSMENT & PLAN NOTE
-Not in acute exacerbation.  Continue inhaled Symbicort, glycopyrrolate, along with as needed bronchodilators per RT protocol.  Not requiring oxygen supplement.

## 2020-09-04 NOTE — ANESTHESIA PROCEDURE NOTES
Arterial Line  Performed by: Newton Bryant M.D.  Authorized by: Newton Bryant M.D.     Start Time:  9/4/2020 1:07 PM  End Time:  9/4/2020 1:08 PM  Localization: surface landmarks    Patient Location:  OR  Indication: continuous blood pressure monitoring        Catheter Size:  20 G  Seldinger Technique?: Yes    Laterality:  Left  Site:  Radial artery  Line Secured:  Antimicrobial disc, tape and transparent dressing  Events: patient tolerated procedure well with no complications

## 2020-09-04 NOTE — ED NOTES
Pharmacy Medication Reconciliation    Unable to complete Medication Reconciliation at this time. Pt is not able to remember current medications and pt home pharmacy is closed.     Allergies verified with pt

## 2020-09-04 NOTE — OP REPORT
09/04/20    OPERATIVE NOTE    PRE-OPERATIVE DIAGNOSIS -     1.  Severe peripheral arterial disease with right lower extremity rest pain  2.  Acute on chronic ischemia right lower extremity    POST-OPERATIVE DIAGNOSIS -same    PROCEDURE PERFORMED -     1.  Bilateral common femoral and profunda femoris artery endarterectomies with bovine patch angioplasty  2.  Right to left femoral to femoral artery bypass using 6 mm PTFE  3.  Angioplasty and stenting of right common iliac artery using 8 mm x 59 mm balloon expandable stent  4.  Pelvic angiogram  5.  Catheter placement aorta  6.  Right iliac thrombectomy    SURGEON - Jean-Claude Prado M.D.    ASSISTANT - NYASIA Araujo    TYPE OF ANESTHESIA - General     ANESTHESIOLOGIST  -Dr. Bryant      SPECIMENS -none    PROCEDURE IN DETAIL -     Patient was taken to the operating suite placed in the supine position.  After adequate anesthesia the patient's abdomen bilateral groins and thighs were prepped and draped in sterile fashion.  Longitudinal incision was made overlying the right common femoral artery.  The incision was carried down to the subcutaneous tissue in the right common femoral profunda femoris and superficial femoral arteries were circumferentially dissected and isolated.  A mirror image incision was made on the contralateral side incision was carried down through the subcutaneous tissue in the left common femoral profunda femoris and proximal superficial femoral arteries were dissected from the surrounding tissue and isolated.  Patient was all already on a heparin drip but an additional 2000 units was administered.  The right common femoral artery was occluded proximally and distally and a longitudinal arteriotomy was made in the right common femoral artery.  The incision the arteriotomy was carried down onto the profunda femoris artery.  There was noted to be a dense amount of significant atheromatous calcific plaque within the common femoral artery.  There  was also acute thrombus seen.  A #4 Kirstin catheter was advanced retrograde into the iliac system and a thrombectomy was performed with retrieval of fresh thrombus material.  Under fluoroscopic guidance a wire was advanced across a critical stenosis within the right common iliac artery and into the aorta.  Next my attention turned to the left side.  And a longitudinal arteriotomy was made in the left common femoral artery and carried down onto the profunda femoris artery.  A extensive endarterectomy was performed of the common femoral artery and proximal profunda femoris artery.  Once the endarterectomy was completed a sheath was advanced retrograde into the iliac system and a wire was advanced into the aorta with some difficulty.  Despite having good wire support across the critical stenosis within the left common iliac artery I was unable to pass a small balloon in order to perform an angioplasty and stent.  I have exchanged for an 018 wire with a 3 mm balloon and was unable to cross the balloon into the aorta.  After an exhaustive effort with multiple balloons catheters and wires I elected to perform a right to left femoral to femoral bypass.  A bovine patch was then sewn in circumferentially in the left common femoral artery using a 5-0 Prolene suture in a running fashion.  Just prior to completing the patch all vessels were flushed.  Next an extensive endarterectomy was performed of the right common femoral artery and proximal profunda femoris artery.  A bovine patch was sewn in circumferentially using 5-0 Prolene sutures.  Next a 6 [place] sheath was advanced retrograde into the right iliac system.  A pelvic angiogram was performed and the critical stenosis within the right common iliac artery was identified.  An 8 mm x 59 mm balloon expandable stent was advanced across that area and was deployed.  Post deployment angiography demonstrated resolution of the stenoses and excellent flow through the right iliac  system.  Next a Reina-Wick tunneler was used to tunnel a 6 mm graft from one incision to the other.  A longitudinal arteriotomy was made in the right bovine patch and the 6 mm PTFE graft was promptly spatulated.  An end to side anastomosis was completed between the 6 mm PTFE graft and the bovine patch using a 5-0 Prolene suture in a running fashion.  Next a longitudinal arteriotomy was made in the left common femoral artery.  The graft was cut to length and spatulated.  Inside anastomosis was completed between the 6 mm PTFE graft in the left common femoral artery using a 5-0 Prolene suture in a running fashion.  After 4 flushing and back flushing flow was established down the lower extremities.  Protamine was administered.  Hemostasis was assured.  2-0 Vicryl subcutaneous sutures were used in 2 separate layers.  4-0 strata fix suture was used to close both groin incisions.  Sterile dressings were applied and the patient was taken to the recovery room in stable condition.      Jean-Claude Prado M.D.

## 2020-09-04 NOTE — RESPIRATORY CARE
"COPD EDUCATION by COPD CLINICAL EDUCATOR  9/4/2020 at 9:34 AM by Vi Slaughter, RRT     COPD Education provided? yes    Action Plan Completed/Updated? yes    Pulmonary Function Testing (PFT)? Patient refuses at this time. States he had one about 7 years ago.    Peak Inspiratory flow (PIF)? No, patient refuses at this time    Does patient currently use inhalers/nebulizer at home? Albuterol MDI and Breo ellipta    Has the patient been referred to the [facility] COPD Program? Does not qualify      Smoking Cessation Intervention and education completed, 3 minutes spent on smoking cessation education with patient    Provided smoking cessation packet with \"Tips to Quit\" and flyer for \"Free Smoking Cessation Classes\".     "

## 2020-09-04 NOTE — ANESTHESIA PROCEDURE NOTES
Airway    Date/Time: 9/4/2020 12:57 PM  Performed by: Newton Bryant M.D.  Authorized by: Newton Bryant M.D.     Location:  OR  Urgency:  Elective  Difficult Airway: No    Indications for Airway Management:  Anesthesia      Spontaneous Ventilation: absent    Sedation Level:  Deep  Preoxygenated: Yes    Patient Position:  Sniffing  Mask Difficulty Assessment:  1 - vent by mask  Final Airway Type:  Endotracheal airway  Final Endotracheal Airway:  ETT  Cuffed: Yes    Technique Used for Successful ETT Placement:  Direct laryngoscopy    Insertion Site:  Oral  Blade Type:  Hernandez  Laryngoscope Blade/Videolaryngoscope Blade Size:  2  ETT Size (mm):  7.5  Measured from:  Lips  ETT to Lips (cm):  22  Placement Verified by: auscultation and capnometry    Cormack-Lehane Classification:  Grade I - full view of glottis  Number of Attempts at Approach:  1

## 2020-09-04 NOTE — PROGRESS NOTES
Mr. Fede Parikh Jr. is a 75 y.o. male who was admitted this morning by my colleague Dr. Reena Marin for LOC.  I personally reviewed the H&P, labs and imaging.      Patient was seen and examined at bedside.      Mr. Fede Parikh Jr. is a 75 y.o. male with a history of polysubstance abuse who presented on 9/3/2020 with loss of consciousness.  Patient was found down on the sidewalk and was found to be severely hypoglycemic.  He is a poor historian.  Blood alcohol level was 123.  It is unknown for how long he was down.  He did report right leg pain which has been chronic but exacerbated recently and states his leg gave out which caused him to fall.  A CT angiogram showed dense calcific disease throughout the distal aorta and iliac system, right iliac artery is occluded with reconstitution of the right femoral artery from the profunda femoris artery, questionable high-grade stenosis versus occlusion of the left common femoral artery with reconstitution of the external iliac artery.  Vascular surgery was consulted.  Was started on a heparin drip.  Status post bilateral common femoral and profunda femoris artery endarterectomies with bovine patch angioplasty, right to left femoral to femoral artery bypass using 6 mm PTFE, angioplasty and stenting of right common iliac artery, right iliac thrombectomy 9/4.

## 2020-09-04 NOTE — PROGRESS NOTES
This RN called lab to draw labs ordered from this morning. Lab informed that one lab was heparin Xa and needs to be collected.

## 2020-09-04 NOTE — PROGRESS NOTES
Retrieved pt from ED. Pt placed on Zoll monitor. Report received from Kirstin. Pt transported to tele unit, pt placed on tele monitor, monitor room notified, SR 87. Pt A&Ox4. Pt weighed, VS completed. POC discussed, all questions answered. Pt has no complaints at this time. Bed locked in lowest position, call light in reach with return demonstration on use, fall precautions in place.

## 2020-09-04 NOTE — ANESTHESIA QCDR
2019 [facility] Clinical Data Registry (for Quality Improvement)     Postoperative nausea/vomiting risk protocol (Adult = 18 yrs and Pediatric 3-17 yrs)- (430 and 463)  General inhalation anesthetic (NOT TIVA) with PONV risk factors: Yes  Provision of anti-emetic therapy with at least 2 different classes of agents: Yes   Patient DID NOT receive anti-emetic therapy and reason is documented in Medical Record:  N/A    Multimodal Pain Management- (477)  Non-emergent surgery AND patient age >= 18: Yes  Use of Multimodal Pain Management, two or more drugs and/or interventions, NOT including systemic opioids: Yes  Exception: Documented allergy to multiple classes of analgesics: N/A    Smoking Abstinence (404)  Patient is current smoker (cigarette, pipe, e-cig, marijuanna): Yes  Elective Surgery: No  Abstinence instructions provided prior to day of surgery:   Patient abstained from smoking on day of surgery:     Pre-Op Beta-Blocker in Isolated CABG (44)  Isolated CABG AND patient age >= 18: No  Beta-blocker admin within 24 hours of surgical incision:   Exception:of medical reason(s) for not administering beta blocker within 24 hours prior to surgical incision (e.g., not  indicated,other medical reason):     PACU assessment of acute postoperative pain prior to Anesthesia Care End- Applies to Patients Age = 18- (ABG7)  Initial PACU pain score is which of the following: < 7/10  Patient unable to report pain score: N/A    Post-anesthetic transfer of care checklist/protocol to PACU/ICU- (426 and 427)  Upon conclusion of case, patient transferred to which of the following locations: PACU/Non-ICU  Use of transfer checklist/protocol: Yes  Exclusion: Service Performed in Patient Hospital Room (and thus did not require transfer): N/A  Unplanned admission to ICU related to anesthesia service up through end of PACU care- (MD51)  Unplanned admission to ICU (not initially anticipated at anesthesia start time): No

## 2020-09-04 NOTE — ED PROVIDER NOTES
ED PROVIDER NOTE    Scribed for Kurt Christopher M.D. by Fransisco Sousa. 9/3/2020, 8:55 PM.    This is an addendum to the note on Fede Shadi Parikh Jr.. For further details and full chart entry, see the previously signed ED Provider Note written by Dr. Eli (ERP).      8:24 PM - I discussed the patient's case with Dr. Eli (ERP) who will transfer care of the patient to me at this time. I have ordered a CK.     8:56 PM - Preliminary US shows reduced flow.     8:57 PM - Patient was reevaluated at bedside. He denies any lower back pain.    9:04 PM - I spoke with the ultrasonographer who informed me that the patient's aorta is occluded and there is reconstitution in the legs.    9:34 PM - Patient was reevaluated at bedside. I discussed a plan for hospitalization given his reduced blood flow. He denies any prior history of GI bleeds or melanic stools.     9:35 PM - Paging Vascular Surgery    9:40 PM - I discussed the patient's case with Dr. Prado (Vascular Surgery) who recommended we order a STAT CTA-Aorta and COVID/SARS CoV-2    11:22 PM - Paged Vascular Surgery    11:24 PM - I discussed the patient's case and the above findings with Dr. Prado (Vascular Surgery) who recommended I admit the patient to the hospitalist. Dr. Prado will place the patient on tomorrow's schedule.    11:27 PM - Paged hospitalist    11:33 PM - I discussed the patient's case and the above findings with Dr. Marin (Hospitalist) who agrees to evaluate the patient for hospitalization.  Patient be hospitalized in guarded condition    MDM:  Patient received at signout with plan for follow-up vascular studies due to the patient's severe leg pain.  He does not have palpable pulses, however sensation and motion is intact.  Low suspicion for cauda equina syndrome.  Low suspicion for epidural abscess or epidural hematoma.    I got a verbal report from the ultrasonographer regarding blood flow issues.  After discussion with vascular surgery, the  patient will be initiated on heparin, admitted to the hospitalist service.    FINAL IMPRESSION   1. Pain of right lower extremity     2. Hypoglycemia     3. Alcoholic intoxication without complication (HCC)     4. Iliac artery occlusion (HCC)          Fransisco CASTILLO ([email or web address]), am scribing for, and in the presence of, Kurt Christopher M.D..    Electronically signed by: Fransisco Sousa (Scribe), 9/3/2020    IKurt M.D. personally performed the services described in this documentation, as scribed by Fransisco Sousa in my presence, and it is both accurate and complete.    The note accurately reflects work and decisions made by me.  Kurt Christopher M.D.  9/4/2020  12:16 AM

## 2020-09-04 NOTE — ED NOTES
Pt still c/o pain- bilateral legs. Was given 4mg of Morphine IV an hour ago- pt reports the medication was not effective.

## 2020-09-04 NOTE — ASSESSMENT & PLAN NOTE
- s/p bilateral common femoral and profunda femoris artery endarterectomies with bovine patch angioplasty, right to left femoral to femoral artery bypass using 6 mm PTFE, angioplasty and stenting of right common iliac artery, right iliac thrombectomy on 9/4/20.  -Continue aspirin, and Lipitor.  -Still has Lexie in place, but vascular surgery planning to remove wound VAC prior to discharge.    -Pain well controlled.  Continue oral oxycodone as needed.

## 2020-09-04 NOTE — ED NOTES
"Pt reports 4mg Morphine IV \"did nothing for me\". Pain level remains high, mostly right leg.   Plan of care- Waiting on US.  "

## 2020-09-04 NOTE — ED NOTES
Fingerstick rechecked=71 erp made aware. Will feed pt.  Medicated per mar order. Allergies verified.

## 2020-09-04 NOTE — ED NOTES
Chief Complaint   Patient presents with   • ALOC     pt bib ems found laying on the side walk denies trauma. has bgl of 46. was given 100ml's of D10, bgl went up to 167   • Leg Pain     bilateral leg pain

## 2020-09-04 NOTE — PROGRESS NOTES
Report received from LYNDON Tirado. Patient is resting with no complaints of pain. Bed is locked in lowest position with call light within reach. POC discussed and white board updated. RN will continue to monitor.

## 2020-09-05 PROBLEM — N17.9 AKI (ACUTE KIDNEY INJURY) (HCC): Status: ACTIVE | Noted: 2020-09-05

## 2020-09-05 LAB
ALBUMIN SERPL BCP-MCNC: 3.2 G/DL (ref 3.2–4.9)
ALBUMIN/GLOB SERPL: 1.1 G/DL
ALP SERPL-CCNC: 54 U/L (ref 30–99)
ALT SERPL-CCNC: 47 U/L (ref 2–50)
ANION GAP SERPL CALC-SCNC: 15 MMOL/L (ref 7–16)
AST SERPL-CCNC: 549 U/L (ref 12–45)
BASOPHILS # BLD AUTO: 0.1 % (ref 0–1.8)
BASOPHILS # BLD: 0.01 K/UL (ref 0–0.12)
BILIRUB SERPL-MCNC: 0.7 MG/DL (ref 0.1–1.5)
BUN SERPL-MCNC: 28 MG/DL (ref 8–22)
CALCIUM SERPL-MCNC: 7.8 MG/DL (ref 8.5–10.5)
CHLORIDE SERPL-SCNC: 98 MMOL/L (ref 96–112)
CHOLEST SERPL-MCNC: 123 MG/DL (ref 100–199)
CO2 SERPL-SCNC: 20 MMOL/L (ref 20–33)
CREAT SERPL-MCNC: 3.07 MG/DL (ref 0.5–1.4)
EOSINOPHIL # BLD AUTO: 0 K/UL (ref 0–0.51)
EOSINOPHIL NFR BLD: 0 % (ref 0–6.9)
ERYTHROCYTE [DISTWIDTH] IN BLOOD BY AUTOMATED COUNT: 45 FL (ref 35.9–50)
GLOBULIN SER CALC-MCNC: 2.9 G/DL (ref 1.9–3.5)
GLUCOSE BLD-MCNC: 97 MG/DL (ref 65–99)
GLUCOSE SERPL-MCNC: 120 MG/DL (ref 65–99)
HCT VFR BLD AUTO: 32.8 % (ref 42–52)
HDLC SERPL-MCNC: 73 MG/DL
HGB BLD-MCNC: 11.3 G/DL (ref 14–18)
IMM GRANULOCYTES # BLD AUTO: 0.03 K/UL (ref 0–0.11)
IMM GRANULOCYTES NFR BLD AUTO: 0.3 % (ref 0–0.9)
LDLC SERPL CALC-MCNC: 32 MG/DL
LYMPHOCYTES # BLD AUTO: 0.85 K/UL (ref 1–4.8)
LYMPHOCYTES NFR BLD: 8.8 % (ref 22–41)
MAGNESIUM SERPL-MCNC: 2 MG/DL (ref 1.5–2.5)
MCH RBC QN AUTO: 32.5 PG (ref 27–33)
MCHC RBC AUTO-ENTMCNC: 34.5 G/DL (ref 33.7–35.3)
MCV RBC AUTO: 94.3 FL (ref 81.4–97.8)
MONOCYTES # BLD AUTO: 0.91 K/UL (ref 0–0.85)
MONOCYTES NFR BLD AUTO: 9.4 % (ref 0–13.4)
NEUTROPHILS # BLD AUTO: 7.87 K/UL (ref 1.82–7.42)
NEUTROPHILS NFR BLD: 81.4 % (ref 44–72)
NRBC # BLD AUTO: 0 K/UL
NRBC BLD-RTO: 0 /100 WBC
PHOSPHATE SERPL-MCNC: 4.4 MG/DL (ref 2.5–4.5)
PLATELET # BLD AUTO: 127 K/UL (ref 164–446)
PMV BLD AUTO: 10.5 FL (ref 9–12.9)
POTASSIUM SERPL-SCNC: 4.8 MMOL/L (ref 3.6–5.5)
PROT SERPL-MCNC: 6.1 G/DL (ref 6–8.2)
RBC # BLD AUTO: 3.48 M/UL (ref 4.7–6.1)
SODIUM SERPL-SCNC: 133 MMOL/L (ref 135–145)
TRIGL SERPL-MCNC: 89 MG/DL (ref 0–149)
WBC # BLD AUTO: 9.7 K/UL (ref 4.8–10.8)

## 2020-09-05 PROCEDURE — 99233 SBSQ HOSP IP/OBS HIGH 50: CPT | Performed by: INTERNAL MEDICINE

## 2020-09-05 PROCEDURE — 84100 ASSAY OF PHOSPHORUS: CPT

## 2020-09-05 PROCEDURE — 700111 HCHG RX REV CODE 636 W/ 250 OVERRIDE (IP): Performed by: INTERNAL MEDICINE

## 2020-09-05 PROCEDURE — A9270 NON-COVERED ITEM OR SERVICE: HCPCS | Performed by: INTERNAL MEDICINE

## 2020-09-05 PROCEDURE — 94640 AIRWAY INHALATION TREATMENT: CPT

## 2020-09-05 PROCEDURE — 36415 COLL VENOUS BLD VENIPUNCTURE: CPT

## 2020-09-05 PROCEDURE — 83735 ASSAY OF MAGNESIUM: CPT

## 2020-09-05 PROCEDURE — 770020 HCHG ROOM/CARE - TELE (206)

## 2020-09-05 PROCEDURE — 80061 LIPID PANEL: CPT

## 2020-09-05 PROCEDURE — 700102 HCHG RX REV CODE 250 W/ 637 OVERRIDE(OP): Performed by: NURSE PRACTITIONER

## 2020-09-05 PROCEDURE — A9270 NON-COVERED ITEM OR SERVICE: HCPCS | Performed by: NURSE PRACTITIONER

## 2020-09-05 PROCEDURE — 700102 HCHG RX REV CODE 250 W/ 637 OVERRIDE(OP): Performed by: INTERNAL MEDICINE

## 2020-09-05 PROCEDURE — 82962 GLUCOSE BLOOD TEST: CPT

## 2020-09-05 PROCEDURE — 85025 COMPLETE CBC W/AUTO DIFF WBC: CPT

## 2020-09-05 PROCEDURE — 700105 HCHG RX REV CODE 258: Performed by: INTERNAL MEDICINE

## 2020-09-05 PROCEDURE — 80053 COMPREHEN METABOLIC PANEL: CPT

## 2020-09-05 RX ORDER — NICOTINE 21 MG/24HR
14 PATCH, TRANSDERMAL 24 HOURS TRANSDERMAL
Status: DISCONTINUED | OUTPATIENT
Start: 2020-09-05 | End: 2020-09-10 | Stop reason: HOSPADM

## 2020-09-05 RX ORDER — HEPARIN SODIUM 5000 [USP'U]/ML
5000 INJECTION, SOLUTION INTRAVENOUS; SUBCUTANEOUS EVERY 8 HOURS
Status: DISCONTINUED | OUTPATIENT
Start: 2020-09-05 | End: 2020-09-05

## 2020-09-05 RX ADMIN — BUDESONIDE AND FORMOTEROL FUMARATE DIHYDRATE 2 PUFF: 160; 4.5 AEROSOL RESPIRATORY (INHALATION) at 06:38

## 2020-09-05 RX ADMIN — GLYCOPYRROLATE 1 CAPSULE: 15.6 CAPSULE RESPIRATORY (INHALATION) at 19:24

## 2020-09-05 RX ADMIN — OMEPRAZOLE 20 MG: 20 CAPSULE, DELAYED RELEASE ORAL at 06:00

## 2020-09-05 RX ADMIN — AMLODIPINE BESYLATE 5 MG: 5 TABLET ORAL at 04:34

## 2020-09-05 RX ADMIN — DOCUSATE SODIUM 50 MG AND SENNOSIDES 8.6 MG 2 TABLET: 8.6; 5 TABLET, FILM COATED ORAL at 16:12

## 2020-09-05 RX ADMIN — THIAMINE HYDROCHLORIDE 100 MG: 100 INJECTION, SOLUTION INTRAMUSCULAR; INTRAVENOUS at 04:34

## 2020-09-05 RX ADMIN — OXYCODONE HYDROCHLORIDE 5 MG: 5 TABLET ORAL at 12:33

## 2020-09-05 RX ADMIN — GLYCOPYRROLATE 1 CAPSULE: 15.6 CAPSULE RESPIRATORY (INHALATION) at 06:38

## 2020-09-05 RX ADMIN — SODIUM CHLORIDE, POTASSIUM CHLORIDE, SODIUM LACTATE AND CALCIUM CHLORIDE: 600; 310; 30; 20 INJECTION, SOLUTION INTRAVENOUS at 17:54

## 2020-09-05 RX ADMIN — BUDESONIDE AND FORMOTEROL FUMARATE DIHYDRATE 2 PUFF: 160; 4.5 AEROSOL RESPIRATORY (INHALATION) at 19:24

## 2020-09-05 RX ADMIN — ATORVASTATIN CALCIUM 40 MG: 40 TABLET, FILM COATED ORAL at 04:33

## 2020-09-05 RX ADMIN — OXYCODONE HYDROCHLORIDE 5 MG: 5 TABLET ORAL at 16:12

## 2020-09-05 RX ADMIN — OXYCODONE HYDROCHLORIDE 5 MG: 5 TABLET ORAL at 08:38

## 2020-09-05 RX ADMIN — ASPIRIN 81 MG: 81 TABLET, COATED ORAL at 04:33

## 2020-09-05 ASSESSMENT — PAIN DESCRIPTION - PAIN TYPE
TYPE: ACUTE PAIN

## 2020-09-05 ASSESSMENT — LIFESTYLE VARIABLES
VISUAL DISTURBANCES: NOT PRESENT
PAROXYSMAL SWEATS: NO SWEAT VISIBLE
AUDITORY DISTURBANCES: NOT PRESENT
TOTAL SCORE: 1
HEADACHE, FULLNESS IN HEAD: NOT PRESENT
NAUSEA AND VOMITING: NO NAUSEA AND NO VOMITING
AUDITORY DISTURBANCES: NOT PRESENT
ORIENTATION AND CLOUDING OF SENSORIUM: ORIENTED AND CAN DO SERIAL ADDITIONS
TREMOR: NO TREMOR
ANXIETY: MILDLY ANXIOUS
AUDITORY DISTURBANCES: NOT PRESENT
ORIENTATION AND CLOUDING OF SENSORIUM: ORIENTED AND CAN DO SERIAL ADDITIONS
VISUAL DISTURBANCES: NOT PRESENT
ANXIETY: MILDLY ANXIOUS
PAROXYSMAL SWEATS: NO SWEAT VISIBLE
HEADACHE, FULLNESS IN HEAD: NOT PRESENT
AGITATION: NORMAL ACTIVITY
TOTAL SCORE: 1
TREMOR: NO TREMOR
PAROXYSMAL SWEATS: NO SWEAT VISIBLE
AGITATION: *
TREMOR: NO TREMOR
ORIENTATION AND CLOUDING OF SENSORIUM: ORIENTED AND CAN DO SERIAL ADDITIONS
TOTAL SCORE: 2
TOTAL SCORE: 1
TOTAL SCORE: 2
ORIENTATION AND CLOUDING OF SENSORIUM: ORIENTED AND CAN DO SERIAL ADDITIONS
VISUAL DISTURBANCES: NOT PRESENT
NAUSEA AND VOMITING: NO NAUSEA AND NO VOMITING
TOTAL SCORE: 1
AGITATION: NORMAL ACTIVITY
AUDITORY DISTURBANCES: NOT PRESENT
AGITATION: NORMAL ACTIVITY
PAROXYSMAL SWEATS: NO SWEAT VISIBLE
ANXIETY: NO ANXIETY (AT EASE)
VISUAL DISTURBANCES: NOT PRESENT
NAUSEA AND VOMITING: NO NAUSEA AND NO VOMITING
ORIENTATION AND CLOUDING OF SENSORIUM: ORIENTED AND CAN DO SERIAL ADDITIONS
NAUSEA AND VOMITING: NO NAUSEA AND NO VOMITING
TREMOR: NO TREMOR
AGITATION: SOMEWHAT MORE THAN NORMAL ACTIVITY
NAUSEA AND VOMITING: NO NAUSEA AND NO VOMITING
ANXIETY: MILDLY ANXIOUS
TREMOR: NO TREMOR
AGITATION: NORMAL ACTIVITY
VISUAL DISTURBANCES: NOT PRESENT
TREMOR: NO TREMOR
HEADACHE, FULLNESS IN HEAD: NOT PRESENT
HEADACHE, FULLNESS IN HEAD: NOT PRESENT
ORIENTATION AND CLOUDING OF SENSORIUM: ORIENTED AND CAN DO SERIAL ADDITIONS
AUDITORY DISTURBANCES: NOT PRESENT
ANXIETY: MILDLY ANXIOUS
PAROXYSMAL SWEATS: NO SWEAT VISIBLE
NAUSEA AND VOMITING: NO NAUSEA AND NO VOMITING
HEADACHE, FULLNESS IN HEAD: NOT PRESENT
HEADACHE, FULLNESS IN HEAD: NOT PRESENT
ANXIETY: MILDLY ANXIOUS
PAROXYSMAL SWEATS: NO SWEAT VISIBLE
VISUAL DISTURBANCES: NOT PRESENT
AUDITORY DISTURBANCES: NOT PRESENT

## 2020-09-05 ASSESSMENT — ENCOUNTER SYMPTOMS
BLURRED VISION: 0
ABDOMINAL PAIN: 0
CHILLS: 0
CONSTIPATION: 0
VOMITING: 0
WEAKNESS: 0
COUGH: 0
NAUSEA: 0
SORE THROAT: 0
FEVER: 0
SHORTNESS OF BREATH: 0
DIARRHEA: 0
DIZZINESS: 0
FALLS: 0
PALPITATIONS: 0
DEPRESSION: 0
HEADACHES: 0
NERVOUS/ANXIOUS: 0

## 2020-09-05 ASSESSMENT — FIBROSIS 4 INDEX: FIB4 SCORE: 48.55

## 2020-09-05 NOTE — PROGRESS NOTES
Vascular surgery    Awake and alert  Bilateral lower extremities warm and well-perfused  No swelling to suspect compartment syndrome    Femoral to femoral graft patent    Lexie is in place    Status post iliac stenting and right to left femoral to femoral artery bypass along with bilateral femoral endarterectomies    Successful reperfusion lower extremities    Out of bed to chair  Postoperative care    Jean-Claude Prado MD

## 2020-09-05 NOTE — PROGRESS NOTES
Hospital Medicine Daily Progress Note    Date of Service  9/5/2020    Chief Complaint  75 y.o. male admitted 9/3/2020 with leg pain, loss of consciousness    Hospital Course    Mr. Fede Parikh Jr. is a 75 y.o. male with a history of polysubstance abuse who presented on 9/3/2020 with loss of consciousness.  Patient was found down on the sidewalk and was found to be severely hypoglycemic.  He is a poor historian.  Blood alcohol level was 123.  It is unknown for how long he was down.  He did report right leg pain which has been chronic but exacerbated recently and states his leg gave out which caused him to fall.  A CT angiogram showed dense calcific disease throughout the distal aorta and iliac system, right iliac artery is occluded with reconstitution of the right femoral artery from the profunda femoris artery, questionable high-grade stenosis versus occlusion of the left common femoral artery with reconstitution of the external iliac artery.  Vascular surgery was consulted.  Was started on a heparin drip.  Status post bilateral common femoral and profunda femoris artery endarterectomies with bovine patch angioplasty, right to left femoral to femoral artery bypass using 6 mm PTFE, angioplasty and stenting of right common iliac artery, right iliac thrombectomy 9/4.      Interval Problem Update  Patient was seen and examined at bedside.  I have personally reviewed vitals, labs, and imaging.    9/5.  Afebrile.  Hypertension has resolved.  On 0-2 L nasal cannula.  Acute kidney injury likely secondary to contrast versus hypertension.  Denies fevers, chills any chest pains or shortness of breath.  Reports pain is well controlled.  Request nicotine patches.      Consultants/Specialty  Vascular    Code Status  Full Code    Disposition  Continue to monitor on telemetry    Review of Systems  Review of Systems   Constitutional: Negative for chills and fever.   HENT: Negative for congestion and sore throat.    Eyes:  Negative for blurred vision.   Respiratory: Negative for cough and shortness of breath.    Cardiovascular: Negative for chest pain, palpitations and leg swelling.   Gastrointestinal: Negative for abdominal pain, constipation, diarrhea, nausea and vomiting.   Genitourinary: Negative for dysuria, frequency and urgency.   Musculoskeletal: Negative for falls.   Skin: Negative for rash.   Neurological: Negative for dizziness, weakness and headaches.   Psychiatric/Behavioral: Negative for depression. The patient is not nervous/anxious.    All other systems reviewed and are negative.       Physical Exam  Temp:  [36.2 °C (97.1 °F)-36.9 °C (98.5 °F)] 36.9 °C (98.5 °F)  Pulse:  [56-89] 89  Resp:  [10-23] 18  BP: ()/(52-92) 117/63  SpO2:  [91 %-100 %] 100 %    Physical Exam  Vitals signs and nursing note reviewed.   Constitutional:       General: He is not in acute distress.     Appearance: Normal appearance. He is normal weight.   HENT:      Head: Normocephalic and atraumatic.      Nose: Nose normal.      Mouth/Throat:      Mouth: Mucous membranes are moist.      Pharynx: Oropharynx is clear.   Eyes:      Extraocular Movements: Extraocular movements intact.      Conjunctiva/sclera: Conjunctivae normal.   Neck:      Musculoskeletal: Normal range of motion and neck supple.   Cardiovascular:      Rate and Rhythm: Normal rate and regular rhythm.      Pulses: Normal pulses.      Heart sounds: Normal heart sounds. No murmur. No friction rub. No gallop.    Pulmonary:      Effort: Pulmonary effort is normal. No respiratory distress.      Breath sounds: Normal breath sounds. No wheezing or rales.   Chest:      Chest wall: No tenderness.   Abdominal:      General: Abdomen is flat. Bowel sounds are normal. There is no distension.      Palpations: Abdomen is soft. There is no mass.      Tenderness: There is no abdominal tenderness. There is no guarding.   Musculoskeletal: Normal range of motion.      Comments: Preveena in place.   Positive pulses and good cap refill in lower extremities.   Skin:     General: Skin is warm.      Capillary Refill: Capillary refill takes less than 2 seconds.   Neurological:      General: No focal deficit present.      Mental Status: He is alert and oriented to person, place, and time. Mental status is at baseline.      Cranial Nerves: No cranial nerve deficit.      Motor: No weakness.   Psychiatric:         Mood and Affect: Mood normal.         Behavior: Behavior normal.         Fluids    Intake/Output Summary (Last 24 hours) at 9/5/2020 0935  Last data filed at 9/5/2020 0504  Gross per 24 hour   Intake 2625 ml   Output 700 ml   Net 1925 ml       Laboratory  Recent Labs     09/03/20  1723 09/05/20  0407   WBC 7.6 9.7   RBC 3.95* 3.48*   HEMOGLOBIN 12.8* 11.3*   HEMATOCRIT 37.1* 32.8*   MCV 93.9 94.3   MCH 32.4 32.5   MCHC 34.5 34.5   RDW 47.5 45.0   PLATELETCT 164 127*   MPV 11.0 10.5     Recent Labs     09/03/20  1723 09/05/20  0407   SODIUM 138 133*   POTASSIUM 4.2 4.8   CHLORIDE 101 98   CO2 10* 20   GLUCOSE 108* 120*   BUN 17 28*   CREATININE 1.51* 3.07*   CALCIUM 9.3 7.8*     Recent Labs     09/03/20  2250   APTT 22.9*   INR 1.01         Recent Labs     09/05/20  0407   TRIGLYCERIDE 89   HDL 73   LDL 32       Imaging  DX-PORTABLE FLUORO > 1 HOUR   Final Result      11 minutes 44 seconds fluoroscopy time utilized by Dr. Prado for vascular procedure.      CT-CTA AORTA-RO WITH & W/O-POST PROCESS   Final Result         CTA AORTA      1.  Occlusion of the distal aorta at the bifurcation extending in the right common and external iliac arteries. Reconstitution of the right common femoral artery is seen.   2.  Dense calcification of the origin of the left common iliac artery appears to result in proximal occlusion with reconstitution in the distal common iliac artery.   3.  Heterogeneous right thyroid lesion, recommend follow-up thyroid sonography for further characterization due to lesion size.   4.  Hepatomegaly       CTA LOWER EXTREMITIES      1.  Scattered atherosclerotic changes throughout the right lower extremity without 50% stenosis, the distal right lower cavity vessels are not visualized and there is 0 vessel runoff at the right ankle.   2.  70% stenosis at the origin of the left common femoral artery.   3.  Scattered atherosclerotic calcifications throughout the left lower extremity resulting in approximately 50% stenosis at the popliteal artery, there appears to be three-vessel runoff the left ankle.      3D angiographic/MIP images of the vasculature confirm the vascular findings as described above.      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 Prado in ED and will plan for pre-op  -Pain control with opioids for now, however will need to reduce use post-op as there is component of drug seeking behavior   -Periop garcia score of 1.51%    S/p surgery 9/5.  Continue pain management and postop care.    High anion gap metabolic acidosis- (present on admission)  Assessment & Plan  Anion gap acidosis resolved with fluid resuscitation.  Likely secondary to limb ischemia  Continue fluid resuscitation and continue to monitor    Alcohol use- (present on admission)  Assessment & Plan  -Thiamine and folic acid to be supplemented  -CIWA protocol  No signs of withdrawal at this time.    Essential hypertension  Assessment & Plan  -May be pain driven and will control prior to surgery with opioids  -Continue Norvasc 5mg  -Will give hydralazine IV cautiously and monitor HR for tachy  -Patient used cocaine 2 days ago and will avoid beta blockers for control at this time    COPD (chronic obstructive pulmonary disease) (HCC)- (present on admission)  Assessment & Plan  -No acute respiratory issues  -Albuterol PRN  -BREO and seebri daily    SHANNAN (acute kidney  injury) (HCC)- (present on admission)  Assessment & Plan  Likely secondary to contrast during angiography and angioplasty versus hypertension on presentation.  Had recent renal ultrasound 8/14/2020 which was normal.  Good urine studies.  Monitor kidney function and urine output.  Avoid nephrotoxins    History of hepatitis C- (present on admission)  Assessment & Plan  Previously followed with Dr. Bella and completed treatment.  No follow-up was recommended.       VTE prophylaxis: Enoxaparin

## 2020-09-05 NOTE — ASSESSMENT & PLAN NOTE
- Likely secondary to contrast-induced nephropathy from contrast with angiography and angioplasty versus atheroemboli.  Renal ultrasound on 8/14 was normal.  Renal function continues to improve.    -Continue IV fluids with LR at 75 cc/h.  Continue to monitor renal function closely.  - avoid nephrotoxins, and continue to renally dose all medications.

## 2020-09-05 NOTE — PROGRESS NOTES
Bedside report received. Tele monitor on patient. Fall precautions in place. Patient in bed. Patient alert and oriented x4. Call light within reach. Bed in low and locked position. No further questions at this time.

## 2020-09-05 NOTE — OR NURSING
Pt resting comfortably in bed in no distress, denying pain. Pt informs he does not want any family or friends notified of how his surgery went at this time. Vitals WDL.

## 2020-09-05 NOTE — ANESTHESIA TIME REPORT
Anesthesia Start and Stop Event Times     Date Time Event    9/4/2020 1209 Ready for Procedure     1248 Anesthesia Start     1606 Anesthesia Stop        Responsible Staff  09/04/20    Name Role Begin End    Newton Bryant M.D. Anesth 1248 1606        Preop Diagnosis (Free Text):  Pre-op Diagnosis      dense calcific disease throughout the distal aorta and iliac system        Preop Diagnosis (Codes):    Post op Diagnosis  Peripheral artery disease (HCC)  severe peripheral artery disease with chronic right leg ischemia    Premium Reason  A. 3PM - 7AM    Comments:

## 2020-09-05 NOTE — ANESTHESIA POSTPROCEDURE EVALUATION
Patient: Fede Parikh Jr.    Procedure Summary     Date: 09/04/20 Room / Location: [facility] 05 / SURGERY [facility]    Anesthesia Start: 1248 Anesthesia Stop: 1606    Procedure: CREATION, BYPASS, ARTERIAL, FEMORAL TO FEMORAL, USING GRAFT (Bilateral Groin) Diagnosis: ( dense calcific disease throughout the distal aorta and iliac system)    Surgeon: Jean-Claude Prado M.D. Responsible Provider: Newton Bryant M.D.    Anesthesia Type: general ASA Status: 3          Final Anesthesia Type: general  Last vitals  BP   Blood Pressure : 116/77    Temp   36.2 °C (97.2 °F)    Pulse   Pulse: 63   Resp   16    SpO2   100 %      Anesthesia Post Evaluation    Patient location during evaluation: PACU  Patient participation: complete - patient participated  Level of consciousness: awake and alert  Pain score: 0    Airway patency: patent  Anesthetic complications: no  Cardiovascular status: hemodynamically stable  Respiratory status: acceptable  Hydration status: euvolemic    PONV: none           Nurse Pain Score: 0 (NPRS)

## 2020-09-05 NOTE — PROGRESS NOTES
Report received from LYNDON Almeida. Patient is resting with complaints of pain in his leg but does not want medication at this time. POC discussed with patient and white board updated, patient has no questions at this time. Bed is locked in lowest position with call light within reach. Rn will continue to monitor.

## 2020-09-06 LAB
ANION GAP SERPL CALC-SCNC: 14 MMOL/L (ref 7–16)
BASOPHILS # BLD AUTO: 0.4 % (ref 0–1.8)
BASOPHILS # BLD: 0.04 K/UL (ref 0–0.12)
BUN SERPL-MCNC: 33 MG/DL (ref 8–22)
CALCIUM SERPL-MCNC: 7.9 MG/DL (ref 8.5–10.5)
CHLORIDE SERPL-SCNC: 100 MMOL/L (ref 96–112)
CO2 SERPL-SCNC: 21 MMOL/L (ref 20–33)
CREAT SERPL-MCNC: 4.18 MG/DL (ref 0.5–1.4)
CREAT UR-MCNC: 73.47 MG/DL
CREAT UR-MCNC: 74.62 MG/DL
EOSINOPHIL # BLD AUTO: 0.04 K/UL (ref 0–0.51)
EOSINOPHIL NFR BLD: 0.4 % (ref 0–6.9)
ERYTHROCYTE [DISTWIDTH] IN BLOOD BY AUTOMATED COUNT: 45.6 FL (ref 35.9–50)
GLUCOSE SERPL-MCNC: 100 MG/DL (ref 65–99)
HCT VFR BLD AUTO: 32.3 % (ref 42–52)
HGB BLD-MCNC: 11.2 G/DL (ref 14–18)
IMM GRANULOCYTES # BLD AUTO: 0.06 K/UL (ref 0–0.11)
IMM GRANULOCYTES NFR BLD AUTO: 0.7 % (ref 0–0.9)
LYMPHOCYTES # BLD AUTO: 1.39 K/UL (ref 1–4.8)
LYMPHOCYTES NFR BLD: 15.6 % (ref 22–41)
MAGNESIUM SERPL-MCNC: 1.9 MG/DL (ref 1.5–2.5)
MCH RBC QN AUTO: 32.7 PG (ref 27–33)
MCHC RBC AUTO-ENTMCNC: 34.7 G/DL (ref 33.7–35.3)
MCV RBC AUTO: 94.2 FL (ref 81.4–97.8)
MONOCYTES # BLD AUTO: 0.87 K/UL (ref 0–0.85)
MONOCYTES NFR BLD AUTO: 9.7 % (ref 0–13.4)
NEUTROPHILS # BLD AUTO: 6.53 K/UL (ref 1.82–7.42)
NEUTROPHILS NFR BLD: 73.2 % (ref 44–72)
NRBC # BLD AUTO: 0 K/UL
NRBC BLD-RTO: 0 /100 WBC
PHOSPHATE SERPL-MCNC: 2.9 MG/DL (ref 2.5–4.5)
PLATELET # BLD AUTO: 113 K/UL (ref 164–446)
PMV BLD AUTO: 10.8 FL (ref 9–12.9)
POTASSIUM SERPL-SCNC: 4.2 MMOL/L (ref 3.6–5.5)
PROT UR-MCNC: 26 MG/DL (ref 0–15)
PROT/CREAT UR: 348 MG/G (ref 15–68)
RBC # BLD AUTO: 3.43 M/UL (ref 4.7–6.1)
SODIUM SERPL-SCNC: 135 MMOL/L (ref 135–145)
SODIUM UR-SCNC: 64 MMOL/L
WBC # BLD AUTO: 8.9 K/UL (ref 4.8–10.8)

## 2020-09-06 PROCEDURE — 85025 COMPLETE CBC W/AUTO DIFF WBC: CPT

## 2020-09-06 PROCEDURE — A9270 NON-COVERED ITEM OR SERVICE: HCPCS | Performed by: NURSE PRACTITIONER

## 2020-09-06 PROCEDURE — 97162 PT EVAL MOD COMPLEX 30 MIN: CPT

## 2020-09-06 PROCEDURE — 99223 1ST HOSP IP/OBS HIGH 75: CPT | Performed by: INTERNAL MEDICINE

## 2020-09-06 PROCEDURE — 84156 ASSAY OF PROTEIN URINE: CPT

## 2020-09-06 PROCEDURE — 84100 ASSAY OF PHOSPHORUS: CPT

## 2020-09-06 PROCEDURE — 97166 OT EVAL MOD COMPLEX 45 MIN: CPT

## 2020-09-06 PROCEDURE — 99233 SBSQ HOSP IP/OBS HIGH 50: CPT | Performed by: INTERNAL MEDICINE

## 2020-09-06 PROCEDURE — 700102 HCHG RX REV CODE 250 W/ 637 OVERRIDE(OP): Performed by: NURSE PRACTITIONER

## 2020-09-06 PROCEDURE — 36415 COLL VENOUS BLD VENIPUNCTURE: CPT

## 2020-09-06 PROCEDURE — 700105 HCHG RX REV CODE 258: Performed by: INTERNAL MEDICINE

## 2020-09-06 PROCEDURE — 700111 HCHG RX REV CODE 636 W/ 250 OVERRIDE (IP): Performed by: INTERNAL MEDICINE

## 2020-09-06 PROCEDURE — 83735 ASSAY OF MAGNESIUM: CPT

## 2020-09-06 PROCEDURE — 94640 AIRWAY INHALATION TREATMENT: CPT

## 2020-09-06 PROCEDURE — 82570 ASSAY OF URINE CREATININE: CPT | Mod: 91

## 2020-09-06 PROCEDURE — 770020 HCHG ROOM/CARE - TELE (206)

## 2020-09-06 PROCEDURE — 80048 BASIC METABOLIC PNL TOTAL CA: CPT

## 2020-09-06 PROCEDURE — 700102 HCHG RX REV CODE 250 W/ 637 OVERRIDE(OP): Performed by: INTERNAL MEDICINE

## 2020-09-06 PROCEDURE — 94760 N-INVAS EAR/PLS OXIMETRY 1: CPT

## 2020-09-06 PROCEDURE — 84300 ASSAY OF URINE SODIUM: CPT

## 2020-09-06 PROCEDURE — A9270 NON-COVERED ITEM OR SERVICE: HCPCS | Performed by: INTERNAL MEDICINE

## 2020-09-06 RX ADMIN — ASPIRIN 81 MG: 81 TABLET, COATED ORAL at 04:38

## 2020-09-06 RX ADMIN — ALBUTEROL SULFATE 2 PUFF: 90 AEROSOL, METERED RESPIRATORY (INHALATION) at 05:40

## 2020-09-06 RX ADMIN — OMEPRAZOLE 20 MG: 20 CAPSULE, DELAYED RELEASE ORAL at 04:39

## 2020-09-06 RX ADMIN — DOCUSATE SODIUM 50 MG AND SENNOSIDES 8.6 MG 2 TABLET: 8.6; 5 TABLET, FILM COATED ORAL at 16:57

## 2020-09-06 RX ADMIN — BUDESONIDE AND FORMOTEROL FUMARATE DIHYDRATE 2 PUFF: 160; 4.5 AEROSOL RESPIRATORY (INHALATION) at 07:02

## 2020-09-06 RX ADMIN — OXYCODONE HYDROCHLORIDE 5 MG: 5 TABLET ORAL at 15:02

## 2020-09-06 RX ADMIN — SODIUM CHLORIDE, POTASSIUM CHLORIDE, SODIUM LACTATE AND CALCIUM CHLORIDE: 600; 310; 30; 20 INJECTION, SOLUTION INTRAVENOUS at 04:38

## 2020-09-06 RX ADMIN — DOCUSATE SODIUM 50 MG AND SENNOSIDES 8.6 MG 2 TABLET: 8.6; 5 TABLET, FILM COATED ORAL at 04:38

## 2020-09-06 RX ADMIN — BUDESONIDE AND FORMOTEROL FUMARATE DIHYDRATE 2 PUFF: 160; 4.5 AEROSOL RESPIRATORY (INHALATION) at 18:53

## 2020-09-06 RX ADMIN — ALBUTEROL SULFATE 2 PUFF: 90 AEROSOL, METERED RESPIRATORY (INHALATION) at 18:33

## 2020-09-06 RX ADMIN — SODIUM CHLORIDE, POTASSIUM CHLORIDE, SODIUM LACTATE AND CALCIUM CHLORIDE: 600; 310; 30; 20 INJECTION, SOLUTION INTRAVENOUS at 19:41

## 2020-09-06 RX ADMIN — ATORVASTATIN CALCIUM 40 MG: 40 TABLET, FILM COATED ORAL at 04:39

## 2020-09-06 RX ADMIN — HYDROMORPHONE HYDROCHLORIDE 0.5 MG: 1 INJECTION, SOLUTION INTRAMUSCULAR; INTRAVENOUS; SUBCUTANEOUS at 16:57

## 2020-09-06 RX ADMIN — THIAMINE HYDROCHLORIDE 100 MG: 100 INJECTION, SOLUTION INTRAMUSCULAR; INTRAVENOUS at 04:38

## 2020-09-06 RX ADMIN — AMLODIPINE BESYLATE 5 MG: 5 TABLET ORAL at 04:39

## 2020-09-06 RX ADMIN — GLYCOPYRROLATE 1 CAPSULE: 15.6 CAPSULE RESPIRATORY (INHALATION) at 18:53

## 2020-09-06 RX ADMIN — GLYCOPYRROLATE 1 CAPSULE: 15.6 CAPSULE RESPIRATORY (INHALATION) at 07:02

## 2020-09-06 RX ADMIN — ALBUTEROL SULFATE 2 PUFF: 90 AEROSOL, METERED RESPIRATORY (INHALATION) at 13:05

## 2020-09-06 ASSESSMENT — ENCOUNTER SYMPTOMS
CHILLS: 0
FEVER: 0
NERVOUS/ANXIOUS: 0
FALLS: 0
SORE THROAT: 0
DEPRESSION: 0
CONSTIPATION: 0
HEADACHES: 0
DIARRHEA: 0
DIZZINESS: 0
ABDOMINAL PAIN: 0
WEAKNESS: 0
COUGH: 0
NAUSEA: 0
SHORTNESS OF BREATH: 0
VOMITING: 0
PALPITATIONS: 0
BLURRED VISION: 0

## 2020-09-06 ASSESSMENT — LIFESTYLE VARIABLES
AGITATION: NORMAL ACTIVITY
ORIENTATION AND CLOUDING OF SENSORIUM: ORIENTED AND CAN DO SERIAL ADDITIONS
PAROXYSMAL SWEATS: NO SWEAT VISIBLE
PAROXYSMAL SWEATS: NO SWEAT VISIBLE
AUDITORY DISTURBANCES: NOT PRESENT
VISUAL DISTURBANCES: NOT PRESENT
AGITATION: SOMEWHAT MORE THAN NORMAL ACTIVITY
VISUAL DISTURBANCES: NOT PRESENT
ANXIETY: MILDLY ANXIOUS
VISUAL DISTURBANCES: NOT PRESENT
ANXIETY: MILDLY ANXIOUS
NAUSEA AND VOMITING: NO NAUSEA AND NO VOMITING
VISUAL DISTURBANCES: NOT PRESENT
TREMOR: NO TREMOR
AGITATION: SOMEWHAT MORE THAN NORMAL ACTIVITY
HEADACHE, FULLNESS IN HEAD: NOT PRESENT
AUDITORY DISTURBANCES: NOT PRESENT
TREMOR: NO TREMOR
ORIENTATION AND CLOUDING OF SENSORIUM: ORIENTED AND CAN DO SERIAL ADDITIONS
ANXIETY: MILDLY ANXIOUS
VISUAL DISTURBANCES: NOT PRESENT
ANXIETY: MILDLY ANXIOUS
AGITATION: NORMAL ACTIVITY
HEADACHE, FULLNESS IN HEAD: NOT PRESENT
NAUSEA AND VOMITING: NO NAUSEA AND NO VOMITING
NAUSEA AND VOMITING: NO NAUSEA AND NO VOMITING
TOTAL SCORE: 1
VISUAL DISTURBANCES: NOT PRESENT
ORIENTATION AND CLOUDING OF SENSORIUM: ORIENTED AND CAN DO SERIAL ADDITIONS
NAUSEA AND VOMITING: NO NAUSEA AND NO VOMITING
AGITATION: NORMAL ACTIVITY
ANXIETY: MILDLY ANXIOUS
PAROXYSMAL SWEATS: NO SWEAT VISIBLE
TOTAL SCORE: 1
AUDITORY DISTURBANCES: NOT PRESENT
TOTAL SCORE: 1
ORIENTATION AND CLOUDING OF SENSORIUM: ORIENTED AND CAN DO SERIAL ADDITIONS
AUDITORY DISTURBANCES: NOT PRESENT
HEADACHE, FULLNESS IN HEAD: NOT PRESENT
TREMOR: NO TREMOR
AUDITORY DISTURBANCES: NOT PRESENT
TREMOR: NO TREMOR
ANXIETY: MILDLY ANXIOUS
HEADACHE, FULLNESS IN HEAD: NOT PRESENT
HEADACHE, FULLNESS IN HEAD: NOT PRESENT
AGITATION: NORMAL ACTIVITY
NAUSEA AND VOMITING: NO NAUSEA AND NO VOMITING
ORIENTATION AND CLOUDING OF SENSORIUM: ORIENTED AND CAN DO SERIAL ADDITIONS
TOTAL SCORE: 1
NAUSEA AND VOMITING: NO NAUSEA AND NO VOMITING
PAROXYSMAL SWEATS: NO SWEAT VISIBLE
ORIENTATION AND CLOUDING OF SENSORIUM: ORIENTED AND CAN DO SERIAL ADDITIONS
TOTAL SCORE: 2
PAROXYSMAL SWEATS: NO SWEAT VISIBLE
HEADACHE, FULLNESS IN HEAD: NOT PRESENT
TOTAL SCORE: 2
PAROXYSMAL SWEATS: NO SWEAT VISIBLE
TREMOR: NO TREMOR
AUDITORY DISTURBANCES: NOT PRESENT
TREMOR: NO TREMOR

## 2020-09-06 ASSESSMENT — COGNITIVE AND FUNCTIONAL STATUS - GENERAL
SUGGESTED CMS G CODE MODIFIER MOBILITY: CL
MOVING FROM LYING ON BACK TO SITTING ON SIDE OF FLAT BED: UNABLE
HELP NEEDED FOR BATHING: A LITTLE
DAILY ACTIVITIY SCORE: 18
TOILETING: A LITTLE
DRESSING REGULAR UPPER BODY CLOTHING: A LITTLE
PERSONAL GROOMING: A LITTLE
STANDING UP FROM CHAIR USING ARMS: A LITTLE
TURNING FROM BACK TO SIDE WHILE IN FLAT BAD: A LOT
MOVING TO AND FROM BED TO CHAIR: A LOT
CLIMB 3 TO 5 STEPS WITH RAILING: A LOT
DRESSING REGULAR LOWER BODY CLOTHING: A LITTLE
MOBILITY SCORE: 13
SUGGESTED CMS G CODE MODIFIER DAILY ACTIVITY: CK
WALKING IN HOSPITAL ROOM: A LITTLE
EATING MEALS: A LITTLE

## 2020-09-06 ASSESSMENT — GAIT ASSESSMENTS
ASSISTIVE DEVICE: FRONT WHEEL WALKER
DEVIATION: SHUFFLED GAIT;BRADYKINETIC
DISTANCE (FEET): 20
GAIT LEVEL OF ASSIST: MINIMAL ASSIST

## 2020-09-06 ASSESSMENT — ACTIVITIES OF DAILY LIVING (ADL): TOILETING: INDEPENDENT

## 2020-09-06 ASSESSMENT — PAIN DESCRIPTION - PAIN TYPE
TYPE: ACUTE PAIN

## 2020-09-06 ASSESSMENT — COPD QUESTIONNAIRES
COPD SCREENING SCORE: 9
DURING THE PAST 4 WEEKS HOW MUCH DID YOU FEEL SHORT OF BREATH: MOST  OR ALL OF THE TIME
HAVE YOU SMOKED AT LEAST 100 CIGARETTES IN YOUR ENTIRE LIFE: YES
DO YOU EVER COUGH UP ANY MUCUS OR PHLEGM?: YES, A FEW DAYS A WEEK OR MONTH

## 2020-09-06 ASSESSMENT — FIBROSIS 4 INDEX: FIB4 SCORE: 54.57

## 2020-09-06 NOTE — THERAPY
"Physical Therapy   Initial Evaluation     Patient Name: Fede Parikh Jr.  Age:  75 y.o., Sex:  male  Medical Record #: [id number]  Today's Date: 9/6/2020     Precautions: Fall Risk, Other (See Comments)  B hip wound vacs    Assessment  Patient is 75 y.o. male s/p LE angiogram, B common femoral endarterectomies with stent placement, and femoral bypass. Pt agreeable to work with PT. Pt reporting he does not feel comfortable going home at current functional level. Pt required min assist with bed mobility, STS, bed<>chair transfer. Pt ambulated ~20ft with FWW and min assist with shuffled gait. Pt is limited by pain, weakness, balance deficits. PT will cont while in acute care setting    Plan    Recommend Physical Therapy 3 times per week until therapy goals are met for the following treatments:  Bed Mobility, Community Re-integration, Gait Training, Neuro Re-Education / Balance, Self Care/Home Evaluation, Therapeutic Activities and Therapeutic Exercises    DC Equipment Recommendations: Front-Wheel Walker  Discharge Recommendations: Recommend post-acute placement for additional physical therapy services prior to discharge home          09/06/20 0839   Vitals   O2 (LPM) 1.5   O2 Delivery Device Silicone Nasal Cannula   Prior Living Situation   Prior Services None   Housing / Facility 1 Story Apartment / Condo   Steps Into Home 0   Steps In Home 0   Equipment Owned Single Point Cane   Lives with - Patient's Self Care Capacity Significant Other   Comments Pt reported he lives with \"she.\" Pt did not provide therapist with much information regarding prior assistance   Prior Level of Functional Mobility   Bed Mobility Independent   Transfer Status Independent   Ambulation Independent   Distance Ambulation (Feet)   (community)   Assistive Devices Used None   Comments pt has a SPC but doesnt use it   Cognition    Cognition / Consciousness X   Speech/ Communication Hard of Hearing   Level of Consciousness Alert   Comments " cues for safety   Gait Analysis   Gait Level Of Assist Minimal Assist   Assistive Device Front Wheel Walker   Distance (Feet) 20   # of Times Distance was Traveled 1   Deviation Shuffled Gait;Bradykinetic   # of Stairs Climbed 0   Weight Bearing Status fwb   Comments cues for safety   Bed Mobility    Supine to Sit Minimal Assist   Sit to Supine Minimal Assist   Scooting Supervised   Functional Mobility   Sit to Stand Minimal Assist   Bed, Chair, Wheelchair Transfer Minimal Assist   Mobility in room with FWW   Short Term Goals    Short Term Goal # 1 Pt will be able to complete bed mobility with SPV and HOB flat in 6tx in order to return home   Short Term Goal # 2 Pt will be able to complete functional transfers with FWW and SPV in 6tx in order to return home   Short Term Goal # 3 Pt will be able to ambulate 100ft with FWW and SPV in 6tx i order to return to household ambulating   Anticipated Discharge Equipment and Recommendations   DC Equipment Recommendations Front-Wheel Walker   Discharge Recommendations Recommend post-acute placement for additional physical therapy services prior to discharge home

## 2020-09-06 NOTE — PROGRESS NOTES
Vascular surgery    Postop day 2 status post bilateral common femoral endarterectomies with retrograde right iliac stent and right to left femoral to femoral bypass for critical limb ischemia    Patient awake and alert  Reports some discomfort in the groin regions in the area of the incisions  Both feet are warm and well-perfused    Progressive renal insufficiency  Nephrology consult pending    From vascular surgery standpoint okay to ambulate  Follow    Jean-Claude Praod MD

## 2020-09-06 NOTE — THERAPY
Occupational Therapy   Initial Evaluation     Patient Name: Fede Parikh Jr.  Age:  75 y.o., Sex:  male  Medical Record #: [id number]  Today's Date: 9/6/2020     Precautions  Precautions: Fall Risk, Other (See Comments)  Comments: B wound vacs on hips    Assessment  Patient is 75 y.o. male with a diagnosis of occlusion of distal aorta, at bifurcation, S?P surgical fix. 2 drains in place.  Additional factors influencing patient status / progress: limited community support, polysubstance abuse.      Plan    Recommend Occupational Therapy 3 times per week until therapy goals are met for the following treatments:  Self Care/Activities of Daily Living, Therapeutic Activities and Therapeutic Exercises.    DC Equipment Recommendations: None  Discharge Recommendations: Recommend home health for continued occupational therapy services     Subjective    initially groggy and requiring cues to engage, once fully awake, actively participatory in eval.     Objective       09/06/20 0830   Total Time Spent   Total Time Spent (Mins) 44   Charge Group   OT Evaluation OT Evaluation Mod   Initial Contact Note    Initial Contact Note Order Received and Verified, Occupational Therapy Evaluation in Progress with Full Report to Follow.   Prior Living Situation   Prior Services None   Housing / Facility 1 Story Apartment / Condo   Steps Into Home 0   Steps In Home 0   Elevator Yes   Bathroom Set up Bathtub / Shower Combination;Walk In Shower   Equipment Owned Single Point Cane   Lives with - Patient's Self Care Capacity Significant Other   Prior Level of ADL Function   Self Feeding Independent   Grooming / Hygiene Independent   Bathing Independent   Dressing Independent   Toileting Independent   Prior Level of IADL Function   Medication Management Independent   Laundry Requires Assist   Kitchen Mobility Independent   Finances Requires Assist   Home Management Requires Assist   Shopping Requires Assist   Prior Level Of Mobility  Independent With Device in Home   History of Falls   Date of Last Fall   (reason for admit)   Precautions   Precautions Fall Risk   Comments 2 drains   Vitals   O2 (LPM) 1.5   O2 Delivery Device Silicone Nasal Cannula   Pain 0 - 10 Group   Therapist Pain Assessment Post Activity Pain Same as Prior to Activity;Nurse Notified;0   Cognition    Level of Consciousness Alert   Comments slowed responses, asks for frequent repetition   Passive ROM Upper Body   Passive ROM Upper Body WDL   Active ROM Upper Body   Active ROM Upper Body  WDL   Dominant Hand Right   Strength Upper Body   Upper Body Strength  WDL   Sensation Upper Body   Upper Extremity Sensation  WDL   Upper Body Muscle Tone   Upper Body Muscle Tone  WDL   Coordination Upper Body   Coordination WDL   Balance Assessment   Sitting Balance (Static) Fair +   Sitting Balance (Dynamic) Fair +   Standing Balance (Static) Fair   Standing Balance (Dynamic) Fair -   Weight Shift Sitting Fair   Weight Shift Standing Fair   Comments limited by weakness   Bed Mobility    Supine to Sit Minimal Assist   Sit to Supine Minimal Assist   Scooting Supervised   Rolling Supervised   ADL Assessment   Eating   (NT)   Grooming Supervision;Seated   Bathing Supervision   Upper Body Dressing Minimal Assist   Lower Body Dressing Moderate Assist   Toileting Minimal Assist  (set up with urinal)   How much help from another person does the patient currently need...   Putting on and taking off regular lower body clothing? 3   Bathing (including washing, rinsing, and drying)? 3   Toileting, which includes using a toilet, bedpan, or urinal? 3   Putting on and taking off regular upper body clothing? 3   Taking care of personal grooming such as brushing teeth? 3   Eating meals? 3   6 Clicks Daily Activity Score 18   Functional Mobility   Sit to Stand Minimal Assist   Bed, Chair, Wheelchair Transfer Minimal Assist   Toilet Transfers Minimal Assist   Transfer Method Stand Pivot  (using FWW)   Visual  Perception   Visual Perception  WDL   Patient / Family Goals   Patient / Family Goal #1 get strong enough to go home   Short Term Goals   Short Term Goal # 1 supervised level for toileting tasks   Short Term Goal # 2 supervised level for neessary functional transfers   Short Term Goal # 3 supervised level for seated dressing tasks   Education Group   Role of Occupational Therapist Patient Response Patient;Acceptance;Explanation;Demonstration;Reinforcement Needed   Problem List   Problem List Decreased Active Daily Living Skills;Decreased Functional Mobility;Decreased Activity Tolerance;Safety Awareness Deficits / Cognition   Anticipated Discharge Equipment and Recommendations   DC Equipment Recommendations None   Discharge Recommendations Recommend home health for continued occupational therapy services   Interdisciplinary Plan of Care Collaboration   IDT Collaboration with  Nursing   Patient Position at End of Therapy In Bed;Bed Alarm On;Call Light within Reach;Tray Table within Reach;Phone within Reach   Collaboration Comments Rn aware of functional level, needs for home   Session Information   Date / Session Number  9/6,1(1/3,9/11)   Priority 2

## 2020-09-06 NOTE — CARE PLAN
Problem: Communication  Goal: The ability to communicate needs accurately and effectively will improve  Outcome: PROGRESSING AS EXPECTED     Problem: Safety  Goal: Will remain free from injury  Outcome: PROGRESSING AS EXPECTED     Problem: Safety  Goal: Will remain free from falls  Outcome: PROGRESSING AS EXPECTED     Problem: Infection  Goal: Will remain free from infection  Outcome: PROGRESSING AS EXPECTED     Problem: Venous Thromboembolism (VTW)/Deep Vein Thrombosis (DVT) Prevention:  Goal: Patient will participate in Venous Thrombosis (VTE)/Deep Vein Thrombosis (DVT)Prevention Measures  Outcome: PROGRESSING AS EXPECTED     Problem: Bowel/Gastric:  Goal: Normal bowel function is maintained or improved  Outcome: PROGRESSING AS EXPECTED

## 2020-09-06 NOTE — CONSULTS
Nephrology Consultation    Date of Service  9/6/2020    Referring Physician  Gerson Mazariegos D.O.    Consulting Physician  Mushtaq Rutledge M.D.    Reason for Consultation  SHANNAN on CKD    History of Presenting Illness  75 y.o. male with history of HTN, CKD, polysubstance abuse who presented 9/3/2020 with leg pain.    He says his legs were hurting for a while when he was walking around. The pain progressed to the point where his legs were hurting at rest. He came in to the ER and was found to have critical limb ischemia. He was taken to the OR on 9/4/20 for angiogram or LE and wire was also placed in the aorta.    He denies using NSAIDs prior to admission, denies urinary difficulties. He currently denies chest pain, SOB, headache, nausea, vomiting. He says his appetite is fair.       Review of Systems  Review of Systems   Constitutional: Negative for fever.   Respiratory: Negative for shortness of breath.    Cardiovascular: Negative for chest pain.   Gastrointestinal: Negative for abdominal pain.   All other systems reviewed and are negative.      Past Medical History  Past Medical History:   Diagnosis Date   • Hepatitis    • Hypertension    • Pulmonary emphysema (HCC)        Surgical History  Past Surgical History:   Procedure Laterality Date   • FEMORAL FEMORAL BYPASS Bilateral 9/4/2020    Procedure: CREATION, BYPASS, ARTERIAL, FEMORAL TO FEMORAL, USING GRAFT;  Surgeon: Jean-Claude Prado M.D.;  Location: SURGERY [facility];  Service: General       Family History  Family History   Problem Relation Age of Onset   • Heart Disease Mother    • Cancer Mother    • Diabetes Brother        Social History  Social History     Socioeconomic History   • Marital status: Single     Spouse name: Not on file   • Number of children: Not on file   • Years of education: Not on file   • Highest education level: Not on file   Occupational History   • Not on file   Social Needs   • Financial resource strain: Not on file   • Food insecurity      Worry: Not on file     Inability: Not on file   • Transportation needs     Medical: Not on file     Non-medical: Not on file   Tobacco Use   • Smoking status: Current Some Day Smoker     Packs/day: 0.50     Types: Cigarettes   • Smokeless tobacco: Never Used   • Tobacco comment: pt slowly weaning down.   Substance and Sexual Activity   • Alcohol use: Yes     Frequency: 4 or more times a week     Drinks per session: 5 or 6   • Drug use: Yes     Types: Inhaled     Comment: cocaine (2 days ago), marijuana   • Sexual activity: Not on file   Lifestyle   • Physical activity     Days per week: Not on file     Minutes per session: Not on file   • Stress: Not on file   Relationships   • Social connections     Talks on phone: Not on file     Gets together: Not on file     Attends [employer] service: Not on file     Active member of club or organization: Not on file     Attends meetings of clubs or organizations: Not on file     Relationship status: Not on file   • Intimate partner violence     Fear of current or ex partner: Not on file     Emotionally abused: Not on file     Physically abused: Not on file     Forced sexual activity: Not on file   Other Topics Concern   • Not on file   Social History Narrative   • Not on file       Medications  Current Facility-Administered Medications   Medication Dose Route Frequency Provider Last Rate Last Dose   • nicotine (NICODERM) 14 MG/24HR 14 mg  14 mg Transdermal Daily-0600 Gerson Mazariegos D.O.        And   • nicotine polacrilex (NICORETTE) 2 MG piece 2 mg  2 mg Oral Q HOUR PRN STAN Gardner.O.       • albuterol inhaler 2 Puff  2 Puff Inhalation Q4H PRN (RT) Reena Marin M.D.   2 Puff at 09/06/20 0540   • amLODIPine (NORVASC) tablet 5 mg  5 mg Oral DAILY Reena Marin M.D.   5 mg at 09/06/20 0439   • aspirin EC (ECOTRIN) tablet 81 mg  81 mg Oral DAILY Reena Marin M.D.   81 mg at 09/06/20 0438   • atorvastatin (LIPITOR) tablet 40 mg  40 mg Oral DAILY Reena Marin M.D.   40 mg at  09/06/20 0439   • budesonide-formoterol (SYMBICORT) 160-4.5 MCG/ACT inhaler 2 Puff  2 Puff Inhalation BID (RT) Reena Marin M.D.   2 Puff at 09/06/20 0702   • omeprazole (PRILOSEC) capsule 20 mg  20 mg Oral DAILY Reena Marin M.D.   20 mg at 09/06/20 0439   • glycopyrrolate (Seebri) 15.6 mcg inhalation capsule 1 Cap  1 Cap Inhalation BID (RT) Reena Marin M.D.   1 Cap at 09/06/20 0702   • senna-docusate (PERICOLACE or SENOKOT S) 8.6-50 MG per tablet 2 Tab  2 Tab Oral BID Reena Marin M.D.   2 Tab at 09/06/20 0438    And   • polyethylene glycol/lytes (MIRALAX) PACKET 1 Packet  1 Packet Oral QDAY PRN Reena Marin M.D.        And   • magnesium hydroxide (MILK OF MAGNESIA) suspension 30 mL  30 mL Oral QDAY PRN Reena Marin M.D.        And   • bisacodyl (DULCOLAX) suppository 10 mg  10 mg Rectal QDAY PRN Reena Marin M.D.       • lactated ringers infusion   Intravenous Continuous Reena Marin M.D. 75 mL/hr at 09/06/20 0438     • acetaminophen (TYLENOL) tablet 650 mg  650 mg Oral Q6HRS PRN Reena Marin M.D.       • LORazepam (ATIVAN) tablet 0.5 mg  0.5 mg Oral Q4HRS PRN Reena Marin M.D.       • LORazepam (ATIVAN) tablet 1 mg  1 mg Oral Q4HRS PRN Reena Marin M.D.        Or   • LORazepam (ATIVAN) injection 0.5 mg  0.5 mg Intravenous Q4HRS PRN Reena Marin M.D.       • LORazepam (ATIVAN) tablet 2 mg  2 mg Oral Q2HRS PRN Reena Marin M.D.        Or   • LORazepam (ATIVAN) injection 1 mg  1 mg Intravenous Q2HRS PRN Reena Marin M.D.       • LORazepam (ATIVAN) tablet 3 mg  3 mg Oral Q HOUR PRN Reena Marin M.D.        Or   • LORazepam (ATIVAN) injection 1.5 mg  1.5 mg Intravenous Q HOUR PRN Reena Marin M.D.       • LORazepam (ATIVAN) tablet 4 mg  4 mg Oral Q15 MIN PRN Reena Marin M.D.        Or   • LORazepam (ATIVAN) injection 2 mg  2 mg Intravenous Q15 MIN PRN Reena Marin M.D.       • HYDROmorphone pf (DILAUDID) injection 0.5 mg  0.5 mg Intravenous Q2HRS PRN Reena Marin M.D.   0.5 mg at 09/04/20 2049   • Pharmacy Consult  Request ...Pain Management Review 1 Each  1 Each Other PHARMACY TO DOSE CRIS AraujoP.N.       • ondansetron (ZOFRAN) syringe/vial injection 4 mg  4 mg Intravenous Q4HRS PRN CARTER Araujo.P.N.       • dexamethasone (DECADRON) injection 4 mg  4 mg Intravenous Once PRN CARTER Araujo.P.N.       • diphenhydrAMINE (BENADRYL) injection 25 mg  25 mg Intravenous Q6HRS PRN CARTER Araujo.P.N.       • haloperidol lactate (HALDOL) injection 1 mg  1 mg Intravenous Q6HRS PRN CARTER Araujo.P.N.       • scopolamine (TRANSDERM-SCOP) patch 1 Patch  1 Patch Transdermal Q72HRS PRN CARTER Araujo.P.N.       • oxyCODONE immediate-release (ROXICODONE) tablet 5 mg  5 mg Oral Q3HRS PRN CARTER Araujo.P.N.   5 mg at 09/05/20 1612   • cloNIDine (CATAPRES) tablet 0.2 mg  0.2 mg Oral Once PRN CARTER Araujo.P.N.        And   • cloNIDine (CATAPRES) tablet 0.1 mg  0.1 mg Oral Q HOUR PRN CARTER Araujo.P.N.       • enalaprilat (VASOTEC) injection 2.5 mg  2.5 mg Intravenous Q6HRS PRN Kristen Rodriguez A.P.N.           Allergies  No Known Allergies    Physical Exam  Temp:  [36.5 °C (97.7 °F)-37.4 °C (99.3 °F)] 36.5 °C (97.7 °F)  Pulse:  [] 88  Resp:  [16-18] 18  BP: (117-145)/(65-89) 145/89  SpO2:  [90 %-100 %] 100 %    Physical Exam   Constitutional: He is oriented to person, place, and time. He appears well-developed. No distress.   AA man   HENT:   Mouth/Throat: Oropharynx is clear and moist. No oropharyngeal exudate.   Eyes: EOM are normal. No scleral icterus.   Neck: No tracheal deviation present.   Cardiovascular: Normal rate and normal heart sounds.   No murmur heard.  Pulmonary/Chest: Effort normal and breath sounds normal. No stridor. He has no rales.   Abdominal: Soft. He exhibits no distension. There is no abdominal tenderness.   Musculoskeletal: Normal range of motion.         General: No edema.   Neurological: He is alert and oriented to person, place, and time.   Skin: Skin is warm and dry. He is not diaphoretic.    Psychiatric: He has a normal mood and affect.       Fluids  Date 09/06/20 0700 - 09/07/20 0659   Shift 0418-8973 5003-6021 0616-4701 24 Hour Total   INTAKE   Shift Total       OUTPUT   Urine 100   100   Shift Total 100   100   Weight (kg) 80.9 80.9 80.9 80.9       Laboratory  Labs reviewed, pertinent labs below.  Recent Labs     09/03/20  1723 09/05/20  0407 09/06/20  0033   WBC 7.6 9.7 8.9   RBC 3.95* 3.48* 3.43*   HEMOGLOBIN 12.8* 11.3* 11.2*   HEMATOCRIT 37.1* 32.8* 32.3*   MCV 93.9 94.3 94.2   MCH 32.4 32.5 32.7   MCHC 34.5 34.5 34.7   RDW 47.5 45.0 45.6   PLATELETCT 164 127* 113*   MPV 11.0 10.5 10.8     Recent Labs     09/03/20  1723 09/05/20  0407 09/06/20  0033   SODIUM 138 133* 135   POTASSIUM 4.2 4.8 4.2   CHLORIDE 101 98 100   CO2 10* 20 21   GLUCOSE 108* 120* 100*   BUN 17 28* 33*   CREATININE 1.51* 3.07* 4.18*   CALCIUM 9.3 7.8* 7.9*     Recent Labs     09/03/20  2250   APTT 22.9*   INR 1.01            URINALYSIS:  No results found for: COLORURINE, CLARITY, SPECGRAVITY, PHURINE, KETONES, PROTEINURIN, BILIRUBINUR, UROBILU, NITRITE, LEUKESTERAS, OCCULTBLOOD  UPC  No results found for: TOTPROTUR No results found for: CREATININEU    Imaging reviewed  DX-PORTABLE FLUORO > 1 HOUR   Final Result      11 minutes 44 seconds fluoroscopy time utilized by Dr. Prado for vascular procedure.      CT-CTA AORTA-RO WITH & W/O-POST PROCESS   Final Result         CTA AORTA      1.  Occlusion of the distal aorta at the bifurcation extending in the right common and external iliac arteries. Reconstitution of the right common femoral artery is seen.   2.  Dense calcification of the origin of the left common iliac artery appears to result in proximal occlusion with reconstitution in the distal common iliac artery.   3.  Heterogeneous right thyroid lesion, recommend follow-up thyroid sonography for further characterization due to lesion size.   4.  Hepatomegaly      CTA LOWER EXTREMITIES      1.  Scattered atherosclerotic  changes throughout the right lower extremity without 50% stenosis, the distal right lower cavity vessels are not visualized and there is 0 vessel runoff at the right ankle.   2.  70% stenosis at the origin of the left common femoral artery.   3.  Scattered atherosclerotic calcifications throughout the left lower extremity resulting in approximately 50% stenosis at the popliteal artery, there appears to be three-vessel runoff the left ankle.      3D angiographic/MIP images of the vasculature confirm the vascular findings as described above.      US-DANIELE SINGLE LEVEL BILAT   Final Result      US-EXTREMITY ARTERY LOWER BILAT   Final Result            Assessment/Plan  75 y.o. male with history of HTN, CKD, polysubstance abuse who presented 9/3/2020 with critical limb ischemia, s/p revascularization 9/4/20, with course complicated by SHANNAN.    1. SHANNAN on CKD3, non-oliguric, worsening. Baseline Scr 1.5-1.9. SHANNAN likely from contrast induced nephropathy or atheroembolic disease. No acute need for RRT. Check labs daily. Monitor urine output. Avoid further nephrotoxins. Check UA, UPCR, MACR.    2. PVD, with critical limb ischemia on admission, s/p revascularization surgery on 9/4/20. Further management per vascular surgery.     3. Azotemia, worsening. No signs of uremia. Check labs daily. No acute need for RRT.     4. HTN, controlled. If becomes volume overloaded, start lasix 80mg IV BID.     5. Normocytic anemia, worsening. Unclear etiology. Check CBC daily.     6. Alcohol use, high risk for withdrawal. Monitor CIWA for withdrawal.     7. Thrombocytopenia, likely from alcohol use. Worsening. Check CBC daily.     Prognosis guarded  Discussed with Dr. Yair Rutledge MD  Nephrology

## 2020-09-06 NOTE — PROGRESS NOTES
[facility] Medicine Daily Progress Note    Date of Service  9/6/2020    Chief Complaint  75 y.o. male admitted 9/3/2020 with leg pain, loss of consciousness    Hospital Course    Mr. Fede Parikh Jr. is a 75 y.o. male with a history of polysubstance abuse who presented on 9/3/2020 with loss of consciousness.  Patient was found down on the sidewalk and was found to be severely hypoglycemic.  He is a poor historian.  Blood alcohol level was 123.  It is unknown for how long he was down.  He did report right leg pain which has been chronic but exacerbated recently and states his leg gave out which caused him to fall.  A CT angiogram showed dense calcific disease throughout the distal aorta and iliac system, right iliac artery is occluded with reconstitution of the right femoral artery from the profunda femoris artery, questionable high-grade stenosis versus occlusion of the left common femoral artery with reconstitution of the external iliac artery.  Vascular surgery was consulted.  Was started on a heparin drip.  Status post bilateral common femoral and profunda femoris artery endarterectomies with bovine patch angioplasty, right to left femoral to femoral artery bypass using 6 mm PTFE, angioplasty and stenting of right common iliac artery, right iliac thrombectomy 9/4.  Patient did have an acute kidney injury on chronic kidney disease stage III.  Nephrology was consulted and felt that this was secondary to contrast during angiogram/revascularization versus atheroemboli from chronic limb ischemia.      Interval Problem Update  Patient was seen and examined at bedside.  I have personally reviewed vitals, labs, and imaging.    9/5.  Afebrile.  Hypertension has resolved.  On 0-2 L nasal cannula.  Acute kidney injury likely secondary to contrast versus hypertension.  Denies fevers, chills any chest pains or shortness of breath.  Reports pain is well controlled.  Request nicotine patches.    9/6.  Afebrile.  Episodes  of tachycardia.  On 0-1.5 L nasal cannula.  Denies fever, chills, chest pains, shortness of breath.  Reports normal urine output.  Discussed with him nephrology about urine output.  Denies fever, chills, chest pain, shortness of breath    Consultants/Specialty  Vascular    Code Status  Full Code    Disposition  Continue to monitor on telemetry    Review of Systems  Review of Systems   Constitutional: Negative for chills and fever.   HENT: Negative for congestion and sore throat.    Eyes: Negative for blurred vision.   Respiratory: Negative for cough and shortness of breath.    Cardiovascular: Negative for chest pain, palpitations and leg swelling.   Gastrointestinal: Negative for abdominal pain, constipation, diarrhea, nausea and vomiting.   Genitourinary: Negative for dysuria, frequency and urgency.   Musculoskeletal: Negative for falls.   Skin: Negative for rash.   Neurological: Negative for dizziness, weakness and headaches.   Psychiatric/Behavioral: Negative for depression. The patient is not nervous/anxious.    All other systems reviewed and are negative.       Physical Exam  Temp:  [36.5 °C (97.7 °F)-37.4 °C (99.3 °F)] 36.5 °C (97.7 °F)  Pulse:  [] 88  Resp:  [16-18] 18  BP: (117-145)/(65-89) 145/89  SpO2:  [90 %-100 %] 100 %    Physical Exam  Vitals signs and nursing note reviewed.   Constitutional:       General: He is not in acute distress.     Appearance: Normal appearance. He is normal weight.   HENT:      Head: Normocephalic and atraumatic.      Nose: Nose normal.      Mouth/Throat:      Mouth: Mucous membranes are moist.      Pharynx: Oropharynx is clear.   Eyes:      Extraocular Movements: Extraocular movements intact.      Conjunctiva/sclera: Conjunctivae normal.   Neck:      Musculoskeletal: Normal range of motion and neck supple.   Cardiovascular:      Rate and Rhythm: Normal rate and regular rhythm.      Pulses: Normal pulses.      Heart sounds: Normal heart sounds. No murmur. No friction  rub. No gallop.    Pulmonary:      Effort: Pulmonary effort is normal. No respiratory distress.      Breath sounds: Normal breath sounds. No wheezing or rales.   Chest:      Chest wall: No tenderness.   Abdominal:      General: Abdomen is flat. Bowel sounds are normal. There is no distension.      Palpations: Abdomen is soft. There is no mass.      Tenderness: There is no abdominal tenderness. There is no guarding.   Musculoskeletal: Normal range of motion.      Comments: Preveena in place.  Positive pulses and good cap refill in lower extremities.   Skin:     General: Skin is warm.      Capillary Refill: Capillary refill takes less than 2 seconds.   Neurological:      General: No focal deficit present.      Mental Status: He is alert and oriented to person, place, and time. Mental status is at baseline.      Cranial Nerves: No cranial nerve deficit.      Motor: No weakness.   Psychiatric:         Mood and Affect: Mood normal.         Behavior: Behavior normal.         Fluids    Intake/Output Summary (Last 24 hours) at 9/6/2020 0753  Last data filed at 9/6/2020 0438  Gross per 24 hour   Intake 2530 ml   Output 750 ml   Net 1780 ml       Laboratory  Recent Labs     09/03/20  1723 09/05/20  0407 09/06/20  0033   WBC 7.6 9.7 8.9   RBC 3.95* 3.48* 3.43*   HEMOGLOBIN 12.8* 11.3* 11.2*   HEMATOCRIT 37.1* 32.8* 32.3*   MCV 93.9 94.3 94.2   MCH 32.4 32.5 32.7   MCHC 34.5 34.5 34.7   RDW 47.5 45.0 45.6   PLATELETCT 164 127* 113*   MPV 11.0 10.5 10.8     Recent Labs     09/03/20  1723 09/05/20  0407 09/06/20  0033   SODIUM 138 133* 135   POTASSIUM 4.2 4.8 4.2   CHLORIDE 101 98 100   CO2 10* 20 21   GLUCOSE 108* 120* 100*   BUN 17 28* 33*   CREATININE 1.51* 3.07* 4.18*   CALCIUM 9.3 7.8* 7.9*     Recent Labs     09/03/20  2250   APTT 22.9*   INR 1.01         Recent Labs     09/05/20  0407   TRIGLYCERIDE 89   HDL 73   LDL 32       Imaging  DX-PORTABLE FLUORO > 1 HOUR   Final Result      11 minutes 44 seconds fluoroscopy time  utilized by Dr. Prado for vascular procedure.      CT-CTA AORTA-RO WITH & W/O-POST PROCESS   Final Result         CTA AORTA      1.  Occlusion of the distal aorta at the bifurcation extending in the right common and external iliac arteries. Reconstitution of the right common femoral artery is seen.   2.  Dense calcification of the origin of the left common iliac artery appears to result in proximal occlusion with reconstitution in the distal common iliac artery.   3.  Heterogeneous right thyroid lesion, recommend follow-up thyroid sonography for further characterization due to lesion size.   4.  Hepatomegaly      CTA LOWER EXTREMITIES      1.  Scattered atherosclerotic changes throughout the right lower extremity without 50% stenosis, the distal right lower cavity vessels are not visualized and there is 0 vessel runoff at the right ankle.   2.  70% stenosis at the origin of the left common femoral artery.   3.  Scattered atherosclerotic calcifications throughout the left lower extremity resulting in approximately 50% stenosis at the popliteal artery, there appears to be three-vessel runoff the left ankle.      3D angiographic/MIP images of the vasculature confirm the vascular findings as described above.      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 Prado in ED and will plan for pre-op  -Pain control with opioids for now, however will need to reduce use post-op as there is component of drug seeking behavior   -Periop garcia score of 1.51%    S/p surgery 9/5.  Continue pain management and postop care.    High anion gap metabolic acidosis- (present on admission)  Assessment & Plan  Anion gap acidosis resolved with fluid resuscitation.  Likely secondary to limb ischemia  Continue fluid resuscitation and continue to monitor    Alcohol use-  (present on admission)  Assessment & Plan  -Thiamine and folic acid to be supplemented  -CIWA protocol  No signs of withdrawal at this time.    Essential hypertension  Assessment & Plan  -May be pain driven and will control prior to surgery with opioids  -Continue Norvasc 5mg  -Will give hydralazine IV cautiously and monitor HR for tachy  -Patient used cocaine 2 days ago and will avoid beta blockers for control at this time    COPD (chronic obstructive pulmonary disease) (HCC)- (present on admission)  Assessment & Plan  -No acute respiratory issues  -Albuterol PRN  -BREO and seebri daily    SHANNAN (acute kidney injury) (HCC)- (present on admission)  Assessment & Plan  Likely secondary to contrast during angiography and angioplasty versus atheroemboli  Had recent renal ultrasound 8/14/2020 which was normal.  Nephrology is following  Monitor kidney function and urine output.  Avoid nephrotoxins    History of hepatitis C- (present on admission)  Assessment & Plan  Previously followed with Dr. Bella and completed treatment.  No follow-up was recommended.       VTE prophylaxis: Avoid anticoagulation per vascular surgery

## 2020-09-07 LAB
ANION GAP SERPL CALC-SCNC: 14 MMOL/L (ref 7–16)
BASOPHILS # BLD AUTO: 0.1 % (ref 0–1.8)
BASOPHILS # BLD: 0.01 K/UL (ref 0–0.12)
BUN SERPL-MCNC: 29 MG/DL (ref 8–22)
CALCIUM SERPL-MCNC: 8.1 MG/DL (ref 8.5–10.5)
CHLORIDE SERPL-SCNC: 101 MMOL/L (ref 96–112)
CO2 SERPL-SCNC: 21 MMOL/L (ref 20–33)
CREAT SERPL-MCNC: 3.38 MG/DL (ref 0.5–1.4)
EOSINOPHIL # BLD AUTO: 0.03 K/UL (ref 0–0.51)
EOSINOPHIL NFR BLD: 0.4 % (ref 0–6.9)
ERYTHROCYTE [DISTWIDTH] IN BLOOD BY AUTOMATED COUNT: 44.1 FL (ref 35.9–50)
GLUCOSE SERPL-MCNC: 109 MG/DL (ref 65–99)
HCT VFR BLD AUTO: 28.1 % (ref 42–52)
HGB BLD-MCNC: 9.5 G/DL (ref 14–18)
IMM GRANULOCYTES # BLD AUTO: 0.04 K/UL (ref 0–0.11)
IMM GRANULOCYTES NFR BLD AUTO: 0.6 % (ref 0–0.9)
LYMPHOCYTES # BLD AUTO: 1.15 K/UL (ref 1–4.8)
LYMPHOCYTES NFR BLD: 16.1 % (ref 22–41)
MAGNESIUM SERPL-MCNC: 1.7 MG/DL (ref 1.5–2.5)
MCH RBC QN AUTO: 32.3 PG (ref 27–33)
MCHC RBC AUTO-ENTMCNC: 33.8 G/DL (ref 33.7–35.3)
MCV RBC AUTO: 95.6 FL (ref 81.4–97.8)
MONOCYTES # BLD AUTO: 0.61 K/UL (ref 0–0.85)
MONOCYTES NFR BLD AUTO: 8.6 % (ref 0–13.4)
NEUTROPHILS # BLD AUTO: 5.29 K/UL (ref 1.82–7.42)
NEUTROPHILS NFR BLD: 74.2 % (ref 44–72)
NRBC # BLD AUTO: 0 K/UL
NRBC BLD-RTO: 0 /100 WBC
PHOSPHATE SERPL-MCNC: 2.8 MG/DL (ref 2.5–4.5)
PLATELET # BLD AUTO: 104 K/UL (ref 164–446)
PMV BLD AUTO: 10.9 FL (ref 9–12.9)
POTASSIUM SERPL-SCNC: 4.1 MMOL/L (ref 3.6–5.5)
RBC # BLD AUTO: 2.94 M/UL (ref 4.7–6.1)
SODIUM SERPL-SCNC: 136 MMOL/L (ref 135–145)
WBC # BLD AUTO: 7.1 K/UL (ref 4.8–10.8)

## 2020-09-07 PROCEDURE — 99233 SBSQ HOSP IP/OBS HIGH 50: CPT | Performed by: INTERNAL MEDICINE

## 2020-09-07 PROCEDURE — 80048 BASIC METABOLIC PNL TOTAL CA: CPT

## 2020-09-07 PROCEDURE — A9270 NON-COVERED ITEM OR SERVICE: HCPCS | Performed by: INTERNAL MEDICINE

## 2020-09-07 PROCEDURE — 700105 HCHG RX REV CODE 258: Performed by: INTERNAL MEDICINE

## 2020-09-07 PROCEDURE — 94760 N-INVAS EAR/PLS OXIMETRY 1: CPT

## 2020-09-07 PROCEDURE — 700102 HCHG RX REV CODE 250 W/ 637 OVERRIDE(OP): Performed by: NURSE PRACTITIONER

## 2020-09-07 PROCEDURE — 36415 COLL VENOUS BLD VENIPUNCTURE: CPT

## 2020-09-07 PROCEDURE — 700102 HCHG RX REV CODE 250 W/ 637 OVERRIDE(OP): Performed by: INTERNAL MEDICINE

## 2020-09-07 PROCEDURE — 94640 AIRWAY INHALATION TREATMENT: CPT

## 2020-09-07 PROCEDURE — 83735 ASSAY OF MAGNESIUM: CPT

## 2020-09-07 PROCEDURE — 84100 ASSAY OF PHOSPHORUS: CPT

## 2020-09-07 PROCEDURE — 770020 HCHG ROOM/CARE - TELE (206)

## 2020-09-07 PROCEDURE — 85025 COMPLETE CBC W/AUTO DIFF WBC: CPT

## 2020-09-07 PROCEDURE — 99232 SBSQ HOSP IP/OBS MODERATE 35: CPT | Performed by: INTERNAL MEDICINE

## 2020-09-07 PROCEDURE — 700101 HCHG RX REV CODE 250: Performed by: INTERNAL MEDICINE

## 2020-09-07 PROCEDURE — A9270 NON-COVERED ITEM OR SERVICE: HCPCS | Performed by: NURSE PRACTITIONER

## 2020-09-07 RX ADMIN — AMLODIPINE BESYLATE 5 MG: 5 TABLET ORAL at 04:08

## 2020-09-07 RX ADMIN — OXYCODONE HYDROCHLORIDE 5 MG: 5 TABLET ORAL at 17:20

## 2020-09-07 RX ADMIN — OMEPRAZOLE 20 MG: 20 CAPSULE, DELAYED RELEASE ORAL at 04:08

## 2020-09-07 RX ADMIN — GLYCOPYRROLATE 1 CAPSULE: 15.6 CAPSULE RESPIRATORY (INHALATION) at 04:09

## 2020-09-07 RX ADMIN — POLYETHYLENE GLYCOL 3350 1 PACKET: 17 POWDER, FOR SOLUTION ORAL at 04:08

## 2020-09-07 RX ADMIN — OXYCODONE HYDROCHLORIDE 5 MG: 5 TABLET ORAL at 21:02

## 2020-09-07 RX ADMIN — OXYCODONE HYDROCHLORIDE 5 MG: 5 TABLET ORAL at 00:33

## 2020-09-07 RX ADMIN — OXYCODONE HYDROCHLORIDE 5 MG: 5 TABLET ORAL at 09:58

## 2020-09-07 RX ADMIN — GLYCOPYRROLATE 1 CAPSULE: 15.6 CAPSULE RESPIRATORY (INHALATION) at 18:25

## 2020-09-07 RX ADMIN — BUDESONIDE AND FORMOTEROL FUMARATE DIHYDRATE 2 PUFF: 160; 4.5 AEROSOL RESPIRATORY (INHALATION) at 18:25

## 2020-09-07 RX ADMIN — SODIUM CHLORIDE, POTASSIUM CHLORIDE, SODIUM LACTATE AND CALCIUM CHLORIDE: 600; 310; 30; 20 INJECTION, SOLUTION INTRAVENOUS at 12:40

## 2020-09-07 RX ADMIN — BUDESONIDE AND FORMOTEROL FUMARATE DIHYDRATE 2 PUFF: 160; 4.5 AEROSOL RESPIRATORY (INHALATION) at 04:09

## 2020-09-07 RX ADMIN — ALBUTEROL SULFATE 2 PUFF: 90 AEROSOL, METERED RESPIRATORY (INHALATION) at 00:32

## 2020-09-07 RX ADMIN — ASPIRIN 81 MG: 81 TABLET, COATED ORAL at 04:08

## 2020-09-07 RX ADMIN — ATORVASTATIN CALCIUM 40 MG: 40 TABLET, FILM COATED ORAL at 04:08

## 2020-09-07 RX ADMIN — ALBUTEROL SULFATE 2 PUFF: 90 AEROSOL, METERED RESPIRATORY (INHALATION) at 09:56

## 2020-09-07 RX ADMIN — DOCUSATE SODIUM 50 MG AND SENNOSIDES 8.6 MG 2 TABLET: 8.6; 5 TABLET, FILM COATED ORAL at 17:56

## 2020-09-07 ASSESSMENT — LIFESTYLE VARIABLES
TOTAL SCORE: 1
NAUSEA AND VOMITING: NO NAUSEA AND NO VOMITING
AUDITORY DISTURBANCES: NOT PRESENT
AUDITORY DISTURBANCES: NOT PRESENT
ANXIETY: NO ANXIETY (AT EASE)
NAUSEA AND VOMITING: NO NAUSEA AND NO VOMITING
ORIENTATION AND CLOUDING OF SENSORIUM: ORIENTED AND CAN DO SERIAL ADDITIONS
ORIENTATION AND CLOUDING OF SENSORIUM: ORIENTED AND CAN DO SERIAL ADDITIONS
TOTAL SCORE: 0
VISUAL DISTURBANCES: NOT PRESENT
AGITATION: NORMAL ACTIVITY
TREMOR: NO TREMOR
VISUAL DISTURBANCES: NOT PRESENT
ANXIETY: MILDLY ANXIOUS
PAROXYSMAL SWEATS: NO SWEAT VISIBLE
HEADACHE, FULLNESS IN HEAD: NOT PRESENT
HEADACHE, FULLNESS IN HEAD: NOT PRESENT
NAUSEA AND VOMITING: NO NAUSEA AND NO VOMITING
VISUAL DISTURBANCES: NOT PRESENT
NAUSEA AND VOMITING: NO NAUSEA AND NO VOMITING
PAROXYSMAL SWEATS: NO SWEAT VISIBLE
AGITATION: NORMAL ACTIVITY
ORIENTATION AND CLOUDING OF SENSORIUM: ORIENTED AND CAN DO SERIAL ADDITIONS
TOTAL SCORE: 1
AGITATION: NORMAL ACTIVITY
AGITATION: NORMAL ACTIVITY
ORIENTATION AND CLOUDING OF SENSORIUM: ORIENTED AND CAN DO SERIAL ADDITIONS
HEADACHE, FULLNESS IN HEAD: NOT PRESENT
ANXIETY: NO ANXIETY (AT EASE)
VISUAL DISTURBANCES: NOT PRESENT
AUDITORY DISTURBANCES: NOT PRESENT
TOTAL SCORE: 0
PAROXYSMAL SWEATS: NO SWEAT VISIBLE
ANXIETY: MILDLY ANXIOUS
PAROXYSMAL SWEATS: NO SWEAT VISIBLE
TREMOR: NO TREMOR
AUDITORY DISTURBANCES: NOT PRESENT
HEADACHE, FULLNESS IN HEAD: NOT PRESENT

## 2020-09-07 ASSESSMENT — FIBROSIS 4 INDEX: FIB4 SCORE: 59.29

## 2020-09-07 ASSESSMENT — ENCOUNTER SYMPTOMS
BLURRED VISION: 0
VOMITING: 0
CHILLS: 0
FALLS: 0
SORE THROAT: 0
DEPRESSION: 0
PALPITATIONS: 0
HEADACHES: 0
NAUSEA: 0
CONSTIPATION: 0
DIZZINESS: 0
COUGH: 0
NERVOUS/ANXIOUS: 0
WEAKNESS: 0
SHORTNESS OF BREATH: 0
FEVER: 0
ABDOMINAL PAIN: 0
DIARRHEA: 0

## 2020-09-07 ASSESSMENT — PAIN DESCRIPTION - PAIN TYPE
TYPE: ACUTE PAIN

## 2020-09-07 NOTE — PROGRESS NOTES
[facility] Medicine Daily Progress Note    Date of Service  9/7/2020    Chief Complaint  75 y.o. male admitted 9/3/2020 with leg pain, loss of consciousness    Hospital Course    Mr. Fede Parikh Jr. is a 75 y.o. male with a history of polysubstance abuse who presented on 9/3/2020 with loss of consciousness.  Patient was found down on the sidewalk and was found to be severely hypoglycemic.  He is a poor historian.  Blood alcohol level was 123.  It is unknown for how long he was down.  He did report right leg pain which has been chronic but exacerbated recently and states his leg gave out which caused him to fall.  A CT angiogram showed dense calcific disease throughout the distal aorta and iliac system, right iliac artery is occluded with reconstitution of the right femoral artery from the profunda femoris artery, questionable high-grade stenosis versus occlusion of the left common femoral artery with reconstitution of the external iliac artery.  Vascular surgery was consulted.  Was started on a heparin drip.  Status post bilateral common femoral and profunda femoris artery endarterectomies with bovine patch angioplasty, right to left femoral to femoral artery bypass using 6 mm PTFE, angioplasty and stenting of right common iliac artery, right iliac thrombectomy 9/4.  Patient did have an acute kidney injury on chronic kidney disease stage III.  Nephrology was consulted and felt that this was secondary to contrast during angiogram/revascularization versus atheroemboli from chronic limb ischemia.      Interval Problem Update  Patient was seen and examined at bedside.  I have personally reviewed vitals, labs, and imaging.    9/5.  Afebrile.  Hypertension has resolved.  On 0-2 L nasal cannula.  Acute kidney injury likely secondary to contrast versus hypertension.  Denies fevers, chills any chest pains or shortness of breath.  Reports pain is well controlled.  Request nicotine patches.    9/6.  Afebrile.  Episodes  of tachycardia.  On 0-1.5 L nasal cannula.  Denies fever, chills, chest pains, shortness of breath.  Reports normal urine output.  Discussed with him nephrology about urine output.  Denies fever, chills, chest pain, shortness of breath  9/7.  Afebrile.  Stable vitals.  On 0-2 L nasal cannula.  Drop in hemoglobin from 11.2 to 9.5.  Kidney function is improved.  Denies fevers, chills, chest pain, shortness of breath.  Only complaint is that the Lexie is uncomfortable.  No signs of bleeding.    Consultants/Specialty  Vascular  Nephrology    Code Status  Full Code    Disposition  PT recommends placement  OT recommends home health    Review of Systems  Review of Systems   Constitutional: Negative for chills and fever.   HENT: Negative for congestion and sore throat.    Eyes: Negative for blurred vision.   Respiratory: Negative for cough and shortness of breath.    Cardiovascular: Negative for chest pain, palpitations and leg swelling.   Gastrointestinal: Negative for abdominal pain, constipation, diarrhea, nausea and vomiting.   Genitourinary: Negative for dysuria, frequency and urgency.   Musculoskeletal: Negative for falls.   Skin: Negative for rash.   Neurological: Negative for dizziness, weakness and headaches.   Psychiatric/Behavioral: Negative for depression. The patient is not nervous/anxious.    All other systems reviewed and are negative.       Physical Exam  Temp:  [36.3 °C (97.3 °F)-36.8 °C (98.2 °F)] 36.3 °C (97.3 °F)  Pulse:  [77-91] 91  Resp:  [16-18] 17  BP: (118-145)/(70-89) 118/70  SpO2:  [92 %-100 %] 99 %    Physical Exam  Vitals signs and nursing note reviewed.   Constitutional:       General: He is not in acute distress.     Appearance: Normal appearance. He is normal weight.   HENT:      Head: Normocephalic and atraumatic.      Nose: Nose normal.      Mouth/Throat:      Mouth: Mucous membranes are moist.      Pharynx: Oropharynx is clear.   Eyes:      Extraocular Movements: Extraocular movements  intact.      Conjunctiva/sclera: Conjunctivae normal.   Neck:      Musculoskeletal: Normal range of motion and neck supple.   Cardiovascular:      Rate and Rhythm: Normal rate and regular rhythm.      Pulses: Normal pulses.      Heart sounds: Normal heart sounds. No murmur. No friction rub. No gallop.    Pulmonary:      Effort: Pulmonary effort is normal. No respiratory distress.      Breath sounds: Normal breath sounds. No wheezing or rales.   Chest:      Chest wall: No tenderness.   Abdominal:      General: Abdomen is flat. Bowel sounds are normal. There is no distension.      Palpations: Abdomen is soft. There is no mass.      Tenderness: There is no abdominal tenderness. There is no guarding.   Musculoskeletal: Normal range of motion.      Comments: Preveena in place.  Positive pulses and good cap refill in lower extremities.   Skin:     General: Skin is warm.      Capillary Refill: Capillary refill takes less than 2 seconds.   Neurological:      General: No focal deficit present.      Mental Status: He is alert and oriented to person, place, and time. Mental status is at baseline.      Cranial Nerves: No cranial nerve deficit.      Motor: No weakness.   Psychiatric:         Mood and Affect: Mood normal.         Behavior: Behavior normal.         Fluids    Intake/Output Summary (Last 24 hours) at 9/7/2020 0741  Last data filed at 9/7/2020 0500  Gross per 24 hour   Intake 2536.25 ml   Output 1275 ml   Net 1261.25 ml       Laboratory  Recent Labs     09/05/20  0407 09/06/20  0033 09/07/20  0215   WBC 9.7 8.9 7.1   RBC 3.48* 3.43* 2.94*   HEMOGLOBIN 11.3* 11.2* 9.5*   HEMATOCRIT 32.8* 32.3* 28.1*   MCV 94.3 94.2 95.6   MCH 32.5 32.7 32.3   MCHC 34.5 34.7 33.8   RDW 45.0 45.6 44.1   PLATELETCT 127* 113* 104*   MPV 10.5 10.8 10.9     Recent Labs     09/05/20  0407 09/06/20  0033 09/07/20  0215   SODIUM 133* 135 136   POTASSIUM 4.8 4.2 4.1   CHLORIDE 98 100 101   CO2 20 21 21   GLUCOSE 120* 100* 109*   BUN 28* 33* 29*    CREATININE 3.07* 4.18* 3.38*   CALCIUM 7.8* 7.9* 8.1*             Recent Labs     09/05/20  0407   TRIGLYCERIDE 89   HDL 73   LDL 32       Imaging  DX-PORTABLE FLUORO > 1 HOUR   Final Result      11 minutes 44 seconds fluoroscopy time utilized by Dr. Prado for vascular procedure.      CT-CTA AORTA-RO WITH & W/O-POST PROCESS   Final Result         CTA AORTA      1.  Occlusion of the distal aorta at the bifurcation extending in the right common and external iliac arteries. Reconstitution of the right common femoral artery is seen.   2.  Dense calcification of the origin of the left common iliac artery appears to result in proximal occlusion with reconstitution in the distal common iliac artery.   3.  Heterogeneous right thyroid lesion, recommend follow-up thyroid sonography for further characterization due to lesion size.   4.  Hepatomegaly      CTA LOWER EXTREMITIES      1.  Scattered atherosclerotic changes throughout the right lower extremity without 50% stenosis, the distal right lower cavity vessels are not visualized and there is 0 vessel runoff at the right ankle.   2.  70% stenosis at the origin of the left common femoral artery.   3.  Scattered atherosclerotic calcifications throughout the left lower extremity resulting in approximately 50% stenosis at the popliteal artery, there appears to be three-vessel runoff the left ankle.      3D angiographic/MIP images of the vasculature confirm the vascular findings as described above.      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 Prado in ED and will plan for pre-op  -Pain control with opioids for now, however will need to reduce use post-op as there is component of drug seeking behavior   -Periop garcia score of 1.51%    S/p surgery 9/5.  Continue pain management and postop  care.    High anion gap metabolic acidosis- (present on admission)  Assessment & Plan  Anion gap acidosis resolved with fluid resuscitation.  Likely secondary to limb ischemia    Alcohol use- (present on admission)  Assessment & Plan  -Thiamine and folic acid to be supplemented  -CIWA protocol  No signs of withdrawal at this time.    Essential hypertension  Assessment & Plan  -May be pain driven and will control prior to surgery with opioids  -Continue Norvasc 5mg  -Will give hydralazine IV cautiously and monitor HR for tachy  -Patient used cocaine 2 days ago and will avoid beta blockers for control at this time    COPD (chronic obstructive pulmonary disease) (HCC)- (present on admission)  Assessment & Plan  -No acute respiratory issues  -Albuterol PRN  -BREO and seebri daily    SHANNAN (acute kidney injury) (HCC)- (present on admission)  Assessment & Plan  Likely secondary to contrast during angiography and angioplasty versus atheroemboli  Had recent renal ultrasound 8/14/2020 which was normal.  Nephrology is following  Monitor kidney function and urine output.  Avoid nephrotoxins    History of hepatitis C- (present on admission)  Assessment & Plan  Previously followed with Dr. Bella and completed treatment.  No follow-up was recommended.       VTE prophylaxis: Avoid anticoagulation per vascular surgery

## 2020-09-07 NOTE — PROGRESS NOTES
Nephrology Daily Progress Note    Date of Service  9/7/2020    Chief Complaint  75 y.o. male admitted 9/3/2020 with leg pain, ischemia, developed SHANNAN    Interval Problem Update  Pt has no CP, no SOB    Review of Systems  Review of Systems   Constitutional: Negative for chills, fever and malaise/fatigue.   Respiratory: Negative for cough and shortness of breath.    Cardiovascular: Negative for chest pain and leg swelling.   Gastrointestinal: Negative for nausea and vomiting.   Genitourinary: Negative for dysuria, frequency and urgency.   All other systems reviewed and are negative.       Physical Exam  Temp:  [36.3 °C (97.3 °F)-37.1 °C (98.7 °F)] 37.1 °C (98.7 °F)  Pulse:  [77-93] 93  Resp:  [16-18] 16  BP: (100-145)/(54-75) 100/54  SpO2:  [92 %-100 %] 93 %    Physical Exam  Vitals signs and nursing note reviewed.   Constitutional:       General: He is awake. He is not in acute distress.     Appearance: He is not ill-appearing.   HENT:      Head: Normocephalic and atraumatic.      Right Ear: External ear normal.      Left Ear: External ear normal.      Nose: Nose normal.      Mouth/Throat:      Pharynx: No oropharyngeal exudate or posterior oropharyngeal erythema.   Eyes:      General:         Right eye: No discharge.         Left eye: No discharge.      Conjunctiva/sclera: Conjunctivae normal.   Neck:      Musculoskeletal: No neck rigidity or muscular tenderness.   Cardiovascular:      Rate and Rhythm: Normal rate and regular rhythm.      Heart sounds: No murmur.   Pulmonary:      Effort: Pulmonary effort is normal. No respiratory distress.      Breath sounds: Normal breath sounds. No wheezing.      Comments: Coarse BS  Abdominal:      General: Abdomen is flat. Bowel sounds are normal.   Musculoskeletal:         General: No tenderness or deformity.      Right lower leg: No edema.      Left lower leg: No edema.   Skin:     General: Skin is warm and dry.      Coloration: Skin is not jaundiced.   Neurological:       General: No focal deficit present.      Mental Status: He is alert and oriented to person, place, and time. Mental status is at baseline.   Psychiatric:         Mood and Affect: Mood normal.         Behavior: Behavior normal.         Thought Content: Thought content normal.         Fluids    Intake/Output Summary (Last 24 hours) at 9/7/2020 1557  Last data filed at 9/7/2020 1000  Gross per 24 hour   Intake 1896.25 ml   Output 1075 ml   Net 821.25 ml       Laboratory  Recent Labs     09/05/20 0407 09/06/20  0033 09/07/20  0215   WBC 9.7 8.9 7.1   RBC 3.48* 3.43* 2.94*   HEMOGLOBIN 11.3* 11.2* 9.5*   HEMATOCRIT 32.8* 32.3* 28.1*   MCV 94.3 94.2 95.6   MCH 32.5 32.7 32.3   MCHC 34.5 34.7 33.8   RDW 45.0 45.6 44.1   PLATELETCT 127* 113* 104*   MPV 10.5 10.8 10.9     Recent Labs     09/05/20 0407 09/06/20  0033 09/07/20  0215   SODIUM 133* 135 136   POTASSIUM 4.8 4.2 4.1   CHLORIDE 98 100 101   CO2 20 21 21   GLUCOSE 120* 100* 109*   BUN 28* 33* 29*   CREATININE 3.07* 4.18* 3.38*   CALCIUM 7.8* 7.9* 8.1*         No results for input(s): NTPROBNP in the last 72 hours.  Recent Labs     09/05/20 0407   TRIGLYCERIDE 89   HDL 73   LDL 32       Imaging  DX-PORTABLE FLUORO > 1 HOUR   Final Result      11 minutes 44 seconds fluoroscopy time utilized by Dr. Prado for vascular procedure.      CT-CTA AORTA-RO WITH & W/O-POST PROCESS   Final Result         CTA AORTA      1.  Occlusion of the distal aorta at the bifurcation extending in the right common and external iliac arteries. Reconstitution of the right common femoral artery is seen.   2.  Dense calcification of the origin of the left common iliac artery appears to result in proximal occlusion with reconstitution in the distal common iliac artery.   3.  Heterogeneous right thyroid lesion, recommend follow-up thyroid sonography for further characterization due to lesion size.   4.  Hepatomegaly      CTA LOWER EXTREMITIES      1.  Scattered atherosclerotic changes throughout  the right lower extremity without 50% stenosis, the distal right lower cavity vessels are not visualized and there is 0 vessel runoff at the right ankle.   2.  70% stenosis at the origin of the left common femoral artery.   3.  Scattered atherosclerotic calcifications throughout the left lower extremity resulting in approximately 50% stenosis at the popliteal artery, there appears to be three-vessel runoff the left ankle.      3D angiographic/MIP images of the vasculature confirm the vascular findings as described above.      US-DANIELE SINGLE LEVEL BILAT   Final Result      US-EXTREMITY ARTERY LOWER BILAT   Final Result            Assessment/Plan  1 SHANNAN on CKD III: sec to CHARANJIT  2 PVD  3 HTN  4 Anemia  5 Anemia  Plan  no acute need for HD, evaluate daily  Continue IVF  Renal diet  Daily labs  Renal dose all meds  Avoid nephrotoxins  Prognosis guarded.

## 2020-09-08 ENCOUNTER — PATIENT OUTREACH (OUTPATIENT)
Dept: HEALTH INFORMATION MANAGEMENT | Facility: OTHER | Age: 75
End: 2020-09-08

## 2020-09-08 LAB
ANION GAP SERPL CALC-SCNC: 12 MMOL/L (ref 7–16)
BASOPHILS # BLD AUTO: 0.2 % (ref 0–1.8)
BASOPHILS # BLD: 0.01 K/UL (ref 0–0.12)
BUN SERPL-MCNC: 26 MG/DL (ref 8–22)
CALCIUM SERPL-MCNC: 8.2 MG/DL (ref 8.5–10.5)
CHLORIDE SERPL-SCNC: 104 MMOL/L (ref 96–112)
CO2 SERPL-SCNC: 22 MMOL/L (ref 20–33)
CREAT SERPL-MCNC: 2.63 MG/DL (ref 0.5–1.4)
EOSINOPHIL # BLD AUTO: 0.1 K/UL (ref 0–0.51)
EOSINOPHIL NFR BLD: 1.6 % (ref 0–6.9)
ERYTHROCYTE [DISTWIDTH] IN BLOOD BY AUTOMATED COUNT: 44.3 FL (ref 35.9–50)
GLUCOSE SERPL-MCNC: 104 MG/DL (ref 65–99)
HCT VFR BLD AUTO: 25.3 % (ref 42–52)
HGB BLD-MCNC: 9 G/DL (ref 14–18)
IMM GRANULOCYTES # BLD AUTO: 0.02 K/UL (ref 0–0.11)
IMM GRANULOCYTES NFR BLD AUTO: 0.3 % (ref 0–0.9)
LYMPHOCYTES # BLD AUTO: 1.25 K/UL (ref 1–4.8)
LYMPHOCYTES NFR BLD: 20.5 % (ref 22–41)
MAGNESIUM SERPL-MCNC: 1.5 MG/DL (ref 1.5–2.5)
MCH RBC QN AUTO: 33.3 PG (ref 27–33)
MCHC RBC AUTO-ENTMCNC: 35.6 G/DL (ref 33.7–35.3)
MCV RBC AUTO: 93.7 FL (ref 81.4–97.8)
MONOCYTES # BLD AUTO: 0.68 K/UL (ref 0–0.85)
MONOCYTES NFR BLD AUTO: 11.1 % (ref 0–13.4)
NEUTROPHILS # BLD AUTO: 4.05 K/UL (ref 1.82–7.42)
NEUTROPHILS NFR BLD: 66.3 % (ref 44–72)
NRBC # BLD AUTO: 0 K/UL
NRBC BLD-RTO: 0 /100 WBC
PHOSPHATE SERPL-MCNC: 2.6 MG/DL (ref 2.5–4.5)
PLATELET # BLD AUTO: 130 K/UL (ref 164–446)
PMV BLD AUTO: 10.7 FL (ref 9–12.9)
POTASSIUM SERPL-SCNC: 4 MMOL/L (ref 3.6–5.5)
RBC # BLD AUTO: 2.7 M/UL (ref 4.7–6.1)
SODIUM SERPL-SCNC: 138 MMOL/L (ref 135–145)
WBC # BLD AUTO: 6.1 K/UL (ref 4.8–10.8)

## 2020-09-08 PROCEDURE — 80048 BASIC METABOLIC PNL TOTAL CA: CPT

## 2020-09-08 PROCEDURE — 700102 HCHG RX REV CODE 250 W/ 637 OVERRIDE(OP): Performed by: INTERNAL MEDICINE

## 2020-09-08 PROCEDURE — 36415 COLL VENOUS BLD VENIPUNCTURE: CPT

## 2020-09-08 PROCEDURE — 770020 HCHG ROOM/CARE - TELE (206)

## 2020-09-08 PROCEDURE — 83735 ASSAY OF MAGNESIUM: CPT

## 2020-09-08 PROCEDURE — A9270 NON-COVERED ITEM OR SERVICE: HCPCS | Performed by: INTERNAL MEDICINE

## 2020-09-08 PROCEDURE — 700101 HCHG RX REV CODE 250: Performed by: INTERNAL MEDICINE

## 2020-09-08 PROCEDURE — 84100 ASSAY OF PHOSPHORUS: CPT

## 2020-09-08 PROCEDURE — 94760 N-INVAS EAR/PLS OXIMETRY 1: CPT

## 2020-09-08 PROCEDURE — 99232 SBSQ HOSP IP/OBS MODERATE 35: CPT | Performed by: INTERNAL MEDICINE

## 2020-09-08 PROCEDURE — A9270 NON-COVERED ITEM OR SERVICE: HCPCS | Performed by: NURSE PRACTITIONER

## 2020-09-08 PROCEDURE — 97116 GAIT TRAINING THERAPY: CPT

## 2020-09-08 PROCEDURE — 94640 AIRWAY INHALATION TREATMENT: CPT

## 2020-09-08 PROCEDURE — 700102 HCHG RX REV CODE 250 W/ 637 OVERRIDE(OP): Performed by: NURSE PRACTITIONER

## 2020-09-08 PROCEDURE — 85025 COMPLETE CBC W/AUTO DIFF WBC: CPT

## 2020-09-08 RX ORDER — FOLIC ACID 1 MG/1
1 TABLET ORAL DAILY
Status: DISCONTINUED | OUTPATIENT
Start: 2020-09-09 | End: 2020-09-10 | Stop reason: HOSPADM

## 2020-09-08 RX ORDER — THIAMINE MONONITRATE (VIT B1) 100 MG
100 TABLET ORAL DAILY
Status: DISCONTINUED | OUTPATIENT
Start: 2020-09-09 | End: 2020-09-10 | Stop reason: HOSPADM

## 2020-09-08 RX ADMIN — MAGNESIUM HYDROXIDE 30 ML: 400 SUSPENSION ORAL at 05:45

## 2020-09-08 RX ADMIN — NICOTINE 14 MG: 14 PATCH TRANSDERMAL at 05:41

## 2020-09-08 RX ADMIN — OXYCODONE HYDROCHLORIDE 5 MG: 5 TABLET ORAL at 16:53

## 2020-09-08 RX ADMIN — DOCUSATE SODIUM 50 MG AND SENNOSIDES 8.6 MG 2 TABLET: 8.6; 5 TABLET, FILM COATED ORAL at 05:40

## 2020-09-08 RX ADMIN — ALBUTEROL SULFATE 2 PUFF: 90 AEROSOL, METERED RESPIRATORY (INHALATION) at 21:25

## 2020-09-08 RX ADMIN — BUDESONIDE AND FORMOTEROL FUMARATE DIHYDRATE 2 PUFF: 160; 4.5 AEROSOL RESPIRATORY (INHALATION) at 18:26

## 2020-09-08 RX ADMIN — GLYCOPYRROLATE 1 CAPSULE: 15.6 CAPSULE RESPIRATORY (INHALATION) at 18:27

## 2020-09-08 RX ADMIN — BUDESONIDE AND FORMOTEROL FUMARATE DIHYDRATE 2 PUFF: 160; 4.5 AEROSOL RESPIRATORY (INHALATION) at 05:50

## 2020-09-08 RX ADMIN — POLYETHYLENE GLYCOL 3350 1 PACKET: 17 POWDER, FOR SOLUTION ORAL at 05:45

## 2020-09-08 RX ADMIN — ASPIRIN 81 MG: 81 TABLET, COATED ORAL at 05:40

## 2020-09-08 RX ADMIN — ALBUTEROL SULFATE 2 PUFF: 90 AEROSOL, METERED RESPIRATORY (INHALATION) at 13:06

## 2020-09-08 RX ADMIN — GLYCOPYRROLATE 1 CAPSULE: 15.6 CAPSULE RESPIRATORY (INHALATION) at 05:50

## 2020-09-08 RX ADMIN — AMLODIPINE BESYLATE 5 MG: 5 TABLET ORAL at 05:41

## 2020-09-08 RX ADMIN — ATORVASTATIN CALCIUM 40 MG: 40 TABLET, FILM COATED ORAL at 05:41

## 2020-09-08 RX ADMIN — OMEPRAZOLE 20 MG: 20 CAPSULE, DELAYED RELEASE ORAL at 05:40

## 2020-09-08 RX ADMIN — DOCUSATE SODIUM 50 MG AND SENNOSIDES 8.6 MG 2 TABLET: 8.6; 5 TABLET, FILM COATED ORAL at 16:53

## 2020-09-08 ASSESSMENT — COGNITIVE AND FUNCTIONAL STATUS - GENERAL
SUGGESTED CMS G CODE MODIFIER MOBILITY: CK
MOBILITY SCORE: 18
MOVING TO AND FROM BED TO CHAIR: A LITTLE
WALKING IN HOSPITAL ROOM: A LITTLE
TURNING FROM BACK TO SIDE WHILE IN FLAT BAD: A LITTLE
MOVING FROM LYING ON BACK TO SITTING ON SIDE OF FLAT BED: A LITTLE
STANDING UP FROM CHAIR USING ARMS: A LITTLE
CLIMB 3 TO 5 STEPS WITH RAILING: A LITTLE

## 2020-09-08 ASSESSMENT — LIFESTYLE VARIABLES
VISUAL DISTURBANCES: NOT PRESENT
NAUSEA AND VOMITING: NO NAUSEA AND NO VOMITING
TOTAL SCORE: 0
AGITATION: NORMAL ACTIVITY
AUDITORY DISTURBANCES: NOT PRESENT
TREMOR: NO TREMOR
HEADACHE, FULLNESS IN HEAD: NOT PRESENT
PAROXYSMAL SWEATS: NO SWEAT VISIBLE
ANXIETY: NO ANXIETY (AT EASE)
ORIENTATION AND CLOUDING OF SENSORIUM: ORIENTED AND CAN DO SERIAL ADDITIONS

## 2020-09-08 ASSESSMENT — ENCOUNTER SYMPTOMS
VOMITING: 0
CHILLS: 0
SHORTNESS OF BREATH: 0
NAUSEA: 0
FEVER: 0
COUGH: 0

## 2020-09-08 ASSESSMENT — GAIT ASSESSMENTS
DEVIATION: SHUFFLED GAIT
ASSISTIVE DEVICE: FRONT WHEEL WALKER
DISTANCE (FEET): 100
GAIT LEVEL OF ASSIST: SUPERVISED

## 2020-09-08 ASSESSMENT — PATIENT HEALTH QUESTIONNAIRE - PHQ9
1. LITTLE INTEREST OR PLEASURE IN DOING THINGS: NOT AT ALL
SUM OF ALL RESPONSES TO PHQ9 QUESTIONS 1 AND 2: 0
2. FEELING DOWN, DEPRESSED, IRRITABLE, OR HOPELESS: NOT AT ALL

## 2020-09-08 ASSESSMENT — PAIN DESCRIPTION - PAIN TYPE: TYPE: ACUTE PAIN

## 2020-09-08 NOTE — PROGRESS NOTES
Assumed care of patient. Patient resting in bed with no distress noted. Bilateral Prevenas in place, patient with no c/o pain. Call bell in reach and safety measures in place.

## 2020-09-08 NOTE — PROGRESS NOTES
Bedside report received, assumed pt care@[id number]. Pt A&Ox4. Pt denies any pain. POC discussed, pt verbalized understanding. Bed in lowest position with bed alarm on. Call light within reach.

## 2020-09-08 NOTE — THERAPY
Physical Therapy   Daily Treatment     Patient Name: Fede Parikh Jr.  Age:  75 y.o., Sex:  male  Medical Record #: [id number]  Today's Date: 9/8/2020     Precautions: Fall Risk, Other (See Comments)- B hip wound vacs    Assessment    Pt seen for follow up PT tx. Pt is progressing well and appears to be back to baseline for functional mobility. Pt ambulating with FWW and SPV without LOB. Pt not receptive to PT and will be discharged as functional goals have been met.     Plan    Discharge secondary to goals met.    DC Equipment Recommendations: Front-Wheel Walker  Discharge Recommendations: Recommend home health for continued physical therapy services       09/08/20 1635   Cognition    Level of Consciousness Alert   Comments pt rude to therapist throughout session. Not receptive to cues   Gait Analysis   Gait Level Of Assist Supervised   Assistive Device Front Wheel Walker   Distance (Feet) 100   # of Times Distance was Traveled 1   Deviation Shuffled Gait   # of Stairs Climbed 0   Weight Bearing Status fwb   Comments cue for safety   Bed Mobility    Supine to Sit Supervised   Sit to Supine   (in chair)   Scooting Supervised   Rolling Supervised   Functional Mobility   Sit to Stand Supervised   Bed, Chair, Wheelchair Transfer Supervised   Transfer Method Stand Step   Mobility in hallway with FWW   Short Term Goals    Short Term Goal # 1 Pt will be able to complete bed mobility with SPV and HOB flat in 6tx in order to return home   Goal Outcome # 1 Goal met   Short Term Goal # 2 Pt will be able to complete functional transfers with FWW and SPV in 6tx in order to return home   Goal Outcome # 2 Goal met   Short Term Goal # 3 Pt will be able to ambulate 100ft with FWW and SPV in 6tx i order to return to household ambulating   Goal Outcome # 3 Goal met   Anticipated Discharge Equipment and Recommendations   DC Equipment Recommendations Front-Wheel Walker   Discharge Recommendations Recommend home health for  continued physical therapy services

## 2020-09-08 NOTE — PROGRESS NOTES
Hospital Medicine Daily Progress Note    Date of Service  9/8/2020    Chief Complaint  leg pain, loss of consciousness    Hospital Course    Mr. Fede Parikh Jr. is a 75 y.o. male  who is a poor historian, with a history of polysubstance abuse who presented on 9/3/2020 with loss of consciousness.  Patient was found down on the sidewalk and was found to be severely hypoglycemic.  Blood alcohol level was 123.  It was unknown for how long he was down.  He did report right leg pain which has been chronic but exacerbated recently and states his leg gave out which caused him to fall.  A CT angiogram showed dense calcific disease throughout the distal aorta and iliac system, right iliac artery is occluded with reconstitution of the right femoral artery from the profunda femoris artery, questionable high-grade stenosis versus occlusion of the left common femoral artery with reconstitution of the external iliac artery.  Vascular surgery was consulted. Patient was started on a heparin drip.  He subsequently underwent bilateral common femoral and profunda femoris artery endarterectomies with bovine patch angioplasty, right to left femoral to femoral artery bypass using 6 mm PTFE, angioplasty and stenting of right common iliac artery, right iliac thrombectomy on 9/4/20.  Post procedure, patient developed acute kidney injury on top of his chronic kidney disease stage III.  He is placed on aspirin and Lipitor.  Nephrology was consulted and felt that this was secondary to contrast during angiogram/revascularization versus atheroemboli from chronic limb ischemia.  He is placed on IV fluids.  He was monitored for alcohol withdrawal, and fortunately did not exhibit any symptoms.  PT recommends SNF placement        Interval Problem Update  9/8/2020 - I reviewed the patient's chart. There were no significant overnight events. Remains hemodynamically stable and afebrile. Stable on RA.  CIWA remains 0.  Hemoglobin 9.0.  No  leukocytosis.  Platelet count 130.  Creatinine improved to 2.63 (baseline of around 1.5).  Electrolytes are normal.    > I have personally seen and examined the patient today.  He denies any pains on the legs or lower extremities.  No other complaints.  No chest pain, shortness of breath, nausea or vomiting. He is amenable to SNF placement.      Consultants/Specialty  Vascular  Nephrology    Code Status  Full Code    Disposition  Monitor on the floors  PT recommends placement. OT recommends home health. PT/OT to reevaluate.  SNF placement    Review of Systems  Review of Systems      Pertinent positives/negatives as mentioned above.     A complete review of systems was personally done by me. All other systems were negative.       Physical Exam  Temp:  [36.4 °C (97.5 °F)-37.4 °C (99.3 °F)] 36.4 °C (97.5 °F)  Pulse:  [] 102  Resp:  [16-19] 19  BP: (100-134)/(54-91) 132/67  SpO2:  [93 %-98 %] 93 %    Physical Exam  Vitals signs and nursing note reviewed.   Constitutional:       General: He is not in acute distress.     Appearance: Normal appearance. He is normal weight. He is not toxic-appearing or diaphoretic.   HENT:      Head: Normocephalic and atraumatic.      Right Ear: External ear normal.      Left Ear: External ear normal.      Nose: Nose normal.      Mouth/Throat:      Mouth: Mucous membranes are moist.      Pharynx: Oropharynx is clear. No oropharyngeal exudate.   Eyes:      General: No scleral icterus.     Extraocular Movements: Extraocular movements intact.      Conjunctiva/sclera: Conjunctivae normal.      Pupils: Pupils are equal, round, and reactive to light.   Neck:      Musculoskeletal: Normal range of motion and neck supple.   Cardiovascular:      Rate and Rhythm: Normal rate and regular rhythm.      Pulses: Normal pulses.      Heart sounds: Normal heart sounds. No murmur. No friction rub. No gallop.    Pulmonary:      Effort: Pulmonary effort is normal. No respiratory distress.      Breath  sounds: Normal breath sounds. No stridor. No wheezing, rhonchi or rales.   Chest:      Chest wall: No tenderness.   Abdominal:      General: Abdomen is flat. Bowel sounds are normal. There is no distension.      Palpations: Abdomen is soft. There is no mass.      Tenderness: There is no abdominal tenderness. There is no guarding or rebound.   Musculoskeletal: Normal range of motion.         General: No swelling.      Right lower leg: No edema.      Left lower leg: No edema.      Comments: Preveena in place.  Positive pulses and good cap refill in lower extremities.   Lymphadenopathy:      Cervical: No cervical adenopathy.   Skin:     General: Skin is warm and dry.      Capillary Refill: Capillary refill takes less than 2 seconds.      Coloration: Skin is not jaundiced.      Findings: No rash.   Neurological:      General: No focal deficit present.      Mental Status: He is alert and oriented to person, place, and time. Mental status is at baseline.      Cranial Nerves: No cranial nerve deficit.      Motor: No weakness.   Psychiatric:         Mood and Affect: Mood normal.         Behavior: Behavior normal.         Thought Content: Thought content normal.         Judgment: Judgment normal.       I have performed the physical examination today 9/8/2020.  In review of yesterday's note, there are no new changes except as documented above.        Fluids    Intake/Output Summary (Last 24 hours) at 9/8/2020 1143  Last data filed at 9/8/2020 1004  Gross per 24 hour   Intake 2421.25 ml   Output 870 ml   Net 1551.25 ml       Laboratory  Recent Labs     09/06/20  0033 09/07/20  0215 09/08/20  0227   WBC 8.9 7.1 6.1   RBC 3.43* 2.94* 2.70*   HEMOGLOBIN 11.2* 9.5* 9.0*   HEMATOCRIT 32.3* 28.1* 25.3*   MCV 94.2 95.6 93.7   MCH 32.7 32.3 33.3*   MCHC 34.7 33.8 35.6*   RDW 45.6 44.1 44.3   PLATELETCT 113* 104* 130*   MPV 10.8 10.9 10.7     Recent Labs     09/06/20  0033 09/07/20  0215 09/08/20  0227   SODIUM 135 136 138   POTASSIUM  4.2 4.1 4.0   CHLORIDE 100 101 104   CO2 21 21 22   GLUCOSE 100* 109* 104*   BUN 33* 29* 26*   CREATININE 4.18* 3.38* 2.63*   CALCIUM 7.9* 8.1* 8.2*                   Imaging  DX-PORTABLE FLUORO > 1 HOUR   Final Result      11 minutes 44 seconds fluoroscopy time utilized by Dr. Prado for vascular procedure.      CT-CTA AORTA-RO WITH & W/O-POST PROCESS   Final Result         CTA AORTA      1.  Occlusion of the distal aorta at the bifurcation extending in the right common and external iliac arteries. Reconstitution of the right common femoral artery is seen.   2.  Dense calcification of the origin of the left common iliac artery appears to result in proximal occlusion with reconstitution in the distal common iliac artery.   3.  Heterogeneous right thyroid lesion, recommend follow-up thyroid sonography for further characterization due to lesion size.   4.  Hepatomegaly      CTA LOWER EXTREMITIES      1.  Scattered atherosclerotic changes throughout the right lower extremity without 50% stenosis, the distal right lower cavity vessels are not visualized and there is 0 vessel runoff at the right ankle.   2.  70% stenosis at the origin of the left common femoral artery.   3.  Scattered atherosclerotic calcifications throughout the left lower extremity resulting in approximately 50% stenosis at the popliteal artery, there appears to be three-vessel runoff the left ankle.      3D angiographic/MIP images of the vasculature confirm the vascular findings as described above.      US-DANIELE SINGLE LEVEL BILAT   Final Result      US-EXTREMITY ARTERY LOWER BILAT   Final Result           Assessment/Plan  * Right iliac artery occlusion, with critical limb ischemia (HCC)- (present on admission)  Assessment & Plan  - s/p bilateral common femoral and profunda femoris artery endarterectomies with bovine patch angioplasty, right to left femoral to femoral artery bypass using 6 mm PTFE, angioplasty and stenting of right common iliac artery,  right iliac thrombectomy on 9/4/20.  -Continue aspirin, and Lipitor.  -Still has Lexie in place, defer to vascular surgery regarding wound VAC.  -Pain well controlled.  Continue oral oxycodone as needed.    SHANNAN (acute kidney injury) (HCC)  Assessment & Plan  - Likely secondary to contrast-induced nephropathy from contrast with angiography and angioplasty versus atheroemboli.  Renal ultrasound on 8/14 was normal.  Renal function improving slowly.  -Continue IV fluids with LR at 75 cc/h.  Continue to monitor renal function closely.  - avoid nephrotoxins, and continue to renally dose all medications.    High anion gap metabolic acidosis- (present on admission)  Assessment & Plan  -Resolved.    Alcohol use- (present on admission)  Assessment & Plan  -No signs of alcohol withdrawal.  Continue to monitor with CIWA.  Continue thiamine, folate, and multivitamin supplements.    History of hepatitis C- (present on admission)  Assessment & Plan  -Previously followed with Dr. Bella and completed treatment.  -No follow-up was recommended.    Essential hypertension- (present on admission)  Assessment & Plan  -Maintaining good control.    -Continue Norvasc 5 mg daily.  Monitor blood pressure trends closely with as needed oral Catapres, and IV Vasotec for significant hypertension.    COPD (chronic obstructive pulmonary disease) (HCC)- (present on admission)  Assessment & Plan  -Not in acute exacerbation.  Continue inhaled Symbicort, glycopyrrolate, along with as needed bronchodilators per RT protocol.  Not requiring oxygen supplement.       VTE prophylaxis: Avoid anticoagulation per vascular surgery. SCD.

## 2020-09-08 NOTE — PROGRESS NOTES
Nephrology Daily Progress Note    Date of Service  9/8/2020    Chief Complaint  75 y.o. male admitted 9/3/2020 with leg pain, ischemia, developed SHANNAN    Interval Problem Update  Pt has no CP, no SOB  9/8 patient feels better today, no chest pain no shortness of breath.    Review of Systems  Review of Systems   Constitutional: Negative for chills, fever and malaise/fatigue.   Respiratory: Negative for cough and shortness of breath.    Cardiovascular: Negative for chest pain and leg swelling.   Gastrointestinal: Negative for nausea and vomiting.   Genitourinary: Negative for dysuria, frequency and urgency.        Physical Exam  Temp:  [36.4 °C (97.5 °F)-37.4 °C (99.3 °F)] 36.7 °C (98.1 °F)  Pulse:  [] 92  Resp:  [16-19] 19  BP: (100-143)/(54-91) 143/85  SpO2:  [93 %-98 %] 97 %    Physical Exam  Vitals signs and nursing note reviewed.   Constitutional:       General: He is not in acute distress.     Appearance: He is not ill-appearing.   HENT:      Head: Normocephalic and atraumatic.      Right Ear: External ear normal.      Left Ear: External ear normal.      Nose: Nose normal.   Eyes:      General:         Right eye: No discharge.         Left eye: No discharge.      Conjunctiva/sclera: Conjunctivae normal.   Cardiovascular:      Rate and Rhythm: Normal rate and regular rhythm.      Heart sounds: No murmur.   Pulmonary:      Effort: Pulmonary effort is normal. No respiratory distress.      Breath sounds: Normal breath sounds. No wheezing.   Musculoskeletal:         General: No tenderness or deformity.      Right lower leg: No edema.      Left lower leg: No edema.   Skin:     General: Skin is warm.   Neurological:      General: No focal deficit present.      Mental Status: He is alert and oriented to person, place, and time.   Psychiatric:         Mood and Affect: Mood normal.         Behavior: Behavior normal.         Fluids    Intake/Output Summary (Last 24 hours) at 9/8/2020 1225  Last data filed at 9/8/2020  1004  Gross per 24 hour   Intake 2421.25 ml   Output 870 ml   Net 1551.25 ml       Laboratory  Recent Labs     09/06/20  0033 09/07/20 0215 09/08/20 0227   WBC 8.9 7.1 6.1   RBC 3.43* 2.94* 2.70*   HEMOGLOBIN 11.2* 9.5* 9.0*   HEMATOCRIT 32.3* 28.1* 25.3*   MCV 94.2 95.6 93.7   MCH 32.7 32.3 33.3*   MCHC 34.7 33.8 35.6*   RDW 45.6 44.1 44.3   PLATELETCT 113* 104* 130*   MPV 10.8 10.9 10.7     Recent Labs     09/06/20 0033 09/07/20 0215 09/08/20 0227   SODIUM 135 136 138   POTASSIUM 4.2 4.1 4.0   CHLORIDE 100 101 104   CO2 21 21 22   GLUCOSE 100* 109* 104*   BUN 33* 29* 26*   CREATININE 4.18* 3.38* 2.63*   CALCIUM 7.9* 8.1* 8.2*         No results for input(s): NTPROBNP in the last 72 hours.        Imaging  DX-PORTABLE FLUORO > 1 HOUR   Final Result      11 minutes 44 seconds fluoroscopy time utilized by Dr. Prado for vascular procedure.      CT-CTA AORTA-RO WITH & W/O-POST PROCESS   Final Result         CTA AORTA      1.  Occlusion of the distal aorta at the bifurcation extending in the right common and external iliac arteries. Reconstitution of the right common femoral artery is seen.   2.  Dense calcification of the origin of the left common iliac artery appears to result in proximal occlusion with reconstitution in the distal common iliac artery.   3.  Heterogeneous right thyroid lesion, recommend follow-up thyroid sonography for further characterization due to lesion size.   4.  Hepatomegaly      CTA LOWER EXTREMITIES      1.  Scattered atherosclerotic changes throughout the right lower extremity without 50% stenosis, the distal right lower cavity vessels are not visualized and there is 0 vessel runoff at the right ankle.   2.  70% stenosis at the origin of the left common femoral artery.   3.  Scattered atherosclerotic calcifications throughout the left lower extremity resulting in approximately 50% stenosis at the popliteal artery, there appears to be three-vessel runoff the left ankle.      3D  angiographic/MIP images of the vasculature confirm the vascular findings as described above.      US-DANIELE SINGLE LEVEL BILAT   Final Result      US-EXTREMITY ARTERY LOWER BILAT   Final Result            Assessment/Plan  1 SHANNAN on CKD III: Improving  2 PVD  3 HTN  4 Anemia    Plan  no need for HD  Renal diet  Daily labs  Renal dose all meds  Avoid nephrotoxins

## 2020-09-08 NOTE — CARE PLAN
Problem: Communication  Goal: The ability to communicate needs accurately and effectively will improve  Outcome: PROGRESSING AS EXPECTED     Problem: Safety  Goal: Will remain free from injury  Outcome: PROGRESSING AS EXPECTED     Problem: Venous Thromboembolism (VTW)/Deep Vein Thrombosis (DVT) Prevention:  Goal: Patient will participate in Venous Thrombosis (VTE)/Deep Vein Thrombosis (DVT)Prevention Measures  Outcome: PROGRESSING AS EXPECTED     Problem: Bowel/Gastric:  Goal: Will not experience complications related to bowel motility  Outcome: PROGRESSING SLOWER THAN EXPECTED

## 2020-09-09 LAB
ANION GAP SERPL CALC-SCNC: 12 MMOL/L (ref 7–16)
APPEARANCE UR: CLEAR
BACTERIA #/AREA URNS HPF: NEGATIVE /HPF
BILIRUB UR QL STRIP.AUTO: NEGATIVE
BUN SERPL-MCNC: 19 MG/DL (ref 8–22)
CALCIUM SERPL-MCNC: 9.3 MG/DL (ref 8.5–10.5)
CHLORIDE SERPL-SCNC: 102 MMOL/L (ref 96–112)
CO2 SERPL-SCNC: 25 MMOL/L (ref 20–33)
COLOR UR: YELLOW
CREAT SERPL-MCNC: 2.3 MG/DL (ref 0.5–1.4)
CREAT UR-MCNC: 40.01 MG/DL
EPI CELLS #/AREA URNS HPF: NEGATIVE /HPF
GLUCOSE SERPL-MCNC: 100 MG/DL (ref 65–99)
GLUCOSE UR STRIP.AUTO-MCNC: NEGATIVE MG/DL
HYALINE CASTS #/AREA URNS LPF: ABNORMAL /LPF
KETONES UR STRIP.AUTO-MCNC: NEGATIVE MG/DL
LEUKOCYTE ESTERASE UR QL STRIP.AUTO: NEGATIVE
MICRO URNS: ABNORMAL
MICROALBUMIN UR-MCNC: <1.2 MG/DL
MICROALBUMIN/CREAT UR: NORMAL MG/G (ref 0–30)
NITRITE UR QL STRIP.AUTO: NEGATIVE
PH UR STRIP.AUTO: 7.5 [PH] (ref 5–8)
POTASSIUM SERPL-SCNC: 4.1 MMOL/L (ref 3.6–5.5)
PROT UR QL STRIP: NEGATIVE MG/DL
RBC # URNS HPF: ABNORMAL /HPF
RBC UR QL AUTO: ABNORMAL
SODIUM SERPL-SCNC: 139 MMOL/L (ref 135–145)
SP GR UR STRIP.AUTO: 1.01
UROBILINOGEN UR STRIP.AUTO-MCNC: 0.2 MG/DL
WBC #/AREA URNS HPF: ABNORMAL /HPF

## 2020-09-09 PROCEDURE — 81001 URINALYSIS AUTO W/SCOPE: CPT

## 2020-09-09 PROCEDURE — 36415 COLL VENOUS BLD VENIPUNCTURE: CPT

## 2020-09-09 PROCEDURE — A9270 NON-COVERED ITEM OR SERVICE: HCPCS | Performed by: INTERNAL MEDICINE

## 2020-09-09 PROCEDURE — 700102 HCHG RX REV CODE 250 W/ 637 OVERRIDE(OP): Performed by: INTERNAL MEDICINE

## 2020-09-09 PROCEDURE — 99232 SBSQ HOSP IP/OBS MODERATE 35: CPT | Performed by: INTERNAL MEDICINE

## 2020-09-09 PROCEDURE — 700105 HCHG RX REV CODE 258: Performed by: INTERNAL MEDICINE

## 2020-09-09 PROCEDURE — 82043 UR ALBUMIN QUANTITATIVE: CPT

## 2020-09-09 PROCEDURE — 82570 ASSAY OF URINE CREATININE: CPT

## 2020-09-09 PROCEDURE — 80048 BASIC METABOLIC PNL TOTAL CA: CPT

## 2020-09-09 PROCEDURE — A9270 NON-COVERED ITEM OR SERVICE: HCPCS | Performed by: NURSE PRACTITIONER

## 2020-09-09 PROCEDURE — 700101 HCHG RX REV CODE 250: Performed by: INTERNAL MEDICINE

## 2020-09-09 PROCEDURE — 700102 HCHG RX REV CODE 250 W/ 637 OVERRIDE(OP): Performed by: NURSE PRACTITIONER

## 2020-09-09 PROCEDURE — 94640 AIRWAY INHALATION TREATMENT: CPT

## 2020-09-09 PROCEDURE — 94760 N-INVAS EAR/PLS OXIMETRY 1: CPT

## 2020-09-09 PROCEDURE — 770020 HCHG ROOM/CARE - TELE (206)

## 2020-09-09 RX ADMIN — BUDESONIDE AND FORMOTEROL FUMARATE DIHYDRATE 2 PUFF: 160; 4.5 AEROSOL RESPIRATORY (INHALATION) at 06:15

## 2020-09-09 RX ADMIN — OMEPRAZOLE 20 MG: 20 CAPSULE, DELAYED RELEASE ORAL at 06:00

## 2020-09-09 RX ADMIN — POLYETHYLENE GLYCOL 3350 1 PACKET: 17 POWDER, FOR SOLUTION ORAL at 05:00

## 2020-09-09 RX ADMIN — ATORVASTATIN CALCIUM 40 MG: 40 TABLET, FILM COATED ORAL at 06:00

## 2020-09-09 RX ADMIN — DOCUSATE SODIUM 50 MG AND SENNOSIDES 8.6 MG 2 TABLET: 8.6; 5 TABLET, FILM COATED ORAL at 04:57

## 2020-09-09 RX ADMIN — AMLODIPINE BESYLATE 5 MG: 5 TABLET ORAL at 04:57

## 2020-09-09 RX ADMIN — ALBUTEROL SULFATE 2 PUFF: 90 AEROSOL, METERED RESPIRATORY (INHALATION) at 12:38

## 2020-09-09 RX ADMIN — BUDESONIDE AND FORMOTEROL FUMARATE DIHYDRATE 2 PUFF: 160; 4.5 AEROSOL RESPIRATORY (INHALATION) at 19:25

## 2020-09-09 RX ADMIN — ALBUTEROL SULFATE 2 PUFF: 90 AEROSOL, METERED RESPIRATORY (INHALATION) at 05:11

## 2020-09-09 RX ADMIN — OXYCODONE HYDROCHLORIDE 5 MG: 5 TABLET ORAL at 20:56

## 2020-09-09 RX ADMIN — NICOTINE 14 MG: 14 PATCH TRANSDERMAL at 05:00

## 2020-09-09 RX ADMIN — SODIUM CHLORIDE, POTASSIUM CHLORIDE, SODIUM LACTATE AND CALCIUM CHLORIDE: 600; 310; 30; 20 INJECTION, SOLUTION INTRAVENOUS at 05:01

## 2020-09-09 RX ADMIN — THERA TABS 1 TABLET: TAB at 04:58

## 2020-09-09 RX ADMIN — Medication 100 MG: at 04:58

## 2020-09-09 RX ADMIN — NICOTINE POLACRILEX 2 MG: 2 GUM, CHEWING BUCCAL at 00:19

## 2020-09-09 RX ADMIN — OXYCODONE HYDROCHLORIDE 5 MG: 5 TABLET ORAL at 12:29

## 2020-09-09 RX ADMIN — ASPIRIN 81 MG: 81 TABLET, COATED ORAL at 06:00

## 2020-09-09 RX ADMIN — DOCUSATE SODIUM 50 MG AND SENNOSIDES 8.6 MG 2 TABLET: 8.6; 5 TABLET, FILM COATED ORAL at 16:58

## 2020-09-09 RX ADMIN — ALBUTEROL SULFATE 2 PUFF: 90 AEROSOL, METERED RESPIRATORY (INHALATION) at 16:57

## 2020-09-09 RX ADMIN — FOLIC ACID 1 MG: 1 TABLET ORAL at 06:00

## 2020-09-09 RX ADMIN — GLYCOPYRROLATE 1 CAPSULE: 15.6 CAPSULE RESPIRATORY (INHALATION) at 06:15

## 2020-09-09 RX ADMIN — GLYCOPYRROLATE 1 CAPSULE: 15.6 CAPSULE RESPIRATORY (INHALATION) at 19:25

## 2020-09-09 SDOH — ECONOMIC STABILITY: FOOD INSECURITY: WITHIN THE PAST 12 MONTHS, YOU WORRIED THAT YOUR FOOD WOULD RUN OUT BEFORE YOU GOT MONEY TO BUY MORE.: NEVER TRUE

## 2020-09-09 SDOH — ECONOMIC STABILITY: TRANSPORTATION INSECURITY
IN THE PAST 12 MONTHS, HAS THE LACK OF TRANSPORTATION KEPT YOU FROM MEDICAL APPOINTMENTS OR FROM GETTING MEDICATIONS?: YES

## 2020-09-09 SDOH — ECONOMIC STABILITY: INCOME INSECURITY: HOW HARD IS IT FOR YOU TO PAY FOR THE VERY BASICS LIKE FOOD, HOUSING, MEDICAL CARE, AND HEATING?: NOT VERY HARD

## 2020-09-09 SDOH — ECONOMIC STABILITY: FOOD INSECURITY: WITHIN THE PAST 12 MONTHS, THE FOOD YOU BOUGHT JUST DIDN'T LAST AND YOU DIDN'T HAVE MONEY TO GET MORE.: NEVER TRUE

## 2020-09-09 SDOH — ECONOMIC STABILITY: TRANSPORTATION INSECURITY
IN THE PAST 12 MONTHS, HAS LACK OF TRANSPORTATION KEPT YOU FROM MEETINGS, WORK, OR FROM GETTING THINGS NEEDED FOR DAILY LIVING?: YES

## 2020-09-09 ASSESSMENT — ENCOUNTER SYMPTOMS
NAUSEA: 0
SHORTNESS OF BREATH: 0
CHILLS: 0
FEVER: 0
VOMITING: 0
COUGH: 0

## 2020-09-09 NOTE — DISCHARGE PLANNING
"Hospital Care Management Discharge Planning       Anticipated Discharge Disposition:   · Home     Action:   · This RN CM attempted to meet with patient at bedside to complete assessment and obtain home health choice. Patient reported that he was not interested in Home Health or speaking with this RN CM and yelled \"if I change my mind I will let your know\".  · Updated MD via tiger text.      Barriers to Discharge:   · Medical Clearance.      Plan:   · Patient to discharge home once medically cleared; no further CM needs.   · Hospital Care Management Team to continue to provide support services and assistance with discharge planning as needed.       Current Expected Day of Discharge: 9/11/2020       For further assistance please contact the assigned RN Case Manager or  at the extension listed under \"Treatment Teams\".      "

## 2020-09-09 NOTE — PROGRESS NOTES
Paged vascular surgeon, Dr. Prado, to follow up regarding bilateral groin prevena. MD came to evaluate patient at bedside. Both groin sites C/D/I. Will remain in place for now. Dr. Prado will continue to follow with possible removal of prevena tomorrow (9/10).     Addendum: Follow up with Dr. Arroyo regarding disposition. Plan for patient to discharge tomorrow morning. Reached out to vascular surgeon. Okay to remove prevena and dress with gauze and tegaderm per Dr. Prado.

## 2020-09-09 NOTE — PROGRESS NOTES
Nephrology Daily Progress Note    Date of Service  9/9/2020    Chief Complaint  75 y.o. male admitted 9/3/2020 with leg pain, ischemia, developed SHANNAN    Interval Problem Update  Pt has no CP, no SOB  9/8 patient feels better today, no chest pain no shortness of breath.  9/9 pt is doing much better, anxious to go home  Review of Systems  Review of Systems   Constitutional: Negative for chills, fever and malaise/fatigue.   Respiratory: Negative for cough and shortness of breath.    Cardiovascular: Negative for chest pain and leg swelling.   Gastrointestinal: Negative for nausea and vomiting.   Genitourinary: Negative for dysuria, frequency and urgency.        Physical Exam  Temp:  [36.2 °C (97.2 °F)-37.3 °C (99.2 °F)] 37.2 °C (99 °F)  Pulse:  [] 88  Resp:  [16-18] 18  BP: (113-165)/(81-94) 135/85  SpO2:  [94 %-97 %] 94 %    Physical Exam  Vitals signs and nursing note reviewed.   Constitutional:       General: He is not in acute distress.     Appearance: He is not ill-appearing.   HENT:      Head: Normocephalic and atraumatic.      Right Ear: External ear normal.      Left Ear: External ear normal.      Nose: Nose normal.   Eyes:      General:         Right eye: No discharge.         Left eye: No discharge.      Conjunctiva/sclera: Conjunctivae normal.   Cardiovascular:      Rate and Rhythm: Normal rate and regular rhythm.      Heart sounds: No murmur.   Pulmonary:      Effort: Pulmonary effort is normal. No respiratory distress.      Breath sounds: Normal breath sounds. No wheezing.   Musculoskeletal:         General: No tenderness or deformity.      Right lower leg: No edema.      Left lower leg: No edema.   Skin:     General: Skin is warm.   Neurological:      General: No focal deficit present.      Mental Status: He is alert and oriented to person, place, and time.   Psychiatric:         Mood and Affect: Mood normal.         Behavior: Behavior normal.         Fluids    Intake/Output Summary (Last 24 hours)  at 9/9/2020 1326  Last data filed at 9/9/2020 1019  Gross per 24 hour   Intake 1170 ml   Output 900 ml   Net 270 ml       Laboratory  Recent Labs     09/07/20 0215 09/08/20 0227   WBC 7.1 6.1   RBC 2.94* 2.70*   HEMOGLOBIN 9.5* 9.0*   HEMATOCRIT 28.1* 25.3*   MCV 95.6 93.7   MCH 32.3 33.3*   MCHC 33.8 35.6*   RDW 44.1 44.3   PLATELETCT 104* 130*   MPV 10.9 10.7     Recent Labs     09/07/20 0215 09/08/20 0227 09/09/20  0906   SODIUM 136 138 139   POTASSIUM 4.1 4.0 4.1   CHLORIDE 101 104 102   CO2 21 22 25   GLUCOSE 109* 104* 100*   BUN 29* 26* 19   CREATININE 3.38* 2.63* 2.30*   CALCIUM 8.1* 8.2* 9.3         No results for input(s): NTPROBNP in the last 72 hours.        Imaging  DX-PORTABLE FLUORO > 1 HOUR   Final Result      11 minutes 44 seconds fluoroscopy time utilized by Dr. Prado for vascular procedure.      CT-CTA AORTA-RO WITH & W/O-POST PROCESS   Final Result         CTA AORTA      1.  Occlusion of the distal aorta at the bifurcation extending in the right common and external iliac arteries. Reconstitution of the right common femoral artery is seen.   2.  Dense calcification of the origin of the left common iliac artery appears to result in proximal occlusion with reconstitution in the distal common iliac artery.   3.  Heterogeneous right thyroid lesion, recommend follow-up thyroid sonography for further characterization due to lesion size.   4.  Hepatomegaly      CTA LOWER EXTREMITIES      1.  Scattered atherosclerotic changes throughout the right lower extremity without 50% stenosis, the distal right lower cavity vessels are not visualized and there is 0 vessel runoff at the right ankle.   2.  70% stenosis at the origin of the left common femoral artery.   3.  Scattered atherosclerotic calcifications throughout the left lower extremity resulting in approximately 50% stenosis at the popliteal artery, there appears to be three-vessel runoff the left ankle.      3D angiographic/MIP images of the  vasculature confirm the vascular findings as described above.      US-DANIELE SINGLE LEVEL BILAT   Final Result      US-EXTREMITY ARTERY LOWER BILAT   Final Result            Assessment/Plan  1 SHANNAN on CKD III:improving  2 PVD  3 HTN  4 Anemia    Plan  no need for HD  Renal diet  Daily labs  Renal dose all meds  Avoid nephrotoxins

## 2020-09-09 NOTE — FACE TO FACE
Face to Face Supporting Documentation - Home Health    The encounter with this patient was in whole or in part the primary reason for home health admission.    Date of encounter:   Patient:                    MRN:                       YOB: 2022  Fede Parikh Jr.  [id number]  1945     Home health to see patient for:  Skilled Nursing care for assessment, interventions & education, Physical Therapy evaluation and treatment and Occupational therapy evaluation and treatment    Skilled need for:  Surgical Aftercare Critical limb ischemia and New Onset Medical Diagnosis Contrast-induced nephropathy    Skilled nursing interventions to include:  Comment:   Skilled nursing care for assessment, intervention and education.       Homebound status evidenced by:  Needs the assistance of another person in order to leave the home. Leaving home requires a considerable and taxing effort. There is a normal inability to leave the home.    Community Physician to provide follow up care: Cortes Dubose M.D.     Optional Interventions? No      I certify the face to face encounter for this home health care referral meets the CMS requirements and the encounter/clinical assessment with the patient was, in whole, or in part, for the medical condition(s) listed above, which is the primary reason for home health care. Based on my clinical findings: the service(s) are medically necessary, support the need for home health care, and the homebound criteria are met.  I certify that this patient has had a face to face encounter by myself.  Rony Arroyo M.D. - NPI: [id number]

## 2020-09-09 NOTE — PROGRESS NOTES
Vascular Surgery     Incisions look good   Preveenas in place     Feet warm and well perfused   Resolution of rest pain     OK to disposition from vascular surgery standpoint   Will remove Preveenas tomorrow     Jean-Claude Prado< MD

## 2020-09-09 NOTE — PROGRESS NOTES
12 hour chart check    Monitor Summary    .20/.08/.32  Rhythm: Sinus Rhythm  Rate: 79-93  Ectopy: (R) PVCs, (R) PACs, couplets

## 2020-09-09 NOTE — PROGRESS NOTES
Community Health Worker Intake  • Social determinates of health intake complete.   • Identified barriers to transportation.  • Contact information provided to Fede Parikh Jr.  • Has PCP appointment scheduled for 9/18 at 1:20pm.  • Accepted Meds-To-Beds.   • Inpatient assessment completed.    CHW [place] met with pt bedside to introduce CCM services. Pt accepted. Pt reports PCP is no longer Cortes Dubose MD. This CHW TC to [facility]. Per office, pt sees resident Alexx Reynaga. This CHW was able to schedule f/u appmnt for date listed above. Pt reports transportation issues getting to appmnts. Pt reports a friend can take him sometimes. Informed pt CCM can provide bus pass to appmnts if needed. Informed pt about Access to Healthcare. Pt reports no food insecurity. Pt reports he feels confident in managing his health after d/c. Pt reports no other needs at this time.     Plan: Follow up call after d/c. PCP appmnt reminder.

## 2020-09-09 NOTE — PROGRESS NOTES
Hospital Medicine Daily Progress Note    Date of Service  9/9/2020    Chief Complaint  leg pain, loss of consciousness    Hospital Course    Mr. Fede Parikh Jr. is a 75 y.o. male  who is a poor historian, with a history of polysubstance abuse who presented on 9/3/2020 with loss of consciousness.  Patient was found down on the sidewalk and was found to be severely hypoglycemic.  Blood alcohol level was 123.  It was unknown for how long he was down.  He did report right leg pain which has been chronic but exacerbated recently and states his leg gave out which caused him to fall.  A CT angiogram showed dense calcific disease throughout the distal aorta and iliac system, right iliac artery is occluded with reconstitution of the right femoral artery from the profunda femoris artery, questionable high-grade stenosis versus occlusion of the left common femoral artery with reconstitution of the external iliac artery.  Vascular surgery was consulted. Patient was started on a heparin drip.  He subsequently underwent bilateral common femoral and profunda femoris artery endarterectomies with bovine patch angioplasty, right to left femoral to femoral artery bypass using 6 mm PTFE, angioplasty and stenting of right common iliac artery, right iliac thrombectomy on 9/4/20.  Post procedure, patient developed acute kidney injury on top of his chronic kidney disease stage III.  He is placed on aspirin and Lipitor.  Nephrology was consulted and felt that this was secondary to contrast during angiogram/revascularization versus atheroemboli from chronic limb ischemia.  He is placed on IV fluids.  His renal function improved with IV fluids.  He was monitored for alcohol withdrawal, and fortunately did not exhibit any symptoms.  PT/OT recommends home health.      Interval Problem Update  9/9/2020 - I reviewed the patient's chart today. Uneventful night. VSS. Afebrile. Saturating well on RA.  CIWA scores remain low at 0. Creatinine  improved to 2.3.  Electrolytes are normal. Urinalysis showed no pyuria, negative for nitrite and leukocyte esterase.  PT upgraded their recommendation to home health, but patient refusing.    > I have personally seen and examined the patient today.  He is comfortable, although has some discomfort on the groin.  Still has the wound VAC in place in both groins.  Vascular surgery plan to remove wound VAC prior to discharge.  He has no other complaints.  He is eating breakfast without issues.  No nausea or vomiting.  No chest pain or shortness of breath.          Consultants/Specialty  Vascular  Nephrology    Code Status  Full Code    Disposition  Monitor on the floors  Pt refusing Home health    Review of Systems  ROS   Pertinent positives/negatives as mentioned above.     A complete review of systems was personally done by me. All other systems were negative.       Physical Exam  Temp:  [36.2 °C (97.2 °F)-37.3 °C (99.2 °F)] 37.2 °C (99 °F)  Pulse:  [] 100  Resp:  [16-19] 17  BP: (113-165)/(81-94) 144/94  SpO2:  [94 %-97 %] 94 %    Physical Exam  Vitals signs and nursing note reviewed.   Constitutional:       General: He is not in acute distress.     Appearance: Normal appearance. He is normal weight. He is not toxic-appearing or diaphoretic.   HENT:      Head: Normocephalic and atraumatic.      Right Ear: External ear normal.      Left Ear: External ear normal.      Nose: Nose normal.      Mouth/Throat:      Mouth: Mucous membranes are moist.      Pharynx: Oropharynx is clear. No oropharyngeal exudate.   Eyes:      General: No scleral icterus.     Extraocular Movements: Extraocular movements intact.      Conjunctiva/sclera: Conjunctivae normal.      Pupils: Pupils are equal, round, and reactive to light.   Neck:      Musculoskeletal: Normal range of motion and neck supple.   Cardiovascular:      Rate and Rhythm: Normal rate and regular rhythm.      Pulses: Normal pulses.      Heart sounds: Normal heart sounds.  No murmur. No friction rub. No gallop.    Pulmonary:      Effort: Pulmonary effort is normal. No respiratory distress.      Breath sounds: Normal breath sounds. No stridor. No wheezing, rhonchi or rales.   Chest:      Chest wall: No tenderness.   Abdominal:      General: Abdomen is flat. Bowel sounds are normal. There is no distension.      Palpations: Abdomen is soft. There is no mass.      Tenderness: There is no abdominal tenderness. There is no guarding or rebound.   Musculoskeletal: Normal range of motion.         General: No swelling.      Right lower leg: No edema.      Left lower leg: No edema.      Comments: Preveena in place.  Positive pulses and good cap refill in lower extremities.  Minimal swelling on the right thigh  No tenderness on B/L groin/thigh   Lymphadenopathy:      Cervical: No cervical adenopathy.   Skin:     General: Skin is warm and dry.      Capillary Refill: Capillary refill takes less than 2 seconds.      Coloration: Skin is not jaundiced.      Findings: No rash.   Neurological:      General: No focal deficit present.      Mental Status: He is alert and oriented to person, place, and time. Mental status is at baseline.      Cranial Nerves: No cranial nerve deficit.      Motor: No weakness.   Psychiatric:         Mood and Affect: Mood normal.         Behavior: Behavior normal.         Thought Content: Thought content normal.         Judgment: Judgment normal.           Fluids    Intake/Output Summary (Last 24 hours) at 9/9/2020 1113  Last data filed at 9/9/2020 1019  Gross per 24 hour   Intake 1170 ml   Output 900 ml   Net 270 ml       Laboratory  Recent Labs     09/07/20 0215 09/08/20 0227   WBC 7.1 6.1   RBC 2.94* 2.70*   HEMOGLOBIN 9.5* 9.0*   HEMATOCRIT 28.1* 25.3*   MCV 95.6 93.7   MCH 32.3 33.3*   MCHC 33.8 35.6*   RDW 44.1 44.3   PLATELETCT 104* 130*   MPV 10.9 10.7     Recent Labs     09/07/20 0215 09/08/20 0227 09/09/20  0906   SODIUM 136 138 139   POTASSIUM 4.1 4.0 4.1    CHLORIDE 101 104 102   CO2 21 22 25   GLUCOSE 109* 104* 100*   BUN 29* 26* 19   CREATININE 3.38* 2.63* 2.30*   CALCIUM 8.1* 8.2* 9.3                   Imaging  DX-PORTABLE FLUORO > 1 HOUR   Final Result      11 minutes 44 seconds fluoroscopy time utilized by Dr. Prado for vascular procedure.      CT-CTA AORTA-RO WITH & W/O-POST PROCESS   Final Result         CTA AORTA      1.  Occlusion of the distal aorta at the bifurcation extending in the right common and external iliac arteries. Reconstitution of the right common femoral artery is seen.   2.  Dense calcification of the origin of the left common iliac artery appears to result in proximal occlusion with reconstitution in the distal common iliac artery.   3.  Heterogeneous right thyroid lesion, recommend follow-up thyroid sonography for further characterization due to lesion size.   4.  Hepatomegaly      CTA LOWER EXTREMITIES      1.  Scattered atherosclerotic changes throughout the right lower extremity without 50% stenosis, the distal right lower cavity vessels are not visualized and there is 0 vessel runoff at the right ankle.   2.  70% stenosis at the origin of the left common femoral artery.   3.  Scattered atherosclerotic calcifications throughout the left lower extremity resulting in approximately 50% stenosis at the popliteal artery, there appears to be three-vessel runoff the left ankle.      3D angiographic/MIP images of the vasculature confirm the vascular findings as described above.      US-DANIELE SINGLE LEVEL BILAT   Final Result      US-EXTREMITY ARTERY LOWER BILAT   Final Result           Assessment/Plan  * Right iliac artery occlusion, with critical limb ischemia (HCC)- (present on admission)  Assessment & Plan  - s/p bilateral common femoral and profunda femoris artery endarterectomies with bovine patch angioplasty, right to left femoral to femoral artery bypass using 6 mm PTFE, angioplasty and stenting of right common iliac artery, right iliac  thrombectomy on 9/4/20.  -Continue aspirin, and Lipitor.  -Still has Lexie in place, but vascular surgery planning to remove wound VAC prior to discharge.    -Pain well controlled.  Continue oral oxycodone as needed.    SHANNAN (acute kidney injury) due to contrast-induced nephropathy (HCC)  Assessment & Plan  - Likely secondary to contrast-induced nephropathy from contrast with angiography and angioplasty versus atheroemboli.  Renal ultrasound on 8/14 was normal.  Renal function continues to improve.    -Continue IV fluids with LR at 75 cc/h.  Continue to monitor renal function closely.  - avoid nephrotoxins, and continue to renally dose all medications.    High anion gap metabolic acidosis- (present on admission)  Assessment & Plan  -Resolved.    Alcohol use- (present on admission)  Assessment & Plan  -No signs of alcohol withdrawal.  Continue to monitor with CIWA.  Continue thiamine, folate, and multivitamin supplements.    History of hepatitis C- (present on admission)  Assessment & Plan  -Previously followed with Dr. Bella and completed treatment.  -No follow-up was recommended.    Essential hypertension- (present on admission)  Assessment & Plan  -Maintaining good control.    -Continue Norvasc 5 mg daily.  Monitor blood pressure trends closely with as needed oral Catapres, and IV Vasotec for significant hypertension.    COPD (chronic obstructive pulmonary disease) (HCC)- (present on admission)  Assessment & Plan  -Not in acute exacerbation.  Continue inhaled Symbicort, glycopyrrolate, along with as needed bronchodilators per RT protocol.  Not requiring oxygen supplement.       VTE prophylaxis: Avoid anticoagulation per vascular surgery. SCD.   no abdominal pain, no bloating, no constipation, no diarrhea, no nausea and no vomiting.

## 2020-09-10 ENCOUNTER — PATIENT OUTREACH (OUTPATIENT)
Dept: HEALTH INFORMATION MANAGEMENT | Facility: OTHER | Age: 75
End: 2020-09-10

## 2020-09-10 VITALS
RESPIRATION RATE: 18 BRPM | HEART RATE: 89 BPM | DIASTOLIC BLOOD PRESSURE: 112 MMHG | BODY MASS INDEX: 25.49 KG/M2 | OXYGEN SATURATION: 98 % | TEMPERATURE: 98.8 F | WEIGHT: 168.21 LBS | SYSTOLIC BLOOD PRESSURE: 135 MMHG | HEIGHT: 68 IN

## 2020-09-10 LAB
ANION GAP SERPL CALC-SCNC: 10 MMOL/L (ref 7–16)
BUN SERPL-MCNC: 21 MG/DL (ref 8–22)
CALCIUM SERPL-MCNC: 9 MG/DL (ref 8.5–10.5)
CHLORIDE SERPL-SCNC: 102 MMOL/L (ref 96–112)
CO2 SERPL-SCNC: 23 MMOL/L (ref 20–33)
CREAT SERPL-MCNC: 2.1 MG/DL (ref 0.5–1.4)
GLUCOSE SERPL-MCNC: 97 MG/DL (ref 65–99)
POTASSIUM SERPL-SCNC: 3.8 MMOL/L (ref 3.6–5.5)
SODIUM SERPL-SCNC: 135 MMOL/L (ref 135–145)

## 2020-09-10 PROCEDURE — A9270 NON-COVERED ITEM OR SERVICE: HCPCS | Performed by: INTERNAL MEDICINE

## 2020-09-10 PROCEDURE — 99232 SBSQ HOSP IP/OBS MODERATE 35: CPT | Performed by: INTERNAL MEDICINE

## 2020-09-10 PROCEDURE — 80048 BASIC METABOLIC PNL TOTAL CA: CPT

## 2020-09-10 PROCEDURE — 700102 HCHG RX REV CODE 250 W/ 637 OVERRIDE(OP): Performed by: INTERNAL MEDICINE

## 2020-09-10 PROCEDURE — 99239 HOSP IP/OBS DSCHRG MGMT >30: CPT | Performed by: INTERNAL MEDICINE

## 2020-09-10 PROCEDURE — 36415 COLL VENOUS BLD VENIPUNCTURE: CPT

## 2020-09-10 RX ORDER — OXYCODONE HYDROCHLORIDE 5 MG/1
2.5-5 TABLET ORAL EVERY 8 HOURS PRN
Qty: 15 TAB | Refills: 0 | Status: SHIPPED | OUTPATIENT
Start: 2020-09-10 | End: 2020-09-15

## 2020-09-10 RX ADMIN — NICOTINE 14 MG: 14 PATCH TRANSDERMAL at 05:06

## 2020-09-10 RX ADMIN — FOLIC ACID 1 MG: 1 TABLET ORAL at 05:06

## 2020-09-10 RX ADMIN — Medication 100 MG: at 05:06

## 2020-09-10 RX ADMIN — AMLODIPINE BESYLATE 5 MG: 5 TABLET ORAL at 05:06

## 2020-09-10 RX ADMIN — DOCUSATE SODIUM 50 MG AND SENNOSIDES 8.6 MG 2 TABLET: 8.6; 5 TABLET, FILM COATED ORAL at 05:06

## 2020-09-10 RX ADMIN — OMEPRAZOLE 20 MG: 20 CAPSULE, DELAYED RELEASE ORAL at 05:06

## 2020-09-10 RX ADMIN — ATORVASTATIN CALCIUM 40 MG: 40 TABLET, FILM COATED ORAL at 05:06

## 2020-09-10 RX ADMIN — ALBUTEROL SULFATE 2 PUFF: 90 AEROSOL, METERED RESPIRATORY (INHALATION) at 03:03

## 2020-09-10 RX ADMIN — ASPIRIN 81 MG: 81 TABLET, COATED ORAL at 05:06

## 2020-09-10 RX ADMIN — THERA TABS 1 TABLET: TAB at 05:06

## 2020-09-10 RX ADMIN — ALBUTEROL SULFATE 2 PUFF: 90 AEROSOL, METERED RESPIRATORY (INHALATION) at 07:40

## 2020-09-10 ASSESSMENT — ENCOUNTER SYMPTOMS
VOMITING: 0
CHILLS: 0
FEVER: 0
NAUSEA: 0
SHORTNESS OF BREATH: 0
COUGH: 0

## 2020-09-10 NOTE — DISCHARGE PLANNING
Anticipated Discharge Disposition: Home    Action: Pt has d/c order in place. LSW met with pt to provide IMM. LSW provided copy of IMM to pt. LSW placed IMM with verbal consent in pt's chart.     Krystle LYLES left report for LSW and pt refusing HH.     Barriers to Discharge: None     Plan: LSW to assist as needed       Addendum 0916  LSW informed by bedside RNKatia that pt stating he does not feel safe to d/c without a walker. Dr. Arroyo has entered DME order. Still pending a F2F for walker. LSW [place] Texted Dr. Claros with request.   LSW to meet with pt at bedside to collect DME CHOICE.       Addendum 0920  LSW met with pt at bedside and pt provided verbal consent for DME referral to be sent to [facility]. Bedside RN aware to order walker from traction.   LSW faxed DME CHOICE form to [facility].   F2F now available.

## 2020-09-10 NOTE — PROGRESS NOTES
Nephrology Daily Progress Note    Date of Service  9/10/2020    Chief Complaint  75 y.o. male admitted 9/3/2020 with leg pain, ischemia, developed SHANNAN    Interval Problem Update  Pt has no CP, no SOB  9/8 patient feels better today, no chest pain no shortness of breath.  9/9 pt is doing much better, anxious to go home  9/10 patient is doing better, excited to go home  Review of Systems  Review of Systems   Constitutional: Negative for chills, fever and malaise/fatigue.   Respiratory: Negative for cough and shortness of breath.    Cardiovascular: Negative for chest pain and leg swelling.   Gastrointestinal: Negative for nausea and vomiting.   Genitourinary: Negative for dysuria, frequency and urgency.        Physical Exam  Temp:  [36.2 °C (97.2 °F)-37.2 °C (98.9 °F)] 37.1 °C (98.8 °F)  Pulse:  [76-98] 89  Resp:  [16-18] 18  BP: (125-149)/() 135/112  SpO2:  [95 %-98 %] 98 %    Physical Exam  Vitals signs and nursing note reviewed.   Constitutional:       General: He is not in acute distress.     Appearance: He is not ill-appearing.   HENT:      Head: Normocephalic and atraumatic.      Right Ear: External ear normal.      Left Ear: External ear normal.      Nose: Nose normal.   Eyes:      General:         Right eye: No discharge.         Left eye: No discharge.      Conjunctiva/sclera: Conjunctivae normal.   Cardiovascular:      Rate and Rhythm: Normal rate and regular rhythm.      Heart sounds: No murmur.   Pulmonary:      Effort: Pulmonary effort is normal. No respiratory distress.      Breath sounds: Normal breath sounds. No wheezing.   Musculoskeletal:         General: No tenderness or deformity.      Right lower leg: No edema.      Left lower leg: No edema.   Skin:     General: Skin is warm.   Neurological:      General: No focal deficit present.      Mental Status: He is alert and oriented to person, place, and time.   Psychiatric:         Mood and Affect: Mood normal.         Behavior: Behavior normal.          Fluids    Intake/Output Summary (Last 24 hours) at 9/10/2020 1250  Last data filed at 9/10/2020 0943  Gross per 24 hour   Intake 880 ml   Output 850 ml   Net 30 ml       Laboratory  Recent Labs     09/08/20  0227   WBC 6.1   RBC 2.70*   HEMOGLOBIN 9.0*   HEMATOCRIT 25.3*   MCV 93.7   MCH 33.3*   MCHC 35.6*   RDW 44.3   PLATELETCT 130*   MPV 10.7     Recent Labs     09/08/20  0227 09/09/20  0906 09/10/20  0253   SODIUM 138 139 135   POTASSIUM 4.0 4.1 3.8   CHLORIDE 104 102 102   CO2 22 25 23   GLUCOSE 104* 100* 97   BUN 26* 19 21   CREATININE 2.63* 2.30* 2.10*   CALCIUM 8.2* 9.3 9.0         No results for input(s): NTPROBNP in the last 72 hours.        Imaging  DX-PORTABLE FLUORO > 1 HOUR   Final Result      11 minutes 44 seconds fluoroscopy time utilized by Dr. Prado for vascular procedure.      CT-CTA AORTA-RO WITH & W/O-POST PROCESS   Final Result         CTA AORTA      1.  Occlusion of the distal aorta at the bifurcation extending in the right common and external iliac arteries. Reconstitution of the right common femoral artery is seen.   2.  Dense calcification of the origin of the left common iliac artery appears to result in proximal occlusion with reconstitution in the distal common iliac artery.   3.  Heterogeneous right thyroid lesion, recommend follow-up thyroid sonography for further characterization due to lesion size.   4.  Hepatomegaly      CTA LOWER EXTREMITIES      1.  Scattered atherosclerotic changes throughout the right lower extremity without 50% stenosis, the distal right lower cavity vessels are not visualized and there is 0 vessel runoff at the right ankle.   2.  70% stenosis at the origin of the left common femoral artery.   3.  Scattered atherosclerotic calcifications throughout the left lower extremity resulting in approximately 50% stenosis at the popliteal artery, there appears to be three-vessel runoff the left ankle.      3D angiographic/MIP images of the vasculature confirm  the vascular findings as described above.      US-DANIELE SINGLE LEVEL BILAT   Final Result      US-EXTREMITY ARTERY LOWER BILAT   Final Result            Assessment/Plan  1 SHANNAN on CKD III: Improving  2 PVD  3 HTN  4 Anemia    Plan  no need for HD  Renal diet  Renal dose all meds  Avoid nephrotoxins  Follow-up as outpatient

## 2020-09-10 NOTE — PROGRESS NOTES
Patient denies the desire for home health upon discharge, but requesting a walker. Walker ordered and delivered to bedside. All follow up appointments made. Discharge instructions reviewed with patient and signed and dated for understanding. Questions answered for clarity. Copy given to patient. Controlled substance informed consent form reviewed and signed by patient. Telemetry box and peripheral IV removed. All patient belongings and personal walker gathered. Patient brought down by wheelchair. Picked up in car by wife and friend.

## 2020-09-10 NOTE — DISCHARGE PLANNING
Received Choice form at 1527  Agency/Facility Name: [facility]   Referral sent per Choice form @ [id number]

## 2020-09-10 NOTE — FACE TO FACE
Face to Face Note  -  Durable Medical Equipment    Rony Arroyo M.D. - NPI: [id number]  I certify that this patient is under my care and that they had a durable medical equipment(DME)face to face encounter by myself that meets the physician DME face-to-face encounter requirements with this patient on:    Date of encounter:   Patient:                    MRN:                       YOB: 2022  Fede Hsu Jl Tyler  [id number]  1945     The encounter with the patient was in whole, or in part, for the following medical condition, which is the primary reason for durable medical equipment:  Post-Op Surgery    I certify that, based on my findings, the following durable medical equipment is medically necessary:  Walkers.      My Clinical findings support the need for the above equipment due to:  Abnormal Gait    Supporting Symptoms: gait instability

## 2020-09-10 NOTE — DISCHARGE SUMMARY
Discharge Summary    CHIEF COMPLAINT ON ADMISSION  Chief Complaint   Patient presents with   • ALOC     pt bib ems found laying on the side walk denies trauma. has bgl of 46. was given 100ml's of D10, bgl went up to 167   • Leg Pain     bilateral leg pain       Reason for Admission  EMS 39     Admission Date  9/3/2020    CODE STATUS  Full Code    HPI & HOSPITAL COURSE  This is a Mr. Fede Parikh . is a 75 y.o. male  who is a poor historian, with a history of polysubstance abuse who presented on 9/3/2020 with loss of consciousness.  Patient was found down on the sidewalk and was found to be severely hypoglycemic.  Blood alcohol level was 123.  It was unknown for how long he was down.  He did report right leg pain which has been chronic but exacerbated recently and states his leg gave out which caused him to fall.  A CT angiogram showed dense calcific disease throughout the distal aorta and iliac system, right iliac artery is occluded with reconstitution of the right femoral artery from the profunda femoris artery, questionable high-grade stenosis versus occlusion of the left common femoral artery with reconstitution of the external iliac artery.  Vascular surgery was consulted. Patient was started on a heparin drip.  He subsequently underwent bilateral common femoral and profunda femoris artery endarterectomies with bovine patch angioplasty, right to left femoral to femoral artery bypass using 6 mm PTFE, angioplasty and stenting of right common iliac artery, right iliac thrombectomy on 9/4/20. He is then placed on aspirin and Lipitor.  Post procedure, patient developed acute kidney injury on top of his chronic kidney disease stage III.  Nephrology was consulted and felt that this was secondary to contrast during angiogram/revascularization versus atheroemboli from chronic limb ischemia.  He is placed on IV fluids.  His renal function improved back to near his baseline.  He was monitored for alcohol  withdrawal, and fortunately did not exhibit any symptoms.  PT/OT recommends home health but patient refused this.       Vascular surgery subsequently removed his Lexie VAC, and is cleared him for discharge from vascular standpoint.  Medically, he had no further active medical issues requiring hospitalization.  He remained hemodynamically stable and afebrile. I have personally seen and examined the patient on the day of discharge. With his clinical improvement, he was deemed ready to discharge from the hospital as he did not have any further hospitalization needs. Patient felt comfortable going home. The discharge plan was discussed with the patient, with which he was agreeable to.      Therefore, he is discharged in good and stable condition to home with close outpatient follow-up.    The patient met 2-midnight criteria for an inpatient stay at the time of discharge.    Discharge Date  9/10/2020      FOLLOW UP ITEMS POST DISCHARGE  -He will continue on aspirin and Lipitor.  He will follow-up with outpatient vascular surgery.  He is given limited supply of oxycodone as needed for pain, and he was consented to the narcotic prescription.  -Follow-up with PCP, and nephrology.  Follow-up renal function.  - counseled to seek immediate medical attention, or return to the ED for recurrent or worsening symptoms.      DISCHARGE DIAGNOSES  Principal Problem:    Right iliac artery occlusion, with critical limb ischemia (HCC) (Chronic) POA: Yes  Active Problems:    SHANNAN (acute kidney injury) due to contrast-induced nephropathy (HCC) POA: No    High anion gap metabolic acidosis POA: Yes    COPD (chronic obstructive pulmonary disease) (HCC) POA: Yes    Essential hypertension POA: Yes    History of hepatitis C POA: Yes      Overview: Treated in 2018, cured per ID      F3 (F3-F4)- per UpTo Date rec con't surveilance for HCC with US +/- AFP q       6 mths    Alcohol use POA: Yes  Resolved Problems:    * No resolved hospital  problems. *      FOLLOW UP  No future appointments.  Alexx Reynaga M.D.  [address] [facility] [address]  [phone]    On 9/18/2020  Please go to your Primary Care doctor appointment at 1:20pm.      MEDICATIONS ON DISCHARGE     Medication List      START taking these medications      Instructions   oxyCODONE immediate-release 5 MG Tabs  Commonly known as: ROXICODONE   Take 0.5-1 Tabs by mouth every 8 hours as needed for Severe Pain for up to 5 days.  Dose: 2.5-5 mg        CONTINUE taking these medications      Instructions   albuterol 108 (90 Base) MCG/ACT Aers inhalation aerosol  Commonly known as: Ventolin HFA   INHALE TWO PUFFS BY MOUTH EVERY SIX HOURS AS NEEDED     amLODIPine 5 MG Tabs  Commonly known as: NORVASC   Take 1 Tab by mouth every day. For high blood pressure  Dose: 5 mg     aspirin 81 MG EC tablet  Commonly known as: Aspirin Low Dose   TAKE ONE TABLET BY MOUTH ONE TIME DAILY     atorvastatin 40 MG Tabs  Commonly known as: LIPITOR   Take 1 Tab by mouth every day.  Dose: 40 mg     ergocalciferol 49229 UNIT capsule  Commonly known as: DRISDOL   Take 1 Cap by mouth every 7 days.  Dose: 50,000 Units     fluocinolone 0.025 % cream  Commonly known as: SYNALAR   Apply 1 Application to affected area(s) 3 times a day as needed.  Dose: 1 Application     Fluticasone Furoate-Vilanterol 100-25 MCG/INH Aepb  Commonly known as: Breo Ellipta   Inhale 1 Puff by mouth every day.  Dose: 1 Puff     hydrOXYzine HCl 25 MG Tabs  Commonly known as: ATARAX   Take 1 Tab by mouth 2 times a day as needed for Itching.  Dose: 25 mg     loratadine 10 MG Tabs  Commonly known as: CLARITIN   Take 1 Tab by mouth every day.  Dose: 10 mg     omeprazole 20 MG delayed-release capsule  Commonly known as: PRILOSEC   TAKE ONE CAPSULE BY MOUTH ONE TIME DAILY     tiotropium 18 MCG Caps  Commonly known as: Spiriva HandiHaler   INHALE 1 CAPSULE VIA HANDIHALER ONCE DAILY            Allergies  No Known Allergies    DIET  Orders Placed This  Encounter   Procedures   • Diet Order Regular     Standing Status:   Standing     Number of Occurrences:   1     Order Specific Question:   Diet:     Answer:   Regular [1]       ACTIVITY  As tolerated.  Weight bearing as tolerated    CONSULTATIONS  Vascular surgery  Nephrology    PROCEDURES  As above    LABORATORY  Lab Results   Component Value Date    SODIUM 135 09/10/2020    POTASSIUM 3.8 09/10/2020    CHLORIDE 102 09/10/2020    CO2 23 09/10/2020    GLUCOSE 97 09/10/2020    BUN 21 09/10/2020    CREATININE 2.10 (H) 09/10/2020    CREATININE 1.4 08/23/2005        Lab Results   Component Value Date    WBC 6.1 09/08/2020    HEMOGLOBIN 9.0 (L) 09/08/2020    HEMATOCRIT 25.3 (L) 09/08/2020    PLATELETCT 130 (L) 09/08/2020        Total time of the discharge process = 40 minutes.

## 2020-09-10 NOTE — PROGRESS NOTES
Bedside report received. Tele monitor on patient. Fall precautions in place. Patient in bed. Patient alert and oriented x4. Call light within reach. Bed in low and locked position. Bilateral groin sites assessed. Previnas removed per MD in morning shift. Sites are clean, dry and intact covered with gauze and tape.

## 2020-09-10 NOTE — DISCHARGE INSTRUCTIONS
DISCHARGE INSTRUCTIONS    Discharged to home by car with relative. Discharged via wheelchair, hospital escort: Yes.  Special equipment needed: Not Applicable    Be sure to schedule a follow-up appointment with your primary care doctor or any specialists as instructed.     Discharge Plan:        I understand that a diet low in cholesterol, fat, and sodium is recommended for good health. Unless I have been given specific instructions below for another diet, I accept this instruction as my diet prescription.   Other diet: Regular Diet    Special Instructions: None    Depression / Suicide Risk  As you are discharged from this [facility] facility, it is important to learn how to keep safe from harming yourself.    Recognize the warning signs:  · Abrupt changes in personality, positive or negative- including increase in energy   · Giving away possessions  · Change in eating patterns- significant weight changes-  positive or negative  · Change in sleeping patterns- unable to sleep or sleeping all the time   · Unwillingness or inability to communicate  · Depression  · Unusual sadness, discouragement and loneliness  · Talk of wanting to die  · Neglect of personal appearance   · Rebelliousness- reckless behavior  · Withdrawal from people/activities they love  · Confusion- inability to concentrate     If you or a loved one observes any of these behaviors or has concerns about self-harm, here's what you can do:  · Talk about it- your feelings and reasons for harming yourself  · Remove any means that you might use to hurt yourself (examples: pills, rope, extension cords, firearm)  · Get professional help from the community (Mental Health, Substance Abuse, psychological counseling)  · Do not be alone:Call your Safe Contact- someone whom you trust who will be there for you.  · Call your local CRISIS HOTLINE 965-7265 or 620-968-2756  · Call your local Children's Mobile Crisis Response Team [place] (692) 836-1489 or  "www.GetMyBoat  · Call the toll free National Suicide Prevention Hotlines   · National Suicide Prevention Lifeline 412-556-LRBX (6723)  · [employer] Hope Line Network 800-SUICIDE (347-4886)  ·     ACUTE ISCHEMIC LIMB  Acute ischemic limb develops when an arm or leg (limb) has trouble getting enough blood. This happens when an artery (a large blood vessel that carries oxygen and nutrients that feed your body) is partially or totally blocked. It is also called critical limb ischemia. The problem affects the legs more often than the arms. It is described as acute when the condition comes on suddenly. When this happens, it is considered a medical emergency.  CAUSES   An ischemic limb almost always is the result of underlying atherosclerotic disease (when artery walls become thick and hard). It develops because of the buildup of fatty material that hardens (plaque) in arteries and veins. This buildup narrows the blood vessels and keeps tissues in the limb from getting enough blood. Small amounts of plaque can break off from the walls of the blood vessels and create a clot. This blocks the blood flow and causes ischemic limb.  Other factors that can make ischemic limb more likely are:  · Smoking.  · High blood pressure.  · Diabetes.  · High cholesterol levels.  · Abnormal heart rhythms (for example, a common one called \"atrial fibrillation\")  · Past operations on the heart and/or blood vessels.  · Past problems with blood clots.  · Past injury (such as burns or a broken bone) that damaged blood vessels in the limb.  Other conditions can cause ischemic limb, although these are rare. They include:  · Buerger's disease, caused by inflamed blood vessels in the hands and feet. This is also called thromboangiitis obliterans.  · Some forms of arthritis.  · Rare birth defects affecting the arteries of the legs.  SYMPTOMS   The major signs and symptoms of ischemic limb are:   · Pain in the leg while resting (also called \"rest " "pain\"). This is sometimes described as a burning pain in the foot that gets worse when lying down.  · The leg or arm is cool to the touch.  · The leg or arm lacks color.  · The leg or arm will not move.  · There is numbness, a tingling or a prickling sensation in the limb.  · Blood flow or pulse cannot be found by your caregiver in the arm or leg.  DIAGNOSIS   In diagnosing ischemic limb a caregiver considers:   · The person's description of how the leg or arm has been feeling.  · The results of the caregiver's own examination.  · Blood tests.  PROGNOSIS   With timely emergency treatment, there is a greater than 50-50 chance that a caregiver can fix or remove the block in the artery that is causing the ischemic limb. This could require medication and, possibly, an operation.  If medical care is not gotten quickly enough, the limb might need to be surgically removed (amputated).  TREATMENT   Possible treatments for ischemic limb include:  · Medications to break down blood clots and let blood flow more easily. These medicines must be given directly into a blood vessel and requires staying the hospital.  · Revascularization, which is surgery to improve blood flow by putting in new blood vessels.  · Angioplasty, which is surgery that places a small balloon in an artery to open blood flow.  · Surgery to place a stent (a flexible metal tube) in an artery to keep it open.  · Amputation, which is surgical removal of some or all of the affected limb.  RELATED COMPLICATIONS  People who are treated for acute ischemic limb are more likely to:  · Have more-than-normal bleeding caused by blood-thinning medications.  · Have ongoing pain.  · Develop an infection in the arm or leg.  If surgery is needed, possible complications could include:  · A possible need for blood transfusions.  · Infection.  · Swelling inside the limb that squeezes the blood vessels. This is called \"Compartment Syndrome\", a condition that requires more " surgery.  · A long recovery time.  · Loss of mobility.  · A need for amputation.  · The need for more surgery.        OXYCODONE TABLETS OR CAPSULES  What is this medicine?  OXYCODONE (ox i KOE done) is a pain reliever. It is used to treat moderate to severe pain.  This medicine may be used for other purposes; ask your health care provider or pharmacist if you have questions.  COMMON BRAND NAME(S): Dazidox, Endocodone, Oxaydo, OXECTA, OxyIR, Percolone, Roxicodone, Roxybond  What should I tell my health care provider before I take this medicine?  They need to know if you have any of these conditions:  · Dana's disease  · brain tumor  · head injury  · heart disease  · history of drug or alcohol abuse problem  · if you often drink alcohol  · kidney disease  · liver disease  · lung or breathing disease, like asthma  · mental illness  · pancreatic disease  · seizures  · thyroid disease  · an unusual or allergic reaction to oxycodone, codeine, hydrocodone, morphine, other medicines, foods, dyes, or preservatives  · pregnant or trying to get pregnant  · breast-feeding  How should I use this medicine?  Take this medicine by mouth with a glass of water. Follow the directions on the prescription label. You can take it with or without food. If it upsets your stomach, take it with food. Take your medicine at regular intervals. Do not take it more often than directed. Do not stop taking except on your doctor's advice.  Some brands of this medicine, like Oxecta, have special instructions. Ask your doctor or pharmacist if these directions are for you: Do not cut, crush or chew this medicine. Swallow only one tablet at a time. Do not wet, soak, or lick the tablet before you take it.  A special MedGuide will be given to you by the pharmacist with each prescription and refill. Be sure to read this information carefully each time.  Talk to your pediatrician regarding the use of this medicine in children. Special care may be  needed.  Overdosage: If you think you have taken too much of this medicine contact a poison control center or emergency room at once.  NOTE: This medicine is only for you. Do not share this medicine with others.  What if I miss a dose?  If you miss a dose, take it as soon as you can. If it is almost time for your next dose, take only that dose. Do not take double or extra doses.  What may interact with this medicine?  This medicine may interact with the following medications:  · alcohol  · antihistamines for allergy, cough and cold  · antiviral medicines for HIV or AIDS  · atropine  · certain antibiotics like clarithromycin, erythromycin, linezolid, rifampin  · certain medicines for anxiety or sleep  · certain medicines for bladder problems like oxybutynin, tolterodine  · certain medicines for depression like amitriptyline, fluoxetine, sertraline  · certain medicines for fungal infections like ketoconazole, itraconazole, voriconazole  · certain medicines for migraine headache like almotriptan, eletriptan, frovatriptan, naratriptan, rizatriptan, sumatriptan, zolmitriptan  · certain medicines for nausea or vomiting like dolasetron, ondansetron, palonosetron  · certain medicines for Parkinson's disease like benztropine, trihexyphenidyl  · certain medicines for seizures like phenobarbital, phenytoin, primidone  · certain medicines for stomach problems like dicyclomine, hyoscyamine  · certain medicines for travel sickness like scopolamine  · diuretics  · general anesthetics like halothane, isoflurane, methoxyflurane, propofol  · ipratropium  · local anesthetics like lidocaine, pramoxine, tetracaine  · MAOIs like Carbex, Eldepryl, Marplan, Nardil, and Parnate  · medicines that relax muscles for surgery  · methylene blue  · nilotinib  · other narcotic medicines for pain or cough  · phenothiazines like chlorpromazine, mesoridazine, prochlorperazine, thioridazine  This list may not describe all possible interactions. Give  your health care provider a list of all the medicines, herbs, non-prescription drugs, or dietary supplements you use. Also tell them if you smoke, drink alcohol, or use illegal drugs. Some items may interact with your medicine.  What should I watch for while using this medicine?  Tell your doctor or health care professional if your pain does not go away, if it gets worse, or if you have new or a different type of pain. You may develop tolerance to the medicine. Tolerance means that you will need a higher dose of the medicine for pain relief. Tolerance is normal and is expected if you take this medicine for a long time.  Do not suddenly stop taking your medicine because you may develop a severe reaction. Your body becomes used to the medicine. This does NOT mean you are addicted. Addiction is a behavior related to getting and using a drug for a non-medical reason. If you have pain, you have a medical reason to take pain medicine. Your doctor will tell you how much medicine to take. If your doctor wants you to stop the medicine, the dose will be slowly lowered over time to avoid any side effects.  There are different types of narcotic medicines (opiates). If you take more than one type at the same time or if you are taking another medicine that also causes drowsiness, you may have more side effects. Give your health care provider a list of all medicines you use. Your doctor will tell you how much medicine to take. Do not take more medicine than directed. Call emergency for help if you have problems breathing or unusual sleepiness.  You may get drowsy or dizzy. Do not drive, use machinery, or do anything that needs mental alertness until you know how the medicine affects you. Do not stand or sit up quickly, especially if you are an older patient. This reduces the risk of dizzy or fainting spells. Alcohol may interfere with the effect of this medicine. Avoid alcoholic drinks.  This medicine will cause constipation. Try  to have a bowel movement at least every 2 to 3 days. If you do not have a bowel movement for 3 days, call your doctor or health care professional.  Your mouth may get dry. Chewing sugarless gum or sucking hard candy, and drinking plenty of water may help. Contact your doctor if the problem does not go away or is severe.  What side effects may I notice from receiving this medicine?  Side effects that you should report to your doctor or health care professional as soon as possible:  · allergic reactions like skin rash, itching or hives, swelling of the face, lips, or tongue  · breathing problems  · confusion  · signs and symptoms of low blood pressure like dizziness; feeling faint or lightheaded, falls; unusually weak or tired  · trouble passing urine or change in the amount of urine  · trouble swallowing  Side effects that usually do not require medical attention (report to your doctor or health care professional if they continue or are bothersome):  · constipation  · dry mouth  · nausea, vomiting  · tiredness  This list may not describe all possible side effects. Call your doctor for medical advice about side effects. You may report side effects to FDA at 8-841-MUR-1089.  Where should I keep my medicine?  Keep out of the reach of children. This medicine can be abused. Keep your medicine in a safe place to protect it from theft. Do not share this medicine with anyone. Selling or giving away this medicine is dangerous and against the law.  Store at room temperature between 15 and 30 degrees C (59 and 86 degrees F). Protect from light. Keep container tightly closed.  This medicine may cause harm and death if it is taken by other adults, children, or pets. Return medicine that has not been used to an official disposal site. Contact the [facility] at 1-161.950.3326 or your [facility] government to find a site. If you cannot return the medicine, flush it down the toilet. Do not use the medicine after the expiration  date.

## 2020-09-11 ENCOUNTER — PATIENT OUTREACH (OUTPATIENT)
Dept: HEALTH INFORMATION MANAGEMENT | Facility: OTHER | Age: 75
End: 2020-09-11

## 2020-09-15 ENCOUNTER — OFFICE VISIT (OUTPATIENT)
Dept: NEPHROLOGY | Facility: MEDICAL CENTER | Age: 75
End: 2020-09-15
Payer: MEDICARE

## 2020-09-15 VITALS
TEMPERATURE: 98 F | HEIGHT: 68 IN | OXYGEN SATURATION: 99 % | DIASTOLIC BLOOD PRESSURE: 70 MMHG | RESPIRATION RATE: 14 BRPM | WEIGHT: 148 LBS | BODY MASS INDEX: 22.43 KG/M2 | HEART RATE: 102 BPM | SYSTOLIC BLOOD PRESSURE: 122 MMHG

## 2020-09-15 DIAGNOSIS — Z72.0 TOBACCO USE: ICD-10-CM

## 2020-09-15 DIAGNOSIS — Z86.39 HISTORY OF VITAMIN D DEFICIENCY: ICD-10-CM

## 2020-09-15 DIAGNOSIS — I10 ESSENTIAL HYPERTENSION: Chronic | ICD-10-CM

## 2020-09-15 DIAGNOSIS — N18.30 CKD (CHRONIC KIDNEY DISEASE), STAGE III: ICD-10-CM

## 2020-09-15 DIAGNOSIS — J44.9 CHRONIC OBSTRUCTIVE PULMONARY DISEASE, UNSPECIFIED COPD TYPE (HCC): ICD-10-CM

## 2020-09-15 DIAGNOSIS — N17.9 AKI (ACUTE KIDNEY INJURY) (HCC): ICD-10-CM

## 2020-09-15 PROCEDURE — 99406 BEHAV CHNG SMOKING 3-10 MIN: CPT | Performed by: INTERNAL MEDICINE

## 2020-09-15 PROCEDURE — 99214 OFFICE O/P EST MOD 30 MIN: CPT | Mod: 25 | Performed by: INTERNAL MEDICINE

## 2020-09-15 RX ORDER — LISINOPRIL 5 MG/1
5 TABLET ORAL DAILY
Qty: 90 TAB | Refills: 3 | Status: ON HOLD
Start: 2020-09-15 | End: 2020-09-25

## 2020-09-15 RX ORDER — ALBUTEROL SULFATE 90 UG/1
2 AEROSOL, METERED RESPIRATORY (INHALATION) EVERY 6 HOURS PRN
Qty: 18 G | Refills: 0 | Status: SHIPPED | OUTPATIENT
Start: 2020-09-15 | End: 2021-11-23 | Stop reason: SDUPTHER

## 2020-09-15 RX ORDER — BECLOMETHASONE DIPROPIONATE HFA 40 UG/1
AEROSOL, METERED RESPIRATORY (INHALATION)
COMMUNITY
Start: 2019-11-14 | End: 2020-09-24

## 2020-09-15 ASSESSMENT — FIBROSIS 4 INDEX: FIB4 SCORE: 47.43

## 2020-09-15 ASSESSMENT — ENCOUNTER SYMPTOMS
SHORTNESS OF BREATH: 0
ABDOMINAL PAIN: 0
FEVER: 0

## 2020-09-15 NOTE — PROGRESS NOTES
9/15. Community Health Worker Intake  • Contact information provided to Fede Parikh .   • Has PCP appointment scheduled for 9/18 at 1:20pm.   • Outpatient assessment completed.  • Did the patient receive medications post discharge: Yes    CHW Renato spoke with Fede via TC to follow up after d/c. Reviewed and discussed AVS with Fede. Reminded him about upcoming PCP appmnt. Fede reports his friend will provide transportation. Informed him I [facility] and was told he already picked up his medications. Fede reports he needs an inhaler. Informed Fede to discuss it with his PCP. This CHW offered to provide contact info for PCP, and he declined. Fede declined to speak to [facility] RN for health education, questions, and concerns. Fede reports no other needs from this CHW. Encouraged him to reach out to me when needed. Fede met all goals, and will be d/c from [facility] services.

## 2020-09-15 NOTE — PROGRESS NOTES
ERIBERTO Gross briefly spoke with Fede via TC to follow up after d/c. He reports he went down to his pharmacy to  his oxycodone and pharmacy did not have orders for it. This CHW will TC [facility] when they open at 9am.

## 2020-09-16 ENCOUNTER — PATIENT OUTREACH (OUTPATIENT)
Dept: HEALTH INFORMATION MANAGEMENT | Facility: OTHER | Age: 75
End: 2020-09-16

## 2020-09-16 NOTE — PROGRESS NOTES
TC to Fede reminding him about upcoming PCP appmnt for 9/18 at 1:20pm. Appmnt is at [facility]. This CHW lvm.

## 2020-09-24 ENCOUNTER — APPOINTMENT (OUTPATIENT)
Dept: RADIOLOGY | Facility: MEDICAL CENTER | Age: 75
DRG: 916 | End: 2020-09-24
Attending: EMERGENCY MEDICINE
Payer: MEDICARE

## 2020-09-24 ENCOUNTER — HOSPITAL ENCOUNTER (INPATIENT)
Facility: MEDICAL CENTER | Age: 75
LOS: 1 days | DRG: 916 | End: 2020-09-25
Attending: EMERGENCY MEDICINE | Admitting: INTERNAL MEDICINE
Payer: MEDICARE

## 2020-09-24 DIAGNOSIS — T78.3XXA ANGIOEDEMA, INITIAL ENCOUNTER: ICD-10-CM

## 2020-09-24 DIAGNOSIS — I10 ESSENTIAL HYPERTENSION: Chronic | ICD-10-CM

## 2020-09-24 PROBLEM — T46.4X5A ANGIOEDEMA DUE TO ANGIOTENSIN CONVERTING ENZYME INHIBITOR (ACE-I): Status: ACTIVE | Noted: 2020-09-24

## 2020-09-24 LAB
ALBUMIN SERPL BCP-MCNC: 3.4 G/DL (ref 3.2–4.9)
ALBUMIN/GLOB SERPL: 0.9 G/DL
ALP SERPL-CCNC: 66 U/L (ref 30–99)
ALT SERPL-CCNC: 8 U/L (ref 2–50)
ANION GAP SERPL CALC-SCNC: 15 MMOL/L (ref 7–16)
AST SERPL-CCNC: 18 U/L (ref 12–45)
BASOPHILS # BLD AUTO: 0.5 % (ref 0–1.8)
BASOPHILS # BLD: 0.03 K/UL (ref 0–0.12)
BILIRUB SERPL-MCNC: 0.4 MG/DL (ref 0.1–1.5)
BUN SERPL-MCNC: 18 MG/DL (ref 8–22)
CALCIUM SERPL-MCNC: 9.1 MG/DL (ref 8.5–10.5)
CHLORIDE SERPL-SCNC: 104 MMOL/L (ref 96–112)
CO2 SERPL-SCNC: 20 MMOL/L (ref 20–33)
COVID ORDER STATUS COVID19: NORMAL
CREAT SERPL-MCNC: 1.52 MG/DL (ref 0.5–1.4)
EOSINOPHIL # BLD AUTO: 0.08 K/UL (ref 0–0.51)
EOSINOPHIL NFR BLD: 1.5 % (ref 0–6.9)
ERYTHROCYTE [DISTWIDTH] IN BLOOD BY AUTOMATED COUNT: 45.4 FL (ref 35.9–50)
GLOBULIN SER CALC-MCNC: 3.9 G/DL (ref 1.9–3.5)
GLUCOSE BLD-MCNC: 126 MG/DL (ref 65–99)
GLUCOSE BLD-MCNC: 132 MG/DL (ref 65–99)
GLUCOSE SERPL-MCNC: 99 MG/DL (ref 65–99)
HCT VFR BLD AUTO: 30.1 % (ref 42–52)
HGB BLD-MCNC: 10.1 G/DL (ref 14–18)
IMM GRANULOCYTES # BLD AUTO: 0.03 K/UL (ref 0–0.11)
IMM GRANULOCYTES NFR BLD AUTO: 0.5 % (ref 0–0.9)
LACTATE BLD-SCNC: 1.4 MMOL/L (ref 0.5–2)
LYMPHOCYTES # BLD AUTO: 1.65 K/UL (ref 1–4.8)
LYMPHOCYTES NFR BLD: 30.1 % (ref 22–41)
MCH RBC QN AUTO: 31.7 PG (ref 27–33)
MCHC RBC AUTO-ENTMCNC: 33.6 G/DL (ref 33.7–35.3)
MCV RBC AUTO: 94.4 FL (ref 81.4–97.8)
MONOCYTES # BLD AUTO: 0.8 K/UL (ref 0–0.85)
MONOCYTES NFR BLD AUTO: 14.6 % (ref 0–13.4)
NEUTROPHILS # BLD AUTO: 2.9 K/UL (ref 1.82–7.42)
NEUTROPHILS NFR BLD: 52.8 % (ref 44–72)
NRBC # BLD AUTO: 0 K/UL
NRBC BLD-RTO: 0 /100 WBC
PLATELET # BLD AUTO: 217 K/UL (ref 164–446)
PMV BLD AUTO: 10.3 FL (ref 9–12.9)
POTASSIUM SERPL-SCNC: 3.9 MMOL/L (ref 3.6–5.5)
PROT SERPL-MCNC: 7.3 G/DL (ref 6–8.2)
RBC # BLD AUTO: 3.19 M/UL (ref 4.7–6.1)
SARS-COV-2 RNA RESP QL NAA+PROBE: NOTDETECTED
SODIUM SERPL-SCNC: 139 MMOL/L (ref 135–145)
SPECIMEN SOURCE: NORMAL
WBC # BLD AUTO: 5.5 K/UL (ref 4.8–10.8)

## 2020-09-24 PROCEDURE — 83605 ASSAY OF LACTIC ACID: CPT

## 2020-09-24 PROCEDURE — 700111 HCHG RX REV CODE 636 W/ 250 OVERRIDE (IP): Performed by: INTERNAL MEDICINE

## 2020-09-24 PROCEDURE — 80053 COMPREHEN METABOLIC PANEL: CPT

## 2020-09-24 PROCEDURE — 96374 THER/PROPH/DIAG INJ IV PUSH: CPT

## 2020-09-24 PROCEDURE — 700101 HCHG RX REV CODE 250: Performed by: INTERNAL MEDICINE

## 2020-09-24 PROCEDURE — 70360 X-RAY EXAM OF NECK: CPT

## 2020-09-24 PROCEDURE — A9270 NON-COVERED ITEM OR SERVICE: HCPCS | Performed by: INTERNAL MEDICINE

## 2020-09-24 PROCEDURE — 770022 HCHG ROOM/CARE - ICU (200)

## 2020-09-24 PROCEDURE — 99291 CRITICAL CARE FIRST HOUR: CPT | Performed by: INTERNAL MEDICINE

## 2020-09-24 PROCEDURE — 71045 X-RAY EXAM CHEST 1 VIEW: CPT

## 2020-09-24 PROCEDURE — 94640 AIRWAY INHALATION TREATMENT: CPT

## 2020-09-24 PROCEDURE — 85025 COMPLETE CBC W/AUTO DIFF WBC: CPT

## 2020-09-24 PROCEDURE — 700105 HCHG RX REV CODE 258: Performed by: INTERNAL MEDICINE

## 2020-09-24 PROCEDURE — 36415 COLL VENOUS BLD VENIPUNCTURE: CPT

## 2020-09-24 PROCEDURE — C9803 HOPD COVID-19 SPEC COLLECT: HCPCS | Performed by: INTERNAL MEDICINE

## 2020-09-24 PROCEDURE — U0003 INFECTIOUS AGENT DETECTION BY NUCLEIC ACID (DNA OR RNA); SEVERE ACUTE RESPIRATORY SYNDROME CORONAVIRUS 2 (SARS-COV-2) (CORONAVIRUS DISEASE [COVID-19]), AMPLIFIED PROBE TECHNIQUE, MAKING USE OF HIGH THROUGHPUT TECHNOLOGIES AS DESCRIBED BY CMS-2020-01-R: HCPCS

## 2020-09-24 PROCEDURE — 700102 HCHG RX REV CODE 250 W/ 637 OVERRIDE(OP): Performed by: INTERNAL MEDICINE

## 2020-09-24 PROCEDURE — 82962 GLUCOSE BLOOD TEST: CPT

## 2020-09-24 PROCEDURE — 700111 HCHG RX REV CODE 636 W/ 250 OVERRIDE (IP): Performed by: EMERGENCY MEDICINE

## 2020-09-24 PROCEDURE — 99291 CRITICAL CARE FIRST HOUR: CPT

## 2020-09-24 PROCEDURE — 96375 TX/PRO/DX INJ NEW DRUG ADDON: CPT

## 2020-09-24 PROCEDURE — 87040 BLOOD CULTURE FOR BACTERIA: CPT | Mod: 91

## 2020-09-24 RX ORDER — ONDANSETRON 2 MG/ML
4 INJECTION INTRAMUSCULAR; INTRAVENOUS EVERY 4 HOURS PRN
Status: DISCONTINUED | OUTPATIENT
Start: 2020-09-24 | End: 2020-09-25 | Stop reason: HOSPADM

## 2020-09-24 RX ORDER — DEXTROSE MONOHYDRATE 25 G/50ML
50 INJECTION, SOLUTION INTRAVENOUS
Status: DISCONTINUED | OUTPATIENT
Start: 2020-09-24 | End: 2020-09-25 | Stop reason: HOSPADM

## 2020-09-24 RX ORDER — ONDANSETRON 4 MG/1
4 TABLET, ORALLY DISINTEGRATING ORAL EVERY 4 HOURS PRN
Status: DISCONTINUED | OUTPATIENT
Start: 2020-09-24 | End: 2020-09-25 | Stop reason: HOSPADM

## 2020-09-24 RX ORDER — LABETALOL HYDROCHLORIDE 5 MG/ML
10 INJECTION, SOLUTION INTRAVENOUS EVERY 4 HOURS PRN
Status: DISCONTINUED | OUTPATIENT
Start: 2020-09-24 | End: 2020-09-25 | Stop reason: HOSPADM

## 2020-09-24 RX ORDER — AMOXICILLIN 250 MG
2 CAPSULE ORAL 2 TIMES DAILY
Status: DISCONTINUED | OUTPATIENT
Start: 2020-09-24 | End: 2020-09-25 | Stop reason: HOSPADM

## 2020-09-24 RX ORDER — DEXAMETHASONE SODIUM PHOSPHATE 4 MG/ML
4 INJECTION, SOLUTION INTRA-ARTICULAR; INTRALESIONAL; INTRAMUSCULAR; INTRAVENOUS; SOFT TISSUE ONCE
Status: COMPLETED | OUTPATIENT
Start: 2020-09-24 | End: 2020-09-24

## 2020-09-24 RX ORDER — IPRATROPIUM BROMIDE AND ALBUTEROL SULFATE 2.5; .5 MG/3ML; MG/3ML
3 SOLUTION RESPIRATORY (INHALATION)
Status: DISCONTINUED | OUTPATIENT
Start: 2020-09-24 | End: 2020-09-25 | Stop reason: HOSPADM

## 2020-09-24 RX ORDER — SODIUM CHLORIDE, SODIUM LACTATE, POTASSIUM CHLORIDE, AND CALCIUM CHLORIDE .6; .31; .03; .02 G/100ML; G/100ML; G/100ML; G/100ML
500 INJECTION, SOLUTION INTRAVENOUS ONCE
Status: COMPLETED | OUTPATIENT
Start: 2020-09-24 | End: 2020-09-24

## 2020-09-24 RX ORDER — HEPARIN SODIUM 5000 [USP'U]/ML
5000 INJECTION, SOLUTION INTRAVENOUS; SUBCUTANEOUS EVERY 8 HOURS
Status: DISCONTINUED | OUTPATIENT
Start: 2020-09-24 | End: 2020-09-25 | Stop reason: HOSPADM

## 2020-09-24 RX ORDER — POLYETHYLENE GLYCOL 3350 17 G/17G
1 POWDER, FOR SOLUTION ORAL
Status: DISCONTINUED | OUTPATIENT
Start: 2020-09-24 | End: 2020-09-25 | Stop reason: HOSPADM

## 2020-09-24 RX ORDER — DEXAMETHASONE SODIUM PHOSPHATE 4 MG/ML
4 INJECTION, SOLUTION INTRA-ARTICULAR; INTRALESIONAL; INTRAMUSCULAR; INTRAVENOUS; SOFT TISSUE EVERY 6 HOURS
Status: DISCONTINUED | OUTPATIENT
Start: 2020-09-24 | End: 2020-09-25

## 2020-09-24 RX ORDER — BISACODYL 10 MG
10 SUPPOSITORY, RECTAL RECTAL
Status: DISCONTINUED | OUTPATIENT
Start: 2020-09-24 | End: 2020-09-25 | Stop reason: HOSPADM

## 2020-09-24 RX ORDER — ENALAPRILAT 1.25 MG/ML
1.25 INJECTION INTRAVENOUS EVERY 6 HOURS PRN
Status: DISCONTINUED | OUTPATIENT
Start: 2020-09-24 | End: 2020-09-24

## 2020-09-24 RX ADMIN — FAMOTIDINE 20 MG: 10 INJECTION INTRAVENOUS at 07:40

## 2020-09-24 RX ADMIN — DEXAMETHASONE SODIUM PHOSPHATE 4 MG: 4 INJECTION, SOLUTION INTRA-ARTICULAR; INTRALESIONAL; INTRAMUSCULAR; INTRAVENOUS; SOFT TISSUE at 17:09

## 2020-09-24 RX ADMIN — IPRATROPIUM BROMIDE AND ALBUTEROL SULFATE 3 ML: .5; 3 SOLUTION RESPIRATORY (INHALATION) at 19:35

## 2020-09-24 RX ADMIN — HEPARIN SODIUM 5000 UNITS: 5000 INJECTION INTRAVENOUS; SUBCUTANEOUS at 15:07

## 2020-09-24 RX ADMIN — ONDANSETRON 4 MG: 2 INJECTION INTRAMUSCULAR; INTRAVENOUS at 20:48

## 2020-09-24 RX ADMIN — HEPARIN SODIUM 5000 UNITS: 5000 INJECTION INTRAVENOUS; SUBCUTANEOUS at 22:55

## 2020-09-24 RX ADMIN — DEXAMETHASONE SODIUM PHOSPHATE 4 MG: 4 INJECTION, SOLUTION INTRA-ARTICULAR; INTRALESIONAL; INTRAMUSCULAR; INTRAVENOUS; SOFT TISSUE at 06:47

## 2020-09-24 RX ADMIN — DEXAMETHASONE SODIUM PHOSPHATE 4 MG: 4 INJECTION, SOLUTION INTRA-ARTICULAR; INTRALESIONAL; INTRAMUSCULAR; INTRAVENOUS; SOFT TISSUE at 12:37

## 2020-09-24 RX ADMIN — SODIUM CHLORIDE, POTASSIUM CHLORIDE, SODIUM LACTATE AND CALCIUM CHLORIDE 500 ML: 600; 310; 30; 20 INJECTION, SOLUTION INTRAVENOUS at 16:47

## 2020-09-24 SDOH — HEALTH STABILITY: MENTAL HEALTH: HOW OFTEN DO YOU HAVE 6 OR MORE DRINKS ON ONE OCCASION?: NEVER

## 2020-09-24 SDOH — HEALTH STABILITY: MENTAL HEALTH: HOW MANY STANDARD DRINKS CONTAINING ALCOHOL DO YOU HAVE ON A TYPICAL DAY?: 1 OR 2

## 2020-09-24 ASSESSMENT — LIFESTYLE VARIABLES: DO YOU DRINK ALCOHOL: YES

## 2020-09-24 ASSESSMENT — ENCOUNTER SYMPTOMS
NAUSEA: 0
CHILLS: 0
SEIZURES: 0
HEADACHES: 0
SPUTUM PRODUCTION: 0
ABDOMINAL PAIN: 0
VOMITING: 0
TREMORS: 0
WHEEZING: 1
SPEECH CHANGE: 1
DIZZINESS: 0
WEAKNESS: 0
MYALGIAS: 0
TINGLING: 0
STRIDOR: 0
SHORTNESS OF BREATH: 0
FEVER: 0
DOUBLE VISION: 0
BLURRED VISION: 0

## 2020-09-24 ASSESSMENT — COPD QUESTIONNAIRES
DURING THE PAST 4 WEEKS HOW MUCH DID YOU FEEL SHORT OF BREATH: SOME OF THE TIME
COPD SCREENING SCORE: 7
HAVE YOU SMOKED AT LEAST 100 CIGARETTES IN YOUR ENTIRE LIFE: YES
DO YOU EVER COUGH UP ANY MUCUS OR PHLEGM?: YES, A FEW DAYS A WEEK OR MONTH

## 2020-09-24 ASSESSMENT — FIBROSIS 4 INDEX
FIB4 SCORE: 47.43
FIB4 SCORE: 2.26

## 2020-09-24 NOTE — ED NOTES
Received report from Lisa HANEY assumed care done.  Pt resting in bed comfortably respiration unlabored. No drooling noted. Hob upright. Suction available at bedside.  Able to speak in full sentences. VSS. Updated with the poc on getting admitted to the hospital.

## 2020-09-24 NOTE — ASSESSMENT & PLAN NOTE
ICU monitoring for critical airway swelling and possible emergent need for mechanical ventilation which would likely need to be achieved with fiberoptic airway management.  Decadron and Pepcid x2 days  ACE inhibitor discontinued, placed on allergy list

## 2020-09-24 NOTE — CONSULTS
Critical Care Consultation    Date of consult: 9/24/2020    Referring Physician  Alejandro Vigil M.D.    Reason for Consultation  Angioedema    History of Presenting Illness  75 y.o. male who presented 9/24/2020 with a history of Hep C s/p treatment, COPD, CKD, GERD, tobacco use, and polysubstance abuse presented to the ER with tongue swelling while on lisinopril.    On September 5, 2020 he had a bilateral common femoral endarterectomies with retrograde right iliac stent and right to left femoral to femoral bypass for critical limb ischemia - he states his legs feel fine.     The patient states that he noticed transient tongue swelling a week ago but did not seek medical care.  He says today his tongue was so large that he could not speak properly and he could not swallow so he was brought to the emergency department.    The emergency department evaluation was concerning for angioedema with possible impending airway occlusion.  His CBC and BMP are consistent with prior values revealing his CKD and chronic anemia.    He currently states his tongue feels like the swelling may be going down following medications in the ER.    Critical care was consulted for careful monitoring and possible intervention should he have an airway occlusion with his dangerously swollen oropharynx.     Code Status  Full Code    Review of Systems  Review of Systems   Constitutional: Positive for malaise/fatigue. Negative for chills and fever.   HENT: Negative for ear discharge and ear pain.         Positive for tongue swelling and dysarthria    Eyes: Negative for blurred vision and double vision.   Respiratory: Positive for wheezing. Negative for sputum production, shortness of breath and stridor.    Cardiovascular: Negative for chest pain.   Gastrointestinal: Negative for abdominal pain, nausea and vomiting.   Genitourinary: Negative for dysuria and urgency.   Musculoskeletal: Negative for myalgias.   Skin: Negative for rash.    Neurological: Positive for speech change. Negative for dizziness, tingling, tremors, seizures, weakness and headaches.       Past Medical History   has a past medical history of Hepatitis, Hyperlipemia, Hypertension, and Pulmonary emphysema (HCC).    Surgical History   has a past surgical history that includes femoral femoral bypass (Bilateral, 9/4/2020).    Family History  family history includes Cancer in his mother; Diabetes in his brother; Heart Disease in his mother.    Social History   reports that he has been smoking cigarettes. He has been smoking about 0.50 packs per day. He has never used smokeless tobacco. He reports current alcohol use. He reports previous drug use.    Medications  Home Medications     Reviewed by Elise Flood (Pharmacy Tech) on 09/24/20 at 0749  Med List Status: Complete   Medication Last Dose Status   albuterol (VENTOLIN HFA) 108 (90 Base) MCG/ACT Aero Soln inhalation aerosol 9/24/2020 Active   ergocalciferol (DRISDOL) 55919 UNIT capsule 9/18/2020 Active   fluocinolone (SYNALAR) 0.025 % cream not started yet Active   Fluticasone Furoate-Vilanterol (BREO ELLIPTA) 100-25 MCG/INH AEROSOL POWDER, BREATH ACTIVATED 9/23/2020 Active   hydrOXYzine HCl (ATARAX) 25 MG Tab 9/23/2020 Active   lisinopril (PRINIVIL) 5 MG Tab 9/23/2020 Active   omeprazole (PRILOSEC) 20 MG delayed-release capsule 9/23/2020 Active   tiotropium (SPIRIVA HANDIHALER) 18 MCG Cap 9/23/2020 Active              Current Facility-Administered Medications   Medication Dose Route Frequency Provider Last Rate Last Dose   • senna-docusate (PERICOLACE or SENOKOT S) 8.6-50 MG per tablet 2 Tab  2 Tab Oral BID Diego Hernandez M.D.        And   • polyethylene glycol/lytes (MIRALAX) PACKET 1 Packet  1 Packet Oral QDAY PRN Diego Hernandez M.D.        And   • magnesium hydroxide (MILK OF MAGNESIA) suspension 30 mL  30 mL Oral QDAY PRN Diego Hernandez M.D.        And   • bisacodyl (DULCOLAX) suppository 10 mg  10 mg Rectal  QDAY PRN Diego Hernandez M.D.       • Respiratory Therapy Consult   Nebulization Continuous RT Diego Hernandez M.D.       • heparin injection 5,000 Units  5,000 Units Subcutaneous Q8HRS Diego Hernandez M.D.       • labetalol (NORMODYNE/TRANDATE) injection 10 mg  10 mg Intravenous Q4HRS PRN Diego Hernandez M.D.       • ondansetron (ZOFRAN) syringe/vial injection 4 mg  4 mg Intravenous Q4HRS PRN Diego Hernandez M.D.       • ondansetron (ZOFRAN ODT) dispertab 4 mg  4 mg Oral Q4HRS PRN Diego Hernandez M.D.       • insulin regular (HumuLIN R,NovoLIN R) injection  1-6 Units Subcutaneous Q6HRS Diego Hernanedz M.D.        And   • glucose 4 g chewable tablet 16 g  16 g Oral Q15 MIN PRN Diego Hernandez M.D.        And   • dextrose 50% (D50W) injection 50 mL  50 mL Intravenous Q15 MIN PRN Diego Hernandez M.D.       • dexamethasone (DECADRON) injection 4 mg  4 mg Intravenous Q6HRS Diego Hernandez M.D.       • famotidine (PEPCID) injection 20 mg  20 mg Intravenous Q24HRS Diego Hernandez M.D.   Stopped at 09/24/20 0845     Current Outpatient Medications   Medication Sig Dispense Refill   • albuterol (VENTOLIN HFA) 108 (90 Base) MCG/ACT Aero Soln inhalation aerosol Inhale 2 Puffs by mouth every 6 hours as needed for Shortness of Breath. INHALE TWO PUFFS BY MOUTH EVERY SIX HOURS AS NEEDED 18 g 0   • lisinopril (PRINIVIL) 5 MG Tab Take 1 Tab by mouth every day. 90 Tab 3   • fluocinolone (SYNALAR) 0.025 % cream Apply 1 Application to affected area(s) 3 times a day as needed. 1 Tube 0   • tiotropium (SPIRIVA HANDIHALER) 18 MCG Cap INHALE 1 CAPSULE VIA HANDIHALER ONCE DAILY 90 Cap 3   • Fluticasone Furoate-Vilanterol (BREO ELLIPTA) 100-25 MCG/INH AEROSOL POWDER, BREATH ACTIVATED Inhale 1 Puff by mouth every day. 1 Each 2   • omeprazole (PRILOSEC) 20 MG delayed-release capsule TAKE ONE CAPSULE BY MOUTH ONE TIME DAILY 30 Cap 5   • hydrOXYzine HCl (ATARAX) 25 MG Tab Take 1 Tab by mouth 2 times a day as needed for  Itching. 60 Tab 1   • ergocalciferol (DRISDOL) 30331 UNIT capsule Take 1 Cap by mouth every 7 days. 8 Cap 0       Allergies  No Known Allergies    Vital Signs last 24 hours  Temp:  [36.1 °C (96.9 °F)] 36.1 °C (96.9 °F)  Pulse:  [71-80] 73  Resp:  [18-29] 29  BP: (128-175)/(71-89) 151/81  SpO2:  [98 %-100 %] 98 %    Physical Exam  Physical Exam  Vitals signs and nursing note reviewed.   Constitutional:       Comments: Chronically ill appearing   HENT:      Head: Normocephalic and atraumatic.      Nose: Nose normal.      Mouth/Throat:      Comments: The right side of his tongue has significant swelling, sub-lingual area also has some fullness, unable to fully appreciate posterior oropharynx but he states it is not difficult to breathe and he feels no swelling there.  Eyes:      Extraocular Movements: Extraocular movements intact.      Pupils: Pupils are equal, round, and reactive to light.   Neck:      Musculoskeletal: No neck rigidity.   Cardiovascular:      Rate and Rhythm: Normal rate and regular rhythm.   Pulmonary:      Effort: Pulmonary effort is normal. No respiratory distress.      Breath sounds: Normal breath sounds. No stridor. No wheezing, rhonchi or rales.   Abdominal:      General: Abdomen is flat. There is no distension.      Tenderness: There is no guarding or rebound.   Musculoskeletal:         General: No deformity or signs of injury.      Comments: Prior common femoral endarterectomy sites have clean, dry, and intact bandages and his legs are warm and well-perfused   Lymphadenopathy:      Cervical: No cervical adenopathy.   Skin:     General: Skin is warm and dry.      Capillary Refill: Capillary refill takes less than 2 seconds.   Neurological:      Mental Status: He is alert and oriented to person, place, and time.      Cranial Nerves: No cranial nerve deficit.      Sensory: No sensory deficit.      Motor: No weakness.   Psychiatric:         Mood and Affect: Mood normal.         Fluids  No intake  or output data in the 24 hours ending 09/24/20 1002    Laboratory  Recent Results (from the past 48 hour(s))   CBC WITH DIFFERENTIAL    Collection Time: 09/24/20  6:05 AM   Result Value Ref Range    WBC 5.5 4.8 - 10.8 K/uL    RBC 3.19 (L) 4.70 - 6.10 M/uL    Hemoglobin 10.1 (L) 14.0 - 18.0 g/dL    Hematocrit 30.1 (L) 42.0 - 52.0 %    MCV 94.4 81.4 - 97.8 fL    MCH 31.7 27.0 - 33.0 pg    MCHC 33.6 (L) 33.7 - 35.3 g/dL    RDW 45.4 35.9 - 50.0 fL    Platelet Count 217 164 - 446 K/uL    MPV 10.3 9.0 - 12.9 fL    Neutrophils-Polys 52.80 44.00 - 72.00 %    Lymphocytes 30.10 22.00 - 41.00 %    Monocytes 14.60 (H) 0.00 - 13.40 %    Eosinophils 1.50 0.00 - 6.90 %    Basophils 0.50 0.00 - 1.80 %    Immature Granulocytes 0.50 0.00 - 0.90 %    Nucleated RBC 0.00 /100 WBC    Neutrophils (Absolute) 2.90 1.82 - 7.42 K/uL    Lymphs (Absolute) 1.65 1.00 - 4.80 K/uL    Monos (Absolute) 0.80 0.00 - 0.85 K/uL    Eos (Absolute) 0.08 0.00 - 0.51 K/uL    Baso (Absolute) 0.03 0.00 - 0.12 K/uL    Immature Granulocytes (abs) 0.03 0.00 - 0.11 K/uL    NRBC (Absolute) 0.00 K/uL   COMP METABOLIC PANEL    Collection Time: 09/24/20  6:05 AM   Result Value Ref Range    Sodium 139 135 - 145 mmol/L    Potassium 3.9 3.6 - 5.5 mmol/L    Chloride 104 96 - 112 mmol/L    Co2 20 20 - 33 mmol/L    Anion Gap 15.0 7.0 - 16.0    Glucose 99 65 - 99 mg/dL    Bun 18 8 - 22 mg/dL    Creatinine 1.52 (H) 0.50 - 1.40 mg/dL    Calcium 9.1 8.5 - 10.5 mg/dL    AST(SGOT) 18 12 - 45 U/L    ALT(SGPT) 8 2 - 50 U/L    Alkaline Phosphatase 66 30 - 99 U/L    Total Bilirubin 0.4 0.1 - 1.5 mg/dL    Albumin 3.4 3.2 - 4.9 g/dL    Total Protein 7.3 6.0 - 8.2 g/dL    Globulin 3.9 (H) 1.9 - 3.5 g/dL    A-G Ratio 0.9 g/dL   LACTIC ACID    Collection Time: 09/24/20  6:05 AM   Result Value Ref Range    Lactic Acid 1.4 0.5 - 2.0 mmol/L   ESTIMATED GFR    Collection Time: 09/24/20  6:05 AM   Result Value Ref Range    GFR If  54 (A) >60 mL/min/1.73 m 2    GFR If Non African  American 45 (A) >60 mL/min/1.73 m 2   Routine (COVID/SARS COV-2 In-House PCR up to 24 hours)    Collection Time: 09/24/20  8:06 AM    Specimen: Nasopharyngeal; Respirate   Result Value Ref Range    COVID Order Status Received    SARS-CoV-2, PCR (In-House)    Collection Time: 09/24/20  8:06 AM   Result Value Ref Range    SARS-CoV-2 Source Nasal Swab     SARS-CoV-2 by PCR NotDetected        Imaging  DX-NECK FOR SOFT TISSUE   Final Result      Some possible submental/submandibular soft tissue prominence on the right.      Prevertebral soft tissues are within normal limits. Airway is midline and patent. Additional findings as detailed.      DX-CHEST-PORTABLE (1 VIEW)   Final Result      No acute cardiopulmonary abnormality.          Assessment/Plan  Angioedema due to angiotensin converting enzyme inhibitor (ACE-I)  Assessment & Plan  ICU monitoring for critical airway swelling and possible emergent need for mechanical ventilation which would likely need to be achieved with fiberoptic airway management.  Decadron and Pepcid x2 days  ACE inhibitor discontinued, placed on allergy list    Right iliac artery occlusion, with critical limb ischemia (HCC)- (present on admission)  Assessment & Plan  Bandages from prior endarterectomies are clean dry and intact  Legs are warm and well perfused    CKD (chronic kidney disease), stage III (HCC)- (present on admission)  Assessment & Plan  Strict ins and outs  Avoid nephrotoxic agents  Monitor daily    Essential hypertension- (present on admission)  Assessment & Plan  Never to receive lisinopril or ACE inhibitor again  PRN labetalol while n.p.o.    COPD (chronic obstructive pulmonary disease) (HCC)- (present on admission)  Assessment & Plan  DuoNebs every 4 hours as needed    75-year-old male here with ACE inhibitor induced angioedema with critical airway swelling, given his risk for airway compromise he will be monitored and managed in the ICU.    Discussed patient condition and risk  of morbidity and/or mortality with Family, RN, RT, Code status disscussed, Charge nurse / hot rounds and Patient.    The patient remains critically ill.  Critical care time = 35 minutes in directly providing and coordinating critical care and extensive data review.  No time overlap and excludes procedures.

## 2020-09-24 NOTE — ED NOTES
Med rec complete per Pt's GF bedside.   Girlfriend said Pt picked up Antibiotics yesterday , but did not start.   I will contact [employer] when they open.   Allergies reviewed

## 2020-09-24 NOTE — ED NOTES
Per [facility] pt picked up Bactrim DS 1 tab BID x 14 days.   Per GF Pt has not started antibiotic yet.

## 2020-09-24 NOTE — ED PROVIDER NOTES
ED Provider Note    Scribed for Alejandro Vigil M.D. by Kai Blanca. 9/24/2020, 6:11 AM.    Primary care provider: Alexx Reynaga M.D.  Means of arrival: EMS  History obtained from: patient/EMS  History limited by: none    CHIEF COMPLAINT  Chief Complaint   Patient presents with   • Tongue Swelling     beginning this AM, + drooling. maintaining airway, oxygen saturation >90%. A&Ox4     PPE Note: I personally donned full PPE for all patient encounters during this visit, including being clean-shaven with an N95 respirator mask, gloves, and eye protection. Scribe remained outside the patient's room and did not have any contact with the patient for the duration of patient encounter.       HPI  Fede Parikh Jr. is a 75 y.o. male who presents to the Emergency Department via EMS with complaints of moderate tongue swelling acute onset 2 hours ago. He states that his tongue is twisted to the left side of his mouth. He denies any shortness of breath. No alleviating or exacerbating factors noted. Patient states that he is currently taking Lisinopril for his hypertension. He adds that he had a similar episode last week where he had swelling of his face, lips and cheeks.     REVIEW OF SYSTEMS  Review of Systems   HENT:        Tongue swelling   Respiratory: Negative for shortness of breath.    All other systems reviewed and are negative.      PAST MEDICAL HISTORY   has a past medical history of Hepatitis, Hyperlipemia, Hypertension, and Pulmonary emphysema (HCC).    SURGICAL HISTORY   has a past surgical history that includes femoral femoral bypass (Bilateral, 9/4/2020).    SOCIAL HISTORY  Social History     Tobacco Use   • Smoking status: Current Some Day Smoker     Packs/day: 0.50     Types: Cigarettes   • Smokeless tobacco: Never Used   Substance Use Topics   • Alcohol use: Yes     Frequency: 4 or more times a week     Drinks per session: 1 or 2     Binge frequency: Never   • Drug use: Not Currently      Social  "History     Substance and Sexual Activity   Drug Use Not Currently       FAMILY HISTORY  Family History   Problem Relation Age of Onset   • Heart Disease Mother    • Cancer Mother    • Diabetes Brother        CURRENT MEDICATIONS  Current Outpatient Medications   Medication Instructions   • albuterol (VENTOLIN HFA) 108 (90 Base) MCG/ACT Aero Soln inhalation aerosol 2 Puffs, Inhalation, EVERY 6 HOURS PRN, INHALE TWO PUFFS BY MOUTH EVERY SIX HOURS AS NEEDED   • amLODIPine (NORVASC) 5 mg, Oral, DAILY, For high blood pressure   • aspirin (ASPIRIN LOW DOSE) 81 MG EC tablet TAKE ONE TABLET BY MOUTH ONE TIME DAILY   • atorvastatin (LIPITOR) 40 mg, Oral, DAILY   • beclomethasone HFA (QVAR REDIHALER) 40 MCG/ACT inhaler QVAR REDIHALER 40 MCG/ACT AERB   • ergocalciferol (DRISDOL) 50,000 Units, Oral, EVERY 7 DAYS   • fluocinolone (SYNALAR) 0.025 % cream 1 Application, Topical, 3 TIMES DAILY PRN   • Fluticasone Furoate-Vilanterol (BREO ELLIPTA) 100-25 MCG/INH AEROSOL POWDER, BREATH ACTIVATED 1 Puff, Inhalation, DAILY   • hydrOXYzine HCl (ATARAX) 25 mg, Oral, 2 TIMES DAILY PRN   • lisinopril (PRINIVIL) 5 mg, Oral, DAILY   • loratadine (CLARITIN) 10 mg, Oral, DAILY   • omeprazole (PRILOSEC) 20 MG delayed-release capsule TAKE ONE CAPSULE BY MOUTH ONE TIME DAILY   • tiotropium (SPIRIVA HANDIHALER) 18 MCG Cap INHALE 1 CAPSULE VIA HANDIHALER ONCE DAILY          ALLERGIES  No Known Allergies    PHYSICAL EXAM  VITAL SIGNS: /75   Pulse 78   Temp 36.1 °C (96.9 °F) (Temporal)   Resp 20   Ht 1.753 m (5' 9\")   Wt 65.8 kg (145 lb)   SpO2 98%   BMI 21.41 kg/m²     Constitutional:   No acute distress  HENT: Swelling of the base of the tongue on the right as well as the right half of the tongue, guttural voice  Eyes:  No conjunctivitis or icterus  Neck:  trachea is midline, no palpable thyroid  Cardiovascular:  Regular rate and rhythm, no murmurs  Thorax & Lungs:  Normal breath sounds, no rhonchi  Abdomen:  Soft, Non-tender  Skin:.  " "no rash  Extremities:   no edema  Vascular:  symmetric radial pulse  Neurologic:  Normal gross motor      LABS  Labs Reviewed   CBC WITH DIFFERENTIAL - Abnormal; Notable for the following components:       Result Value    RBC 3.19 (*)     Hemoglobin 10.1 (*)     Hematocrit 30.1 (*)     MCHC 33.6 (*)     Monocytes 14.60 (*)     All other components within normal limits   COMP METABOLIC PANEL - Abnormal; Notable for the following components:    Creatinine 1.52 (*)     Globulin 3.9 (*)     All other components within normal limits   ESTIMATED GFR - Abnormal; Notable for the following components:    GFR If  54 (*)     GFR If Non  45 (*)     All other components within normal limits   LACTIC ACID   BLOOD CULTURE    Narrative:     Per Hospital Policy: Only change Specimen Src: to \"Line\" if  specified by physician order.   BLOOD CULTURE    Narrative:     Per Hospital Policy: Only change Specimen Src: to \"Line\" if  specified by physician order.   COVID/SARS COV-2     All labs reviewed by me.    RADIOLOGY  DX-NECK FOR SOFT TISSUE   Final Result      Some possible submental/submandibular soft tissue prominence on the right.      Prevertebral soft tissues are within normal limits. Airway is midline and patent. Additional findings as detailed.      DX-CHEST-PORTABLE (1 VIEW)   Final Result      No acute cardiopulmonary abnormality.        The radiologist's interpretation of all radiological studies have been reviewed by me.    COURSE & MEDICAL DECISION MAKING  Pertinent Labs & Imaging studies reviewed. (See chart for details)    6:11 AM - Patient seen and examined at bedside. He appears to have angioedema, consistent with him taking Lisinopril according to his listed medications. Patient will be treated with Decadron and Pepcid. Ordered CXR, DX-soft tissue neck, CBC w/ diff, CMP, lactic acid, and blood cultures to evaluate his symptoms. The differential diagnoses include but are not limited to: " angioedema, allergic reaction, . Patient does not appear to require intubation at this time, but we discussed that intubation or a tracheotomy may be required if his condition worsens. We further discussed the need for admission to the ICU for observation and possible intervention. Patient verbalizes understanding and agreement to this plan of care.      6:30 AM Paged the intensivist.      7:20 AM - I discussed the patient's case and the above findings with Dr. Hernandez (Intensivist) who agrees to evaluate the patient for hospitalization to the ICU.    Medical Decision Making:   Patient presents with tongue swelling on the right half of the mouth with swelling of the floor the mouth.  Is voice is abnormal.  He is not drooling and is not having any shortness of breath.  He was given Benadryl in the field and have added Decadron in the emergency department along with Pepcid IV.  Emergency airway cards to the bedside.  The patient did not decompensate I discussed the case with the intensivist.  The patient is being admitted for evaluation    CRITICAL CARE  The very real possibilty of a deterioration of this patient's condition required the highest level of my preparedness for sudden, emergent intervention.  I provided critical care services, which included medication orders, frequent reevaluations of the patient's condition and response to treatment, ordering and reviewing test results, and discussing the case with various consultants.  The critical care time associated with the care of the patient was 35 minutes. Review chart for interventions. This time is exclusive of any other billable procedures.     DISPOSITION:  Patient will be hospitalized by Dr. Hernandez in critical condition.     FINAL IMPRESSION  1. Angioedema, initial encounter     The critical care time associated with the care of the patient was 35 minutes.     Kai CASTILLO (Scribe), am scribing for, and in the presence of, Alejandro Vigil,  M.D..    Electronically signed by: Kai Blanca (Scribe), 9/24/2020    IAlejandro M.D. personally performed the services described in this documentation, as scribed by Kai Blanca in my presence, and it is both accurate and complete.    The note accurately reflects work and decisions made by me.  Alejandro Vigil M.D.  9/24/2020  1:00 PM

## 2020-09-24 NOTE — ED TRIAGE NOTES
Chief Complaint   Patient presents with   • Tongue Swelling     beginning this AM, + drooling. maintaining airway, oxygen saturation >90%. A&Ox4       Pt BIB EMS for above. Wife present at bedside to help answer questions. Pt had a similar episode last week however wife states just his mouth/cheek was swollen, not his tongue. At that time pt was given benadryl with relief in symptoms. 25mg IV benadryl given en route. No allergies to any medications. Only new medication is Bactrim stated one week prior. Anaphylaxis kit at bedside. Chart up for ERP

## 2020-09-24 NOTE — PROGRESS NOTES
Pt arrived to [facility]. Covid Swabbed, not detected.    VSS  Temp 98.8  Wt: 62.1 kg    BP:  137/75  HR: 75  Sp02: 99 on Room Air  RR: 20  2 RN skin check: Bilateral femoral sites, guaze and tegaderm. PTA   Left posterior shoulder scar  Left big toe callous and left posterior toe.        Belongings:  Shirt   Boxers,   Pants Belt  Shoes and socks [employer]let

## 2020-09-24 NOTE — ED NOTES
[facility] ICU called and notified that pt is ready for bedside report and transport.

## 2020-09-25 VITALS
TEMPERATURE: 98.8 F | WEIGHT: 138.89 LBS | RESPIRATION RATE: 16 BRPM | OXYGEN SATURATION: 98 % | DIASTOLIC BLOOD PRESSURE: 66 MMHG | SYSTOLIC BLOOD PRESSURE: 126 MMHG | HEIGHT: 69 IN | HEART RATE: 92 BPM | BODY MASS INDEX: 20.57 KG/M2

## 2020-09-25 PROBLEM — T46.4X5A ANGIOEDEMA DUE TO ANGIOTENSIN CONVERTING ENZYME INHIBITOR (ACE-I): Status: RESOLVED | Noted: 2020-09-24 | Resolved: 2020-09-25

## 2020-09-25 PROBLEM — T78.3XXA ANGIOEDEMA DUE TO ANGIOTENSIN CONVERTING ENZYME INHIBITOR (ACE-I): Status: RESOLVED | Noted: 2020-09-24 | Resolved: 2020-09-25

## 2020-09-25 PROBLEM — I74.5 ILIAC ARTERY OCCLUSION, RIGHT (HCC): Chronic | Status: RESOLVED | Noted: 2020-09-04 | Resolved: 2020-09-25

## 2020-09-25 LAB
ANION GAP SERPL CALC-SCNC: 15 MMOL/L (ref 7–16)
BUN SERPL-MCNC: 21 MG/DL (ref 8–22)
CALCIUM SERPL-MCNC: 9.1 MG/DL (ref 8.5–10.5)
CHLORIDE SERPL-SCNC: 107 MMOL/L (ref 96–112)
CO2 SERPL-SCNC: 18 MMOL/L (ref 20–33)
CREAT SERPL-MCNC: 1.65 MG/DL (ref 0.5–1.4)
ERYTHROCYTE [DISTWIDTH] IN BLOOD BY AUTOMATED COUNT: 45 FL (ref 35.9–50)
GLUCOSE BLD-MCNC: 127 MG/DL (ref 65–99)
GLUCOSE BLD-MCNC: 128 MG/DL (ref 65–99)
GLUCOSE SERPL-MCNC: 126 MG/DL (ref 65–99)
HCT VFR BLD AUTO: 27.4 % (ref 42–52)
HGB BLD-MCNC: 9.4 G/DL (ref 14–18)
MCH RBC QN AUTO: 31.9 PG (ref 27–33)
MCHC RBC AUTO-ENTMCNC: 34.3 G/DL (ref 33.7–35.3)
MCV RBC AUTO: 92.9 FL (ref 81.4–97.8)
PLATELET # BLD AUTO: 215 K/UL (ref 164–446)
PMV BLD AUTO: 10.4 FL (ref 9–12.9)
POTASSIUM SERPL-SCNC: 4.7 MMOL/L (ref 3.6–5.5)
RBC # BLD AUTO: 2.95 M/UL (ref 4.7–6.1)
SODIUM SERPL-SCNC: 140 MMOL/L (ref 135–145)
WBC # BLD AUTO: 4.2 K/UL (ref 4.8–10.8)

## 2020-09-25 PROCEDURE — A9270 NON-COVERED ITEM OR SERVICE: HCPCS | Performed by: INTERNAL MEDICINE

## 2020-09-25 PROCEDURE — 90662 IIV NO PRSV INCREASED AG IM: CPT | Performed by: INTERNAL MEDICINE

## 2020-09-25 PROCEDURE — 80048 BASIC METABOLIC PNL TOTAL CA: CPT

## 2020-09-25 PROCEDURE — 85027 COMPLETE CBC AUTOMATED: CPT

## 2020-09-25 PROCEDURE — 90471 IMMUNIZATION ADMIN: CPT

## 2020-09-25 PROCEDURE — 700111 HCHG RX REV CODE 636 W/ 250 OVERRIDE (IP): Performed by: INTERNAL MEDICINE

## 2020-09-25 PROCEDURE — 82962 GLUCOSE BLOOD TEST: CPT

## 2020-09-25 PROCEDURE — 99239 HOSP IP/OBS DSCHRG MGMT >30: CPT | Performed by: INTERNAL MEDICINE

## 2020-09-25 PROCEDURE — 700102 HCHG RX REV CODE 250 W/ 637 OVERRIDE(OP): Performed by: INTERNAL MEDICINE

## 2020-09-25 RX ORDER — OMEPRAZOLE 20 MG/1
20 CAPSULE, DELAYED RELEASE ORAL DAILY
Status: DISCONTINUED | OUTPATIENT
Start: 2020-09-26 | End: 2020-09-25 | Stop reason: HOSPADM

## 2020-09-25 RX ORDER — OMEPRAZOLE 20 MG/1
20 CAPSULE, DELAYED RELEASE ORAL DAILY
Status: DISCONTINUED | OUTPATIENT
Start: 2020-09-25 | End: 2020-09-25

## 2020-09-25 RX ORDER — AMLODIPINE BESYLATE 10 MG/1
5 TABLET ORAL ONCE
Status: COMPLETED | OUTPATIENT
Start: 2020-09-25 | End: 2020-09-25

## 2020-09-25 RX ORDER — BUDESONIDE AND FORMOTEROL FUMARATE DIHYDRATE 160; 4.5 UG/1; UG/1
2 AEROSOL RESPIRATORY (INHALATION)
Status: DISCONTINUED | OUTPATIENT
Start: 2020-09-25 | End: 2020-09-25 | Stop reason: HOSPADM

## 2020-09-25 RX ORDER — AMLODIPINE BESYLATE 5 MG/1
5 TABLET ORAL DAILY
Qty: 30 TAB | Refills: 1 | Status: SHIPPED | OUTPATIENT
Start: 2020-09-25 | End: 2021-12-15 | Stop reason: SDUPTHER

## 2020-09-25 RX ADMIN — AMLODIPINE BESYLATE 5 MG: 10 TABLET ORAL at 10:41

## 2020-09-25 RX ADMIN — DEXAMETHASONE SODIUM PHOSPHATE 4 MG: 4 INJECTION, SOLUTION INTRA-ARTICULAR; INTRALESIONAL; INTRAMUSCULAR; INTRAVENOUS; SOFT TISSUE at 05:39

## 2020-09-25 RX ADMIN — DEXAMETHASONE SODIUM PHOSPHATE 4 MG: 4 INJECTION, SOLUTION INTRA-ARTICULAR; INTRALESIONAL; INTRAMUSCULAR; INTRAVENOUS; SOFT TISSUE at 00:33

## 2020-09-25 RX ADMIN — HEPARIN SODIUM 5000 UNITS: 5000 INJECTION INTRAVENOUS; SUBCUTANEOUS at 05:38

## 2020-09-25 RX ADMIN — BUDESONIDE AND FORMOTEROL FUMARATE DIHYDRATE 2 PUFF: 160; 4.5 AEROSOL RESPIRATORY (INHALATION) at 09:13

## 2020-09-25 RX ADMIN — GLYCOPYRROLATE 1 CAPSULE: 15.6 CAPSULE RESPIRATORY (INHALATION) at 09:13

## 2020-09-25 RX ADMIN — FAMOTIDINE 20 MG: 10 INJECTION INTRAVENOUS at 05:38

## 2020-09-25 RX ADMIN — INFLUENZA A VIRUS A/MICHIGAN/45/2015 X-275 (H1N1) ANTIGEN (FORMALDEHYDE INACTIVATED), INFLUENZA A VIRUS A/SINGAPORE/INFIMH-16-0019/2016 IVR-186 (H3N2) ANTIGEN (FORMALDEHYDE INACTIVATED), INFLUENZA B VIRUS B/PHUKET/3073/2013 ANTIGEN (FORMALDEHYDE INACTIVATED), AND INFLUENZA B VIRUS B/MARYLAND/15/2016 BX-69A ANTIGEN (FORMALDEHYDE INACTIVATED) 0.7 ML: 60; 60; 60; 60 INJECTION, SUSPENSION INTRAMUSCULAR at 10:42

## 2020-09-25 ASSESSMENT — ENCOUNTER SYMPTOMS
DIZZINESS: 0
FEVER: 0
SEIZURES: 0
DOUBLE VISION: 0
VOMITING: 0
ABDOMINAL PAIN: 0
CHILLS: 0
MYALGIAS: 0
SPEECH CHANGE: 0
HEADACHES: 0
WHEEZING: 0
SHORTNESS OF BREATH: 0
WEAKNESS: 0
NAUSEA: 0
BLURRED VISION: 0
SPUTUM PRODUCTION: 0
TINGLING: 0
TREMORS: 0
STRIDOR: 0

## 2020-09-25 ASSESSMENT — FIBROSIS 4 INDEX: FIB4 SCORE: 2.28

## 2020-09-25 NOTE — PROGRESS NOTES
Pt discharged in stable condition.    Discharge instruction given to the patient and his girlfriend and pt verbalized understanding.   NO ACEI/Lisinopril   To start Norvasc tmrw (dose given while in hospital today)   To call/make appt with his primary MD ASAP.

## 2020-09-25 NOTE — PROGRESS NOTES
Pt ambulated around the unit without assistance or difficulty.  Tolerating PO intake, no signs of chocking or aspiration.  VSS. RA.

## 2020-09-25 NOTE — DISCHARGE SUMMARY
Discharge Summary    CHIEF COMPLAINT ON ADMISSION  Chief Complaint   Patient presents with   • Tongue Swelling     beginning this AM, + drooling. maintaining airway, oxygen saturation >90%. A&Ox4       Reason for Admission  EMS     Admission Date  9/24/2020    CODE STATUS  Full Code    HPI & HOSPITAL COURSE  This is a 75 y.o. male who presented on 9/24/2020 who has a history of Hep C s/p treatment, COPD, CKD, GERD, tobacco use, and polysubstance abuse presented to the ER with tongue swelling while on lisinopril.     The emergency department evaluation was concerning for angioedema with possible impending airway occlusion.  His CBC and BMP are consistent with prior values revealing his CKD and chronic anemia.    The patient was observed in the ICU for 24 hours, he had complete resolution of his oropharyngeal swelling, he felt well and was tolerating a diet-his labs are consistent with prior values of chronic anemia and CKD.  He strongly desired to go home (stating he will sign out AMA if he does not get to go home immediately) and since his symptoms have resolved he will be discharged home with amlodipine 5 mg daily, lisinopril has been added to his list of allergies and discontinued. He is to follow up with his PCP.         Therefore, he is discharged in good and stable condition to home with close outpatient follow-up.    The patient recovered much more quickly than anticipated on admission.    Discharge Date  09/25/20      FOLLOW UP ITEMS POST DISCHARGE  Norvasc prescription  Stop Lisinopril     DISCHARGE DIAGNOSES  Active Problems:    CKD (chronic kidney disease), stage III (HCC) POA: Yes    COPD (chronic obstructive pulmonary disease) (HCC) POA: Yes    Essential hypertension (Chronic) POA: Yes  Resolved Problems:    Right iliac artery occlusion, with critical limb ischemia (HCC) (Chronic) POA: Yes    Angioedema due to angiotensin converting enzyme inhibitor (ACE-I) POA: Yes      FOLLOW UP  No future  appointments.  Alexx Reynaga M.D.  [address] [facility] [address]  [phone]    Schedule an appointment as soon as possible for a visit        MEDICATIONS ON DISCHARGE     Medication List      START taking these medications      Instructions   amLODIPine 5 MG Tabs  Commonly known as: NORVASC   Doctor's comments: Will need follow up with PCP for further prescriptions  Take 1 Tab by mouth every day.  Dose: 5 mg        CONTINUE taking these medications      Instructions   albuterol 108 (90 Base) MCG/ACT Aers inhalation aerosol  Commonly known as: Ventolin HFA   Inhale 2 Puffs by mouth every 6 hours as needed for Shortness of Breath. INHALE TWO PUFFS BY MOUTH EVERY SIX HOURS AS NEEDED  Dose: 2 Puff     ergocalciferol 08658 UNIT capsule  Commonly known as: DRISDOL   Take 1 Cap by mouth every 7 days.  Dose: 50,000 Units     fluocinolone 0.025 % cream  Commonly known as: SYNALAR   Apply 1 Application to affected area(s) 3 times a day as needed.  Dose: 1 Application     Fluticasone Furoate-Vilanterol 100-25 MCG/INH Aepb  Commonly known as: Breo Ellipta   Inhale 1 Puff by mouth every day.  Dose: 1 Puff     hydrOXYzine HCl 25 MG Tabs  Commonly known as: ATARAX   Take 1 Tab by mouth 2 times a day as needed for Itching.  Dose: 25 mg     omeprazole 20 MG delayed-release capsule  Commonly known as: PRILOSEC   TAKE ONE CAPSULE BY MOUTH ONE TIME DAILY     tiotropium 18 MCG Caps  Commonly known as: Spiriva HandiHaler   INHALE 1 CAPSULE VIA HANDIHALER ONCE DAILY        STOP taking these medications    lisinopril 5 MG Tabs  Commonly known as: PRINIVIL            Allergies  Allergies   Allergen Reactions   • Ace Inhibitors Swelling       DIET  Orders Placed This Encounter   Procedures   • Diet Order Regular     Standing Status:   Standing     Number of Occurrences:   1     Order Specific Question:   Diet:     Answer:   Regular [1]       ACTIVITY  As tolerated.      CONSULTATIONS  Critical  Care    PROCEDURES  None    LABORATORY  Lab Results   Component Value Date    SODIUM 140 09/25/2020    POTASSIUM 4.7 09/25/2020    CHLORIDE 107 09/25/2020    CO2 18 (L) 09/25/2020    GLUCOSE 126 (H) 09/25/2020    BUN 21 09/25/2020    CREATININE 1.65 (H) 09/25/2020    CREATININE 1.4 08/23/2005        Lab Results   Component Value Date    WBC 4.2 (L) 09/25/2020    HEMOGLOBIN 9.4 (L) 09/25/2020    HEMATOCRIT 27.4 (L) 09/25/2020    PLATELETCT 215 09/25/2020        Total time of the discharge process exceeds 31 minutes.

## 2020-09-25 NOTE — DISCHARGE INSTRUCTIONS
Never take Lisinopril or an Acei again!    Angioedema  Angioedema is the sudden swelling of tissue in the body. Angioedema can affect any part of the body, but it most often affects the deeper parts of the skin, causing red, itchy patches (hives) to appear over the affected area. It often begins during the night and is found in the morning.  Depending on the cause, angioedema may happen:  · Only once.  · Several times. It may come back in unpredictable patterns.  · Repeatedly for several years. Over time, it may gradually stop coming back.  Angioedema can be life-threatening if it affects the air passages that you breathe through.  What are the causes?  This condition may be caused by:  · Foods, such as milk, eggs, shellfish, wheat, or nuts.  · Certain medicines, such as ACE inhibitors, antibiotics, nonsteroidal anti-inflammatory drugs, birth control pills, or dyes used in X-rays.  · Insect stings.  · Infections.  Angioedema can be inherited, and episodes can be triggered by:  · Mild injury.  · Dental work.  · Surgery.  · Stress.  · Sudden changes in temperature.  · Exercise.  In some cases, the cause of this condition is not known.  What are the signs or symptoms?  Symptoms of this condition depend on where the swelling happens. Symptoms may include:  · Swollen skin.  · Red, itchy patches of skin (hives).  · Redness in the affected area.  · Pain in the affected area.  · Swollen lips or tongue.  · Wheezing.  · Breathing problems.    If your internal organs are involved, symptoms may also include:  · Nausea.  · Abdominal pain.  · Vomiting.  · Difficulty swallowing.  · Difficulty passing urine.  How is this diagnosed?  This condition may be diagnosed based on:  · An exam of the affected area.  · Your medical history.  · Whether anyone in your family has had this condition before.  · A review of any medicines you have been taking.  · Tests, including:  ? Allergy skin tests to see if the condition was caused by an  allergic reaction.  ? Blood tests to see if the condition was caused by a gene.  ? Tests to check for underlying diseases that could cause the condition.  How is this treated?  Treatment for this condition depends on the cause. It may involve any of the following:  · If something triggered the condition, making changes to keep it from triggering the condition again.  · If the condition affects your breathing, having tubes placed in your airway to keep it open.  · Taking medicines to treat symptoms or prevent future episodes. These may include:  ? Antihistamines.  ? Epinephrine injections.  ? Steroids.  If your condition is severe, you may need to be treated at the hospital. Angioedema usually gets better in 24-48 hours.  Follow these instructions at home:  · Take over-the-counter and prescription medicines only as told by your health care provider.  · If you were given medicines for emergency allergy treatment, always carry them with you.  · Wear a medical bracelet as told by your health care provider.  · If something triggers your condition, avoid the trigger, if possible.  · If your condition is inherited and you are thinking about having children, talk to your health care provider. It is important to discuss the risks of passing on the condition to your children.  Contact a health care provider if:  · You have repeated episodes of angioedema.  · Episodes of angioedema start to happen more often than they used to, even after you take steps to prevent them.  · You have episodes of angioedema that are more severe than they have been before, even after you take steps to prevent them.  · You are thinking about having children.  Get help right away if:  · You have severe swelling of your mouth, tongue, or lips.  · You have trouble breathing.  · You have trouble swallowing.  · You faint.  This information is not intended to replace advice given to you by your health care provider. Make sure you discuss any questions you  have with your health care provider.  Document Released: 02/26/2003 Document Revised: 11/30/2018 Document Reviewed: 06/27/2017  [employer] Patient Education © 2020 [employer]      Discharge Instructions    Discharged to home by car with friend. Discharged via wheelchair, hospital escort: Yes.  Special equipment needed: Not Applicable    Be sure to schedule a follow-up appointment with your primary care doctor or any specialists as instructed.     Discharge Plan:   Diet Plan: Discussed  Activity Level: Discussed  Confirmed Follow up Appointment: Patient to Call and Schedule Appointment  Confirmed Symptoms Management: Discussed  Medication Reconciliation Updated: Yes  Influenza Vaccine Indication: Indicated: 9 to 64 years of age  Influenza Vaccine Given - only chart on this line when given: Influenza Vaccine Given (See MAR)    I understand that a diet low in cholesterol, fat, and sodium is recommended for good health. Unless I have been given specific instructions below for another diet, I accept this instruction as my diet prescription.   Other diet: Regular    Special Instructions: None    · Is patient discharged on Warfarin / Coumadin?   No     Depression / Suicide Risk    As you are discharged from this [facility] facility, it is important to learn how to keep safe from harming yourself.    Recognize the warning signs:  · Abrupt changes in personality, positive or negative- including increase in energy   · Giving away possessions  · Change in eating patterns- significant weight changes-  positive or negative  · Change in sleeping patterns- unable to sleep or sleeping all the time   · Unwillingness or inability to communicate  · Depression  · Unusual sadness, discouragement and loneliness  · Talk of wanting to die  · Neglect of personal appearance   · Rebelliousness- reckless behavior  · Withdrawal from people/activities they love  · Confusion- inability to concentrate     If you or a loved one observes any of  these behaviors or has concerns about self-harm, here's what you can do:  · Talk about it- your feelings and reasons for harming yourself  · Remove any means that you might use to hurt yourself (examples: pills, rope, extension cords, firearm)  · Get professional help from the community (Mental Health, Substance Abuse, psychological counseling)  · Do not be alone:Call your Safe Contact- someone whom you trust who will be there for you.  · Call your local CRISIS HOTLINE 410-0799 or 134-346-8942  · Call your local Children's Mobile Crisis Response Team [place] (554) 687-9289 or www.[employer].com  · Call the toll free National Suicide Prevention Hotlines   · National Suicide Prevention Lifeline 995-942-CHPQ (7177)  · National Hope Line Network 800-SUICIDE (359-4211)

## 2020-09-25 NOTE — CARE PLAN
Problem: Infection  Goal: Will remain free from infection  Intervention: Assess signs and symptoms of infection  Note: RN monitors Pt VS and lab values to observe for S/S of infection.  Hand hygiene implemented before and after Pt care.       Problem: Knowledge Deficit  Goal: Knowledge of disease process/condition, treatment plan, diagnostic tests, and medications will improve  Intervention: Assess knowledge level of disease process/condition, treatment plan, diagnostic tests, and medications  Note: Reviewing plan of care, activities, and medication with patient.  Encouraging patient to ask questions and participate in plan of care.  Providing answers to all questions.  Continuing with current plan of care.

## 2020-09-25 NOTE — CONSULTS
Critical Care Consultation    Date of consult: 9/24/2020    Referring Physician  Alejandro Vigil M.D.    Reason for Consultation  Angioedema    History of Presenting Illness  75 y.o. male who presented 9/24/2020 with a history of Hep C s/p treatment, COPD, CKD, GERD, tobacco use, and polysubstance abuse presented to the ER with tongue swelling while on lisinopril.      The patient states that he noticed transient tongue swelling a week ago but did not seek medical care.  He says today his tongue was so large that he could not speak properly and he could not swallow so he was brought to the emergency department.    The emergency department evaluation was concerning for angioedema with possible impending airway occlusion.  His CBC and BMP are consistent with prior values revealing his CKD and chronic anemia.    Critical care was consulted for careful monitoring and possible intervention should he have an airway occlusion with his dangerously swollen oropharynx.     9/25 symptoms have been completely resolved now since admission to the ICU, he strongly desires to go home and states he feels perfectly fine    Code Status  Full Code    Review of Systems  Review of Systems   Constitutional: Negative for chills, fever and malaise/fatigue.   HENT: Negative for ear discharge and ear pain.         Tongue swelling completely better   Eyes: Negative for blurred vision and double vision.   Respiratory: Negative for sputum production, shortness of breath, wheezing and stridor.    Cardiovascular: Negative for chest pain.   Gastrointestinal: Negative for abdominal pain, nausea and vomiting.   Genitourinary: Negative for dysuria and urgency.   Musculoskeletal: Negative for myalgias.   Skin: Negative for rash.   Neurological: Negative for dizziness, tingling, tremors, speech change, seizures, weakness and headaches.       Past Medical History   has a past medical history of Hepatitis, Hyperlipemia, Hypertension, and Pulmonary  emphysema (HCC).    Surgical History   has a past surgical history that includes femoral femoral bypass (Bilateral, 9/4/2020).    Family History  family history includes Cancer in his mother; Diabetes in his brother; Heart Disease in his mother.    Social History   reports that he has been smoking cigarettes. He has been smoking about 0.50 packs per day. He has never used smokeless tobacco. He reports current alcohol use. He reports previous drug use.    Medications  Home Medications     Reviewed by Elise Flood (Pharmacy Tech) on 09/24/20 at 0749  Med List Status: Complete   Medication Last Dose Status   albuterol (VENTOLIN HFA) 108 (90 Base) MCG/ACT Aero Soln inhalation aerosol 9/24/2020 Active   ergocalciferol (DRISDOL) 90143 UNIT capsule 9/18/2020 Active   fluocinolone (SYNALAR) 0.025 % cream not started yet Active   Fluticasone Furoate-Vilanterol (BREO ELLIPTA) 100-25 MCG/INH AEROSOL POWDER, BREATH ACTIVATED 9/23/2020 Active   hydrOXYzine HCl (ATARAX) 25 MG Tab 9/23/2020 Active   lisinopril (PRINIVIL) 5 MG Tab 9/23/2020 Active   omeprazole (PRILOSEC) 20 MG delayed-release capsule 9/23/2020 Active   tiotropium (SPIRIVA HANDIHALER) 18 MCG Cap 9/23/2020 Active              Current Facility-Administered Medications   Medication Dose Route Frequency Provider Last Rate Last Dose   • [START ON 9/26/2020] omeprazole (PRILOSEC) capsule 20 mg  20 mg Oral DAILY Diego Hernandez M.D.       • budesonide-formoterol (SYMBICORT) 160-4.5 MCG/ACT inhaler 2 Puff  2 Puff Inhalation BID (RT) Diego Hernandez M.D.   2 Puff at 09/25/20 0913   • glycopyrrolate (Seebri) 15.6 mcg inhalation capsule 1 Cap  1 Cap Inhalation BID (RT) Diego Hernandez M.D.   1 Cap at 09/25/20 0913   • amLODIPine (NORVASC) tablet 5 mg  5 mg Oral Once Diego Hernandez M.D.       • senna-docusate (PERICOLACE or SENOKOT S) 8.6-50 MG per tablet 2 Tab  2 Tab Oral BID Diego Hernandez M.D.   Stopped at 09/24/20 5149    And   • polyethylene  glycol/lytes (MIRALAX) PACKET 1 Packet  1 Packet Oral QDAY PRN Diego Hernandez M.D.        And   • magnesium hydroxide (MILK OF MAGNESIA) suspension 30 mL  30 mL Oral QDAY PRN Diego Hernandez M.D.        And   • bisacodyl (DULCOLAX) suppository 10 mg  10 mg Rectal QDAY PRN Diego Hernandez M.D.       • Respiratory Therapy Consult   Nebulization Continuous RT Diego Hernandez M.D.       • heparin injection 5,000 Units  5,000 Units Subcutaneous Q8HRS Diego Hernandez M.D.   5,000 Units at 09/25/20 0538   • labetalol (NORMODYNE/TRANDATE) injection 10 mg  10 mg Intravenous Q4HRS PRN Diego Hernandez M.D.       • ondansetron (ZOFRAN) syringe/vial injection 4 mg  4 mg Intravenous Q4HRS PRN Diego Hernandez M.D.   4 mg at 09/24/20 2048   • ondansetron (ZOFRAN ODT) dispertab 4 mg  4 mg Oral Q4HRS PRN Diego Hernandez M.D.       • glucose 4 g chewable tablet 16 g  16 g Oral Q15 MIN PRN Diego Hernandez M.D.        And   • dextrose 50% (D50W) injection 50 mL  50 mL Intravenous Q15 MIN PRN Diego Hernandez M.D.       • ipratropium-albuterol (DUONEB) nebulizer solution  3 mL Nebulization Q4H PRN (RT) Diego Hernandez M.D.   3 mL at 09/24/20 1935   • racepinephrine (MICRONEFRIN) 2.25 % nebulizer solution 0.5 mL  0.5 mL Nebulization Q30MIN PRN (RT) Diego Hernandez M.D.           Allergies  Allergies   Allergen Reactions   • Ace Inhibitors Swelling       Vital Signs last 24 hours  Temp:  [36.7 °C (98 °F)-37.8 °C (100 °F)] 37.1 °C (98.8 °F)  Pulse:  [75-97] 86  Resp:  [12-37] 20  BP: (116-167)/(58-91) 117/58  SpO2:  [94 %-99 %] 96 %    Physical Exam  Physical Exam  Vitals signs and nursing note reviewed.   Constitutional:       Comments: Chronically ill appearing   HENT:      Head: Normocephalic and atraumatic.      Nose: Nose normal.      Mouth/Throat:      Comments: Tongue is completely normal, no oropharyngeal swelling, voice is normal without stridor no fullness in his airway, no sub-lingual swelling  Eyes:       Extraocular Movements: Extraocular movements intact.      Pupils: Pupils are equal, round, and reactive to light.   Neck:      Musculoskeletal: No neck rigidity.   Cardiovascular:      Rate and Rhythm: Normal rate and regular rhythm.   Pulmonary:      Effort: Pulmonary effort is normal. No respiratory distress.      Breath sounds: Normal breath sounds. No stridor. No wheezing, rhonchi or rales.   Abdominal:      General: Abdomen is flat. There is no distension.      Tenderness: There is no guarding or rebound.   Musculoskeletal:         General: No deformity or signs of injury.      Comments: Prior common femoral endarterectomy sites have clean, dry, and intact bandages and his legs are warm and well-perfused   Lymphadenopathy:      Cervical: No cervical adenopathy.   Skin:     General: Skin is warm and dry.      Capillary Refill: Capillary refill takes less than 2 seconds.   Neurological:      Mental Status: He is alert and oriented to person, place, and time.      Cranial Nerves: No cranial nerve deficit.      Sensory: No sensory deficit.      Motor: No weakness.   Psychiatric:         Mood and Affect: Mood normal.         Fluids    Intake/Output Summary (Last 24 hours) at 9/25/2020 1010  Last data filed at 9/25/2020 0000  Gross per 24 hour   Intake 500 ml   Output 500 ml   Net 0 ml       Laboratory  Recent Results (from the past 48 hour(s))   CBC WITH DIFFERENTIAL    Collection Time: 09/24/20  6:05 AM   Result Value Ref Range    WBC 5.5 4.8 - 10.8 K/uL    RBC 3.19 (L) 4.70 - 6.10 M/uL    Hemoglobin 10.1 (L) 14.0 - 18.0 g/dL    Hematocrit 30.1 (L) 42.0 - 52.0 %    MCV 94.4 81.4 - 97.8 fL    MCH 31.7 27.0 - 33.0 pg    MCHC 33.6 (L) 33.7 - 35.3 g/dL    RDW 45.4 35.9 - 50.0 fL    Platelet Count 217 164 - 446 K/uL    MPV 10.3 9.0 - 12.9 fL    Neutrophils-Polys 52.80 44.00 - 72.00 %    Lymphocytes 30.10 22.00 - 41.00 %    Monocytes 14.60 (H) 0.00 - 13.40 %    Eosinophils 1.50 0.00 - 6.90 %    Basophils 0.50 0.00 - 1.80  %    Immature Granulocytes 0.50 0.00 - 0.90 %    Nucleated RBC 0.00 /100 WBC    Neutrophils (Absolute) 2.90 1.82 - 7.42 K/uL    Lymphs (Absolute) 1.65 1.00 - 4.80 K/uL    Monos (Absolute) 0.80 0.00 - 0.85 K/uL    Eos (Absolute) 0.08 0.00 - 0.51 K/uL    Baso (Absolute) 0.03 0.00 - 0.12 K/uL    Immature Granulocytes (abs) 0.03 0.00 - 0.11 K/uL    NRBC (Absolute) 0.00 K/uL   COMP METABOLIC PANEL    Collection Time: 09/24/20  6:05 AM   Result Value Ref Range    Sodium 139 135 - 145 mmol/L    Potassium 3.9 3.6 - 5.5 mmol/L    Chloride 104 96 - 112 mmol/L    Co2 20 20 - 33 mmol/L    Anion Gap 15.0 7.0 - 16.0    Glucose 99 65 - 99 mg/dL    Bun 18 8 - 22 mg/dL    Creatinine 1.52 (H) 0.50 - 1.40 mg/dL    Calcium 9.1 8.5 - 10.5 mg/dL    AST(SGOT) 18 12 - 45 U/L    ALT(SGPT) 8 2 - 50 U/L    Alkaline Phosphatase 66 30 - 99 U/L    Total Bilirubin 0.4 0.1 - 1.5 mg/dL    Albumin 3.4 3.2 - 4.9 g/dL    Total Protein 7.3 6.0 - 8.2 g/dL    Globulin 3.9 (H) 1.9 - 3.5 g/dL    A-G Ratio 0.9 g/dL   LACTIC ACID    Collection Time: 09/24/20  6:05 AM   Result Value Ref Range    Lactic Acid 1.4 0.5 - 2.0 mmol/L   ESTIMATED GFR    Collection Time: 09/24/20  6:05 AM   Result Value Ref Range    GFR If  54 (A) >60 mL/min/1.73 m 2    GFR If Non African American 45 (A) >60 mL/min/1.73 m 2   Routine (COVID/SARS COV-2 In-House PCR up to 24 hours)    Collection Time: 09/24/20  8:06 AM    Specimen: Nasopharyngeal; Respirate   Result Value Ref Range    COVID Order Status Received    SARS-CoV-2, PCR (In-House)    Collection Time: 09/24/20  8:06 AM   Result Value Ref Range    SARS-CoV-2 Source Nasal Swab     SARS-CoV-2 by PCR NotDetected    ACCU-CHEK GLUCOSE    Collection Time: 09/24/20 12:38 PM   Result Value Ref Range    Glucose - Accu-Ck 126 (H) 65 - 99 mg/dL   ACCU-CHEK GLUCOSE    Collection Time: 09/24/20  5:09 PM   Result Value Ref Range    Glucose - Accu-Ck 132 (H) 65 - 99 mg/dL   ACCU-CHEK GLUCOSE    Collection Time: 09/25/20 12:36  AM   Result Value Ref Range    Glucose - Accu-Ck 127 (H) 65 - 99 mg/dL   CBC without Differential    Collection Time: 09/25/20  4:27 AM   Result Value Ref Range    WBC 4.2 (L) 4.8 - 10.8 K/uL    RBC 2.95 (L) 4.70 - 6.10 M/uL    Hemoglobin 9.4 (L) 14.0 - 18.0 g/dL    Hematocrit 27.4 (L) 42.0 - 52.0 %    MCV 92.9 81.4 - 97.8 fL    MCH 31.9 27.0 - 33.0 pg    MCHC 34.3 33.7 - 35.3 g/dL    RDW 45.0 35.9 - 50.0 fL    Platelet Count 215 164 - 446 K/uL    MPV 10.4 9.0 - 12.9 fL   Basic Metabolic Panel (BMP)    Collection Time: 09/25/20  4:27 AM   Result Value Ref Range    Sodium 140 135 - 145 mmol/L    Potassium 4.7 3.6 - 5.5 mmol/L    Chloride 107 96 - 112 mmol/L    Co2 18 (L) 20 - 33 mmol/L    Glucose 126 (H) 65 - 99 mg/dL    Bun 21 8 - 22 mg/dL    Creatinine 1.65 (H) 0.50 - 1.40 mg/dL    Calcium 9.1 8.5 - 10.5 mg/dL    Anion Gap 15.0 7.0 - 16.0   ESTIMATED GFR    Collection Time: 09/25/20  4:27 AM   Result Value Ref Range    GFR If  49 (A) >60 mL/min/1.73 m 2    GFR If Non  41 (A) >60 mL/min/1.73 m 2   ACCU-CHEK GLUCOSE    Collection Time: 09/25/20  5:44 AM   Result Value Ref Range    Glucose - Accu-Ck 128 (H) 65 - 99 mg/dL       Imaging  DX-NECK FOR SOFT TISSUE   Final Result      Some possible submental/submandibular soft tissue prominence on the right.      Prevertebral soft tissues are within normal limits. Airway is midline and patent. Additional findings as detailed.      DX-CHEST-PORTABLE (1 VIEW)   Final Result      No acute cardiopulmonary abnormality.          Assessment/Plan  CKD (chronic kidney disease), stage III (HCC)- (present on admission)  Assessment & Plan  Near baseline, will follow up with PCP      Essential hypertension- (present on admission)  Assessment & Plan  Never to receive lisinopril or ACE inhibitor again  Norvasc 5 mg and f/u with PCP    COPD (chronic obstructive pulmonary disease) (HCC)- (present on admission)  Assessment & Plan  Continue prior home  meds    75-year-old male here with ACE inhibitor induced angioedema with critical airway swelling, he rapidly improved in the ICU and remained well-appearing and tolerating a diet.  He was able to ambulate without difficulty or desaturations.  He strongly desired to go home, he will be discharged with a new prescription for Norvasc and instructions never to take an ACE inhibitor lisinopril again.    Discussed patient condition and risk of morbidity and/or mortality with Family, RN, RT, Code status disscussed, Charge nurse / hot rounds and Patient.

## 2020-09-25 NOTE — CARE PLAN
Problem: Safety  Goal: Will remain free from injury  Note: Room near nursing station and bedalarm activated to ensure pt safety.     Problem: Knowledge Deficit  Goal: Knowledge of disease process/condition, treatment plan, diagnostic tests, and medications will improve  Note: Discussed plan of care for today and possible discharge today.

## 2020-09-25 NOTE — DISCHARGE PLANNING
"Care Transition Team Assessment    LSW met with pt at bedside and obtained the following information used in this assessment. Pt verified accuracy of facesheet. Pt has a good support system with \"his lady.\"  Prior to current hospitalization, pt was completely independent in ADLS/IADLS. No prior DME. Pt has hx of substance abuse but denies resources at this time. Denies any hx of mh. Pharmacy is [facility] and pt has a PCP.     LSW spoke with bedside RN who reports pt is pending transfer to wren vs home. No case management needs at this time.     LSW will continue to assist with social/dc needs     Care Transition Team Assessment    Information Source  Orientation : Oriented x 4  Information Given By: Patient  Who is responsible for making decisions for patient? : Patient    Readmission Evaluation  Is this a readmission?: No    Elopement Risk  Legal Hold: No  Ambulatory or Self Mobile in Wheelchair: Yes  Disoriented: No  Psychiatric Symptoms: None  History of Wandering: No  Elopement this Admit: No  Vocalizing Wanting to Leave: No  Displays Behaviors, Body Language Wanting to Leave: No-Not at Risk for Elopement  Elopement Risk: Not at Risk for Elopement    Discharge Preparedness  What is your plan after discharge?: Home with help  Prior Functional Level: Ambulatory, Drives Self, Independent with Activities of Daily Living, Independent with Medication Management    Functional Assesment  Prior Functional Level: Ambulatory, Drives Self, Independent with Activities of Daily Living, Independent with Medication Management    Finances  Financial Barriers to Discharge: No  Prescription Coverage: Yes    Vision / Hearing Impairment  Hearing Impairment: (P) Other (Comments)(Has difficulty hearing)    Advance Directive  Advance Directive?: None  Advance Directive offered?: AD Booklet refused    Domestic Abuse  Have you ever been the victim of abuse or violence?: No    Psychological Assessment  History of Substance Abuse: " None  History of Psychiatric Problems: No  Non-compliant with Treatment: No  Newly Diagnosed Illness: Yes    Discharge Risks or Barriers  Discharge risks or barriers?: No  Patient risk factors: Complex medical needs    Anticipated Discharge Information  Discharge Disposition: Discharged to home/self care (01)(Pending medical teams collaboration)

## 2020-09-28 ENCOUNTER — PATIENT OUTREACH (OUTPATIENT)
Dept: HEALTH INFORMATION MANAGEMENT | Facility: OTHER | Age: 75
End: 2020-09-28

## 2020-09-28 NOTE — PROGRESS NOTES
Community Health Worker Intake  • Social determinates of health intake complete.   • Identified no barriers.  • Contact information provided to Fede [facility].   • Outpatient assessment completed.  • Did the patient receive medications post discharge: Yes    CHW Renato spoke with Fede via TC to follow up after d/c and reintroduce CCM services. Fede reports no f/u appmnt with PCP. Fede reports he will give PCP office a call today. He reports a friend can provide transportation. Educated Fede on the importance of f/u with PCP. He reports no trouble paying for resources such as care, housing, and food. He declined to speak with CCM RN for health education, questions, and concerns. Fede reports no other needs at this time.     Plan: Follow up call tomorrow 9/29 regarding if PCP appmnt has been made.

## 2020-09-29 ENCOUNTER — PATIENT OUTREACH (OUTPATIENT)
Dept: HEALTH INFORMATION MANAGEMENT | Facility: OTHER | Age: 75
End: 2020-09-29

## 2020-09-29 LAB
BACTERIA BLD CULT: NORMAL
BACTERIA BLD CULT: NORMAL
SIGNIFICANT IND 70042: NORMAL
SIGNIFICANT IND 70042: NORMAL
SITE SITE: NORMAL
SITE SITE: NORMAL
SOURCE SOURCE: NORMAL
SOURCE SOURCE: NORMAL

## 2020-09-29 NOTE — PROGRESS NOTES
CHW Renato spoke with Fede via TC to follow up. He reports he will schedule PCP appmnt when he can. This CHW educated him on the importance of f/u with PCP. Fede reports no other needs from this CHW. Encouraged him to reach out to me when needed. Fede will be d/c from CCM services.

## 2021-01-07 ENCOUNTER — OFFICE VISIT (OUTPATIENT)
Dept: URGENT CARE | Facility: CLINIC | Age: 76
End: 2021-01-07
Payer: MEDICARE

## 2021-01-07 VITALS
HEIGHT: 68 IN | RESPIRATION RATE: 16 BRPM | HEART RATE: 81 BPM | TEMPERATURE: 96.7 F | OXYGEN SATURATION: 97 % | BODY MASS INDEX: 20.92 KG/M2 | SYSTOLIC BLOOD PRESSURE: 120 MMHG | WEIGHT: 138 LBS | DIASTOLIC BLOOD PRESSURE: 68 MMHG

## 2021-01-07 DIAGNOSIS — J43.9 PULMONARY EMPHYSEMA, UNSPECIFIED EMPHYSEMA TYPE (HCC): ICD-10-CM

## 2021-01-07 PROCEDURE — 99213 OFFICE O/P EST LOW 20 MIN: CPT | Performed by: PHYSICIAN ASSISTANT

## 2021-01-07 RX ORDER — ALBUTEROL SULFATE 90 UG/1
1-2 AEROSOL, METERED RESPIRATORY (INHALATION) EVERY 6 HOURS PRN
Qty: 8.5 G | Refills: 1 | Status: SHIPPED | OUTPATIENT
Start: 2021-01-07 | End: 2021-12-15

## 2021-01-07 ASSESSMENT — ENCOUNTER SYMPTOMS
WHEEZING: 0
CHILLS: 0
HEADACHES: 0
VOMITING: 0
DIZZINESS: 0
MYALGIAS: 0
ABDOMINAL PAIN: 0
COUGH: 0
SHORTNESS OF BREATH: 0
NAUSEA: 0
FEVER: 0
PALPITATIONS: 0

## 2021-01-07 ASSESSMENT — FIBROSIS 4 INDEX: FIB4 SCORE: 2.28

## 2021-01-08 NOTE — PROGRESS NOTES
"Subjective:      Fede Parikh Jr. is a 75 y.o. male who presents with Medication Refill (inhaler for asthma )            The patient is here for a medication refill.  The patient reports he has history of pulmonary emphysema. He is almost out of his albuterol. He is requesting a refill. He denies any fever, abnormal cough, hemoptysis, or shortness of breath. He states he has a PCP and has been trying to get a hold of him without luck. No other issues. He states his emphysema is well controlled on his current inhalers. He continues to smoke.      Past Medical History:   Diagnosis Date   • Hepatitis    • Hyperlipemia    • Hypertension    • Pulmonary emphysema (HCC)        Past Surgical History:   Procedure Laterality Date   • FEMORAL FEMORAL BYPASS Bilateral 9/4/2020    Procedure: CREATION, BYPASS, ARTERIAL, FEMORAL TO FEMORAL, USING GRAFT;  Surgeon: Jean-Claude Prado M.D.;  Location: SURGERY [facility];  Service: General       Family History   Problem Relation Age of Onset   • Heart Disease Mother    • Cancer Mother    • Diabetes Brother        Allergies   Allergen Reactions   • Ace Inhibitors Swelling       Medications, Allergies, and current problem list reviewed today in Epic    Review of Systems   Constitutional: Negative for chills, fever and malaise/fatigue.   Respiratory: Negative for cough, shortness of breath and wheezing.    Cardiovascular: Negative for chest pain, palpitations and leg swelling.   Gastrointestinal: Negative for abdominal pain, nausea and vomiting.   Musculoskeletal: Negative for myalgias.   Skin: Negative for rash.   Neurological: Negative for dizziness and headaches.     All other systems reviewed and are negative.        Objective:     /68 (Patient Position: Sitting)   Pulse 81   Temp 35.9 °C (96.7 °F) (Temporal)   Resp 16   Ht 1.727 m (5' 8\")   Wt 62.6 kg (138 lb)   SpO2 97%   BMI 20.98 kg/m²      Physical Exam  Constitutional:       General: He is not in acute " distress.     Appearance: He is not ill-appearing.   HENT:      Head: Normocephalic and atraumatic.   Eyes:      Conjunctiva/sclera: Conjunctivae normal.   Cardiovascular:      Rate and Rhythm: Normal rate and regular rhythm.      Heart sounds: Normal heart sounds. No murmur. No friction rub. No gallop.    Pulmonary:      Effort: Pulmonary effort is normal. No respiratory distress.      Breath sounds: Normal breath sounds. No stridor. No wheezing, rhonchi or rales.   Musculoskeletal: Normal range of motion.      Right lower leg: No edema.      Left lower leg: No edema.   Skin:     General: Skin is warm and dry.      Findings: No rash.   Neurological:      General: No focal deficit present.      Mental Status: He is alert and oriented to person, place, and time.   Psychiatric:         Mood and Affect: Mood normal.         Behavior: Behavior normal.         Thought Content: Thought content normal.         Judgment: Judgment normal.                 Assessment/Plan:        1. Pulmonary emphysema, unspecified emphysema type (HCC)    - albuterol 108 (90 Base) MCG/ACT Aero Soln inhalation aerosol; Inhale 1-2 Puffs every 6 hours as needed for Shortness of Breath.  Dispense: 8.5 g; Refill: 1    Differential diagnoses, Supportive care, and indications for immediate follow-up discussed with patient.   Pathogenesis of diagnosis discussed including typical length and natural progression.   Instructed to return to clinic or nearest emergency department for any change in condition, further concerns, or worsening of symptoms.    The patient demonstrated a good understanding and agreed with the treatment plan.    Pearl Ordonez P.A.-C.

## 2021-01-14 DIAGNOSIS — Z23 NEED FOR VACCINATION: ICD-10-CM

## 2021-01-15 ENCOUNTER — APPOINTMENT (OUTPATIENT)
Dept: NEPHROLOGY | Facility: MEDICAL CENTER | Age: 76
End: 2021-01-15
Payer: MEDICARE

## 2021-02-12 ENCOUNTER — APPOINTMENT (OUTPATIENT)
Dept: NEPHROLOGY | Facility: MEDICAL CENTER | Age: 76
End: 2021-02-12
Payer: MEDICARE

## 2021-03-12 ENCOUNTER — OFFICE VISIT (OUTPATIENT)
Dept: URGENT CARE | Facility: CLINIC | Age: 76
End: 2021-03-12
Payer: MEDICARE

## 2021-03-12 VITALS
OXYGEN SATURATION: 100 % | DIASTOLIC BLOOD PRESSURE: 70 MMHG | BODY MASS INDEX: 21.95 KG/M2 | HEIGHT: 68 IN | TEMPERATURE: 97.7 F | RESPIRATION RATE: 18 BRPM | WEIGHT: 144.8 LBS | SYSTOLIC BLOOD PRESSURE: 120 MMHG | HEART RATE: 89 BPM

## 2021-03-12 DIAGNOSIS — J43.9 HISTORY OF EMPHYSEMA (HCC): ICD-10-CM

## 2021-03-12 DIAGNOSIS — Z76.0 ENCOUNTER FOR MEDICATION REFILL: ICD-10-CM

## 2021-03-12 PROCEDURE — 99214 OFFICE O/P EST MOD 30 MIN: CPT | Performed by: NURSE PRACTITIONER

## 2021-03-12 RX ORDER — ALBUTEROL SULFATE 90 UG/1
1-2 AEROSOL, METERED RESPIRATORY (INHALATION) EVERY 4 HOURS PRN
Qty: 8 G | Refills: 0 | Status: SHIPPED | OUTPATIENT
Start: 2021-03-12 | End: 2021-12-15

## 2021-03-12 ASSESSMENT — VISUAL ACUITY: OU: 1

## 2021-03-12 ASSESSMENT — ENCOUNTER SYMPTOMS
CONSTITUTIONAL NEGATIVE: 1
RESPIRATORY NEGATIVE: 1

## 2021-03-12 ASSESSMENT — FIBROSIS 4 INDEX: FIB4 SCORE: 2.31

## 2021-03-12 NOTE — PATIENT INSTRUCTIONS
Medicine Refill at the Emergency Department  We have refilled your medicine today. However, it is best for you to get refills through your primary health care provider's office. In the future, please plan ahead so you do not need to get refills from the emergency department.  If we refilled a medicine that you take daily for a chronic problem (maintenance medicine), you may have received only enough to get you by until you are able to see your regular health care provider.  This information is not intended to replace advice given to you by your health care provider. Make sure you discuss any questions you have with your health care provider.  Document Released: 04/05/2005 Document Revised: 12/31/2018 Document Reviewed: 12/31/2018  Elsevier Patient Education © 2020 [employer]

## 2021-03-12 NOTE — PROGRESS NOTES
Subjective:     Fede Parikh Jr. is a 76 y.o. male who presents for Medication Refill (Refill for inhaler, albuterol. Asthma x4days. )       Other  This is a new problem.     Patient presents to urgent care for refills of his albuterol inhaler.  Reports a history of emphysema.  Ran out of his inhaler about 3 days ago.  Symptoms well controlled.  Denies acute symptoms at this time.  On Breo.    Patient reports he has an appointment to see his PCP on April 6, 2021.    Patient was screened prior to rooming and denied COVID-19 diagnosis or contact with a person who has been diagnosed or is suspected to have COVID-19. During this visit, appropriate PPE was worn, hand hygiene was performed, and the patient and any visitors were masked.     PMH:  has a past medical history of Hepatitis, Hyperlipemia, Hypertension, and Pulmonary emphysema (HCC).    MEDS:   Current Outpatient Medications:   •  albuterol 108 (90 Base) MCG/ACT Aero Soln inhalation aerosol, Inhale 1-2 Puffs every four hours as needed for Shortness of Breath (and/or wheezing)., Disp: 8 g, Rfl: 0  •  albuterol 108 (90 Base) MCG/ACT Aero Soln inhalation aerosol, Inhale 1-2 Puffs every 6 hours as needed for Shortness of Breath., Disp: 8.5 g, Rfl: 1  •  albuterol (VENTOLIN HFA) 108 (90 Base) MCG/ACT Aero Soln inhalation aerosol, Inhale 2 Puffs by mouth every 6 hours as needed for Shortness of Breath. INHALE TWO PUFFS BY MOUTH EVERY SIX HOURS AS NEEDED, Disp: 18 g, Rfl: 0  •  amLODIPine (NORVASC) 5 MG Tab, Take 1 Tab by mouth every day. (Patient not taking: Reported on 1/7/2021), Disp: 30 Tab, Rfl: 1  •  fluocinolone (SYNALAR) 0.025 % cream, Apply 1 Application to affected area(s) 3 times a day as needed. (Patient not taking: Reported on 1/7/2021), Disp: 1 Tube, Rfl: 0  •  tiotropium (SPIRIVA HANDIHALER) 18 MCG Cap, INHALE 1 CAPSULE VIA HANDIHALER ONCE DAILY (Patient not taking: Reported on 1/7/2021), Disp: 90 Cap, Rfl: 3  •  Fluticasone Furoate-Vilanterol  "(BREO ELLIPTA) 100-25 MCG/INH AEROSOL POWDER, BREATH ACTIVATED, Inhale 1 Puff by mouth every day. (Patient not taking: Reported on 1/7/2021), Disp: 1 Each, Rfl: 2  •  omeprazole (PRILOSEC) 20 MG delayed-release capsule, TAKE ONE CAPSULE BY MOUTH ONE TIME DAILY (Patient not taking: Reported on 1/7/2021), Disp: 30 Cap, Rfl: 5  •  hydrOXYzine HCl (ATARAX) 25 MG Tab, Take 1 Tab by mouth 2 times a day as needed for Itching. (Patient not taking: Reported on 1/7/2021), Disp: 60 Tab, Rfl: 1  •  ergocalciferol (DRISDOL) 69334 UNIT capsule, Take 1 Cap by mouth every 7 days. (Patient not taking: Reported on 1/7/2021), Disp: 8 Cap, Rfl: 0    ALLERGIES:   Allergies   Allergen Reactions   • Ace Inhibitors Swelling       SURGHX:   Past Surgical History:   Procedure Laterality Date   • FEMORAL FEMORAL BYPASS Bilateral 9/4/2020    Procedure: CREATION, BYPASS, ARTERIAL, FEMORAL TO FEMORAL, USING GRAFT;  Surgeon: Jean-Claude Prado M.D.;  Location: SURGERY [facility];  Service: General       SOCHX:  reports that he has been smoking cigarettes. He has been smoking about 0.50 packs per day. He has never used smokeless tobacco. He reports current alcohol use. He reports previous drug use.     FH: Reviewed with patient, not pertinent to this visit.    Review of Systems   Constitutional: Negative.    Respiratory: Negative.    All other systems reviewed and are negative.    Additional details per HPI.      Objective:     /70 (BP Location: Left arm, Patient Position: Sitting, BP Cuff Size: Adult)   Pulse 89   Temp 36.5 °C (97.7 °F) (Temporal)   Resp 18   Ht 1.727 m (5' 8\")   Wt 65.7 kg (144 lb 12.8 oz)   SpO2 100%   BMI 22.02 kg/m²     Physical Exam  Vitals reviewed.   Constitutional:       General: He is not in acute distress.     Appearance: He is well-developed. He is not ill-appearing or toxic-appearing.   HENT:      Head: Normocephalic.      Right Ear: External ear normal.      Left Ear: External ear normal.   Eyes:      " General: Vision grossly intact.      Extraocular Movements: Extraocular movements intact.      Conjunctiva/sclera: Conjunctivae normal.   Cardiovascular:      Rate and Rhythm: Normal rate and regular rhythm.      Pulses: Normal pulses.      Heart sounds: Normal heart sounds.   Pulmonary:      Effort: Pulmonary effort is normal. No respiratory distress.      Breath sounds: Decreased breath sounds and wheezing (Minimal) present.   Abdominal:      General: Bowel sounds are normal.      Palpations: Abdomen is soft.      Tenderness: There is no abdominal tenderness.   Musculoskeletal:         General: No deformity. Normal range of motion.      Cervical back: Normal range of motion.   Skin:     General: Skin is warm and dry.      Capillary Refill: Capillary refill takes less than 2 seconds.      Coloration: Skin is not pale.   Neurological:      Mental Status: He is alert and oriented to person, place, and time.      Sensory: No sensory deficit.      Motor: No weakness.      Coordination: Coordination normal.   Psychiatric:         Behavior: Behavior normal. Behavior is cooperative.       Assessment/Plan:     1. History of emphysema  - albuterol 108 (90 Base) MCG/ACT Aero Soln inhalation aerosol; Inhale 1-2 Puffs every four hours as needed for Shortness of Breath (and/or wheezing).  Dispense: 8 g; Refill: 0    2. Encounter for medication refill  - albuterol 108 (90 Base) MCG/ACT Aero Soln inhalation aerosol; Inhale 1-2 Puffs every four hours as needed for Shortness of Breath (and/or wheezing).  Dispense: 8 g; Refill: 0    Rx as above sent electronically.  Advised to follow-up with primary care for future refills.    Differential diagnosis, natural history, supportive care, over-the-counter symptom management per 's instructions, close monitoring, and indications for immediate follow-up discussed.     Patient advised to: Return for 1) Symptoms that worsen/don't improve, or go to ER, 2) Follow up with primary  care in 7-10 days.    All questions answered. Patient agrees with the plan of care.    Billing note: at least 30 minutes was allotted and spent for face-to-face care with the patient as well as coordination of care such as preparing for the visit, obtaining/reviewing history, reconciling outside information, performing an exam/evaluation, reviewing unique test results/external notes from unique sources, ordering/interpreting diagnostics, ordering/administering treatments, re-evaluating the patient, collaborating/communicating with other healthcare professionals, developing a plan of care, counseling/educating the patient/caregivers/family members, updating the medical record, and ensuring accurate documentation.

## 2021-04-30 ENCOUNTER — APPOINTMENT (OUTPATIENT)
Dept: NEPHROLOGY | Facility: MEDICAL CENTER | Age: 76
End: 2021-04-30
Payer: MEDICARE

## 2021-05-14 ENCOUNTER — APPOINTMENT (OUTPATIENT)
Dept: NEPHROLOGY | Facility: MEDICAL CENTER | Age: 76
End: 2021-05-14
Payer: MEDICARE

## 2021-07-09 ENCOUNTER — HOSPITAL ENCOUNTER (OUTPATIENT)
Dept: RADIOLOGY | Facility: MEDICAL CENTER | Age: 76
End: 2021-07-09
Attending: SURGERY
Payer: MEDICARE

## 2021-07-09 DIAGNOSIS — I77.9 DISEASE OF ARTERY (HCC): ICD-10-CM

## 2021-07-09 DIAGNOSIS — I70.213 ATHEROSCLEROSIS OF NATIVE ARTERY OF BOTH LOWER EXTREMITIES WITH INTERMITTENT CLAUDICATION (HCC): ICD-10-CM

## 2021-07-09 PROCEDURE — 93925 LOWER EXTREMITY STUDY: CPT

## 2021-07-09 PROCEDURE — 93922 UPR/L XTREMITY ART 2 LEVELS: CPT

## 2021-10-23 NOTE — ED NOTES
Discussed DC instructions with patient and family. Discussed meds and next doses due as well as follow up appointments. Telemetry removed. Meds waiting at pharmacy. Patient dressed and belongings packed. Patient to leave via wheelchair to home with family and all belongings. Pt BG was 64. He had eaten 1/2 turkey sandwich and a cheese stick within the last hour.   Gave pt orange juice. Will re-check BG.

## 2021-11-01 DIAGNOSIS — L29.9 PRURITUS: ICD-10-CM

## 2021-11-01 RX ORDER — HYDROXYZINE HYDROCHLORIDE 25 MG/1
25 TABLET, FILM COATED ORAL
Qty: 90 TABLET | Refills: 0 | Status: SHIPPED | OUTPATIENT
Start: 2021-11-01 | End: 2021-12-15 | Stop reason: SDUPTHER

## 2021-11-01 NOTE — TELEPHONE ENCOUNTER
Last seen: 09/18/20 by Dr. Wang  Next appt: None    Was the patient seen in the last year in this department? No  Does patient have an active prescription for medications requested? No   Received Request Via: Pharmacy

## 2021-11-23 DIAGNOSIS — J44.9 CHRONIC OBSTRUCTIVE PULMONARY DISEASE, UNSPECIFIED COPD TYPE (HCC): ICD-10-CM

## 2021-11-23 NOTE — TELEPHONE ENCOUNTER
Last seen: 09/18/20 by Dr. Keene  Next appt: None     Was the patient seen in the last year in this department? Yes   Does patient have an active prescription for medications requested? No   Received Request Via: Pharmacy

## 2021-11-25 RX ORDER — ALBUTEROL SULFATE 90 UG/1
2 AEROSOL, METERED RESPIRATORY (INHALATION) EVERY 6 HOURS PRN
Qty: 18 G | Refills: 0 | Status: SHIPPED | OUTPATIENT
Start: 2021-11-25 | End: 2021-12-15 | Stop reason: SDUPTHER

## 2021-12-15 ENCOUNTER — OFFICE VISIT (OUTPATIENT)
Dept: INTERNAL MEDICINE | Facility: OTHER | Age: 76
End: 2021-12-15
Payer: MEDICARE

## 2021-12-15 VITALS
TEMPERATURE: 97.3 F | SYSTOLIC BLOOD PRESSURE: 130 MMHG | HEIGHT: 69 IN | OXYGEN SATURATION: 98 % | HEART RATE: 87 BPM | DIASTOLIC BLOOD PRESSURE: 75 MMHG | WEIGHT: 136.8 LBS | BODY MASS INDEX: 20.26 KG/M2

## 2021-12-15 DIAGNOSIS — Z00.00 HEALTH CARE MAINTENANCE: ICD-10-CM

## 2021-12-15 DIAGNOSIS — I10 ESSENTIAL HYPERTENSION: Chronic | ICD-10-CM

## 2021-12-15 DIAGNOSIS — J44.9 CHRONIC OBSTRUCTIVE PULMONARY DISEASE, UNSPECIFIED COPD TYPE (HCC): ICD-10-CM

## 2021-12-15 DIAGNOSIS — Z23 NEED FOR VACCINATION: ICD-10-CM

## 2021-12-15 DIAGNOSIS — L29.9 PRURITUS: ICD-10-CM

## 2021-12-15 DIAGNOSIS — N18.32 STAGE 3B CHRONIC KIDNEY DISEASE: ICD-10-CM

## 2021-12-15 DIAGNOSIS — K21.9 GASTROESOPHAGEAL REFLUX DISEASE: ICD-10-CM

## 2021-12-15 DIAGNOSIS — K21.9 GASTROESOPHAGEAL REFLUX DISEASE, UNSPECIFIED WHETHER ESOPHAGITIS PRESENT: ICD-10-CM

## 2021-12-15 PROBLEM — K80.20 CALCULUS OF GALLBLADDER WITHOUT CHOLECYSTITIS: Status: RESOLVED | Noted: 2017-02-15 | Resolved: 2021-12-15

## 2021-12-15 PROBLEM — N17.9 AKI (ACUTE KIDNEY INJURY) (HCC): Status: RESOLVED | Noted: 2020-09-05 | Resolved: 2021-12-15

## 2021-12-15 PROBLEM — R07.89 NON-CARDIAC CHEST PAIN: Status: RESOLVED | Noted: 2017-08-27 | Resolved: 2021-12-15

## 2021-12-15 PROBLEM — E87.29 HIGH ANION GAP METABOLIC ACIDOSIS: Status: RESOLVED | Noted: 2020-09-04 | Resolved: 2021-12-15

## 2021-12-15 PROBLEM — I73.9 PERIPHERAL ARTERY DISEASE (HCC): Status: ACTIVE | Noted: 2020-09-18

## 2021-12-15 PROCEDURE — G0008 ADMIN INFLUENZA VIRUS VAC: HCPCS | Performed by: INTERNAL MEDICINE

## 2021-12-15 PROCEDURE — 99213 OFFICE O/P EST LOW 20 MIN: CPT | Mod: GE | Performed by: STUDENT IN AN ORGANIZED HEALTH CARE EDUCATION/TRAINING PROGRAM

## 2021-12-15 PROCEDURE — 90662 IIV NO PRSV INCREASED AG IM: CPT | Performed by: INTERNAL MEDICINE

## 2021-12-15 RX ORDER — HYDROXYZINE HYDROCHLORIDE 25 MG/1
25 TABLET, FILM COATED ORAL
Qty: 30 TABLET | Refills: 1 | Status: SHIPPED | OUTPATIENT
Start: 2021-12-15 | End: 2022-02-17 | Stop reason: SDUPTHER

## 2021-12-15 RX ORDER — OMEPRAZOLE 20 MG/1
CAPSULE, DELAYED RELEASE ORAL
Qty: 30 CAPSULE | Refills: 5 | Status: SHIPPED | OUTPATIENT
Start: 2021-12-15 | End: 2022-06-30 | Stop reason: SDUPTHER

## 2021-12-15 RX ORDER — AMLODIPINE BESYLATE 5 MG/1
5 TABLET ORAL DAILY
Qty: 30 TABLET | Refills: 5 | Status: SHIPPED | OUTPATIENT
Start: 2021-12-15 | End: 2022-08-08 | Stop reason: SDUPTHER

## 2021-12-15 RX ORDER — TIOTROPIUM BROMIDE 18 UG/1
CAPSULE ORAL; RESPIRATORY (INHALATION)
Qty: 30 CAPSULE | Refills: 5 | Status: SHIPPED | OUTPATIENT
Start: 2021-12-15 | End: 2022-02-17 | Stop reason: SDUPTHER

## 2021-12-15 RX ORDER — LORATADINE 10 MG/1
TABLET ORAL
COMMUNITY
Start: 2021-10-19 | End: 2021-12-15 | Stop reason: SDUPTHER

## 2021-12-15 RX ORDER — ALBUTEROL SULFATE 90 UG/1
2 AEROSOL, METERED RESPIRATORY (INHALATION) EVERY 6 HOURS PRN
Qty: 18 G | Refills: 2 | Status: SHIPPED | OUTPATIENT
Start: 2021-12-15 | End: 2022-02-17 | Stop reason: SDUPTHER

## 2021-12-15 RX ORDER — LORATADINE 10 MG/1
10 TABLET ORAL DAILY
Qty: 30 TABLET | Refills: 5 | Status: SHIPPED | OUTPATIENT
Start: 2021-12-15 | End: 2022-03-15

## 2021-12-15 ASSESSMENT — PATIENT HEALTH QUESTIONNAIRE - PHQ9: CLINICAL INTERPRETATION OF PHQ2 SCORE: 0

## 2021-12-15 ASSESSMENT — FIBROSIS 4 INDEX: FIB4 SCORE: 2.31

## 2021-12-15 NOTE — ASSESSMENT & PLAN NOTE
-Stable, no shortness of breath or cough.  Working on smoking cessation, currently reduced to 2 cigarettes/day.  On exam bilateral lung sounds clear, saturating well on room air  -Continue Spiriva, Breo Ellipta and albuterol as needed  -Refill sent to the pharmacy

## 2021-12-15 NOTE — PROGRESS NOTES
"Fede Parikh Jr. is a 76 y.o. male here for a non-provider visit for:   FLU    Reason for immunization: Annual Flu Vaccine  Immunization records indicate need for vaccine: Yes, confirmed with Epic  Minimum interval has been met for this vaccine: Yes  ABN completed: Yes    VIS Dated  08/06/2022 was given to patient: Yes  All IAC Questionnaire questions were answered \"No.\"    Patient tolerated injection and no adverse effects were observed or reported: Yes    Pt scheduled for next dose in series: Not Indicated  "

## 2021-12-15 NOTE — ASSESSMENT & PLAN NOTE
-Basic labs done in 9/2020, ordered at this appointment  -Flu shot given on 12/15/2021  -Follow-up with PCP for wellness exam

## 2021-12-15 NOTE — PROGRESS NOTES
Established Patient    Chief Complaint   Patient presents with   • Refills, flu-vaccine          HPI: Mr. Fede Parikh Jr. is a 76 y.o. male with a past medical history of hypertension (amlodipine 5 mg OD),  ? COPD (spiriva, breo-ellipta, albuterol PRN), GERD (omeprazole 20 mg OD), CKD stage 3, who presented to the clinic for the following.    Patient denies any symptoms at this visit. He is requesting refill of all of his medications.  He completed COVID vaccine, will get the booster shot.  Due for flu shot, given at this visit.  Reduced smoking, currently smoking 2 cigarettes/day.  Reports that he quit alcohol 9 days ago.  History of remote IV cocaine and marijuana use.  Denies any illicit drug use at this time.  He has paperwork for the labs from previous appointment, reordered at this visit.    Patient Active Problem List    Diagnosis Date Noted   • Health care maintenance 12/15/2021   • Peripheral artery disease (HCC) 09/18/2020   • Vitamin D deficiency 06/05/2019   • Iron deficiency anemia secondary to inadequate dietary iron intake 06/05/2019   • Pruritus 08/27/2017   • CKD (chronic kidney disease), stage III (HCC) 07/03/2017   • History of hepatitis C 02/15/2017   • Tobacco use 02/15/2017   • Alcohol use 02/15/2017   • Multiple thyroid nodules 12/14/2016   • COPD (chronic obstructive pulmonary disease) (HCC) 12/13/2016   • GERD (gastroesophageal reflux disease) 12/13/2016   • Essential hypertension 12/13/2016       Current Outpatient Medications   Medication Sig Dispense Refill   • albuterol (VENTOLIN HFA) 108 (90 Base) MCG/ACT Aero Soln inhalation aerosol Inhale 2 Puffs every 6 hours as needed for Shortness of Breath. INHALE TWO PUFFS BY MOUTH EVERY SIX HOURS AS NEEDED 18 g 2   • amLODIPine (NORVASC) 5 MG Tab Take 1 Tablet by mouth every day. 30 Tablet 5   • aspirin EC (ECOTRIN) 81 MG Tablet Delayed Response Take 1 Tablet by mouth every day. 30 Tablet 5   • Fluticasone Furoate-Vilanterol (BREO  "ELLIPTA) 100-25 MCG/INH AEROSOL POWDER, BREATH ACTIVATED Inhale 1 Puff every day. 1 Each 5   • hydrOXYzine HCl (ATARAX) 25 MG Tab Take 1 Tablet by mouth 1 time a day as needed for Itching. 30 Tablet 1   • loratadine (CLARITIN) 10 MG Tab Take 1 Tablet by mouth every day for 90 days. 30 Tablet 5   • omeprazole (PRILOSEC) 20 MG delayed-release capsule TAKE ONE CAPSULE BY MOUTH ONE TIME DAILY 30 Capsule 5   • tiotropium (SPIRIVA HANDIHALER) 18 MCG Cap INHALE 1 CAPSULE VIA HANDIHALER ONCE DAILY 30 Capsule 5   • ergocalciferol (DRISDOL) 87799 UNIT capsule Take 1 Cap by mouth every 7 days. (Patient not taking: Reported on 1/7/2021) 8 Cap 0     No current facility-administered medications for this visit.       ROS: As per HPI. Additional pertinent systems as noted below.  Constitutional:  Negative for fever, chills   HENT: Bilateral hearing loss present  Cardiovascular:  Negative for chest pain, palpitations   Respiratory:  Negative for cough, sputum production, shortness of breath   Gastrointestinal: Negative for abdominal pain, nausea, vomiting, diarrhea  Extremities: Negative for leg swelling   Neurologic: Negative for headaches, dizziness, seizures, weakness     /75 (BP Location: Left arm, Patient Position: Sitting, BP Cuff Size: Adult)   Pulse 87   Temp 36.3 °C (97.3 °F) (Temporal)   Ht 1.753 m (5' 9\")   Wt 62.1 kg (136 lb 12.8 oz)   SpO2 98%   BMI 20.20 kg/m²     Physical Exam   Constitutional:  Well developed, well nourished. Not in acute distress   HENT:  Normocephalic, Atraumatic, Oropharynx is pink and moist. No oral exudates, Nose normal   Eyes:  EOMI, Conjunctiva normal, No discharge. PERRLA   Neck:  Normal range of motion, No cervical lymphadenopathy. No thyromegaly.   Cardiovascular:  Regular rate and rhythm, No pedal edema, Intact distal pulses   Respiratory: Clear to auscultation bilaterally, No use of accessory muscles   Musculoskeletal: No cyanosis or clubbing, normal ROM   Neurologic: Alert & " oriented x 4, No focal deficits noted  Psychiatric: Judgment normal, Mood normal, Memory normal     Note: I have reviewed all pertinent labs and diagnostic tests associated with this visit with specific comments listed under the assessment and plan below    Assessment and Plan  Essential hypertension  -Well controlled, no alarming symptoms  -Continue current medications -amlodipine 5 mg OD  -Continue to monitor BP at home, maintain a log and bring to next visit  -Life style modifications: Daily exercise for at least 30 min for 5 days a week, low salt diet discussed  -Ordered CMP, follow-up next visit    CKD (chronic kidney disease), stage III (HCC)  -Stage III kidney disease, labs last done in September 2020.  Denies oliguria or anuria  -Ordered CBC, CMP to follow-up at next visit  -Avoid NSAID's and nephrotoxins    Health care maintenance  -Basic labs done in 9/2020, ordered at this appointment  -Flu shot given on 12/15/2021  -Follow-up with PCP for wellness exam    COPD (chronic obstructive pulmonary disease) ([facility])  -Stable, no shortness of breath or cough.  Working on smoking cessation, currently reduced to 2 cigarettes/day.  On exam bilateral lung sounds clear, saturating well on room air  -Continue Spiriva, Breo Ellipta and albuterol as needed  -Refill sent to the pharmacy    Pruritus  -Improved  -Hydroxyzine 25 mg at nighttime as needed, QTc 435 last checked in 2020  -Follow-up with PCP    GERD (gastroesophageal reflux disease)  -Symptoms improved, on omeprazole 20 mg once daily  -Refill sent    Followup: Return in about 2 months (around 2/15/2022).    Patient discussed with attending.    Signed by: Ramona Seay M.D.    Please note that this dictation was created using voice recognition software. I have made every reasonable attempt to correct obvious errors, but I expect that there are errors of grammar and possibly content that I did not discover before finalizing the note.

## 2021-12-15 NOTE — PATIENT INSTRUCTIONS
-Continue medications as directed  -Please get labs prior to next visit  -Follow up visit in 2-3 months

## 2021-12-15 NOTE — ASSESSMENT & PLAN NOTE
-Improved  -Hydroxyzine 25 mg at nighttime as needed, QTc 435 last checked in 2020  -Follow-up with PCP

## 2021-12-15 NOTE — ASSESSMENT & PLAN NOTE
-Stage III kidney disease, labs last done in September 2020.  Denies oliguria or anuria  -Ordered CBC, CMP to follow-up at next visit  -Avoid NSAID's and nephrotoxins

## 2022-02-17 ENCOUNTER — OFFICE VISIT (OUTPATIENT)
Dept: INTERNAL MEDICINE | Facility: OTHER | Age: 77
End: 2022-02-17
Payer: MEDICARE

## 2022-02-17 VITALS
TEMPERATURE: 97.2 F | OXYGEN SATURATION: 95 % | HEART RATE: 95 BPM | BODY MASS INDEX: 19.61 KG/M2 | SYSTOLIC BLOOD PRESSURE: 144 MMHG | HEIGHT: 69 IN | DIASTOLIC BLOOD PRESSURE: 84 MMHG | WEIGHT: 132.4 LBS

## 2022-02-17 DIAGNOSIS — N18.32 STAGE 3B CHRONIC KIDNEY DISEASE: ICD-10-CM

## 2022-02-17 DIAGNOSIS — H91.93 BILATERAL HEARING LOSS, UNSPECIFIED HEARING LOSS TYPE: ICD-10-CM

## 2022-02-17 DIAGNOSIS — L29.9 PRURITUS: ICD-10-CM

## 2022-02-17 DIAGNOSIS — R63.4 WEIGHT LOSS, NON-INTENTIONAL: ICD-10-CM

## 2022-02-17 DIAGNOSIS — J44.9 CHRONIC OBSTRUCTIVE PULMONARY DISEASE, UNSPECIFIED COPD TYPE (HCC): ICD-10-CM

## 2022-02-17 PROCEDURE — 99214 OFFICE O/P EST MOD 30 MIN: CPT | Mod: GC | Performed by: STUDENT IN AN ORGANIZED HEALTH CARE EDUCATION/TRAINING PROGRAM

## 2022-02-17 RX ORDER — TIOTROPIUM BROMIDE 18 UG/1
CAPSULE ORAL; RESPIRATORY (INHALATION)
Qty: 30 CAPSULE | Refills: 5 | Status: SHIPPED | OUTPATIENT
Start: 2022-02-17 | End: 2022-10-11 | Stop reason: SDUPTHER

## 2022-02-17 RX ORDER — ALBUTEROL SULFATE 90 UG/1
2 AEROSOL, METERED RESPIRATORY (INHALATION) EVERY 6 HOURS PRN
Qty: 18 G | Refills: 2 | Status: SHIPPED | OUTPATIENT
Start: 2022-02-17 | End: 2022-04-19 | Stop reason: SDUPTHER

## 2022-02-17 RX ORDER — HYDROXYZINE HYDROCHLORIDE 25 MG/1
25 TABLET, FILM COATED ORAL
Qty: 30 TABLET | Refills: 2 | Status: SHIPPED | OUTPATIENT
Start: 2022-02-17 | End: 2022-06-30 | Stop reason: SDUPTHER

## 2022-02-17 ASSESSMENT — ENCOUNTER SYMPTOMS
NECK PAIN: 0
NAUSEA: 0
VOMITING: 0
SHORTNESS OF BREATH: 0
CHILLS: 0
FOCAL WEAKNESS: 0
HEMOPTYSIS: 0
CLAUDICATION: 1
COUGH: 1
DIZZINESS: 0
SPUTUM PRODUCTION: 1
SPEECH CHANGE: 0
WEIGHT LOSS: 0
ABDOMINAL PAIN: 0
BRUISES/BLEEDS EASILY: 0
FEVER: 0
ORTHOPNEA: 0
DOUBLE VISION: 0
HEADACHES: 0
SENSORY CHANGE: 0
SORE THROAT: 0
BACK PAIN: 0
HALLUCINATIONS: 0
WHEEZING: 0
BLURRED VISION: 0
HEARTBURN: 0
PALPITATIONS: 0
MYALGIAS: 0
PHOTOPHOBIA: 0
DEPRESSION: 0

## 2022-02-17 ASSESSMENT — FIBROSIS 4 INDEX: FIB4 SCORE: 2.34

## 2022-02-17 ASSESSMENT — PATIENT HEALTH QUESTIONNAIRE - PHQ9: CLINICAL INTERPRETATION OF PHQ2 SCORE: 0

## 2022-02-17 ASSESSMENT — LIFESTYLE VARIABLES: SUBSTANCE_ABUSE: 0

## 2022-02-18 NOTE — PROGRESS NOTES
Established Patient    Patient Care Team:  Alexx Reynaga M.D. as PCP - General (Internal Medicine)    HPI:  Fede Parikh Jr. is a 77 y.o. male who presents today with the following Chief Complaint(s):   Chief Complaint   Patient presents with   • Medication Refill     Spiriva, Ventolin, Breo, Hydroxyzine     Mr Parikh come today for regular follow up in his chronic conditions.  Today he has a complaint of poor walking tolerance, he has known severe PAD s/p surgical intervention last year, but has not followed up with Vascular surgeon and also continues to smoke.  Otherwise patient doing relatively well requesting some medications to be refilled.    Review of Systems   Review of Systems   Constitutional: Negative for chills, fever, malaise/fatigue and weight loss.   HENT: Negative for congestion, ear discharge, nosebleeds, sore throat and tinnitus.    Eyes: Negative for blurred vision, double vision and photophobia.   Respiratory: Positive for cough and sputum production. Negative for hemoptysis, shortness of breath and wheezing.    Cardiovascular: Positive for claudication. Negative for chest pain, palpitations, orthopnea and leg swelling.   Gastrointestinal: Negative for abdominal pain, heartburn, nausea and vomiting.   Genitourinary: Negative for dysuria, frequency, hematuria and urgency.   Musculoskeletal: Negative for back pain, joint pain, myalgias and neck pain.   Skin: Positive for itching. Negative for rash.   Neurological: Negative for dizziness, sensory change, speech change, focal weakness and headaches.   Endo/Heme/Allergies: Negative for environmental allergies. Does not bruise/bleed easily.   Psychiatric/Behavioral: Negative for depression, hallucinations, substance abuse and suicidal ideas.         Past Medical History:   Diagnosis Date   • Hepatitis    • Hyperlipemia    • Hypertension    • Pulmonary emphysema (HCC)        Patient Active Problem List    Diagnosis Date Noted   • Health  care maintenance 12/15/2021   • Peripheral artery disease (HCC) 09/18/2020   • Vitamin D deficiency 06/05/2019   • Iron deficiency anemia secondary to inadequate dietary iron intake 06/05/2019   • Pruritus 08/27/2017   • CKD (chronic kidney disease), stage III (HCC) 07/03/2017   • History of hepatitis C 02/15/2017   • Tobacco use 02/15/2017   • Alcohol use 02/15/2017   • Multiple thyroid nodules 12/14/2016   • COPD (chronic obstructive pulmonary disease) ([facility]) 12/13/2016   • GERD (gastroesophageal reflux disease) 12/13/2016   • Essential hypertension 12/13/2016       Allergies:Ace inhibitors    Current Outpatient Medications   Medication Sig Dispense Refill   • albuterol (VENTOLIN HFA) 108 (90 Base) MCG/ACT Aero Soln inhalation aerosol Inhale 2 Puffs every 6 hours as needed for Shortness of Breath. INHALE TWO PUFFS BY MOUTH EVERY SIX HOURS AS NEEDED 18 g 2   • Fluticasone Furoate-Vilanterol (BREO ELLIPTA) 100-25 MCG/INH AEROSOL POWDER, BREATH ACTIVATED Inhale 1 Puff every day. 1 Each 5   • hydrOXYzine HCl (ATARAX) 25 MG Tab Take 1 Tablet by mouth 1 time a day as needed for Itching. 30 Tablet 2   • tiotropium (SPIRIVA HANDIHALER) 18 MCG Cap INHALE 1 CAPSULE VIA HANDIHALER ONCE DAILY 30 Capsule 5   • amLODIPine (NORVASC) 5 MG Tab Take 1 Tablet by mouth every day. 30 Tablet 5   • aspirin EC (ECOTRIN) 81 MG Tablet Delayed Response Take 1 Tablet by mouth every day. 30 Tablet 5   • loratadine (CLARITIN) 10 MG Tab Take 1 Tablet by mouth every day for 90 days. 30 Tablet 5   • omeprazole (PRILOSEC) 20 MG delayed-release capsule TAKE ONE CAPSULE BY MOUTH ONE TIME DAILY 30 Capsule 5     No current facility-administered medications for this visit.       Social History     Tobacco Use   • Smoking status: Current Some Day Smoker     Packs/day: 0.50     Types: Cigarettes   • Smokeless tobacco: Never Used   Vaping Use   • Vaping Use: Never used   Substance Use Topics   • Alcohol use: Yes   • Drug use: Not Currently       Family  "History   Problem Relation Age of Onset   • Heart Disease Mother    • Cancer Mother    • Diabetes Brother          Physical Exam  Vitals:  /84 (BP Location: Left arm, Patient Position: Sitting, BP Cuff Size: Adult)   Pulse 95   Temp 36.2 °C (97.2 °F) (Temporal)   Ht 1.753 m (5' 9\")   Wt 60.1 kg (132 lb 6.4 oz)   SpO2 95%  Body mass index is 19.55 kg/m².     Constitutional:  Not in acute distress, well appearing.  HEENT:   NC/AT. PEERLA, Throat no exudates or erythema.  Cardiovascular: Regular rate and rhythm. No murmurs or gallops.   Lungs:   Clear to auscultation bilaterally. No wheezes or crackles. No respiratory distress.  Abdomen: Not distended, soft, not tender. No guarding or rigidity. No masses.  Extremities:  No cyanosis/clubbing/edema. No obvious deformities. Peripheral pulses lower extremities diminished.  Skin:  Warm and dry.  No visible rashes.  Neurologic: Alert & oriented x 3, CN II-XII grossly intact, strength and sensation grossly intact.  No focal deficits noted.  Psychiatric:  Affect normal, mood normal, judgment normal.      Assessment and Plan:     Weight loss non intentional  - Patient has had some weight loss over the past several months  - He has a significant Hx of smoking  - Denied Hemoptysis, SOB, night sweats or fevers.  - Plan:  - CT thorax     CKD stage 3  - No recent labs  - Get CMP  - Avoid Nephrotoxins, NSAID's    Hearing loss bilaterally  - Chronic issue  - Addressed in the past  - Referral to Audiology for hearing aids     COPD  - On Spiriva, albuterol inhalers  - Patient still smoking but states cutting down to 3-4 cigarettes a day  - Ordered CT thorax  - CTM    Pruritus  - Unclear etiology  - Recurrent   - Takes PRN Atarax    HX of HEP C Treated  - Will check AFP  - Note from ID after treatment to monitor in future  - CTM    Return in about 4 months (around 6/17/2022).        Please note that this dictation was created using voice recognition software. I have made every " reasonable attempt to correct obvious errors, but I expect that there are errors of grammar and possibly content that I did not discover before finalizing the note.    Total face-to-face time spent in medical decision making:    Dr. Ronnell M.D. PGY-2  [facility]

## 2022-04-19 DIAGNOSIS — J44.9 CHRONIC OBSTRUCTIVE PULMONARY DISEASE, UNSPECIFIED COPD TYPE (HCC): ICD-10-CM

## 2022-04-19 RX ORDER — ALBUTEROL SULFATE 90 UG/1
2 AEROSOL, METERED RESPIRATORY (INHALATION) EVERY 6 HOURS PRN
Qty: 18 G | Refills: 2 | Status: SHIPPED | OUTPATIENT
Start: 2022-04-19 | End: 2022-07-18 | Stop reason: SDUPTHER

## 2022-04-19 NOTE — TELEPHONE ENCOUNTER
Last seen: 02.17.2022 by Dr. Rome    Next appt: 06.28.2022 with Dr. rome    Was the patient seen in the last year in this department? Yes   Does patient have an active prescription for medications requested? No   Received Request Via: Pharmacy

## 2022-05-14 ENCOUNTER — APPOINTMENT (OUTPATIENT)
Dept: RADIOLOGY | Facility: MEDICAL CENTER | Age: 77
End: 2022-05-14
Attending: EMERGENCY MEDICINE
Payer: MEDICARE

## 2022-05-14 ENCOUNTER — HOSPITAL ENCOUNTER (EMERGENCY)
Facility: MEDICAL CENTER | Age: 77
End: 2022-05-14
Attending: EMERGENCY MEDICINE
Payer: MEDICARE

## 2022-05-14 VITALS
OXYGEN SATURATION: 96 % | BODY MASS INDEX: 18.42 KG/M2 | DIASTOLIC BLOOD PRESSURE: 70 MMHG | HEIGHT: 69 IN | WEIGHT: 124.34 LBS | TEMPERATURE: 97.6 F | RESPIRATION RATE: 16 BRPM | SYSTOLIC BLOOD PRESSURE: 124 MMHG | HEART RATE: 90 BPM

## 2022-05-14 DIAGNOSIS — K62.89 RECTAL PAIN: ICD-10-CM

## 2022-05-14 DIAGNOSIS — I73.9 CLAUDICATION (HCC): ICD-10-CM

## 2022-05-14 DIAGNOSIS — K59.00 CONSTIPATION, UNSPECIFIED CONSTIPATION TYPE: ICD-10-CM

## 2022-05-14 LAB
ALBUMIN SERPL BCP-MCNC: 4.3 G/DL (ref 3.2–4.9)
ALBUMIN/GLOB SERPL: 1.1 G/DL
ALP SERPL-CCNC: 53 U/L (ref 30–99)
ALT SERPL-CCNC: 16 U/L (ref 2–50)
ANION GAP SERPL CALC-SCNC: 13 MMOL/L (ref 7–16)
AST SERPL-CCNC: 33 U/L (ref 12–45)
BASOPHILS # BLD AUTO: 0.5 % (ref 0–1.8)
BASOPHILS # BLD: 0.03 K/UL (ref 0–0.12)
BILIRUB SERPL-MCNC: 0.6 MG/DL (ref 0.1–1.5)
BUN SERPL-MCNC: 21 MG/DL (ref 8–22)
CALCIUM SERPL-MCNC: 9.7 MG/DL (ref 8.5–10.5)
CHLORIDE SERPL-SCNC: 103 MMOL/L (ref 96–112)
CO2 SERPL-SCNC: 20 MMOL/L (ref 20–33)
CREAT SERPL-MCNC: 2.03 MG/DL (ref 0.5–1.4)
EOSINOPHIL # BLD AUTO: 0.3 K/UL (ref 0–0.51)
EOSINOPHIL NFR BLD: 5 % (ref 0–6.9)
ERYTHROCYTE [DISTWIDTH] IN BLOOD BY AUTOMATED COUNT: 52.1 FL (ref 35.9–50)
GFR SERPLBLD CREATININE-BSD FMLA CKD-EPI: 33 ML/MIN/1.73 M 2
GLOBULIN SER CALC-MCNC: 3.8 G/DL (ref 1.9–3.5)
GLUCOSE SERPL-MCNC: 94 MG/DL (ref 65–99)
HCT VFR BLD AUTO: 30.3 % (ref 42–52)
HGB BLD-MCNC: 9.6 G/DL (ref 14–18)
IMM GRANULOCYTES # BLD AUTO: 0.01 K/UL (ref 0–0.11)
IMM GRANULOCYTES NFR BLD AUTO: 0.2 % (ref 0–0.9)
LIPASE SERPL-CCNC: 353 U/L (ref 11–82)
LYMPHOCYTES # BLD AUTO: 1.08 K/UL (ref 1–4.8)
LYMPHOCYTES NFR BLD: 17.9 % (ref 22–41)
MCH RBC QN AUTO: 22.9 PG (ref 27–33)
MCHC RBC AUTO-ENTMCNC: 31.7 G/DL (ref 33.7–35.3)
MCV RBC AUTO: 72.3 FL (ref 81.4–97.8)
MONOCYTES # BLD AUTO: 0.79 K/UL (ref 0–0.85)
MONOCYTES NFR BLD AUTO: 13.1 % (ref 0–13.4)
NEUTROPHILS # BLD AUTO: 3.81 K/UL (ref 1.82–7.42)
NEUTROPHILS NFR BLD: 63.3 % (ref 44–72)
NRBC # BLD AUTO: 0 K/UL
NRBC BLD-RTO: 0 /100 WBC
PLATELET # BLD AUTO: 296 K/UL (ref 164–446)
PMV BLD AUTO: 9.5 FL (ref 9–12.9)
POTASSIUM SERPL-SCNC: 4.8 MMOL/L (ref 3.6–5.5)
PROT SERPL-MCNC: 8.1 G/DL (ref 6–8.2)
RBC # BLD AUTO: 4.19 M/UL (ref 4.7–6.1)
SODIUM SERPL-SCNC: 136 MMOL/L (ref 135–145)
WBC # BLD AUTO: 6 K/UL (ref 4.8–10.8)

## 2022-05-14 PROCEDURE — 99284 EMERGENCY DEPT VISIT MOD MDM: CPT

## 2022-05-14 PROCEDURE — A9270 NON-COVERED ITEM OR SERVICE: HCPCS | Performed by: EMERGENCY MEDICINE

## 2022-05-14 PROCEDURE — 83690 ASSAY OF LIPASE: CPT

## 2022-05-14 PROCEDURE — 700102 HCHG RX REV CODE 250 W/ 637 OVERRIDE(OP): Performed by: EMERGENCY MEDICINE

## 2022-05-14 PROCEDURE — 700101 HCHG RX REV CODE 250: Performed by: EMERGENCY MEDICINE

## 2022-05-14 PROCEDURE — 36415 COLL VENOUS BLD VENIPUNCTURE: CPT

## 2022-05-14 PROCEDURE — 93926 LOWER EXTREMITY STUDY: CPT | Mod: RT

## 2022-05-14 PROCEDURE — 80053 COMPREHEN METABOLIC PANEL: CPT

## 2022-05-14 PROCEDURE — 85025 COMPLETE CBC W/AUTO DIFF WBC: CPT

## 2022-05-14 RX ORDER — ENEMA 19; 7 G/133ML; G/133ML
1 ENEMA RECTAL ONCE
Status: COMPLETED | OUTPATIENT
Start: 2022-05-14 | End: 2022-05-14

## 2022-05-14 RX ORDER — ALBUTEROL SULFATE 90 UG/1
2 AEROSOL, METERED RESPIRATORY (INHALATION) ONCE
Status: COMPLETED | OUTPATIENT
Start: 2022-05-14 | End: 2022-05-14

## 2022-05-14 RX ORDER — DOCUSATE SODIUM 100 MG/1
100 CAPSULE, LIQUID FILLED ORAL 2 TIMES DAILY
Qty: 60 CAPSULE | Refills: 0 | Status: SHIPPED | OUTPATIENT
Start: 2022-05-14 | End: 2022-10-11

## 2022-05-14 RX ADMIN — SODIUM PHOSPHATE 133 ML: 7; 19 ENEMA RECTAL at 19:16

## 2022-05-14 RX ADMIN — ALBUTEROL SULFATE 2 PUFF: 90 AEROSOL, METERED RESPIRATORY (INHALATION) at 20:30

## 2022-05-14 ASSESSMENT — LIFESTYLE VARIABLES
HOW MANY TIMES IN THE PAST YEAR HAVE YOU HAD 5 OR MORE DRINKS IN A DAY: 0
TOTAL SCORE: 0
DO YOU DRINK ALCOHOL: YES
EVER HAD A DRINK FIRST THING IN THE MORNING TO STEADY YOUR NERVES TO GET RID OF A HANGOVER: NO
HAVE PEOPLE ANNOYED YOU BY CRITICIZING YOUR DRINKING: NO
TOTAL SCORE: 0
EVER FELT BAD OR GUILTY ABOUT YOUR DRINKING: NO
CONSUMPTION TOTAL: NEGATIVE
ON A TYPICAL DAY WHEN YOU DRINK ALCOHOL HOW MANY DRINKS DO YOU HAVE: 0
HAVE YOU EVER FELT YOU SHOULD CUT DOWN ON YOUR DRINKING: NO
AVERAGE NUMBER OF DAYS PER WEEK YOU HAVE A DRINK CONTAINING ALCOHOL: 0
TOTAL SCORE: 0

## 2022-05-14 ASSESSMENT — FIBROSIS 4 INDEX: FIB4 SCORE: 2.34

## 2022-05-14 NOTE — ED TRIAGE NOTES
Chief Complaint   Patient presents with   • Constipation     Pt reports last BM 3 days ago, and now experiencing pain and blood when trying to defecate.     • Hematochezia     Slight amount of blood noted when pt trying to have BM   • Rectal Pain     PT denies abdominal pain, but does have rectal pain and hip pain with ambulation     Abdominal pain protocol ordered.

## 2022-05-15 NOTE — ED PROVIDER NOTES
ED Provider Note    Scribed for Yesi Eli M.D. by Josue Cortez-Reyes. 5/14/2022, 5:51 PM.    Primary care provider: Alexx Reynaga M.D.  Means of arrival: Walk-in  History obtained from: Patient  History limited by: None    CHIEF COMPLAINT  Chief Complaint   Patient presents with   • Constipation     Pt reports last BM 3 days ago, and now experiencing pain and blood when trying to defecate.     • Hematochezia     Slight amount of blood noted when pt trying to have BM   • Rectal Pain     PT denies abdominal pain, but does have rectal pain and hip pain with ambulation       HPI  Fede Parikh Jr. is a 77 y.o. male who presents to the Emergency Department complaining of constipation and rectal pain onset 3 days ago. He endorses additional hematochezia stating that he noticed a slight amount of blood when trying to have a bowel movement. He reports that he has taken stool softeners but continues to experience constipation and straining when trying to pass a bowel movement. He endorses additional hip pain which is present with walking.  the patient has a history of arterial surgery about a year ago.  Patient says he was having this hip pain prior to having this surgery for his arteries.  He says this is the same type of pain that he experienced before this.    REVIEW OF SYSTEMS  Pertinent positives include Rectal pain, hematochezia, hip pain, and constipation. Pertinent negatives include no other pain or illness.  All other systems reviewed and negative.    PAST MEDICAL HISTORY   has a past medical history of Hepatitis, Hyperlipemia, Hypertension, and Pulmonary emphysema (HCC).    SURGICAL HISTORY   has a past surgical history that includes femoral femoral bypass (Bilateral, 9/4/2020).    SOCIAL HISTORY  Social History     Tobacco Use   • Smoking status: Current Some Day Smoker     Packs/day: 0.50     Types: Cigarettes   • Smokeless tobacco: Never Used   Vaping Use   • Vaping Use: Never used   Substance Use  "Topics   • Alcohol use: Yes     Alcohol/week: 4.2 oz     Types: 7 Shots of liquor per week   • Drug use: Not Currently      Social History     Substance and Sexual Activity   Drug Use Not Currently       FAMILY HISTORY  Family History   Problem Relation Age of Onset   • Heart Disease Mother    • Cancer Mother    • Diabetes Brother        CURRENT MEDICATIONS  Home Medications     Reviewed by Ashly Patel R.N. (Registered Nurse) on 05/14/22 at 1647  Med List Status: Partial   Medication Last Dose Status   albuterol (VENTOLIN HFA) 108 (90 Base) MCG/ACT Aero Soln inhalation aerosol  Active   amLODIPine (NORVASC) 5 MG Tab  Active   aspirin EC (ECOTRIN) 81 MG Tablet Delayed Response  Active   Fluticasone Furoate-Vilanterol (BREO ELLIPTA) 100-25 MCG/INH AEROSOL POWDER, BREATH ACTIVATED  Active   hydrOXYzine HCl (ATARAX) 25 MG Tab  Active   omeprazole (PRILOSEC) 20 MG delayed-release capsule  Active   tiotropium (SPIRIVA HANDIHALER) 18 MCG Cap  Active                ALLERGIES  Allergies   Allergen Reactions   • Ace Inhibitors Swelling       PHYSICAL EXAM  VITAL SIGNS: /72   Pulse 100   Temp 36.3 °C (97.4 °F) (Temporal)   Resp 14   Ht 1.753 m (5' 9\")   Wt 56.4 kg (124 lb 5.4 oz)   SpO2 98%   BMI 18.36 kg/m²   Constitutional: Alert in no apparent distress.  HENT: No signs of trauma, Bilateral external ears normal, Nose normal.   Eyes: Pupils are equal and reactive, Conjunctiva normal, Non-icteric.   Neck: Normal range of motion, No tenderness, Supple, No stridor.   Cardiovascular: Regular rate and rhythm, no murmurs.   Thorax & Lungs: Normal breath sounds, No respiratory distress, No wheezing, No chest tenderness.   Abdomen: Bowel sounds normal, Soft, No tenderness, No masses, No peritoneal signs.  Rectal: Hard stool in the rectal vault that is Hemoccult negative  Constipation,  Skin: Warm, Dry, No erythema, No rash.   Musculoskeletal:  No major deformities noted.   Neurologic: Alert, moving all extremities " without difficulty, no focal deficits.    LABS  Labs Reviewed   CBC WITH DIFFERENTIAL - Abnormal; Notable for the following components:       Result Value    RBC 4.19 (*)     Hemoglobin 9.6 (*)     Hematocrit 30.3 (*)     MCV 72.3 (*)     MCH 22.9 (*)     MCHC 31.7 (*)     RDW 52.1 (*)     Lymphocytes 17.90 (*)     All other components within normal limits   COMP METABOLIC PANEL - Abnormal; Notable for the following components:    Creatinine 2.03 (*)     Globulin 3.8 (*)     All other components within normal limits   LIPASE - Abnormal; Notable for the following components:    Lipase 353 (*)     All other components within normal limits   ESTIMATED GFR - Abnormal; Notable for the following components:    GFR (CKD-EPI) 33 (*)     All other components within normal limits   URINALYSIS     All labs reviewed by me.    RADIOLOGY  US-EXTREMITY ARTERY LOWER UNILAT RIGHT           The radiologist's interpretation of all radiological studies have been reviewed by me.    COURSE & MEDICAL DECISION MAKING  Pertinent Labs & Imaging studies reviewed. (See chart for details)    Differential diagnoses include but are not limited to: Stool impaction, peripheral vascular disease claudication    5:51 PM - Patient seen and examined at bedside. Patient will be treated with sodium phosphate enema. Ordered US-Extremity Artery lower unilateral, Estimated GFR,CBC with differential, CMP, Lipase, and UA to evaluate his symptoms. I informed the patient of my plan to obtain the above labs and imaging. Patient verbalizes understanding and agreement to this plan of care.         Decision Making:  This is a 77 y.o. year old male who presents with 2 issues one of the constipation/rectal pain.  With regards to this he is impacted on rectal exam.  He was given an enema with significant improvement of his symptoms.  Labs were obtained per protocol in triage he has a minimally elevated lipase however he has not vomiting his abdominal pain is resolved  after moving his bowels.  I did recommend that he stay on a stool softener to keep his stool easily mobile.    With regards to his right hip pain, patient had a femorofemoral bypass and angioplasty in September 2020.  He has a history of polysubstance abuse.  Ultrasound was performed given concern for possible claudication.  Preliminary report from the tech shows monophasic flow from the external iliac all the way through the leg and no flow in the distal peroneal artery.  I will attempt to speak to vascular with regards to this.  His foot is warm and appears well-perfused at this time he mostly has pain with ambulation. I spoke with Dr. Wilkinson who felt patient could follow up as an outpatient given no rest pain. Therefore patient will be discharged.     The patient will return for new or worsening symptoms and is stable at the time of discharge. Patient was given return precautions. Patient and/or family member verbalizes understanding and will comply.    DISPOSITION:  Patient will be discharged home in stable condition.    FOLLOW UP:  Jean-Claude Prado M.D.  [address]  [address] [id number] [phone]      Vascular surgery follow up    Alexx Reynaga M.D.  [address] [facility] [address]  [phone]      Schedule follow up with your primary within the week    [facility], Emergency Dept  [address] [address] [phone]    Return for worsening pain, rest pain or other concerns      OUTPATIENT MEDICATIONS:  New Prescriptions    DOCUSATE SODIUM (COLACE) 100 MG CAP    Take 1 Capsule by mouth in the morning and 1 Capsule in the evening.         FINAL IMPRESSION  1. Constipation, unspecified constipation type    2. Rectal pain    3. Claudication (HCC)               This dictation has been created using voice recognition software and/or scribes. The accuracy of the dictation is limited by the abilities of the software and the expertise of the scribes. I expect  there may be some errors of grammar and possibly content. I made every attempt to manually correct the errors within my dictation. However, errors related to voice recognition software and/or scribes may still exist and should be interpreted within the appropriate context.     I, Josue Cortez-Reyes (Scribe), am scribing for, and in the presence of, Yesi Eli M.D..    Electronically signed by: Josue Cortez-Reyes (Scribe), 5/14/2022    Yesi CASTILLO M.D. personally performed the services described in this documentation, as scribed by Josue Cortez-Reyes in my presence, and it is both accurate and complete. C.    The note accurately reflects work and decisions made by me.  Yesi Eli M.D.  5/14/2022  8:32 PM

## 2022-05-15 NOTE — ED NOTES
Gave patient enema and provided commode at bedside. Pt immediately started having loose bowel movement. Provided pt with tissues and call light.

## 2022-06-21 ENCOUNTER — APPOINTMENT (OUTPATIENT)
Dept: RADIOLOGY | Facility: MEDICAL CENTER | Age: 77
DRG: 439 | End: 2022-06-21
Attending: EMERGENCY MEDICINE
Payer: MEDICARE

## 2022-06-21 ENCOUNTER — HOSPITAL ENCOUNTER (INPATIENT)
Facility: MEDICAL CENTER | Age: 77
LOS: 4 days | DRG: 439 | End: 2022-06-25
Attending: EMERGENCY MEDICINE | Admitting: HOSPITALIST
Payer: MEDICARE

## 2022-06-21 DIAGNOSIS — R10.9 ABDOMINAL PAIN, UNSPECIFIED ABDOMINAL LOCATION: ICD-10-CM

## 2022-06-21 DIAGNOSIS — R07.9 CHEST PAIN, UNSPECIFIED TYPE: ICD-10-CM

## 2022-06-21 DIAGNOSIS — R79.89 ELEVATED TROPONIN: ICD-10-CM

## 2022-06-21 DIAGNOSIS — I74.10 AORTIC THROMBUS (HCC): ICD-10-CM

## 2022-06-21 DIAGNOSIS — K85.90 ACUTE PANCREATITIS, UNSPECIFIED COMPLICATION STATUS, UNSPECIFIED PANCREATITIS TYPE: ICD-10-CM

## 2022-06-21 PROBLEM — E87.20 LACTIC ACID ACIDOSIS: Status: ACTIVE | Noted: 2020-09-04

## 2022-06-21 PROBLEM — K85.00 IDIOPATHIC ACUTE PANCREATITIS: Status: ACTIVE | Noted: 2022-06-21

## 2022-06-21 LAB
ABO GROUP BLD: NORMAL
ALBUMIN SERPL BCP-MCNC: 3.7 G/DL (ref 3.2–4.9)
ALBUMIN/GLOB SERPL: 1.1 G/DL
ALP SERPL-CCNC: 130 U/L (ref 30–99)
ALT SERPL-CCNC: 15 U/L (ref 2–50)
AMPHET UR QL SCN: NEGATIVE
ANION GAP SERPL CALC-SCNC: 17 MMOL/L (ref 7–16)
APTT PPP: <20 SEC (ref 24.7–36)
AST SERPL-CCNC: 70 U/L (ref 12–45)
BARBITURATES UR QL SCN: NEGATIVE
BASOPHILS # BLD AUTO: 0.3 % (ref 0–1.8)
BASOPHILS # BLD: 0.02 K/UL (ref 0–0.12)
BENZODIAZ UR QL SCN: NEGATIVE
BILIRUB SERPL-MCNC: 0.7 MG/DL (ref 0.1–1.5)
BLD GP AB SCN SERPL QL: NORMAL
BUN SERPL-MCNC: 11 MG/DL (ref 8–22)
BZE UR QL SCN: POSITIVE
CALCIUM SERPL-MCNC: 9.3 MG/DL (ref 8.5–10.5)
CANNABINOIDS UR QL SCN: NEGATIVE
CHLORIDE SERPL-SCNC: 106 MMOL/L (ref 96–112)
CK SERPL-CCNC: 47 U/L (ref 0–154)
CO2 SERPL-SCNC: 16 MMOL/L (ref 20–33)
CREAT SERPL-MCNC: 1.43 MG/DL (ref 0.5–1.4)
EKG IMPRESSION: NORMAL
EKG IMPRESSION: NORMAL
EOSINOPHIL # BLD AUTO: 0.32 K/UL (ref 0–0.51)
EOSINOPHIL NFR BLD: 4.2 % (ref 0–6.9)
ERYTHROCYTE [DISTWIDTH] IN BLOOD BY AUTOMATED COUNT: 60.3 FL (ref 35.9–50)
ETHANOL BLD-MCNC: <10.1 MG/DL
GFR SERPLBLD CREATININE-BSD FMLA CKD-EPI: 50 ML/MIN/1.73 M 2
GLOBULIN SER CALC-MCNC: 3.4 G/DL (ref 1.9–3.5)
GLUCOSE SERPL-MCNC: 78 MG/DL (ref 65–99)
HCT VFR BLD AUTO: 27.6 % (ref 42–52)
HGB BLD-MCNC: 8.7 G/DL (ref 14–18)
IMM GRANULOCYTES # BLD AUTO: 0.03 K/UL (ref 0–0.11)
IMM GRANULOCYTES NFR BLD AUTO: 0.4 % (ref 0–0.9)
INR PPP: 1.04 (ref 0.87–1.13)
LACTATE BLD-SCNC: 1.3 MMOL/L (ref 0.5–2)
LACTATE BLD-SCNC: 3.8 MMOL/L (ref 0.5–2)
LIPASE SERPL-CCNC: 1755 U/L (ref 11–82)
LYMPHOCYTES # BLD AUTO: 1.23 K/UL (ref 1–4.8)
LYMPHOCYTES NFR BLD: 16.3 % (ref 22–41)
MCH RBC QN AUTO: 24 PG (ref 27–33)
MCHC RBC AUTO-ENTMCNC: 31.5 G/DL (ref 33.7–35.3)
MCV RBC AUTO: 76.2 FL (ref 81.4–97.8)
METHADONE UR QL SCN: NEGATIVE
MONOCYTES # BLD AUTO: 0.98 K/UL (ref 0–0.85)
MONOCYTES NFR BLD AUTO: 13 % (ref 0–13.4)
NEUTROPHILS # BLD AUTO: 4.98 K/UL (ref 1.82–7.42)
NEUTROPHILS NFR BLD: 65.8 % (ref 44–72)
NRBC # BLD AUTO: 0 K/UL
NRBC BLD-RTO: 0 /100 WBC
OPIATES UR QL SCN: POSITIVE
OXYCODONE UR QL SCN: NEGATIVE
PCP UR QL SCN: NEGATIVE
PLATELET # BLD AUTO: 211 K/UL (ref 164–446)
PMV BLD AUTO: 10 FL (ref 9–12.9)
POTASSIUM SERPL-SCNC: 4.2 MMOL/L (ref 3.6–5.5)
PROPOXYPH UR QL SCN: NEGATIVE
PROT SERPL-MCNC: 7.1 G/DL (ref 6–8.2)
PROTHROMBIN TIME: 13.3 SEC (ref 12–14.6)
RBC # BLD AUTO: 3.62 M/UL (ref 4.7–6.1)
RH BLD: NORMAL
SODIUM SERPL-SCNC: 139 MMOL/L (ref 135–145)
TROPONIN T SERPL-MCNC: 24 NG/L (ref 6–19)
TROPONIN T SERPL-MCNC: 29 NG/L (ref 6–19)
WBC # BLD AUTO: 7.6 K/UL (ref 4.8–10.8)

## 2022-06-21 PROCEDURE — 82550 ASSAY OF CK (CPK): CPT

## 2022-06-21 PROCEDURE — 36415 COLL VENOUS BLD VENIPUNCTURE: CPT

## 2022-06-21 PROCEDURE — 93005 ELECTROCARDIOGRAM TRACING: CPT | Performed by: EMERGENCY MEDICINE

## 2022-06-21 PROCEDURE — 71045 X-RAY EXAM CHEST 1 VIEW: CPT

## 2022-06-21 PROCEDURE — 700105 HCHG RX REV CODE 258: Performed by: INTERNAL MEDICINE

## 2022-06-21 PROCEDURE — 85025 COMPLETE CBC W/AUTO DIFF WBC: CPT

## 2022-06-21 PROCEDURE — 93010 ELECTROCARDIOGRAM REPORT: CPT | Performed by: INTERNAL MEDICINE

## 2022-06-21 PROCEDURE — 80053 COMPREHEN METABOLIC PANEL: CPT

## 2022-06-21 PROCEDURE — 96374 THER/PROPH/DIAG INJ IV PUSH: CPT

## 2022-06-21 PROCEDURE — 770020 HCHG ROOM/CARE - TELE (206)

## 2022-06-21 PROCEDURE — 93005 ELECTROCARDIOGRAM TRACING: CPT | Performed by: INTERNAL MEDICINE

## 2022-06-21 PROCEDURE — 700105 HCHG RX REV CODE 258: Performed by: EMERGENCY MEDICINE

## 2022-06-21 PROCEDURE — A9270 NON-COVERED ITEM OR SERVICE: HCPCS | Performed by: INTERNAL MEDICINE

## 2022-06-21 PROCEDURE — 86901 BLOOD TYPING SEROLOGIC RH(D): CPT

## 2022-06-21 PROCEDURE — 83690 ASSAY OF LIPASE: CPT

## 2022-06-21 PROCEDURE — 99285 EMERGENCY DEPT VISIT HI MDM: CPT

## 2022-06-21 PROCEDURE — 83605 ASSAY OF LACTIC ACID: CPT

## 2022-06-21 PROCEDURE — 84484 ASSAY OF TROPONIN QUANT: CPT

## 2022-06-21 PROCEDURE — 82077 ASSAY SPEC XCP UR&BREATH IA: CPT

## 2022-06-21 PROCEDURE — 700102 HCHG RX REV CODE 250 W/ 637 OVERRIDE(OP): Performed by: INTERNAL MEDICINE

## 2022-06-21 PROCEDURE — 85730 THROMBOPLASTIN TIME PARTIAL: CPT

## 2022-06-21 PROCEDURE — 86850 RBC ANTIBODY SCREEN: CPT

## 2022-06-21 PROCEDURE — 700111 HCHG RX REV CODE 636 W/ 250 OVERRIDE (IP): Performed by: EMERGENCY MEDICINE

## 2022-06-21 PROCEDURE — 700111 HCHG RX REV CODE 636 W/ 250 OVERRIDE (IP): Performed by: HOSPITALIST

## 2022-06-21 PROCEDURE — 85610 PROTHROMBIN TIME: CPT

## 2022-06-21 PROCEDURE — 80307 DRUG TEST PRSMV CHEM ANLYZR: CPT

## 2022-06-21 PROCEDURE — 86900 BLOOD TYPING SEROLOGIC ABO: CPT

## 2022-06-21 PROCEDURE — 71275 CT ANGIOGRAPHY CHEST: CPT | Mod: ME

## 2022-06-21 PROCEDURE — 700117 HCHG RX CONTRAST REV CODE 255: Performed by: EMERGENCY MEDICINE

## 2022-06-21 PROCEDURE — 99223 1ST HOSP IP/OBS HIGH 75: CPT | Mod: AI | Performed by: INTERNAL MEDICINE

## 2022-06-21 RX ORDER — FAMOTIDINE 20 MG/1
20 TABLET, FILM COATED ORAL DAILY
Status: DISCONTINUED | OUTPATIENT
Start: 2022-06-21 | End: 2022-06-21

## 2022-06-21 RX ORDER — SODIUM CHLORIDE, SODIUM LACTATE, POTASSIUM CHLORIDE, CALCIUM CHLORIDE 600; 310; 30; 20 MG/100ML; MG/100ML; MG/100ML; MG/100ML
INJECTION, SOLUTION INTRAVENOUS CONTINUOUS
Status: DISCONTINUED | OUTPATIENT
Start: 2022-06-21 | End: 2022-06-25 | Stop reason: HOSPADM

## 2022-06-21 RX ORDER — FOLIC ACID 1 MG/1
1 TABLET ORAL DAILY
Status: COMPLETED | OUTPATIENT
Start: 2022-06-21 | End: 2022-06-24

## 2022-06-21 RX ORDER — LORAZEPAM 1 MG/1
1 TABLET ORAL EVERY 4 HOURS PRN
Status: DISCONTINUED | OUTPATIENT
Start: 2022-06-21 | End: 2022-06-24

## 2022-06-21 RX ORDER — ENOXAPARIN SODIUM 100 MG/ML
30 INJECTION SUBCUTANEOUS DAILY
Status: DISCONTINUED | OUTPATIENT
Start: 2022-06-21 | End: 2022-06-21

## 2022-06-21 RX ORDER — HYDROXYZINE HYDROCHLORIDE 25 MG/1
12.5 TABLET, FILM COATED ORAL
Status: DISCONTINUED | OUTPATIENT
Start: 2022-06-21 | End: 2022-06-25 | Stop reason: HOSPADM

## 2022-06-21 RX ORDER — OMEPRAZOLE 20 MG/1
20 CAPSULE, DELAYED RELEASE ORAL 2 TIMES DAILY
Status: DISCONTINUED | OUTPATIENT
Start: 2022-06-21 | End: 2022-06-25 | Stop reason: HOSPADM

## 2022-06-21 RX ORDER — MORPHINE SULFATE 4 MG/ML
4 INJECTION INTRAVENOUS ONCE
Status: COMPLETED | OUTPATIENT
Start: 2022-06-21 | End: 2022-06-21

## 2022-06-21 RX ORDER — AMOXICILLIN 250 MG
2 CAPSULE ORAL 2 TIMES DAILY
Status: DISCONTINUED | OUTPATIENT
Start: 2022-06-21 | End: 2022-06-25 | Stop reason: HOSPADM

## 2022-06-21 RX ORDER — BUDESONIDE AND FORMOTEROL FUMARATE DIHYDRATE 160; 4.5 UG/1; UG/1
2 AEROSOL RESPIRATORY (INHALATION) 2 TIMES DAILY
Status: DISCONTINUED | OUTPATIENT
Start: 2022-06-21 | End: 2022-06-25 | Stop reason: HOSPADM

## 2022-06-21 RX ORDER — GAUZE BANDAGE 2" X 2"
100 BANDAGE TOPICAL DAILY
Status: COMPLETED | OUTPATIENT
Start: 2022-06-21 | End: 2022-06-24

## 2022-06-21 RX ORDER — ACETAMINOPHEN 325 MG/1
650 TABLET ORAL EVERY 6 HOURS PRN
Status: DISCONTINUED | OUTPATIENT
Start: 2022-06-21 | End: 2022-06-25 | Stop reason: HOSPADM

## 2022-06-21 RX ORDER — POLYETHYLENE GLYCOL 3350 17 G/17G
1 POWDER, FOR SOLUTION ORAL
Status: DISCONTINUED | OUTPATIENT
Start: 2022-06-21 | End: 2022-06-25 | Stop reason: HOSPADM

## 2022-06-21 RX ORDER — LORAZEPAM 2 MG/ML
1 INJECTION INTRAMUSCULAR
Status: DISCONTINUED | OUTPATIENT
Start: 2022-06-21 | End: 2022-06-24

## 2022-06-21 RX ORDER — DOCUSATE SODIUM 100 MG/1
100 CAPSULE, LIQUID FILLED ORAL 2 TIMES DAILY
Status: DISCONTINUED | OUTPATIENT
Start: 2022-06-21 | End: 2022-06-25 | Stop reason: HOSPADM

## 2022-06-21 RX ORDER — LORAZEPAM 2 MG/1
4 TABLET ORAL
Status: DISCONTINUED | OUTPATIENT
Start: 2022-06-21 | End: 2022-06-24

## 2022-06-21 RX ORDER — AMLODIPINE BESYLATE 5 MG/1
5 TABLET ORAL DAILY
Status: DISCONTINUED | OUTPATIENT
Start: 2022-06-21 | End: 2022-06-25 | Stop reason: HOSPADM

## 2022-06-21 RX ORDER — LORAZEPAM 2 MG/ML
0.5 INJECTION INTRAMUSCULAR EVERY 4 HOURS PRN
Status: DISCONTINUED | OUTPATIENT
Start: 2022-06-21 | End: 2022-06-24

## 2022-06-21 RX ORDER — ALBUTEROL SULFATE 90 UG/1
2 AEROSOL, METERED RESPIRATORY (INHALATION) EVERY 6 HOURS PRN
Status: DISCONTINUED | OUTPATIENT
Start: 2022-06-21 | End: 2022-06-25 | Stop reason: HOSPADM

## 2022-06-21 RX ORDER — NICOTINE 21 MG/24HR
14 PATCH, TRANSDERMAL 24 HOURS TRANSDERMAL
Status: DISCONTINUED | OUTPATIENT
Start: 2022-06-21 | End: 2022-06-25 | Stop reason: HOSPADM

## 2022-06-21 RX ORDER — LORAZEPAM 2 MG/ML
2 INJECTION INTRAMUSCULAR
Status: DISCONTINUED | OUTPATIENT
Start: 2022-06-21 | End: 2022-06-24

## 2022-06-21 RX ORDER — ENOXAPARIN SODIUM 100 MG/ML
1 INJECTION SUBCUTANEOUS EVERY 12 HOURS
Status: DISCONTINUED | OUTPATIENT
Start: 2022-06-21 | End: 2022-06-25 | Stop reason: HOSPADM

## 2022-06-21 RX ORDER — LORAZEPAM 2 MG/1
2 TABLET ORAL
Status: DISCONTINUED | OUTPATIENT
Start: 2022-06-21 | End: 2022-06-24

## 2022-06-21 RX ORDER — BISACODYL 10 MG
10 SUPPOSITORY, RECTAL RECTAL
Status: DISCONTINUED | OUTPATIENT
Start: 2022-06-21 | End: 2022-06-25 | Stop reason: HOSPADM

## 2022-06-21 RX ORDER — ONDANSETRON 4 MG/1
4 TABLET, ORALLY DISINTEGRATING ORAL EVERY 4 HOURS PRN
Status: DISCONTINUED | OUTPATIENT
Start: 2022-06-21 | End: 2022-06-25 | Stop reason: HOSPADM

## 2022-06-21 RX ORDER — LORAZEPAM 0.5 MG/1
0.5 TABLET ORAL EVERY 4 HOURS PRN
Status: DISCONTINUED | OUTPATIENT
Start: 2022-06-21 | End: 2022-06-24

## 2022-06-21 RX ORDER — SODIUM CHLORIDE 9 MG/ML
1000 INJECTION, SOLUTION INTRAVENOUS ONCE
Status: COMPLETED | OUTPATIENT
Start: 2022-06-21 | End: 2022-06-21

## 2022-06-21 RX ORDER — ONDANSETRON 2 MG/ML
4 INJECTION INTRAMUSCULAR; INTRAVENOUS EVERY 4 HOURS PRN
Status: DISCONTINUED | OUTPATIENT
Start: 2022-06-21 | End: 2022-06-25 | Stop reason: HOSPADM

## 2022-06-21 RX ORDER — LORAZEPAM 2 MG/ML
1.5 INJECTION INTRAMUSCULAR
Status: DISCONTINUED | OUTPATIENT
Start: 2022-06-21 | End: 2022-06-24

## 2022-06-21 RX ORDER — HYDRALAZINE HYDROCHLORIDE 20 MG/ML
10 INJECTION INTRAMUSCULAR; INTRAVENOUS EVERY 4 HOURS PRN
Status: DISCONTINUED | OUTPATIENT
Start: 2022-06-21 | End: 2022-06-25 | Stop reason: HOSPADM

## 2022-06-21 RX ORDER — ALUMINA, MAGNESIA, AND SIMETHICONE 2400; 2400; 240 MG/30ML; MG/30ML; MG/30ML
30 SUSPENSION ORAL EVERY 4 HOURS PRN
Status: DISCONTINUED | OUTPATIENT
Start: 2022-06-21 | End: 2022-06-25 | Stop reason: HOSPADM

## 2022-06-21 RX ORDER — ONDANSETRON 2 MG/ML
4 INJECTION INTRAMUSCULAR; INTRAVENOUS ONCE
Status: COMPLETED | OUTPATIENT
Start: 2022-06-21 | End: 2022-06-21

## 2022-06-21 RX ADMIN — SODIUM CHLORIDE, POTASSIUM CHLORIDE, SODIUM LACTATE AND CALCIUM CHLORIDE: 600; 310; 30; 20 INJECTION, SOLUTION INTRAVENOUS at 09:06

## 2022-06-21 RX ADMIN — MORPHINE SULFATE 4 MG: 4 INJECTION INTRAVENOUS at 04:03

## 2022-06-21 RX ADMIN — OMEPRAZOLE 20 MG: 20 CAPSULE, DELAYED RELEASE ORAL at 06:08

## 2022-06-21 RX ADMIN — ALBUTEROL SULFATE 2 PUFF: 90 AEROSOL, METERED RESPIRATORY (INHALATION) at 14:56

## 2022-06-21 RX ADMIN — OMEPRAZOLE 20 MG: 20 CAPSULE, DELAYED RELEASE ORAL at 18:00

## 2022-06-21 RX ADMIN — SODIUM CHLORIDE, POTASSIUM CHLORIDE, SODIUM LACTATE AND CALCIUM CHLORIDE: 600; 310; 30; 20 INJECTION, SOLUTION INTRAVENOUS at 17:16

## 2022-06-21 RX ADMIN — ASPIRIN 81 MG: 81 TABLET, COATED ORAL at 06:08

## 2022-06-21 RX ADMIN — BUDESONIDE AND FORMOTEROL FUMARATE DIHYDRATE 2 PUFF: 160; 4.5 AEROSOL RESPIRATORY (INHALATION) at 18:36

## 2022-06-21 RX ADMIN — THERA TABS 1 TABLET: TAB at 06:08

## 2022-06-21 RX ADMIN — LIDOCAINE HYDROCHLORIDE 15 ML: 20 SOLUTION OROPHARYNGEAL at 06:09

## 2022-06-21 RX ADMIN — ALBUTEROL SULFATE 2 PUFF: 90 AEROSOL, METERED RESPIRATORY (INHALATION) at 22:38

## 2022-06-21 RX ADMIN — ENOXAPARIN SODIUM 60 MG: 60 INJECTION SUBCUTANEOUS at 18:36

## 2022-06-21 RX ADMIN — IOHEXOL 75 ML: 350 INJECTION, SOLUTION INTRAVENOUS at 03:14

## 2022-06-21 RX ADMIN — FOLIC ACID 1 MG: 1 TABLET ORAL at 06:08

## 2022-06-21 RX ADMIN — THIAMINE HCL TAB 100 MG 100 MG: 100 TAB at 06:08

## 2022-06-21 RX ADMIN — TIOTROPIUM BROMIDE INHALATION SPRAY 5 MCG: 3.12 SPRAY, METERED RESPIRATORY (INHALATION) at 06:09

## 2022-06-21 RX ADMIN — BUDESONIDE AND FORMOTEROL FUMARATE DIHYDRATE 2 PUFF: 160; 4.5 AEROSOL RESPIRATORY (INHALATION) at 06:49

## 2022-06-21 RX ADMIN — ONDANSETRON HYDROCHLORIDE 4 MG: 2 SOLUTION INTRAMUSCULAR; INTRAVENOUS at 04:03

## 2022-06-21 RX ADMIN — SODIUM CHLORIDE 1000 ML: 9 INJECTION, SOLUTION INTRAVENOUS at 04:45

## 2022-06-21 ASSESSMENT — LIFESTYLE VARIABLES
CONSUMPTION TOTAL: POSITIVE
HAVE YOU EVER FELT YOU SHOULD CUT DOWN ON YOUR DRINKING: YES
NAUSEA AND VOMITING: NO NAUSEA AND NO VOMITING
VISUAL DISTURBANCES: NOT PRESENT
AGITATION: NORMAL ACTIVITY
AVERAGE NUMBER OF DAYS PER WEEK YOU HAVE A DRINK CONTAINING ALCOHOL: 7
TOTAL SCORE: 1
EVER HAD A DRINK FIRST THING IN THE MORNING TO STEADY YOUR NERVES TO GET RID OF A HANGOVER: NO
ANXIETY: NO ANXIETY (AT EASE)
ORIENTATION AND CLOUDING OF SENSORIUM: CANNOT DO SERIAL ADDITIONS OR IS UNCERTAIN ABOUT DATE
TOTAL SCORE: 1
TOTAL SCORE: 1
AUDITORY DISTURBANCES: NOT PRESENT
AUDITORY DISTURBANCES: NOT PRESENT
ON A TYPICAL DAY WHEN YOU DRINK ALCOHOL HOW MANY DRINKS DO YOU HAVE: 3
HEADACHE, FULLNESS IN HEAD: NOT PRESENT
HEADACHE, FULLNESS IN HEAD: NOT PRESENT
VISUAL DISTURBANCES: NOT PRESENT
TOTAL SCORE: 1
PAROXYSMAL SWEATS: NO SWEAT VISIBLE
HAVE PEOPLE ANNOYED YOU BY CRITICIZING YOUR DRINKING: NO
HOW MANY TIMES IN THE PAST YEAR HAVE YOU HAD 5 OR MORE DRINKS IN A DAY: 5
TREMOR: NO TREMOR
TREMOR: NO TREMOR
ALCOHOL_USE: YES
AGITATION: NORMAL ACTIVITY
TOTAL SCORE: 0
DOES PATIENT WANT TO STOP DRINKING: YES
PAROXYSMAL SWEATS: NO SWEAT VISIBLE
ORIENTATION AND CLOUDING OF SENSORIUM: ORIENTED AND CAN DO SERIAL ADDITIONS
EVER FELT BAD OR GUILTY ABOUT YOUR DRINKING: NO
ANXIETY: NO ANXIETY (AT EASE)
NAUSEA AND VOMITING: NO NAUSEA AND NO VOMITING
SUBSTANCE_ABUSE: 0
DOES PATIENT WANT TO TALK TO SOMEONE ABOUT QUITTING: NO

## 2022-06-21 ASSESSMENT — ENCOUNTER SYMPTOMS
SPUTUM PRODUCTION: 0
WEIGHT LOSS: 0
MYALGIAS: 1
HEARTBURN: 0
FLANK PAIN: 0
ABDOMINAL PAIN: 1
DOUBLE VISION: 0
PHOTOPHOBIA: 0
POLYDIPSIA: 0
NERVOUS/ANXIOUS: 0
BRUISES/BLEEDS EASILY: 0
CHILLS: 0
SPEECH CHANGE: 0
ORTHOPNEA: 0
COUGH: 0
HALLUCINATIONS: 0
FEVER: 0
VOMITING: 0
BLURRED VISION: 0
FOCAL WEAKNESS: 0
NECK PAIN: 0
BACK PAIN: 0
HEMOPTYSIS: 0
TREMORS: 0
NAUSEA: 0
HEADACHES: 0
PALPITATIONS: 0

## 2022-06-21 ASSESSMENT — COGNITIVE AND FUNCTIONAL STATUS - GENERAL
CLIMB 3 TO 5 STEPS WITH RAILING: A LOT
MOBILITY SCORE: 15
WALKING IN HOSPITAL ROOM: A LOT
HELP NEEDED FOR BATHING: A LOT
DRESSING REGULAR LOWER BODY CLOTHING: A LITTLE
TOILETING: A LOT
PERSONAL GROOMING: A LITTLE
DRESSING REGULAR UPPER BODY CLOTHING: A LITTLE
MOVING TO AND FROM BED TO CHAIR: A LITTLE
SUGGESTED CMS G CODE MODIFIER MOBILITY: CK
STANDING UP FROM CHAIR USING ARMS: A LITTLE
DAILY ACTIVITIY SCORE: 16
SUGGESTED CMS G CODE MODIFIER DAILY ACTIVITY: CK
TURNING FROM BACK TO SIDE WHILE IN FLAT BAD: A LITTLE
MOVING FROM LYING ON BACK TO SITTING ON SIDE OF FLAT BED: A LOT
EATING MEALS: A LITTLE

## 2022-06-21 ASSESSMENT — PAIN DESCRIPTION - PAIN TYPE
TYPE: ACUTE PAIN
TYPE: ACUTE PAIN

## 2022-06-21 ASSESSMENT — PATIENT HEALTH QUESTIONNAIRE - PHQ9
1. LITTLE INTEREST OR PLEASURE IN DOING THINGS: NOT AT ALL
2. FEELING DOWN, DEPRESSED, IRRITABLE, OR HOPELESS: NOT AT ALL
SUM OF ALL RESPONSES TO PHQ9 QUESTIONS 1 AND 2: 0

## 2022-06-21 ASSESSMENT — FIBROSIS 4 INDEX
FIB4 SCORE: 6.77
FIB4 SCORE: 2.2

## 2022-06-21 NOTE — H&P
"Hospital Medicine History & Physical Note    Date of Service  6/21/2022    Primary Care Physician  Alexx Reynaga M.D.        Code Status  Full Code    Chief Complaint  Chief Complaint   Patient presents with   • Chest Pain     X 2 hours, patient states \"I'm having a slight heart attack.\" Patient unable to describe the pain. Patient took 2 of his home nitro tablets without relief. Patient given 324 ASA per EMS   • Abdominal Pain     X 2 hours \"my stomach is killing me.\" Patient unable to describe the pain.    • Hypotension     51/30 per EMS. Blood pressure increased to 104/61 upon arrival.        History of Presenting Illness  Fede Parikh Jr. is a 77 y.o. male with past medical history of COPD/emphysema, hypertension, dyslipidemia, CKD stage III, alcohol abuse  (drinks 6 shots of whiskey daily), ongoing smoking, who presented 6/21/2022 with complaints of chest pain in the middle of the chest up to 10 out of 10 nonradiating, upper abdominal pain, sharp, radiating to the chest without alleviating aggravating factors.  Patient thought that he is going to die.  At this time chest pain and abdominal pain subsided.  Patient is poor historian, could not describe symptoms very well.  Denies nausea vomiting or shortness of breath.  Per EMS he was hypotensive 51/30 on scene, was given only half 100 cc of normal saline.  His blood pressure on arrival is 115/62.  He received 1 L of normal saline, morphine 4 mg IV.  Blood work is significant for elevated lipase 1700, lactic acid 3.8, troponin 2.9.  EKG showed possible old anterior MI without any changes.  Patient follows undergone evaluation with CTA of thoracoabdominal aorta, that showed  occlusion of common right iliac artery stent, left common iliac artery stenosis, occluded femorofemoral bypass graft, moral thrombus of the distal aorta near bifurcation, pancreatic stranding suggestive for pancreatitis and dilated CBD.  Recommended further evaluation with MRCP or " ERCP.  Patient states he has an appointment with Dr. Diaz/vascular surgery today at 11 AM to discuss his symptoms of pain in the lower extremities on ambulation    I discussed the plan of care with patient.    Review of Systems  Review of Systems   Constitutional: Negative for chills, fever and weight loss.   HENT: Negative for ear pain, hearing loss and tinnitus.    Eyes: Negative for blurred vision, double vision and photophobia.   Respiratory: Negative for cough, hemoptysis and sputum production.    Cardiovascular: Positive for chest pain. Negative for palpitations and orthopnea.   Gastrointestinal: Positive for abdominal pain. Negative for heartburn, nausea and vomiting.   Genitourinary: Negative for dysuria, flank pain, frequency and hematuria.   Musculoskeletal: Positive for myalgias. Negative for back pain, joint pain and neck pain.   Skin: Negative for itching and rash.   Neurological: Negative for tremors, speech change, focal weakness and headaches.   Endo/Heme/Allergies: Negative for environmental allergies and polydipsia. Does not bruise/bleed easily.   Psychiatric/Behavioral: Negative for hallucinations and substance abuse. The patient is not nervous/anxious.        Past Medical History   has a past medical history of Hepatitis, Hyperlipemia, Hypertension, and Pulmonary emphysema (HCC).    Surgical History   has a past surgical history that includes femoral femoral bypass (Bilateral, 9/4/2020).     Family History  family history includes Cancer in his mother; Diabetes in his brother; Heart Disease in his mother.   Family history reviewed with patient. There is no family history that is pertinent to the chief complaint.     Social History   reports that he has been smoking cigarettes. He has been smoking about 0.50 packs per day. He has never used smokeless tobacco. He reports current alcohol use of about 4.2 oz of alcohol per week. He reports previous drug use.    Allergies  Allergies   Allergen  Reactions   • Ace Inhibitors Swelling       Medications  Prior to Admission Medications   Prescriptions Last Dose Informant Patient Reported? Taking?   Fluticasone Furoate-Vilanterol (BREO ELLIPTA) 100-25 MCG/INH AEROSOL POWDER, BREATH ACTIVATED >1 day at [facility] Patient No No   Sig: Inhale 1 Puff every day.   albuterol (VENTOLIN HFA) 108 (90 Base) MCG/ACT Aero Soln inhalation aerosol >1 day at [facility] Patient No No   Sig: Inhale 2 Puffs every 6 hours as needed for Shortness of Breath. INHALE TWO PUFFS BY MOUTH EVERY SIX HOURS AS NEEDED   amLODIPine (NORVASC) 5 MG Tab >1 day at [facility] Patient No No   Sig: Take 1 Tablet by mouth every day.   aspirin EC (ECOTRIN) 81 MG Tablet Delayed Response >1 day at [facility] Patient No No   Sig: Take 1 Tablet by mouth every day.   docusate sodium (COLACE) 100 MG Cap >1 day at [facility] Patient No No   Sig: Take 1 Capsule by mouth in the morning and 1 Capsule in the evening.   hydrOXYzine HCl (ATARAX) 25 MG Tab >1 day at [facility] Patient No No   Sig: Take 1 Tablet by mouth 1 time a day as needed for Itching.   omeprazole (PRILOSEC) 20 MG delayed-release capsule >1 day at [facility] Patient No No   Sig: TAKE ONE CAPSULE BY MOUTH ONE TIME DAILY   tiotropium (SPIRIVA HANDIHALER) 18 MCG Cap >1 day at [facility] Patient No No   Sig: INHALE 1 CAPSULE VIA HANDIHALER ONCE DAILY      Facility-Administered Medications: None       Physical Exam  Temp:  [37.3 °C (99.1 °F)] 37.3 °C (99.1 °F)  Pulse:  [80-93] 89  Resp:  [20-25] 20  BP: (104-144)/(61-73) 136/66  SpO2:  [94 %-100 %] 100 %  Blood Pressure : 136/66   Temperature: 37.3 °C (99.1 °F)   Pulse: 89   Respiration: 20   Pulse Oximetry: 100 %       Physical Exam  Vitals and nursing note reviewed.   Constitutional:       General: He is not in acute distress.     Appearance: He is cachectic. He is ill-appearing.      Comments: Disheveled   HENT:      Head: Normocephalic and atraumatic.      Nose: Nose normal.      Mouth/Throat:      Mouth: Mucous membranes are moist.   Eyes:       Extraocular Movements: Extraocular movements intact.      Pupils: Pupils are equal, round, and reactive to light.   Cardiovascular:      Rate and Rhythm: Normal rate and regular rhythm.   Pulmonary:      Effort: Pulmonary effort is normal.      Breath sounds: Normal breath sounds.   Abdominal:      General: Abdomen is flat. There is no distension.      Tenderness: There is no abdominal tenderness.   Musculoskeletal:         General: No swelling or deformity. Normal range of motion.      Cervical back: Normal range of motion and neck supple.      Comments: Chronic trophic changes  Left foot: Faint pulse on dorsalis pedis artery  Right foot: No palpable pulse   Skin:     General: Skin is warm and dry.   Neurological:      General: No focal deficit present.      Mental Status: He is alert and oriented to person, place, and time.   Psychiatric:         Mood and Affect: Mood normal.         Behavior: Behavior normal.         Laboratory:  Recent Labs     06/21/22  0300   WBC 7.6   RBC 3.62*   HEMOGLOBIN 8.7*   HEMATOCRIT 27.6*   MCV 76.2*   MCH 24.0*   MCHC 31.5*   RDW 60.3*   PLATELETCT 211   MPV 10.0     Recent Labs     06/21/22  0300   SODIUM 139   POTASSIUM 4.2   CHLORIDE 106   CO2 16*   GLUCOSE 78   BUN 11   CREATININE 1.43*   CALCIUM 9.3     Recent Labs     06/21/22  0300   ALTSGPT 15   ASTSGOT 70*   ALKPHOSPHAT 130*   TBILIRUBIN 0.7   LIPASE 1755*   GLUCOSE 78     Recent Labs     06/21/22  0300   APTT <20.0*   INR 1.04     No results for input(s): NTPROBNP in the last 72 hours.      Recent Labs     06/21/22  0300   TROPONINT 29*       Imaging:  DX-CHEST-PORTABLE (1 VIEW)   Final Result         1.  No acute cardiopulmonary disease.   2.  Atherosclerosis      CT-CTA COMPLETE THORACOABDOMINAL AORTA   Final Result         1.  Occlusion of right common iliac artery stent.   2.  High-grade stenosis of the left common iliac artery   3.  Femorofemoral bypass graft which is nonopacified and likely occluded.   4.  Mural  thrombus of the distal aorta near the aortic bifurcation   5.  Hazy peripancreatic fat stranding suggests changes of pancreatitis. Dilated common bile duct, follow-up ERCP or MRCP recommended to exclude obstructing pancreatic mass or stenosis.   6.  Bilateral thyroid nodules, greater on the right, follow-up thyroid sonography for further characterization due to nodule size   7.  Atherosclerosis and atherosclerotic coronary artery disease      These findings were discussed with the patient's clinician, UCHE JUAREZ, on 6/21/2022 3:49 AM.                X-Ray:  I have personally reviewed the images and compared with prior images.    Assessment/Plan:  Justification for Admission Status  I anticipate this patient is appropriate for observation status at this time because Pending further clinical course    * Chest pain- (present on admission)  Assessment & Plan  Probably secondary to pancreatitis  EKG did not show acute changes  Troponin is slightly elevated at 39  Plan: Admit to telemetry, monitor with repeat troponin and EKG    Pancreatitis  Assessment & Plan  Patient endorses drinking 6 shots a day  Lipase elevated at 1700 today  CT showed peripancreatic stranding and pancreatic duct dilation, concerning for possible pancreatic duct obstruction versus mass  Recommended MRCP, which will be ordered  IV fluid support, IV Zofran as needed  Antiacids  Clear liquid diet as tolerated    Peripheral artery disease (HCC)- (present on admission)  Assessment & Plan  CTA of thoracoabdominal aorta revealed   occlusion of common right iliac artery stent, left common iliac artery stenosis, occluded femorofemoral bypass graft, moral thrombus of the distal aorta near bifurcation  Patient does not express complaining of pain in the lower extremity at rest, but on ambulation  Lost occlusion seem to be chronic.  Patient had an outpatient appointment with Dr. Diaz/vascular surgery for 6/20 1:11 AM  Consider discussion with vascular  surgery.  Continue aspirin.  Smoking cessation counseling    Lactic acid acidosis  Assessment & Plan  Likely secondary to transient hypoperfusion/hypotension and thiamine deficiency due to alcohol abuse  Plan: IV hydration, thiamine supplementation  Repeat lactic acid    CKD (chronic kidney disease), stage III ([facility])- (present on admission)  Assessment & Plan  Avoid nephrotoxins  Monitor kidney function    Alcohol use- (present on admission)  Assessment & Plan  Monitor for withdrawal with CIWA protocol  Thiamine supplementation    Tobacco use- (present on admission)  Assessment & Plan  Smoking cessation counseling  Nicotine patch    Essential hypertension- (present on admission)  Assessment & Plan  Hold amlodipine for now as patient was hypotensive per EMS  Monitor blood pressure    COPD (chronic obstructive pulmonary disease) ([facility])- (present on admission)  Assessment & Plan  At this time it is not exacerbated  Continue home inhalers Symbicort, Spiriva  Albuterol as needed      VTE prophylaxis: enoxaparin ppx

## 2022-06-21 NOTE — ED NOTES
Med rec completed per patient  Allergies reviewed  No PO Antibiotics in the last 30 days     Patient states he has not had his medications for a day, he is supposed to see his doctor and get refills.

## 2022-06-21 NOTE — ASSESSMENT & PLAN NOTE
Probably secondary to pancreatitis  EKG did not show acute changes  Troponin minimally elevated and decreasing  Pain resolved  Following  Echo

## 2022-06-21 NOTE — PROGRESS NOTES
Assumed care of patient at 08:20. Pt is A&O x4. Upset about not getting breakfast tray yet. MRI screening form completed with the patient.

## 2022-06-21 NOTE — PROGRESS NOTES
4 Eyes Skin Assessment Completed by LYNDON Krause and Jean-Claude RN.    Head WDL  Ears WDL  Nose WDL  Mouth WDL, missing teeth   Neck Scar posterior   Breast/Chest WDL  Shoulder Blades WDL  Spine WDL  (R) Arm/Elbow/Hand Blanching and Scab  (L) Arm/Elbow/Hand Blanching and Scab  Abdomen WDL  Groin WDL  Scrotum/Coccyx/Buttocks WDL  (R) Leg WDL  (L) Leg WDL  (R) Heel/Foot/Toe Blanching and Scab, dryness, cracking skin   (L) Heel/Foot/Toe Blanching and Scab, dryness, cracking skin          Devices In Places Tele Box, Blood Pressure Cuff, Pulse Ox and SCD's      Interventions In Place Gray Ear Foams and Barrier Cream    Possible Skin Injury No    Pictures Uploaded Into Epic N/A  Wound Consult Placed N/A  RN Wound Prevention Protocol Ordered Yes

## 2022-06-21 NOTE — ED NOTES
Rounded on pt. Noted hard of hearing. Denies any pain. Noted removed 02 starting to desaturate at 84%. Placed back on 2l/nc sats improved to 97%

## 2022-06-21 NOTE — ASSESSMENT & PLAN NOTE
No evidence of exacerbation at this time  following  Continue home inhalers Symbicort, Spiriva  Albuterol as needed

## 2022-06-21 NOTE — HOSPITAL COURSE
77-year-old male with past medical history of COPD, hypertension, CKD stage III, dyslipidemia, alcohol abuse, tobacco abuse presenting with chest pain and abdominal pain.  On admission he was found to have acute pancreatitis with lactic acidosis.  He had undergone CTA of thoracoabdominal aorta that showed occlusion of common right iliac artery stent, high-grade stenosis of left common iliac artery, mural thrombus of distal aorta near the aortic bifurcation, bilateral thyroid nodules greater on the right, and dilated CBD.  MRCP ordered for further evaluation dilated CBD.  Patient started on weight-based Lovenox for mural thrombus of distal aorta.  Echocardiogram ordered.      He will need outpatient follow-up of thyroid nodules with thyroid ultrasound.

## 2022-06-21 NOTE — ED TRIAGE NOTES
"Chief Complaint   Patient presents with   • Chest Pain     X 2 hours, patient states \"I'm having a slight heart attack.\" Patient unable to describe the pain. Patient took 2 of his home nitro tablets without relief. Patient given 324 ASA per EMS   • Abdominal Pain     X 2 hours \"my stomach is killing me.\" Patient unable to describe the pain.    • Hypotension     51/30 per EMS. Blood pressure increased to 104/61 upon arrival.        "

## 2022-06-21 NOTE — ASSESSMENT & PLAN NOTE
Continue to monitor for withdrawal with CIWA protocol, no s/o w/d currently  Thiamine supplementation  Cessation discussed and recommended

## 2022-06-21 NOTE — ED NOTES
Assumed care done. Received report from Diana HANEY assumed care done. Pt waiting for bed assignment.

## 2022-06-21 NOTE — ASSESSMENT & PLAN NOTE
Patient endorses drinking 6 shots a day  Lipase elevated   CT showed peripancreatic stranding and pancreatic duct dilation, concerning for possible pancreatic duct obstruction versus mass  MRCP shows no stones, pockets of peripancreatic fluid consistent with pancreatitis noted - too small for drainage - discussed with patient and again stressed risk for worsening pancreatic damage if he resumes etoh use  following

## 2022-06-21 NOTE — ASSESSMENT & PLAN NOTE
CTA of thoracoabdominal aorta revealed   occlusion of common right iliac artery stent, left common iliac artery stenosis, occluded femorofemoral bypass graft, moral thrombus of the distal aorta near bifurcation  Unclear if this is contributing to his abdominal pain or if that is mainly from the pancreatitis  Lost occlusion seem to be chronic.  Patient had an outpatient appointment with Dr. Diaz/vascular surgery for 6/20 1:11 AM  Smoking cessation discussed and recommended  Dr. Briones to eval  Continue full dose lovenox

## 2022-06-21 NOTE — PROGRESS NOTES
Received bedside report from ED RN Danielle, pt brought up to floor via gurney on tele box pt care assumed, VSS, pt assessment complete. Pt AAOx4, with no c/o of pain at this time. No signs of acute distress noted at this time. Plan of care discussed with pt and verbalizes no questions. Pt denies any additional needs at this time. Bed locked/in lowest position, bed alarm on, pt educated on fall risk and verbalized understanding, call light within reach, hourly rounding initiated.

## 2022-06-21 NOTE — PROGRESS NOTES
I saw and examined patient today. Patient admitted early am Dr. Copeland, see H and P for full details.    77-year-old male with past medical history of COPD, hypertension, CKD stage III, dyslipidemia, alcohol abuse, tobacco abuse presenting with chest pain and abdominal pain.  On admission he was found to have acute pancreatitis with lactic acidosis.  He had undergone CTA of thoracoabdominal aorta that showed occlusion of common right iliac artery stent, high-grade stenosis of left common iliac artery, mural thrombus of distal aorta near the aortic bifurcation, bilateral thyroid nodules greater on the right, and dilated CBD.  MRCP ordered for further evaluation dilated CBD.  Patient started on weight-based Lovenox for mural thrombus of distal aorta.  Echocardiogram ordered.    He states both abdo and chest pain have resolved and he is ready to go home. I discussed all findings with patient.    He will need outpatient follow-up of thyroid nodules with thyroid ultrasound.        ;

## 2022-06-21 NOTE — ASSESSMENT & PLAN NOTE
Likely secondary to transient hypoperfusion/hypotension and thiamine deficiency due to alcohol abuse  resolved

## 2022-06-21 NOTE — ED PROVIDER NOTES
"ER Provider Note     Scribed for Gerson Elkins M.D. by Jonathan Kruse. 6/21/2022, 2:48 AM.    Primary Care Provider: Alexx Reynaga M.D.  Means of Arrival: EMS   History obtained from: Patient  History limited by: None     CHIEF COMPLAINT  Chief Complaint   Patient presents with    Chest Pain     X 2 hours, patient states \"I'm having a slight heart attack.\" Patient unable to describe the pain. Patient took 2 of his home nitro tablets without relief. Patient given 324 ASA per EMS    Abdominal Pain     X 2 hours \"my stomach is killing me.\" Patient unable to describe the pain.     Hypotension     51/30 per EMS. Blood pressure increased to 104/61 upon arrival.        HPI  Fede Parikh Jr. is a 77 y.o. male who presents to the ED via EMS with generalized upper abdominal pain onset 2 hours ago. Patient mentions pain is \"killing him.\" To relieve pain, he took 2 Nitroglycerine tablets, which did not alleviate pain. Per EMS, the patient was medicated with Aspirin 324 mg PO en route. Per EMS, he was hypotensive at 51/30 on scene, but his current blood pressure is at 115/62 following 100 mL IV NS. He has associated resolved chest pain and pedal edema, but denies fever. Patient additionally notes he smokes 2 or 3 cigarettes a day. No alleviating or exacerbating factors reported.      REVIEW OF SYSTEMS  See HPI for further details. All other systems are negative.     PAST MEDICAL HISTORY   has a past medical history of Hepatitis, Hyperlipemia, Hypertension, and Pulmonary emphysema (HCC).    SURGICAL HISTORY   has a past surgical history that includes femoral femoral bypass (Bilateral, 9/4/2020).    SOCIAL HISTORY  Social History     Tobacco Use    Smoking status: Current Some Day Smoker     Packs/day: 0.50     Types: Cigarettes    Smokeless tobacco: Never Used   Vaping Use    Vaping Use: Never used   Substance Use Topics    Alcohol use: Yes     Alcohol/week: 4.2 oz     Types: 7 Shots of liquor per week    Drug use: " "Not Currently      Social History     Substance and Sexual Activity   Drug Use Not Currently       FAMILY HISTORY  Family History   Problem Relation Age of Onset    Heart Disease Mother     Cancer Mother     Diabetes Brother        CURRENT MEDICATIONS  Current Outpatient Medications   Medication Instructions    albuterol (VENTOLIN HFA) 108 (90 Base) MCG/ACT Aero Soln inhalation aerosol 2 Puffs, Inhalation, EVERY 6 HOURS PRN, INHALE TWO PUFFS BY MOUTH EVERY SIX HOURS AS NEEDED    amLODIPine (NORVASC) 5 mg, Oral, DAILY    aspirin EC (ECOTRIN) 81 mg, Oral, DAILY    docusate sodium (COLACE) 100 mg, Oral, 2 TIMES DAILY    Fluticasone Furoate-Vilanterol (BREO ELLIPTA) 100-25 MCG/INH AEROSOL POWDER, BREATH ACTIVATED 1 Puff, Inhalation, DAILY    hydrOXYzine HCl (ATARAX) 25 mg, Oral, 1 TIME DAILY PRN    omeprazole (PRILOSEC) 20 MG delayed-release capsule TAKE ONE CAPSULE BY MOUTH ONE TIME DAILY    tiotropium (SPIRIVA HANDIHALER) 18 MCG Cap INHALE 1 CAPSULE VIA HANDIHALER ONCE DAILY       ALLERGIES  Allergies   Allergen Reactions    Ace Inhibitors Swelling       PHYSICAL EXAM  VITAL SIGNS: Ht 1.753 m (5' 9\")   Wt 56.2 kg (124 lb)   BMI 18.31 kg/m²      Constitutional: Alert in no apparent distress. Unwell appearing. Dehydrated appearing.  HENT: No signs of trauma, Bilateral external ears normal, Nose normal.   Eyes: Pupils are equal and reactive, Conjunctiva normal, Non-icteric.   Neck: Normal range of motion, No tenderness, Supple, No stridor.   Lymphatic: No lymphadenopathy noted.   Cardiovascular: Regular rate and rhythm, no palpable thrill  Thorax & Lungs: No respiratory distress,  No chest tenderness.  CTAB  Abdomen: Epigastric abdominal tenderness.  Bowel sounds normal, Soft, No masses, No pulsatile masses. No peritoneal signs.  Skin: Warm, Dry, No erythema, No rash.   Back: No bony tenderness, No CVA tenderness.   Extremities: Intact distal pulses, No edema, No tenderness, No cyanosis.  Musculoskeletal: Good range of " motion in all major joints. No tenderness to palpation or major deformities noted.   Neurologic: Alert , Normal motor function, Normal sensory function, No focal deficits noted.   Psychiatric: Affect normal, Judgment normal, Mood normal.     DIAGNOSTIC STUDIES / PROCEDURES    LABS  Labs Reviewed   CBC WITH DIFFERENTIAL - Abnormal; Notable for the following components:       Result Value    RBC 3.62 (*)     Hemoglobin 8.7 (*)     Hematocrit 27.6 (*)     MCV 76.2 (*)     MCH 24.0 (*)     MCHC 31.5 (*)     RDW 60.3 (*)     Lymphocytes 16.30 (*)     Monos (Absolute) 0.98 (*)     All other components within normal limits    Narrative:     Indicate which anticoagulants the patient is on:->UNKNOWN   TROPONIN - Abnormal; Notable for the following components:    Troponin T 29 (*)     All other components within normal limits    Narrative:     Indicate which anticoagulants the patient is on:->UNKNOWN   COMP METABOLIC PANEL - Abnormal; Notable for the following components:    Co2 16 (*)     Anion Gap 17.0 (*)     Creatinine 1.43 (*)     AST(SGOT) 70 (*)     Alkaline Phosphatase 130 (*)     All other components within normal limits    Narrative:     Indicate which anticoagulants the patient is on:->UNKNOWN   LIPASE - Abnormal; Notable for the following components:    Lipase 1755 (*)     All other components within normal limits    Narrative:     Indicate which anticoagulants the patient is on:->UNKNOWN   LACTIC ACID - Abnormal; Notable for the following components:    Lactic Acid 3.8 (*)     All other components within normal limits   APTT - Abnormal; Notable for the following components:    APTT <20.0 (*)     All other components within normal limits    Narrative:     Indicate which anticoagulants the patient is on:->UNKNOWN   ESTIMATED GFR - Abnormal; Notable for the following components:    GFR (CKD-EPI) 50 (*)     All other components within normal limits    Narrative:     Indicate which anticoagulants the patient is  on:->UNKNOWN   PROTHROMBIN TIME    Narrative:     Indicate which anticoagulants the patient is on:->UNKNOWN   COD (ADULT)   TROPONIN   DIAGNOSTIC ALCOHOL   URINE DRUG SCREEN   CREATINE KINASE   LACTIC ACID     All labs reviewed by me.    RADIOLOGY  DX-CHEST-PORTABLE (1 VIEW)   Final Result         1.  No acute cardiopulmonary disease.   2.  Atherosclerosis      CT-CTA COMPLETE THORACOABDOMINAL AORTA   Final Result         1.  Occlusion of right common iliac artery stent.   2.  High-grade stenosis of the left common iliac artery   3.  Femorofemoral bypass graft which is nonopacified and likely occluded.   4.  Mural thrombus of the distal aorta near the aortic bifurcation   5.  Hazy peripancreatic fat stranding suggests changes of pancreatitis. Dilated common bile duct, follow-up ERCP or MRCP recommended to exclude obstructing pancreatic mass or stenosis.   6.  Bilateral thyroid nodules, greater on the right, follow-up thyroid sonography for further characterization due to nodule size   7.  Atherosclerosis and atherosclerotic coronary artery disease      These findings were discussed with the patient's clinician, UCHE JUAREZ, on 6/21/2022 3:49 AM.            [id number]    (Results Pending)      The radiologist's interpretation of all radiological studies have been reviewed by me.    COURSE & MEDICAL DECISION MAKING  Pertinent Labs & Imaging studies reviewed. (See chart for details)    This is a 77 y.o. male that presents via EMS with generalized upper abdominal pain onset 2 hours ago.  There is concern at this time that this covers an aortic injury.  He will be admitted and aortic code.  We will evaluate his vasculature as well.  We will evaluate for myocardial ischemia and get a screening EKG.  This could be pancreatitis as well as gastritis and I will do the below labs and imaging.    2:48 AM - Patient seen and examined at bedside. Ordered DX-Chest, Ct-CTA Complete Thoracoabdominal Aorta, CBC w/diff,  Troponin, CMP, Lipase, Lactic Acid, PT/INR, PTT, COD, and Estimated GFR to evaluate.    3:03 AM - Spoke to the patient's family who are supportive of my plan of care at this time.    3:04 AM - Patient upgraded to Code Aorta.    3:23 AM - Per nursing, the patient is requesting pain medication. Patient will be medicated with Morphine 4 mg injection and Zofran 4 mg injection for his symptoms.    4:38 AM - Paged lab to check in on the patient's Lipase levels.    The patient has occlusions of the right common iliac artery.  There is high-grade stenosis of the left common iliac artery.  The patient does have capillary refill in the legs and no pain there.  He does have pancreatitis and there is haziness seen on the CT scan.  The patient does have a slightly elevated lactic acid.  Lipase is still variously elevated.  The patient has a low-grade creatinine.  And troponin is elevated at 29.  We will watch patient in guarded condition.    CRITICAL CARE  The very real possibilty of a deterioration of this patient's condition required the highest level of my preparedness for sudden, emergent intervention.  I provided critical care services, which included medication orders, frequent reevaluations of the patient's condition and response to treatment, ordering and reviewing test results, and discussing the case with various consultants.  The critical care time associated with the care of the patient was 35 minutes. Review chart for interventions. This time is exclusive of any other billable procedures.      DISPOSITION:  Patient will be hospitalized by Dr. JAMIL in guarded condition.     FINAL IMPRESSION  1. Chest pain, unspecified type    2. Abdominal pain, unspecified abdominal location    3. Elevated troponin    4. Acute pancreatitis, unspecified complication status, unspecified pancreatitis type     Total Critical Care Time was 35 minutes, as Detailed Above.     Jonathan CASTILLO (Scribe), am scribing for, and in the presence  ofGerson M.D..    Electronically signed by: Jonathan Kruse (Scribe), 6/21/2022    I, Gerson Elkins M.D. personally performed the services described in this documentation, as scribed by Jonathan Kruse in my presence, and it is both accurate and complete. C.    The note accurately reflects work and decisions made by me.  Gerson Elkins M.D.  6/21/2022  5:50 AM

## 2022-06-22 ENCOUNTER — APPOINTMENT (OUTPATIENT)
Dept: RADIOLOGY | Facility: MEDICAL CENTER | Age: 77
DRG: 439 | End: 2022-06-22
Attending: INTERNAL MEDICINE
Payer: MEDICARE

## 2022-06-22 PROBLEM — E83.42 HYPOMAGNESEMIA: Status: ACTIVE | Noted: 2022-06-22

## 2022-06-22 PROBLEM — D64.9 ANEMIA: Status: ACTIVE | Noted: 2022-06-22

## 2022-06-22 PROBLEM — E04.1 THYROID NODULE: Status: ACTIVE | Noted: 2022-06-22

## 2022-06-22 LAB
ALBUMIN SERPL BCP-MCNC: 3.3 G/DL (ref 3.2–4.9)
ALBUMIN/GLOB SERPL: 1.1 G/DL
ALP SERPL-CCNC: 187 U/L (ref 30–99)
ALT SERPL-CCNC: 39 U/L (ref 2–50)
ANION GAP SERPL CALC-SCNC: 11 MMOL/L (ref 7–16)
AST SERPL-CCNC: 80 U/L (ref 12–45)
BASOPHILS # BLD AUTO: 0.3 % (ref 0–1.8)
BASOPHILS # BLD: 0.02 K/UL (ref 0–0.12)
BILIRUB SERPL-MCNC: 0.6 MG/DL (ref 0.1–1.5)
BUN SERPL-MCNC: 13 MG/DL (ref 8–22)
CALCIUM SERPL-MCNC: 8.9 MG/DL (ref 8.5–10.5)
CHLORIDE SERPL-SCNC: 105 MMOL/L (ref 96–112)
CO2 SERPL-SCNC: 22 MMOL/L (ref 20–33)
CREAT SERPL-MCNC: 1.57 MG/DL (ref 0.5–1.4)
EOSINOPHIL # BLD AUTO: 0.31 K/UL (ref 0–0.51)
EOSINOPHIL NFR BLD: 4.7 % (ref 0–6.9)
ERYTHROCYTE [DISTWIDTH] IN BLOOD BY AUTOMATED COUNT: 57.9 FL (ref 35.9–50)
GFR SERPLBLD CREATININE-BSD FMLA CKD-EPI: 44 ML/MIN/1.73 M 2
GLOBULIN SER CALC-MCNC: 3.1 G/DL (ref 1.9–3.5)
GLUCOSE SERPL-MCNC: 97 MG/DL (ref 65–99)
HCT VFR BLD AUTO: 24.8 % (ref 42–52)
HGB BLD-MCNC: 7.8 G/DL (ref 14–18)
IMM GRANULOCYTES # BLD AUTO: 0.04 K/UL (ref 0–0.11)
IMM GRANULOCYTES NFR BLD AUTO: 0.6 % (ref 0–0.9)
LYMPHOCYTES # BLD AUTO: 1.13 K/UL (ref 1–4.8)
LYMPHOCYTES NFR BLD: 17.3 % (ref 22–41)
MAGNESIUM SERPL-MCNC: 1.5 MG/DL (ref 1.5–2.5)
MCH RBC QN AUTO: 23.8 PG (ref 27–33)
MCHC RBC AUTO-ENTMCNC: 31.5 G/DL (ref 33.7–35.3)
MCV RBC AUTO: 75.6 FL (ref 81.4–97.8)
MONOCYTES # BLD AUTO: 0.91 K/UL (ref 0–0.85)
MONOCYTES NFR BLD AUTO: 13.9 % (ref 0–13.4)
NEUTROPHILS # BLD AUTO: 4.13 K/UL (ref 1.82–7.42)
NEUTROPHILS NFR BLD: 63.2 % (ref 44–72)
NRBC # BLD AUTO: 0 K/UL
NRBC BLD-RTO: 0 /100 WBC
PHOSPHATE SERPL-MCNC: 2.8 MG/DL (ref 2.5–4.5)
PLATELET # BLD AUTO: 189 K/UL (ref 164–446)
PMV BLD AUTO: 9.4 FL (ref 9–12.9)
POTASSIUM SERPL-SCNC: 4.3 MMOL/L (ref 3.6–5.5)
PROT SERPL-MCNC: 6.4 G/DL (ref 6–8.2)
RBC # BLD AUTO: 3.28 M/UL (ref 4.7–6.1)
SODIUM SERPL-SCNC: 138 MMOL/L (ref 135–145)
WBC # BLD AUTO: 6.5 K/UL (ref 4.8–10.8)

## 2022-06-22 PROCEDURE — 700111 HCHG RX REV CODE 636 W/ 250 OVERRIDE (IP)

## 2022-06-22 PROCEDURE — 85025 COMPLETE CBC W/AUTO DIFF WBC: CPT

## 2022-06-22 PROCEDURE — 36415 COLL VENOUS BLD VENIPUNCTURE: CPT

## 2022-06-22 PROCEDURE — 83735 ASSAY OF MAGNESIUM: CPT

## 2022-06-22 PROCEDURE — 99233 SBSQ HOSP IP/OBS HIGH 50: CPT | Performed by: HOSPITALIST

## 2022-06-22 PROCEDURE — 700102 HCHG RX REV CODE 250 W/ 637 OVERRIDE(OP): Performed by: HOSPITALIST

## 2022-06-22 PROCEDURE — A9270 NON-COVERED ITEM OR SERVICE: HCPCS | Performed by: HOSPITALIST

## 2022-06-22 PROCEDURE — 700102 HCHG RX REV CODE 250 W/ 637 OVERRIDE(OP): Performed by: INTERNAL MEDICINE

## 2022-06-22 PROCEDURE — 74181 MRI ABDOMEN W/O CONTRAST: CPT | Mod: MG

## 2022-06-22 PROCEDURE — 770020 HCHG ROOM/CARE - TELE (206)

## 2022-06-22 PROCEDURE — 80053 COMPREHEN METABOLIC PANEL: CPT

## 2022-06-22 PROCEDURE — 700111 HCHG RX REV CODE 636 W/ 250 OVERRIDE (IP): Performed by: HOSPITALIST

## 2022-06-22 PROCEDURE — A9270 NON-COVERED ITEM OR SERVICE: HCPCS | Performed by: INTERNAL MEDICINE

## 2022-06-22 PROCEDURE — 84100 ASSAY OF PHOSPHORUS: CPT

## 2022-06-22 PROCEDURE — 700105 HCHG RX REV CODE 258: Performed by: INTERNAL MEDICINE

## 2022-06-22 RX ORDER — HYDROCODONE BITARTRATE AND ACETAMINOPHEN 5; 325 MG/1; MG/1
1 TABLET ORAL EVERY 6 HOURS PRN
Status: DISCONTINUED | OUTPATIENT
Start: 2022-06-22 | End: 2022-06-25 | Stop reason: HOSPADM

## 2022-06-22 RX ORDER — HYDROMORPHONE HYDROCHLORIDE 1 MG/ML
0.5 INJECTION, SOLUTION INTRAMUSCULAR; INTRAVENOUS; SUBCUTANEOUS ONCE
Status: COMPLETED | OUTPATIENT
Start: 2022-06-22 | End: 2022-06-22

## 2022-06-22 RX ORDER — HYDROCODONE BITARTRATE AND ACETAMINOPHEN 5; 325 MG/1; MG/1
1 TABLET ORAL EVERY 12 HOURS PRN
Status: DISCONTINUED | OUTPATIENT
Start: 2022-06-22 | End: 2022-06-22

## 2022-06-22 RX ADMIN — OMEPRAZOLE 20 MG: 20 CAPSULE, DELAYED RELEASE ORAL at 17:09

## 2022-06-22 RX ADMIN — OMEPRAZOLE 20 MG: 20 CAPSULE, DELAYED RELEASE ORAL at 06:15

## 2022-06-22 RX ADMIN — BUDESONIDE AND FORMOTEROL FUMARATE DIHYDRATE 2 PUFF: 160; 4.5 AEROSOL RESPIRATORY (INHALATION) at 06:00

## 2022-06-22 RX ADMIN — DOCUSATE SODIUM 100 MG: 100 CAPSULE, LIQUID FILLED ORAL at 17:09

## 2022-06-22 RX ADMIN — BUDESONIDE AND FORMOTEROL FUMARATE DIHYDRATE 2 PUFF: 160; 4.5 AEROSOL RESPIRATORY (INHALATION) at 17:09

## 2022-06-22 RX ADMIN — DOCUSATE SODIUM 100 MG: 100 CAPSULE, LIQUID FILLED ORAL at 06:14

## 2022-06-22 RX ADMIN — SODIUM CHLORIDE, POTASSIUM CHLORIDE, SODIUM LACTATE AND CALCIUM CHLORIDE: 600; 310; 30; 20 INJECTION, SOLUTION INTRAVENOUS at 22:05

## 2022-06-22 RX ADMIN — SODIUM CHLORIDE, POTASSIUM CHLORIDE, SODIUM LACTATE AND CALCIUM CHLORIDE: 600; 310; 30; 20 INJECTION, SOLUTION INTRAVENOUS at 06:14

## 2022-06-22 RX ADMIN — ALUMINUM HYDROXIDE, MAGNESIUM HYDROXIDE, AND DIMETHICONE 30 ML: 400; 400; 40 SUSPENSION ORAL at 20:49

## 2022-06-22 RX ADMIN — SENNOSIDES AND DOCUSATE SODIUM 2 TABLET: 50; 8.6 TABLET ORAL at 06:14

## 2022-06-22 RX ADMIN — HYDROMORPHONE HYDROCHLORIDE 0.5 MG: 1 INJECTION, SOLUTION INTRAMUSCULAR; INTRAVENOUS; SUBCUTANEOUS at 23:18

## 2022-06-22 RX ADMIN — THIAMINE HCL TAB 100 MG 100 MG: 100 TAB at 06:15

## 2022-06-22 RX ADMIN — FOLIC ACID 1 MG: 1 TABLET ORAL at 06:15

## 2022-06-22 RX ADMIN — THERA TABS 1 TABLET: TAB at 06:15

## 2022-06-22 RX ADMIN — ENOXAPARIN SODIUM 60 MG: 60 INJECTION SUBCUTANEOUS at 06:14

## 2022-06-22 RX ADMIN — HYDROCODONE BITARTRATE AND ACETAMINOPHEN 1 TABLET: 5; 325 TABLET ORAL at 14:32

## 2022-06-22 RX ADMIN — ACETAMINOPHEN 650 MG: 325 TABLET ORAL at 12:02

## 2022-06-22 RX ADMIN — ASPIRIN 81 MG: 81 TABLET, COATED ORAL at 06:15

## 2022-06-22 RX ADMIN — ENOXAPARIN SODIUM 60 MG: 60 INJECTION SUBCUTANEOUS at 17:09

## 2022-06-22 RX ADMIN — ALBUTEROL SULFATE 2 PUFF: 90 AEROSOL, METERED RESPIRATORY (INHALATION) at 04:30

## 2022-06-22 ASSESSMENT — ENCOUNTER SYMPTOMS
FEVER: 0
DIZZINESS: 0
COUGH: 0
ORTHOPNEA: 0
MYALGIAS: 0
ABDOMINAL PAIN: 1
NAUSEA: 0
SORE THROAT: 0
MUSCULOSKELETAL NEGATIVE: 1
TINGLING: 0
SHORTNESS OF BREATH: 0
DEPRESSION: 0
CARDIOVASCULAR NEGATIVE: 1
CHILLS: 0
EYES NEGATIVE: 1
CONSTIPATION: 0
CONSTITUTIONAL NEGATIVE: 1
WEIGHT LOSS: 0
SENSORY CHANGE: 0
BRUISES/BLEEDS EASILY: 0
NEUROLOGICAL NEGATIVE: 1
RESPIRATORY NEGATIVE: 1
HEARTBURN: 0

## 2022-06-22 ASSESSMENT — LIFESTYLE VARIABLES
TREMOR: NO TREMOR
AGITATION: NORMAL ACTIVITY
ANXIETY: NO ANXIETY (AT EASE)
ORIENTATION AND CLOUDING OF SENSORIUM: ORIENTED AND CAN DO SERIAL ADDITIONS
NAUSEA AND VOMITING: NO NAUSEA AND NO VOMITING
VISUAL DISTURBANCES: NOT PRESENT
AUDITORY DISTURBANCES: NOT PRESENT
HEADACHE, FULLNESS IN HEAD: NOT PRESENT
PAROXYSMAL SWEATS: NO SWEAT VISIBLE
TOTAL SCORE: 0
SUBSTANCE_ABUSE: 1

## 2022-06-22 ASSESSMENT — PAIN DESCRIPTION - PAIN TYPE
TYPE: ACUTE PAIN
TYPE: ACUTE PAIN

## 2022-06-22 ASSESSMENT — FIBROSIS 4 INDEX: FIB4 SCORE: 5.35

## 2022-06-22 NOTE — ASSESSMENT & PLAN NOTE
Acute on chronic  Chronic kidney disease contributing  No melena or hematochezia, no hematuria - following  Continue to follow, if stable in am likely d/c

## 2022-06-22 NOTE — PROGRESS NOTES
Monitor summary:  Rhythm: SR/ST  Rate:   Ectopy: R PVC/R coup/R trig/1st deg HB  Measurements: 0.23/0.07/0.34

## 2022-06-22 NOTE — CARE PLAN
The patient is Stable - Low risk of patient condition declining or worsening    Shift Goals  Clinical Goals: pain management  Patient Goals: sleep  Family Goals: chano    Progress made toward(s) clinical / shift goals:    Problem: Pain - Standard  Goal: Alleviation of pain or a reduction in pain to the patient’s comfort goal  Outcome: Progressing  Flowsheets (Taken 6/21/2022 2100)  Pain Rating Scale (NPRS): 0     Problem: Optimal Care for Alcohol Withdrawal  Goal: Optimal Care for the alcohol withdrawal patient  Outcome: Progressing       Patient is not progressing towards the following goals:

## 2022-06-22 NOTE — PROGRESS NOTES
"Hospital Medicine Daily Progress Note    Date of Service  6/22/2022    Chief Complaint  Fede Parikh Jr. is a 77 y.o. male admitted 6/21/2022 with chest and abdominal pain    Hospital Course  Patient is a 77-year-old male with past medical history of COPD, hypertension, CKD stage III, dyslipidemia, alcohol abuse and tobacco abuse. He presented to [facility] on 6/21/22 with chest pain and abdominal pain. On admission he was found to have acute pancreatitis with lactic acidosis.  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  A MRCP was ordered to further evaluate the dilated CBD.  Patient started on weight-based Lovenox for mural thrombus of distal aorta.  Echocardiogram ordered.      He will need outpatient follow-up of thyroid nodules with thyroid ultrasound.    Interval Problem Update  Axox3, he states he is feeling better, no chest pain this am. He does have some mild diffuse abdominal pain rated 2/10, \"aching\" in quality. Denies sob, no n/v. ROS otherwise negative. Mild tachycardia, stable on room air. ROS otherwise negative. Denies melena, no hematochezia, no hematuria    I have discussed this patient's plan of care and discharge plan at IDT rounds today with Case Management, Nursing, Nursing leadership, and other members of the IDT team.    Consultants/Specialty  Vascular - [facility]    Code Status  Full Code    Disposition  Patient is not medically cleared for discharge.   Anticipate discharge to be determined.    Review of Systems  Review of Systems   Constitutional: Negative.  Negative for chills, fever, malaise/fatigue and weight loss.   HENT: Negative.  Negative for sore throat.    Eyes: Negative.    Respiratory: Negative.  Negative for cough and shortness of breath.  "   Cardiovascular: Negative.  Negative for chest pain, orthopnea and leg swelling.   Gastrointestinal: Positive for abdominal pain. Negative for constipation, heartburn and nausea.   Genitourinary: Negative.    Musculoskeletal: Negative.  Negative for myalgias.   Skin: Negative.    Neurological: Negative.  Negative for dizziness, tingling and sensory change.   Endo/Heme/Allergies: Negative.  Does not bruise/bleed easily.   Psychiatric/Behavioral: Positive for substance abuse. Negative for depression.   All other systems reviewed and are negative.       Physical Exam  Temp:  [36.4 °C (97.6 °F)-37 °C (98.6 °F)] 37 °C (98.6 °F)  Pulse:  [] 108  Resp:  [16-18] 18  BP: (106-153)/(56-78) 114/76  SpO2:  [93 %-95 %] 94 %    Physical Exam  Vitals and nursing note reviewed. Exam conducted with a chaperone present.   Constitutional:       General: He is not in acute distress.     Appearance: Normal appearance. He is not diaphoretic.   HENT:      Head: Normocephalic.      Nose: Nose normal.      Mouth/Throat:      Mouth: Mucous membranes are moist.   Eyes:      Pupils: Pupils are equal, round, and reactive to light.   Cardiovascular:      Rate and Rhythm: Normal rate and regular rhythm.      Pulses: Normal pulses.      Heart sounds: Normal heart sounds.   Pulmonary:      Effort: Pulmonary effort is normal.      Breath sounds: Normal breath sounds.   Abdominal:      General: Abdomen is flat. Bowel sounds are normal.      Palpations: Abdomen is soft.      Tenderness: There is abdominal tenderness (diffuse). There is no right CVA tenderness, left CVA tenderness, guarding or rebound.      Comments: Palpable ?scar tissue/ mesh?   Musculoskeletal:         General: No swelling or deformity. Normal range of motion.   Skin:     General: Skin is warm and dry.      Capillary Refill: Capillary refill takes less than 2 seconds.   Neurological:      General: No focal deficit present.      Mental Status: He is alert and oriented to  person, place, and time.      Cranial Nerves: No cranial nerve deficit.   Psychiatric:         Mood and Affect: Mood normal.         Behavior: Behavior normal.         Fluids    Intake/Output Summary (Last 24 hours) at 6/22/2022 1215  Last data filed at 6/22/2022 1000  Gross per 24 hour   Intake 240 ml   Output 750 ml   Net -510 ml       Laboratory  Recent Labs     06/21/22  0300 06/22/22  0235   WBC 7.6 6.5   RBC 3.62* 3.28*   HEMOGLOBIN 8.7* 7.8*   HEMATOCRIT 27.6* 24.8*   MCV 76.2* 75.6*   MCH 24.0* 23.8*   MCHC 31.5* 31.5*   RDW 60.3* 57.9*   PLATELETCT 211 189   MPV 10.0 9.4     Recent Labs     06/21/22  0300 06/22/22  0235   SODIUM 139 138   POTASSIUM 4.2 4.3   CHLORIDE 106 105   CO2 16* 22   GLUCOSE 78 97   BUN 11 13   CREATININE 1.43* 1.57*   CALCIUM 9.3 8.9     Recent Labs     06/21/22  0300   APTT <20.0*   INR 1.04               Imaging  DX-CHEST-PORTABLE (1 VIEW)   Final Result         1.  No acute cardiopulmonary disease.   2.  Atherosclerosis      CT-CTA COMPLETE THORACOABDOMINAL AORTA   Final Result         1.  Occlusion of right common iliac artery stent.   2.  High-grade stenosis of the left common iliac artery   3.  Femorofemoral bypass graft which is nonopacified and likely occluded.   4.  Mural thrombus of the distal aorta near the aortic bifurcation   5.  Hazy peripancreatic fat stranding suggests changes of pancreatitis. Dilated common bile duct, follow-up ERCP or MRCP recommended to exclude obstructing pancreatic mass or stenosis.   6.  Bilateral thyroid nodules, greater on the right, follow-up thyroid sonography for further characterization due to nodule size   7.  Atherosclerosis and atherosclerotic coronary artery disease      These findings were discussed with the patient's clinician, UCHE JUAREZ, on 6/21/2022 3:49 AM.            [id number]    (Results Pending)   EC-ECHOCARDIOGRAM COMPLETE W/O CONT    (Results Pending)        Assessment/Plan  * Chest pain- (present on  admission)  Assessment & Plan  Probably secondary to pancreatitis  EKG did not show acute changes  Troponin minimally elevated and decreasing  Pain resolved  following    Anemia  Assessment & Plan  Acute on chronic  No melena or hematochezia, no hematuria - following  Suspect hemodilution from iv fluids contributing    Hypomagnesemia  Assessment & Plan  Replacing  following    Pancreatitis  Assessment & Plan  Patient endorses drinking 6 shots a day  Lipase elevated   CT showed peripancreatic stranding and pancreatic duct dilation, concerning for possible pancreatic duct obstruction versus mass  MRCP pending  following    Peripheral artery disease (HCC)- (present on admission)  Assessment & Plan  CTA of thoracoabdominal aorta revealed   occlusion of common right iliac artery stent, left common iliac artery stenosis, occluded femorofemoral bypass graft, moral thrombus of the distal aorta near bifurcation  Unclear if this is contributing to his abdominal pain or if that is mainly from the pancreatitis  Lost occlusion seem to be chronic.  Patient had an outpatient appointment with Dr. Diaz/vascular surgery for 6/20 1:11 AM  Smoking cessation discussed and recommended  Dr. Briones to eval  Continue full dose lovenox    Lactic acid acidosis  Assessment & Plan  Likely secondary to transient hypoperfusion/hypotension and thiamine deficiency due to alcohol abuse  resolved    CKD (chronic kidney disease), stage III (HCC)- (present on admission)  Assessment & Plan  Avoid nephrotoxins  Creatinine increasing   Following  Continue hydration    Alcohol use- (present on admission)  Assessment & Plan  Monitor for withdrawal with CIWA protocol, no s/o w/d currently  Thiamine supplementation  Cessation discussed and recommended    Tobacco use- (present on admission)  Assessment & Plan  Smoking cessation discussed and recommended  Nicotine patch    Substance abuse ([facility])  Assessment & Plan  Drug screen positive for cocaine and  opiates  Cessation discussed and recommended    Essential hypertension- (present on admission)  Assessment & Plan  Amlodipine was held as patient was hypotensive per EMS  Currently 126/60  Continue to follow    COPD (chronic obstructive pulmonary disease) (HCC)- (present on admission)  Assessment & Plan  No evidence of exacerbation at this time  following  Continue home inhalers Symbicort, Spiriva  Albuterol as needed       VTE prophylaxis: enoxaparin ppx    I have performed a physical exam and reviewed and updated ROS and Plan today (6/22/2022). In review of yesterday's note (6/21/2022), there are no changes except as documented above.

## 2022-06-22 NOTE — DISCHARGE PLANNING
Case Management Discharge Planning    Admission Date: 6/21/2022  GMLOS: 3.1  ALOS: 1    6-Clicks ADL Score: 16  6-Clicks Mobility Score: 15  PT and/or OT Eval ordered: No  Post-acute Referrals Ordered: No  Post-acute Choice Obtained: No  Has referral(s) been sent to post-acute provider:  No      Anticipated Discharge Dispo:  Pending PT/OT rx for eval and recs for post acute recs.    DME Needed: No    Action(s) Taken: OTHER   Pt was discussed in IDR. Not medically cleared for DC yet. Pending MRCP and ECHO to be completed. PT/OT order has been requested by LSW.     Escalations Completed: None    Medically Clear: No    Next Steps: cont to f/u with med team for any DC Needs and med clearance, PT/OT Rx and eval, MRCP and ECHO    Barriers to Discharge: Medical clearance    Is the patient up for discharge tomorrow: No

## 2022-06-22 NOTE — PROGRESS NOTES
Received patient from dayshift RN. Patient stable , O2 @ 2L , VSS, A&O x4 . Call light and personal items within reach. Bed locked and in lowest position. Fall precautions in place. Bed alarm on. Hourly rounding in place. Tele box verified in place. Will continue to monitor.

## 2022-06-22 NOTE — DIETARY
"Nutrition services: Day 1 of admit.  Fede Parikh Jr. is a 77 y.o. male with admitting DX of chest pain, idiopathic acute pancreatitis    Consult received for BMI <19. Met with pt at bedside. He reports he does not have an appetite and does not like the food he received at lunch. RD asked pt about any recent weight loss and he was unsure. He appeared slightly confused. RD asked pt if he wanted any additional supplements or snacks as he does not like the food but he declined at this time.     Assessment:  Height: 175.3 cm (5' 9\")  Weight: 56.6 kg (124 lb 12.5 oz)- bed scale  Body mass index is 18.43 kg/m²., BMI classification: underweight   Diet/Intake: Level 7- easy to chew, level 0 thin liquids. PO intake 50-75% x1 meal per ADL's.     Evaluation:   1. Pt admitted for acute pancreatitis with hypomagnesemia, anemia, thyroid nodule, chest pain, peripheral artery disease, lactic acid acidosis, CKD stage III, tobacco use, COPD, essential HTN, substance abuse  2. Based on weight history in chart, pt has a weight loss of 20 lbs since 3/12/22. This results in a weight loss of 13.8% in < 4 months which is considered severe.  3. Labs: creatinine 1.57, GFR 44, AST 80, alk phos 187  4. Meds: Colace, omeprazole, senna-docusate, thiamine, MVI, LR infusion @ 83 ml/hr. PRN: Zofran and bowel regimen  5. Skin: No staged wounds noted  6. GI: Last BM PTA    Malnutrition Risk: Pt at risk d/t a weight loss of 13.8% in < 4 months which is considered severe based on chart review. Unable to fully assess at this time as pt appeared confused.     Recommendations/Plan:  1. Continue current PO diet  2. Encourage intake of meals  3. Document intake of all meals as % taken in ADL's to provide interdisciplinary communication across all shifts.   4. Monitor weight.  5. Nutrition rep will continue to see patient for ongoing meal and snack preferences.     RD following.         "

## 2022-06-23 ENCOUNTER — APPOINTMENT (OUTPATIENT)
Dept: CARDIOLOGY | Facility: MEDICAL CENTER | Age: 77
DRG: 439 | End: 2022-06-23
Attending: HOSPITALIST
Payer: MEDICARE

## 2022-06-23 PROBLEM — I74.10 AORTIC THROMBUS (HCC): Status: ACTIVE | Noted: 2022-06-23

## 2022-06-23 LAB
ALBUMIN SERPL BCP-MCNC: 3.2 G/DL (ref 3.2–4.9)
ALBUMIN/GLOB SERPL: 1 G/DL
ALP SERPL-CCNC: 143 U/L (ref 30–99)
ALT SERPL-CCNC: 30 U/L (ref 2–50)
ANION GAP SERPL CALC-SCNC: 11 MMOL/L (ref 7–16)
AST SERPL-CCNC: 44 U/L (ref 12–45)
BASOPHILS # BLD AUTO: 0.3 % (ref 0–1.8)
BASOPHILS # BLD: 0.02 K/UL (ref 0–0.12)
BILIRUB SERPL-MCNC: 0.4 MG/DL (ref 0.1–1.5)
BUN SERPL-MCNC: 10 MG/DL (ref 8–22)
CALCIUM SERPL-MCNC: 8.6 MG/DL (ref 8.5–10.5)
CHLORIDE SERPL-SCNC: 103 MMOL/L (ref 96–112)
CO2 SERPL-SCNC: 22 MMOL/L (ref 20–33)
CREAT SERPL-MCNC: 1.38 MG/DL (ref 0.5–1.4)
EOSINOPHIL # BLD AUTO: 0.43 K/UL (ref 0–0.51)
EOSINOPHIL NFR BLD: 6.9 % (ref 0–6.9)
ERYTHROCYTE [DISTWIDTH] IN BLOOD BY AUTOMATED COUNT: 57.6 FL (ref 35.9–50)
GFR SERPLBLD CREATININE-BSD FMLA CKD-EPI: 52 ML/MIN/1.73 M 2
GLOBULIN SER CALC-MCNC: 3.3 G/DL (ref 1.9–3.5)
GLUCOSE SERPL-MCNC: 95 MG/DL (ref 65–99)
HCT VFR BLD AUTO: 25.3 % (ref 42–52)
HGB BLD-MCNC: 7.9 G/DL (ref 14–18)
IMM GRANULOCYTES # BLD AUTO: 0.02 K/UL (ref 0–0.11)
IMM GRANULOCYTES NFR BLD AUTO: 0.3 % (ref 0–0.9)
INR PPP: 1.06 (ref 0.87–1.13)
LIPASE SERPL-CCNC: 285 U/L (ref 11–82)
LV EJECT FRACT  99904: 75
LV EJECT FRACT MOD 2C 99903: 80.34
LV EJECT FRACT MOD 4C 99902: 64.83
LV EJECT FRACT MOD BP 99901: 74.22
LYMPHOCYTES # BLD AUTO: 1.4 K/UL (ref 1–4.8)
LYMPHOCYTES NFR BLD: 22.5 % (ref 22–41)
MAGNESIUM SERPL-MCNC: 1.5 MG/DL (ref 1.5–2.5)
MCH RBC QN AUTO: 23.6 PG (ref 27–33)
MCHC RBC AUTO-ENTMCNC: 31.2 G/DL (ref 33.7–35.3)
MCV RBC AUTO: 75.5 FL (ref 81.4–97.8)
MONOCYTES # BLD AUTO: 0.89 K/UL (ref 0–0.85)
MONOCYTES NFR BLD AUTO: 14.3 % (ref 0–13.4)
NEUTROPHILS # BLD AUTO: 3.47 K/UL (ref 1.82–7.42)
NEUTROPHILS NFR BLD: 55.7 % (ref 44–72)
NRBC # BLD AUTO: 0 K/UL
NRBC BLD-RTO: 0 /100 WBC
PLATELET # BLD AUTO: 208 K/UL (ref 164–446)
PMV BLD AUTO: 10 FL (ref 9–12.9)
POTASSIUM SERPL-SCNC: 3.9 MMOL/L (ref 3.6–5.5)
PROT SERPL-MCNC: 6.5 G/DL (ref 6–8.2)
PROTHROMBIN TIME: 13.5 SEC (ref 12–14.6)
RBC # BLD AUTO: 3.35 M/UL (ref 4.7–6.1)
SODIUM SERPL-SCNC: 136 MMOL/L (ref 135–145)
WBC # BLD AUTO: 6.2 K/UL (ref 4.8–10.8)

## 2022-06-23 PROCEDURE — 36415 COLL VENOUS BLD VENIPUNCTURE: CPT

## 2022-06-23 PROCEDURE — 93306 TTE W/DOPPLER COMPLETE: CPT

## 2022-06-23 PROCEDURE — 80053 COMPREHEN METABOLIC PANEL: CPT

## 2022-06-23 PROCEDURE — 700102 HCHG RX REV CODE 250 W/ 637 OVERRIDE(OP): Performed by: INTERNAL MEDICINE

## 2022-06-23 PROCEDURE — 94664 DEMO&/EVAL PT USE INHALER: CPT

## 2022-06-23 PROCEDURE — 85025 COMPLETE CBC W/AUTO DIFF WBC: CPT

## 2022-06-23 PROCEDURE — 83735 ASSAY OF MAGNESIUM: CPT

## 2022-06-23 PROCEDURE — 93306 TTE W/DOPPLER COMPLETE: CPT | Mod: 26 | Performed by: INTERNAL MEDICINE

## 2022-06-23 PROCEDURE — 83690 ASSAY OF LIPASE: CPT

## 2022-06-23 PROCEDURE — 700105 HCHG RX REV CODE 258: Performed by: INTERNAL MEDICINE

## 2022-06-23 PROCEDURE — 700102 HCHG RX REV CODE 250 W/ 637 OVERRIDE(OP): Performed by: HOSPITALIST

## 2022-06-23 PROCEDURE — A9270 NON-COVERED ITEM OR SERVICE: HCPCS | Performed by: INTERNAL MEDICINE

## 2022-06-23 PROCEDURE — 97165 OT EVAL LOW COMPLEX 30 MIN: CPT

## 2022-06-23 PROCEDURE — A9270 NON-COVERED ITEM OR SERVICE: HCPCS | Performed by: HOSPITALIST

## 2022-06-23 PROCEDURE — 700111 HCHG RX REV CODE 636 W/ 250 OVERRIDE (IP): Performed by: HOSPITALIST

## 2022-06-23 PROCEDURE — 770020 HCHG ROOM/CARE - TELE (206)

## 2022-06-23 PROCEDURE — 700102 HCHG RX REV CODE 250 W/ 637 OVERRIDE(OP)

## 2022-06-23 PROCEDURE — 85610 PROTHROMBIN TIME: CPT

## 2022-06-23 PROCEDURE — 99233 SBSQ HOSP IP/OBS HIGH 50: CPT | Performed by: HOSPITALIST

## 2022-06-23 PROCEDURE — A9270 NON-COVERED ITEM OR SERVICE: HCPCS

## 2022-06-23 PROCEDURE — 97161 PT EVAL LOW COMPLEX 20 MIN: CPT

## 2022-06-23 RX ORDER — WARFARIN SODIUM 4 MG/1
4 TABLET ORAL DAILY
Status: DISCONTINUED | OUTPATIENT
Start: 2022-06-23 | End: 2022-06-25 | Stop reason: HOSPADM

## 2022-06-23 RX ADMIN — THIAMINE HCL TAB 100 MG 100 MG: 100 TAB at 05:49

## 2022-06-23 RX ADMIN — HYDROCODONE BITARTRATE AND ACETAMINOPHEN 1 TABLET: 5; 325 TABLET ORAL at 03:10

## 2022-06-23 RX ADMIN — BUDESONIDE AND FORMOTEROL FUMARATE DIHYDRATE 2 PUFF: 160; 4.5 AEROSOL RESPIRATORY (INHALATION) at 17:08

## 2022-06-23 RX ADMIN — SODIUM CHLORIDE, POTASSIUM CHLORIDE, SODIUM LACTATE AND CALCIUM CHLORIDE: 600; 310; 30; 20 INJECTION, SOLUTION INTRAVENOUS at 18:19

## 2022-06-23 RX ADMIN — ALUMINUM HYDROXIDE, MAGNESIUM HYDROXIDE, AND DIMETHICONE 30 ML: 400; 400; 40 SUSPENSION ORAL at 05:49

## 2022-06-23 RX ADMIN — ENOXAPARIN SODIUM 60 MG: 60 INJECTION SUBCUTANEOUS at 05:49

## 2022-06-23 RX ADMIN — OMEPRAZOLE 20 MG: 20 CAPSULE, DELAYED RELEASE ORAL at 17:03

## 2022-06-23 RX ADMIN — HYDROCODONE BITARTRATE AND ACETAMINOPHEN 1 TABLET: 5; 325 TABLET ORAL at 17:03

## 2022-06-23 RX ADMIN — FOLIC ACID 1 MG: 1 TABLET ORAL at 05:49

## 2022-06-23 RX ADMIN — HYDROCODONE BITARTRATE AND ACETAMINOPHEN 1 TABLET: 5; 325 TABLET ORAL at 10:49

## 2022-06-23 RX ADMIN — THERA TABS 1 TABLET: TAB at 05:49

## 2022-06-23 RX ADMIN — ASPIRIN 81 MG: 81 TABLET, COATED ORAL at 05:49

## 2022-06-23 RX ADMIN — OMEPRAZOLE 20 MG: 20 CAPSULE, DELAYED RELEASE ORAL at 05:48

## 2022-06-23 RX ADMIN — SENNOSIDES AND DOCUSATE SODIUM 2 TABLET: 50; 8.6 TABLET ORAL at 17:03

## 2022-06-23 RX ADMIN — DOCUSATE SODIUM 100 MG: 100 CAPSULE, LIQUID FILLED ORAL at 05:48

## 2022-06-23 RX ADMIN — BUDESONIDE AND FORMOTEROL FUMARATE DIHYDRATE 2 PUFF: 160; 4.5 AEROSOL RESPIRATORY (INHALATION) at 07:21

## 2022-06-23 RX ADMIN — WARFARIN SODIUM 4 MG: 4 TABLET ORAL at 17:07

## 2022-06-23 RX ADMIN — ENOXAPARIN SODIUM 60 MG: 60 INJECTION SUBCUTANEOUS at 18:42

## 2022-06-23 RX ADMIN — SENNOSIDES AND DOCUSATE SODIUM 2 TABLET: 50; 8.6 TABLET ORAL at 05:49

## 2022-06-23 ASSESSMENT — LIFESTYLE VARIABLES
ORIENTATION AND CLOUDING OF SENSORIUM: ORIENTED AND CAN DO SERIAL ADDITIONS
VISUAL DISTURBANCES: NOT PRESENT
TREMOR: NO TREMOR
PAROXYSMAL SWEATS: NO SWEAT VISIBLE
ANXIETY: NO ANXIETY (AT EASE)
ANXIETY: NO ANXIETY (AT EASE)
HEADACHE, FULLNESS IN HEAD: NOT PRESENT
NAUSEA AND VOMITING: NO NAUSEA AND NO VOMITING
VISUAL DISTURBANCES: NOT PRESENT
TOTAL SCORE: 0
AUDITORY DISTURBANCES: NOT PRESENT
NAUSEA AND VOMITING: NO NAUSEA AND NO VOMITING
SUBSTANCE_ABUSE: 1
TOTAL SCORE: 0
PAROXYSMAL SWEATS: NO SWEAT VISIBLE
AGITATION: NORMAL ACTIVITY
HEADACHE, FULLNESS IN HEAD: NOT PRESENT
ORIENTATION AND CLOUDING OF SENSORIUM: ORIENTED AND CAN DO SERIAL ADDITIONS
TREMOR: NO TREMOR
AUDITORY DISTURBANCES: NOT PRESENT
AGITATION: NORMAL ACTIVITY

## 2022-06-23 ASSESSMENT — ENCOUNTER SYMPTOMS
TINGLING: 0
MUSCULOSKELETAL NEGATIVE: 1
MYALGIAS: 0
DIZZINESS: 0
RESPIRATORY NEGATIVE: 1
CARDIOVASCULAR NEGATIVE: 1
ABDOMINAL PAIN: 1
ORTHOPNEA: 0
CHILLS: 0
NEUROLOGICAL NEGATIVE: 1
SORE THROAT: 0
SHORTNESS OF BREATH: 0
COUGH: 0
BRUISES/BLEEDS EASILY: 0
EYES NEGATIVE: 1
DEPRESSION: 0
CONSTIPATION: 0
CONSTITUTIONAL NEGATIVE: 1
HEARTBURN: 0
NAUSEA: 0
WEIGHT LOSS: 0
FEVER: 0
SENSORY CHANGE: 0

## 2022-06-23 ASSESSMENT — COGNITIVE AND FUNCTIONAL STATUS - GENERAL
SUGGESTED CMS G CODE MODIFIER MOBILITY: CJ
MOBILITY SCORE: 21
DRESSING REGULAR LOWER BODY CLOTHING: A LITTLE
WALKING IN HOSPITAL ROOM: A LITTLE
HELP NEEDED FOR BATHING: A LITTLE
TOILETING: A LITTLE
SUGGESTED CMS G CODE MODIFIER DAILY ACTIVITY: CJ
CLIMB 3 TO 5 STEPS WITH RAILING: A LITTLE
DAILY ACTIVITIY SCORE: 21
STANDING UP FROM CHAIR USING ARMS: A LITTLE

## 2022-06-23 ASSESSMENT — FIBROSIS 4 INDEX: FIB4 SCORE: 3.05

## 2022-06-23 ASSESSMENT — PAIN DESCRIPTION - PAIN TYPE
TYPE: ACUTE PAIN

## 2022-06-23 ASSESSMENT — GAIT ASSESSMENTS
DISTANCE (FEET): 20
GAIT LEVEL OF ASSIST: SUPERVISED

## 2022-06-23 ASSESSMENT — ACTIVITIES OF DAILY LIVING (ADL): TOILETING: INDEPENDENT

## 2022-06-23 NOTE — THERAPY
Occupational Therapy   Initial Evaluation     Patient Name: Fede Parikh Jr.  Age:  77 y.o., Sex:  male  Medical Record #: [id number]  Today's Date: 6/23/2022          Assessment  Patient is 77 y.o. male admitted for chest and abdominal pain, acute pancreatitis, lactic acidosis. Pt normally independent with functional mobility and ADLs living in a ground level apartment with SO, pt unwilling to elaborate or fully participate in evaluation. Pt able to complete all functional mobility and ADLs with supervision, no AD needed. Pt seems to be at his functional baseline, will complete OT order at this time, no further skilled needs. Patient will not be actively followed for occupational therapy services at this time, however may be seen if requested by physician for 1 more visit within 30 days to address any discharge or equipment needs.        Plan    Recommend Occupational Therapy for Evaluation only.    DC Equipment Recommendations: (P) None  Discharge Recommendations: (P) Anticipate that the patient will have no further occupational therapy needs after discharge from the hospital     Objective       06/23/22 1422   Prior Living Situation   Prior Services None   Housing / Facility 1 Story Apartment / Condo   Steps Into Home 0   Steps In Home 0   Bathroom Set up Bathtub / Shower Combination   Equipment Owned None   Lives with - Patient's Self Care Capacity Significant Other   Prior Level of ADL Function   Self Feeding Independent   Grooming / Hygiene Independent   Bathing Independent   Dressing Independent   Toileting Independent   Prior Level of IADL Function   Medication Management Independent   Laundry Independent   Kitchen Mobility Independent   Finances Independent   Home Management Independent   Shopping Independent   Prior Level Of Mobility Independent Without Device in Community   Cognition    Cognition / Consciousness WDL   Level of Consciousness Alert   Active ROM Upper Body   Active ROM Upper Body  WDL    Dominant Hand Right   Strength Upper Body   Upper Body Strength  WDL   Sensation Upper Body   Upper Extremity Sensation  WDL   Upper Body Muscle Tone   Upper Body Muscle Tone  WDL   Neurological Concerns   Neurological Concerns No   Balance Assessment   Sitting Balance (Static) Fair   Sitting Balance (Dynamic) Fair   Standing Balance (Static) Fair   Standing Balance (Dynamic) Fair   Weight Shift Sitting Fair   Weight Shift Standing Fair   Comments no AD   Bed Mobility    Supine to Sit Supervised   Sit to Supine Supervised   Scooting Supervised   Rolling Supervised   ADL Assessment   Grooming Supervision   Upper Body Dressing Supervision   Lower Body Dressing Supervision   Toileting Supervision   How much help from another person does the patient currently need...   Putting on and taking off regular lower body clothing? 3   Bathing (including washing, rinsing, and drying)? 3   Toileting, which includes using a toilet, bedpan, or urinal? 3   Putting on and taking off regular upper body clothing? 4   Taking care of personal grooming such as brushing teeth? 4   Eating meals? 4   6 Clicks Daily Activity Score 21   Functional Mobility   Sit to Stand Supervised   Bed, Chair, Wheelchair Transfer Supervised   Toilet Transfers Supervised   Transfer Method Stand Step   Mobility bed mobility, bathroom mobility, back to bed   Comments no AD   Activity Tolerance   Sitting in Chair 5 min  (toilet)   Sitting Edge of Bed 2 min   Standing 5 min   Education Group   Education Provided Role of Occupational Therapist   Role of Occupational Therapist Patient Response Patient;Acceptance;Explanation   Problem List   Problem List None   Anticipated Discharge Equipment and Recommendations   DC Equipment Recommendations None   Discharge Recommendations Anticipate that the patient will have no further occupational therapy needs after discharge from the hospital   Interdisciplinary Plan of Care Collaboration   IDT Collaboration with   Nursing;Physical Therapist   Patient Position at End of Therapy In Bed;Bed Alarm On;Call Light within Reach;Tray Table within Reach;Phone within Reach   Collaboration Comments RN updated

## 2022-06-23 NOTE — RESPIRATORY CARE
"COPD EDUCATION by COPD CLINICAL EDUCATOR  6/23/2022 at 3:22 PM by Paige Wolff, RRT     Patient interviewed by COPD education team. Patient refused COPD education materials. He has received it in the past. Continues to smoke declined inforamtion An Action Plan was completed in the EMR to reflect current Respiratory Medication use.                   COPD Assessment  COPD Clinical Specialists ONLY  COPD Education Initiated: Yes--Short Intervention ([place], continues to smoke reviewed medications no questions)  DME Equipment Type: none  Physician Name: GIOVANNI BILLY M.D.  [facility]  group  Pulmonologist Name: none  Smoking Cessation: Declined (continues to smoke declined information)  Dispatch Health: Declined  Is this a COPD exacerbation patient?: No  $ Demo/Eval of SVN's, MDI's and Aerosols: Yes (review of delivery devices for medications)  (OP) Pulmonary Function Testing:  (unable to find PFT data states he had one years ago)    Meds to Beds  Would the patient like to opt in for Bedside Medication Delivery at Discharge?: Yes, interested     MY COPD ACTION PLAN     It is recommended that patients and physicians /healthcare providers complete this action plan together. This plan should be discussed at each physician visit and updated as needed.    The green, yellow and red zones show groups of symptoms of COPD. This list of symptoms is not comprehensive, and you may experience other symptoms. In the \"Actions\" column, your healthcare provider has recommended actions for you to take based on your symptoms.    Patient Name: Fede Parikh Jr.   YOB: 1945   Last Updated on:     Green Zone:  I am doing well today Actions   •  Usual activitiy and exercise level •  Take daily medications   •  Usual amounts of cough and phlegm/mucus •  Use oxygen as prescribed   •  Sleep well at night •  Continue regular exercise/diet plan   •  Appetite is good •  At all times avoid cigarette smoke, inhaled " "irritants     Daily Medications (these medications are taken every day):   Fluticasone Furoate and Vilanterol trifenatate (Breo)  Tiotropium Bromide Monohydrate (Spiriva) 1 Puff  1 capsule Once daily  Once daily     Additional Information:  Remember to rinse mouth after using your Breo inhaler    Yellow Zone:  I am having a bad day or a COPD flare Actions   •  More breathless than usual •  Continue daily medications   •  I have less energy for my daily activities •  Use quick relief inhaler as ordered   •  Increased or thicker phlegm/mucus •  Use oxygen as prescribed   •  Using quick relief inhaler/nebulizer more often •  Get plenty of rest   •  Swelling of ankles more than usual •  Use pursed lip breathing   •  More coughing than usual •  At all times avoid cigarette smoke, inhaled irritants   •  I feel like I have a \"chest cold\"   •  Poor sleep and my symptoms woke me up   •  My appetite is not good   •  My medicine is not helping    •  Call provider immediately if symptoms don’t improve     Continue daily medications, add rescue medications:   Albuterol 2 Puffs Every 6 hours PRN       Medications to be used during a flare up, (as Discussed with Provider):           Additional Information:  Use with spacer    Red Zone:  I need urgent medical care Actions   •  Severe shortness of breath even at rest •  Call 911 or seek medical care immediately   •  Not able to do any activity because of breathing    •  Fever or shaking chills    •  Feeling confused or very drowsy     •  Chest pains    •  Coughing up blood                "

## 2022-06-23 NOTE — CARE PLAN
"The patient is Stable - Low risk of patient condition declining or worsening    Shift Goals  Clinical Goals: Patient will acheive adequate pain management throughout shift. Patient will verbalize understanding of POC by end of shift.  Patient Goals: \"Turkey sandwich.\"  Family Goals: RADHA    Progress made toward(s) clinical / shift goals:  Provided patient with a verbal discussion related to POC to include a discussion related to pancreatitis. Patient verbalized understanding and had no questions. Pt is able to verbalize pain on a scale of 1-10 and is able to verbalize comfort goal. Pain management plan followed and pt educated on nonpharmacologic and pharmacological comfort measures. Patient educated to use call light for assistance. Fall precautions in place. Staff will assist with mobilization. Hourly rounding in place.    Patient is not progressing towards the following goals: N/A      "

## 2022-06-23 NOTE — THERAPY
Missed Therapy     Patient Name: Fede Parikh Jr.  Age:  77 y.o., Sex:  male  Medical Record #: [id number]  Today's Date: 6/23/2022    PT consult received. Discussed with RN, pt pending further medical work-up. PT will evaluate once POC established.

## 2022-06-23 NOTE — PROGRESS NOTES
[facility] Medicine Daily Progress Note    Date of Service  6/23/2022    Chief Complaint  Fede Parikh Jr. is a 77 y.o. male admitted 6/21/2022 with chest and abdominal pain    Hospital Course  Patient is a 77-year-old male with past medical history of COPD, hypertension, CKD stage III, dyslipidemia, alcohol abuse and tobacco abuse. He presented to [facility] on 6/21/22 with chest pain and abdominal pain. On admission he was found to have acute pancreatitis with lactic acidosis.  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  A MRCP was ordered to further evaluate the dilated CBD.  Patient started on weight-based Lovenox for mural thrombus of distal aorta.  Echocardiogram ordered.      He will need outpatient follow-up of thyroid nodules with thyroid ultrasound.    Interval Problem Update  He remains axox3, very hard of hearing but does have his hearing aids. He denies further chest pain, reports mild abdominal pain 2/10, no n/v. We discussed long term anticoagulation for the mural thrombus including the risks vs benefits - he agreed to treatment with coumadin. He denies sob, no nausea or vomiting. ROS otherwise negative, echo still pending.     I have discussed this patient's plan of care and discharge plan at IDT rounds today with Case Management, Nursing, Nursing leadership, and other members of the IDT team.    Consultants/Specialty  Vascular - [facility]    Code Status  Full Code    Disposition  Patient is not medically cleared for discharge.   Anticipate discharge to be determined.    Review of Systems  Review of Systems   Constitutional: Negative.  Negative for chills, fever, malaise/fatigue and weight loss.   HENT: Negative.  Negative for sore throat.    Eyes: Negative.     Respiratory: Negative.  Negative for cough and shortness of breath.    Cardiovascular: Negative.  Negative for chest pain, orthopnea and leg swelling.   Gastrointestinal: Positive for abdominal pain. Negative for constipation, heartburn and nausea.   Genitourinary: Negative.    Musculoskeletal: Negative.  Negative for myalgias.   Skin: Negative.    Neurological: Negative.  Negative for dizziness, tingling and sensory change.   Endo/Heme/Allergies: Negative.  Does not bruise/bleed easily.   Psychiatric/Behavioral: Positive for substance abuse. Negative for depression.   All other systems reviewed and are negative.       Physical Exam  Temp:  [36.4 °C (97.6 °F)-37 °C (98.6 °F)] 36.6 °C (97.8 °F)  Pulse:  [] 85  Resp:  [16-18] 16  BP: (114-145)/(68-80) 145/80  SpO2:  [90 %-94 %] 94 %    Physical Exam  Vitals and nursing note reviewed. Exam conducted with a chaperone present.   Constitutional:       General: He is not in acute distress.     Appearance: Normal appearance. He is not diaphoretic.   HENT:      Head: Normocephalic.      Nose: Nose normal.      Mouth/Throat:      Mouth: Mucous membranes are moist.   Eyes:      Pupils: Pupils are equal, round, and reactive to light.   Cardiovascular:      Rate and Rhythm: Normal rate and regular rhythm.      Pulses: Normal pulses.      Heart sounds: Normal heart sounds.   Pulmonary:      Effort: Pulmonary effort is normal.      Breath sounds: Normal breath sounds.   Abdominal:      General: Abdomen is flat. Bowel sounds are normal.      Palpations: Abdomen is soft.      Tenderness: There is no abdominal tenderness. There is no right CVA tenderness, left CVA tenderness, guarding or rebound.      Comments: Palpable ?scar tissue/ mesh - he reports this is chronic   Musculoskeletal:         General: No swelling or deformity. Normal range of motion.   Skin:     General: Skin is warm and dry.      Capillary Refill: Capillary refill takes less than 2 seconds.   Neurological:       General: No focal deficit present.      Mental Status: He is alert and oriented to person, place, and time.      Cranial Nerves: No cranial nerve deficit.   Psychiatric:         Mood and Affect: Mood normal.         Behavior: Behavior normal.         Fluids    Intake/Output Summary (Last 24 hours) at 6/23/2022 1227  Last data filed at 6/23/2022 0730  Gross per 24 hour   Intake 920 ml   Output 700 ml   Net 220 ml       Laboratory  Recent Labs     06/21/22  0300 06/22/22  0235 06/23/22  0222   WBC 7.6 6.5 6.2   RBC 3.62* 3.28* 3.35*   HEMOGLOBIN 8.7* 7.8* 7.9*   HEMATOCRIT 27.6* 24.8* 25.3*   MCV 76.2* 75.6* 75.5*   MCH 24.0* 23.8* 23.6*   MCHC 31.5* 31.5* 31.2*   RDW 60.3* 57.9* 57.6*   PLATELETCT 211 189 208   MPV 10.0 9.4 10.0     Recent Labs     06/21/22  0300 06/22/22  0235 06/23/22  0222   SODIUM 139 138 136   POTASSIUM 4.2 4.3 3.9   CHLORIDE 106 105 103   CO2 16* 22 22   GLUCOSE 78 97 95   BUN 11 13 10   CREATININE 1.43* 1.57* 1.38   CALCIUM 9.3 8.9 8.6     Recent Labs     06/21/22  0300   APTT <20.0*   INR 1.04               Imaging  [id number]/O   Final Result      1. No choledocholithiasis or biliary dilatation.   2. Cholelithiasis. No cholecystitis.   3. Sequela of acute pancreatitis, with several pockets of fluid along the pancreatic head/neck measuring up to 2.5 cm. They likely represent small acute peripancreatic fluid collections related to recent pancreatitis. They are too small to drain.         DX-CHEST-PORTABLE (1 VIEW)   Final Result         1.  No acute cardiopulmonary disease.   2.  Atherosclerosis      CT-CTA COMPLETE THORACOABDOMINAL AORTA   Final Result         1.  Occlusion of right common iliac artery stent.   2.  High-grade stenosis of the left common iliac artery   3.  Femorofemoral bypass graft which is nonopacified and likely occluded.   4.  Mural thrombus of the distal aorta near the aortic bifurcation   5.  Hazy peripancreatic fat stranding suggests changes of pancreatitis.  Dilated common bile duct, follow-up ERCP or MRCP recommended to exclude obstructing pancreatic mass or stenosis.   6.  Bilateral thyroid nodules, greater on the right, follow-up thyroid sonography for further characterization due to nodule size   7.  Atherosclerosis and atherosclerotic coronary artery disease      These findings were discussed with the patient's clinician, UCHE JUAREZ, on 6/21/2022 3:49 AM.            EC-ECHOCARDIOGRAM COMPLETE W/O CONT    (Results Pending)        Assessment/Plan  * Chest pain- (present on admission)  Assessment & Plan  Probably secondary to pancreatitis  EKG did not show acute changes  Troponin minimally elevated and decreasing  Pain resolved  Following  Echo pending    Aortic thrombus (HCC)  Assessment & Plan  Mural thrombus of distal aorta  Discussed with Dr. White - will follow up in clinic  Continue anticoagulation - transition to coumadin - not candidate for dac due to chronic kidney disease    Thyroid nodule  Assessment & Plan  Incidental finding  Discussed with patient  He agrees to have this worked up as an outpatient    Anemia  Assessment & Plan  Acute on chronic  Chronic kidney disease contributing  No melena or hematochezia, no hematuria - following  Suspect hemodilution from iv fluids contributing, stable overnight    Hypomagnesemia  Assessment & Plan  Remains low  Continue replacement  following    Pancreatitis  Assessment & Plan  Patient endorses drinking 6 shots a day  Lipase elevated   CT showed peripancreatic stranding and pancreatic duct dilation, concerning for possible pancreatic duct obstruction versus mass  MRCP shows no stones, pockets of peripancreatic fluid consistent with pancreatitis noted - too small for drainage - discussed with patient and again stressed risk for worsening pancreatic damage if he resumes etoh use  following    Peripheral artery disease (HCC)- (present on admission)  Assessment & Plan  CTA of thoracoabdominal aorta revealed    occlusion of common right iliac artery stent, left common iliac artery stenosis, occluded femorofemoral bypass graft, moral thrombus of the distal aorta near bifurcation  Unclear if this is contributing to his abdominal pain or if that is mainly from the pancreatitis  Lost occlusion seem to be chronic.  Patient had an outpatient appointment with Dr. Diaz/vascular surgery for 6/20 1:11 AM  Smoking cessation discussed and recommended  Dr. Briones to eval  Continue full dose lovenox    Lactic acid acidosis  Assessment & Plan  Likely secondary to transient hypoperfusion/hypotension and thiamine deficiency due to alcohol abuse  resolved    CKD (chronic kidney disease), stage III ([facility])- (present on admission)  Assessment & Plan  Avoid nephrotoxins  Creatinine increasing   Following  Continue hydration    Alcohol use- (present on admission)  Assessment & Plan  Continue to monitor for withdrawal with CIWA protocol, no s/o w/d currently  Thiamine supplementation  Cessation discussed and recommended    Tobacco use- (present on admission)  Assessment & Plan  Smoking cessation discussed and recommended  Nicotine patch    Substance abuse ([facility])  Assessment & Plan  Drug screen positive for cocaine and opiates  Cessation discussed and recommended    Essential hypertension- (present on admission)  Assessment & Plan  Amlodipine was held as patient was hypotensive per EMS  Continue to follow    COPD (chronic obstructive pulmonary disease) ([facility])- (present on admission)  Assessment & Plan  No evidence of exacerbation at this time  following  Continue home inhalers Symbicort, Spiriva  Albuterol as needed       VTE prophylaxis: enoxaparin ppx    I have performed a physical exam and reviewed and updated ROS and Plan today (6/23/2022). In review of yesterday's note (6/22/2022), there are no changes except as documented above.

## 2022-06-23 NOTE — PROGRESS NOTES
Inpatient Anticoagulation Service Note    Date: 6/23/2022    Reason for Anticoagulation:  (mural thrombus)   Target INR: 2.0 to 3.0         Hemoglobin Value: (!) 7.9  Hematocrit Value: (!) 25.3  Lab Platelet Value: 208    INR from last 7 days     Date/Time INR Value    06/23/22 1437 1.06    06/21/22 0300 1.04        Dose from last 7 days     Date/Time Dose (mg)    06/23/22 1618 4        Bridge Therapy: Yes  Date of Last VTE Event: 06/21/22  Bridge Therapy Start Date: 06/23/22  Days of Overlap Therapy: 1  INR Value Greater than 2 Prior to Discontinuation of Parenteral Anticoagulation: Not Applicable   Reversal Agent Administered: Not Applicable  Comments: Anticipate lower end of dosing given low body weight, normal albumin.  Start with 4 mg and continue to trend INR.  Education Material Provided?: No  Pharmacist suggested discharge dosing: CLARENCE Pitts, PharmD  [id number]

## 2022-06-23 NOTE — THERAPY
Physical Therapy   Initial Evaluation     Patient Name: Fede Parikh Jr.  Age:  77 y.o., Sex:  male  Medical Record #: [id number]  Today's Date: 6/23/2022    Assessment  Patient is a 77 y.o. male admitted with c/o chest and abdominal pain, found to have aortic thrombus and pancreatitis. Hx includes alcohol and substance abuse, PAD, CKD. Pt seen for PT evaluation at this time. Demos mobility without assist, no LOB ambulating with and without FWW. Reports no concerns and appears functionally capable of return home at this time. Patient will not be actively followed for physical therapy services, however may be seen if requested by physician for 1 more visit within 30 days to address any discharge or equipment needs.      Plan  Recommend Physical Therapy for Evaluation only   DC Equipment Recommendations: None  Discharge Recommendations: Anticipate that the patient will have no further physical therapy needs after discharge from the hospital          06/23/22 1409   Prior Living Situation   Prior Services None   Housing / Facility 1 Story Apartment / Condo   Steps Into Home 0   Steps In Home 0   Lives with -  Significant Other   Comments was indep prior, SO able to assist   Prior Level of Functional Mobility   Bed Mobility Independent   Transfer Status Independent   Ambulation Independent   Distance Ambulation (Feet) community distances   Assistive Devices Used None   Cognition    Speech/ Communication Hard of Hearing   Level of Consciousness Alert   Comments uses amplifier   Balance Assessment   Sitting Balance (Static) Good   Sitting Balance (Dynamic) Good   Standing Balance (Static) Fair +   Standing Balance (Dynamic) Fair +   Weight Shift Sitting Good   Weight Shift Standing Good   Comments with and without FWW   Gait Analysis   Gait Level Of Assist Supervised   Assistive Device None   Distance (Feet) 20   # of Times Distance Traveled 2   Weight Bearing Status no restrictions   Comments no LOB, first amb with  FWW, then without. declined further mobility following use of bathroom.   Bed Mobility    Supine to Sit Supervised   Sit to Supine Supervised   Scooting Supervised   Functional Mobility   Sit to Stand Supervised   Toilet Transfers Supervised

## 2022-06-23 NOTE — ASSESSMENT & PLAN NOTE
Mural thrombus of distal aorta  Discussed with Dr. White - will follow up in clinic  Continue anticoagulation - transition to coumadin - not candidate for dac due to chronic kidney disease  Referral to coumadin clinic  Lovenox training pending

## 2022-06-23 NOTE — THERAPY
Missed Therapy     Patient Name: Fede Parikh Jr.  Age:  77 y.o., Sex:  male  Medical Record #: [id number]  Today's Date: 6/23/2022    Spoke with bedside RN, pt pending further workup and not medically appropriate at this time. Will hold OT evaluation at this time and follow up as appropriate.      Joe Ellison OTD, OTR/L

## 2022-06-23 NOTE — CARE PLAN
"  Problem: Knowledge Deficit - Standard  Goal: Patient and family/care givers will demonstrate understanding of plan of care, disease process/condition, diagnostic tests and medications  Outcome: Progressing     Problem: Fall Risk  Goal: Patient will remain free from falls  Outcome: Progressing   The patient is Stable - Low risk of patient condition declining or worsening    Shift Goals  Clinical Goals: Pain control  Patient Goals: \"rest\"  Family Goals: RADHA        "

## 2022-06-23 NOTE — DISCHARGE PLANNING
Care Transition Team Assessment    LSW met with pt at bedside to complete assessment and completed chart review. Pt [place] and had difficult time answering some questions. Pt verified his home address and reported he lives with his S/O, Stew, in an apartment with no steps to enter. When asked if pt was independent prior to hospitalization pt stated he and Stew help each other out, and did not elaborate. Pt has previously discharged home with a FWW. Pt has a history of SA. No MH concerns at this time.    Pt pending PT/OT recommendations for DC planning.    Information Source  Orientation Level: Oriented X4  Information Given By: Patient  Informant's Name: Fede Parikh  Who is responsible for making decisions for patient? : Patient    Readmission Evaluation  Is this a readmission?: No    Elopement Risk  Legal Hold: No  Ambulatory or Self Mobile in Wheelchair: Yes  Disoriented: No  Psychiatric Symptoms: None  History of Wandering: No  Elopement this Admit: No  Vocalizing Wanting to Leave: No  Displays Behaviors, Body Language Wanting to Leave: No-Not at Risk for Elopement  Elopement Risk: Not at Risk for Elopement    Interdisciplinary Discharge Planning  Patient or legal guardian wants to designate a caregiver: No    Discharge Preparedness  What is your plan after discharge?: Home health care  What are your discharge supports?: Partner  Prior Functional Level: Ambulatory, Independent with Activities of Daily Living, Independent with Medication Management  Difficulity with ADLs: None  Difficulity with IADLs: Driving    Functional Assesment  Prior Functional Level: Ambulatory, Independent with Activities of Daily Living, Independent with Medication Management    Finances  Financial Barriers to Discharge: No  Prescription Coverage: Yes    Vision / Hearing Impairment  Right Eye Vision: Impaired  Left Eye Vision: Impaired  Hearing Impairment: Hearing Device Not Available, Both Ears    Advance Directive  Advance  Directive?: None  Advance Directive offered?: AD Booklet refused    Domestic Abuse  Have you ever been the victim of abuse or violence?: No    Psychological Assessment  History of Substance Abuse: Cocaine, Prescription opioids  History of Psychiatric Problems: No  Non-compliant with Treatment: No  Newly Diagnosed Illness: No    Discharge Risks or Barriers  Discharge risks or barriers?: Substance abuse  Patient risk factors: Substance abuse    Anticipated Discharge Information  Discharge Disposition: D/T to home under A care in anticipation of covered skilled care (06)

## 2022-06-24 LAB
BASOPHILS # BLD AUTO: 0.2 % (ref 0–1.8)
BASOPHILS # BLD: 0.01 K/UL (ref 0–0.12)
EOSINOPHIL # BLD AUTO: 0.46 K/UL (ref 0–0.51)
EOSINOPHIL NFR BLD: 7.3 % (ref 0–6.9)
ERYTHROCYTE [DISTWIDTH] IN BLOOD BY AUTOMATED COUNT: 58.5 FL (ref 35.9–50)
HCT VFR BLD AUTO: 24.7 % (ref 42–52)
HGB BLD-MCNC: 7.7 G/DL (ref 14–18)
IMM GRANULOCYTES # BLD AUTO: 0.02 K/UL (ref 0–0.11)
IMM GRANULOCYTES NFR BLD AUTO: 0.3 % (ref 0–0.9)
INR PPP: 0.97 (ref 0.87–1.13)
LYMPHOCYTES # BLD AUTO: 1.44 K/UL (ref 1–4.8)
LYMPHOCYTES NFR BLD: 23 % (ref 22–41)
MCH RBC QN AUTO: 23.6 PG (ref 27–33)
MCHC RBC AUTO-ENTMCNC: 31.2 G/DL (ref 33.7–35.3)
MCV RBC AUTO: 75.8 FL (ref 81.4–97.8)
MONOCYTES # BLD AUTO: 0.97 K/UL (ref 0–0.85)
MONOCYTES NFR BLD AUTO: 15.5 % (ref 0–13.4)
NEUTROPHILS # BLD AUTO: 3.36 K/UL (ref 1.82–7.42)
NEUTROPHILS NFR BLD: 53.7 % (ref 44–72)
NRBC # BLD AUTO: 0 K/UL
NRBC BLD-RTO: 0 /100 WBC
PLATELET # BLD AUTO: 262 K/UL (ref 164–446)
PMV BLD AUTO: 10.5 FL (ref 9–12.9)
PROTHROMBIN TIME: 12.6 SEC (ref 12–14.6)
RBC # BLD AUTO: 3.26 M/UL (ref 4.7–6.1)
WBC # BLD AUTO: 6.3 K/UL (ref 4.8–10.8)

## 2022-06-24 PROCEDURE — 99233 SBSQ HOSP IP/OBS HIGH 50: CPT | Performed by: HOSPITALIST

## 2022-06-24 PROCEDURE — 770020 HCHG ROOM/CARE - TELE (206)

## 2022-06-24 PROCEDURE — 700102 HCHG RX REV CODE 250 W/ 637 OVERRIDE(OP): Performed by: HOSPITALIST

## 2022-06-24 PROCEDURE — A9270 NON-COVERED ITEM OR SERVICE: HCPCS | Performed by: INTERNAL MEDICINE

## 2022-06-24 PROCEDURE — 700102 HCHG RX REV CODE 250 W/ 637 OVERRIDE(OP): Performed by: INTERNAL MEDICINE

## 2022-06-24 PROCEDURE — 700102 HCHG RX REV CODE 250 W/ 637 OVERRIDE(OP)

## 2022-06-24 PROCEDURE — 700111 HCHG RX REV CODE 636 W/ 250 OVERRIDE (IP): Performed by: HOSPITALIST

## 2022-06-24 PROCEDURE — 700105 HCHG RX REV CODE 258: Performed by: INTERNAL MEDICINE

## 2022-06-24 PROCEDURE — A9270 NON-COVERED ITEM OR SERVICE: HCPCS | Performed by: HOSPITALIST

## 2022-06-24 PROCEDURE — 36415 COLL VENOUS BLD VENIPUNCTURE: CPT

## 2022-06-24 PROCEDURE — 85025 COMPLETE CBC W/AUTO DIFF WBC: CPT

## 2022-06-24 PROCEDURE — A9270 NON-COVERED ITEM OR SERVICE: HCPCS

## 2022-06-24 PROCEDURE — 85610 PROTHROMBIN TIME: CPT

## 2022-06-24 RX ADMIN — OMEPRAZOLE 20 MG: 20 CAPSULE, DELAYED RELEASE ORAL at 17:10

## 2022-06-24 RX ADMIN — ENOXAPARIN SODIUM 60 MG: 60 INJECTION SUBCUTANEOUS at 04:41

## 2022-06-24 RX ADMIN — BUDESONIDE AND FORMOTEROL FUMARATE DIHYDRATE 2 PUFF: 160; 4.5 AEROSOL RESPIRATORY (INHALATION) at 17:15

## 2022-06-24 RX ADMIN — WARFARIN SODIUM 4 MG: 4 TABLET ORAL at 17:11

## 2022-06-24 RX ADMIN — ENOXAPARIN SODIUM 60 MG: 60 INJECTION SUBCUTANEOUS at 17:10

## 2022-06-24 RX ADMIN — SENNOSIDES AND DOCUSATE SODIUM 2 TABLET: 50; 8.6 TABLET ORAL at 04:41

## 2022-06-24 RX ADMIN — THERA TABS 1 TABLET: TAB at 04:41

## 2022-06-24 RX ADMIN — SODIUM CHLORIDE, POTASSIUM CHLORIDE, SODIUM LACTATE AND CALCIUM CHLORIDE: 600; 310; 30; 20 INJECTION, SOLUTION INTRAVENOUS at 07:42

## 2022-06-24 RX ADMIN — SODIUM CHLORIDE, POTASSIUM CHLORIDE, SODIUM LACTATE AND CALCIUM CHLORIDE: 600; 310; 30; 20 INJECTION, SOLUTION INTRAVENOUS at 19:45

## 2022-06-24 RX ADMIN — FOLIC ACID 1 MG: 1 TABLET ORAL at 04:40

## 2022-06-24 RX ADMIN — DOCUSATE SODIUM 100 MG: 100 CAPSULE, LIQUID FILLED ORAL at 17:10

## 2022-06-24 RX ADMIN — NICOTINE 14 MG: 14 PATCH TRANSDERMAL at 06:00

## 2022-06-24 RX ADMIN — OMEPRAZOLE 20 MG: 20 CAPSULE, DELAYED RELEASE ORAL at 04:41

## 2022-06-24 RX ADMIN — HYDROCODONE BITARTRATE AND ACETAMINOPHEN 1 TABLET: 5; 325 TABLET ORAL at 17:14

## 2022-06-24 RX ADMIN — DOCUSATE SODIUM 100 MG: 100 CAPSULE, LIQUID FILLED ORAL at 04:41

## 2022-06-24 RX ADMIN — THIAMINE HCL TAB 100 MG 100 MG: 100 TAB at 04:41

## 2022-06-24 ASSESSMENT — ENCOUNTER SYMPTOMS
CHILLS: 0
WEIGHT LOSS: 0
CARDIOVASCULAR NEGATIVE: 1
SENSORY CHANGE: 0
MYALGIAS: 0
COUGH: 0
EYES NEGATIVE: 1
MUSCULOSKELETAL NEGATIVE: 1
SORE THROAT: 0
RESPIRATORY NEGATIVE: 1
CONSTITUTIONAL NEGATIVE: 1
HEARTBURN: 0
DIZZINESS: 0
ABDOMINAL PAIN: 0
DEPRESSION: 0
BRUISES/BLEEDS EASILY: 0
CONSTIPATION: 0
FEVER: 0
ORTHOPNEA: 0
SHORTNESS OF BREATH: 0
NEUROLOGICAL NEGATIVE: 1
NAUSEA: 0
TINGLING: 0

## 2022-06-24 ASSESSMENT — LIFESTYLE VARIABLES
NAUSEA AND VOMITING: NO NAUSEA AND NO VOMITING
VISUAL DISTURBANCES: NOT PRESENT
ANXIETY: NO ANXIETY (AT EASE)
ORIENTATION AND CLOUDING OF SENSORIUM: ORIENTED AND CAN DO SERIAL ADDITIONS
TOTAL SCORE: 0
AUDITORY DISTURBANCES: NOT PRESENT
VISUAL DISTURBANCES: NOT PRESENT
SUBSTANCE_ABUSE: 1
AGITATION: NORMAL ACTIVITY
VISUAL DISTURBANCES: NOT PRESENT
AUDITORY DISTURBANCES: NOT PRESENT
ORIENTATION AND CLOUDING OF SENSORIUM: ORIENTED AND CAN DO SERIAL ADDITIONS
TREMOR: NO TREMOR
HEADACHE, FULLNESS IN HEAD: NOT PRESENT
HEADACHE, FULLNESS IN HEAD: NOT PRESENT
TOTAL SCORE: 0
NAUSEA AND VOMITING: NO NAUSEA AND NO VOMITING
PAROXYSMAL SWEATS: NO SWEAT VISIBLE
ORIENTATION AND CLOUDING OF SENSORIUM: ORIENTED AND CAN DO SERIAL ADDITIONS
AGITATION: NORMAL ACTIVITY
ANXIETY: NO ANXIETY (AT EASE)
TREMOR: NO TREMOR
NAUSEA AND VOMITING: NO NAUSEA AND NO VOMITING
TOTAL SCORE: 0
PAROXYSMAL SWEATS: NO SWEAT VISIBLE
PAROXYSMAL SWEATS: NO SWEAT VISIBLE
HEADACHE, FULLNESS IN HEAD: NOT PRESENT
TREMOR: NO TREMOR
ANXIETY: NO ANXIETY (AT EASE)
AGITATION: NORMAL ACTIVITY
AUDITORY DISTURBANCES: NOT PRESENT

## 2022-06-24 ASSESSMENT — PAIN DESCRIPTION - PAIN TYPE: TYPE: ACUTE PAIN

## 2022-06-24 ASSESSMENT — FIBROSIS 4 INDEX: FIB4 SCORE: 2.42

## 2022-06-24 NOTE — PROGRESS NOTES
Report received from day shift RN. Patient resting in bed with wife at bedside. A&O x4, VSS, pain 0/10. Able to make needs known. Call-light in reach and fall precautions in place.

## 2022-06-24 NOTE — CARE PLAN
The patient is Stable - Low risk of patient condition declining or worsening    Shift Goals  Clinical Goals: VS, I/O, labs,  Patient Goals: rest/ activity  Family Goals: RADHA    Progress made toward(s) clinical / shift goals:    Problem: Pain - Standard  Goal: Alleviation of pain or a reduction in pain to the patient’s comfort goal  Outcome: Progressing     Problem: Knowledge Deficit - Standard  Goal: Patient and family/care givers will demonstrate understanding of plan of care, disease process/condition, diagnostic tests and medications  Outcome: Progressing     Problem: Seizure Precautions  Goal: Implementation of seizure precautions  Outcome: Progressing     Problem: Fall Risk  Goal: Patient will remain free from falls  Outcome: Progressing       Patient is not progressing towards the following goals:

## 2022-06-24 NOTE — CARE PLAN
The patient is Stable - Low risk of patient condition declining or worsening    Shift Goals  Clinical Goals: Monitor labs, rest  Patient Goals: Rest, snacks  Family Goals: RADHA    Progress made toward(s) clinical / shift goals:  yes      Problem: Pain - Standard  Goal: Alleviation of pain or a reduction in pain to the patient’s comfort goal  Outcome: Progressing     Problem: Seizure Precautions  Goal: Implementation of seizure precautions  Outcome: Progressing     Problem: Fall Risk  Goal: Patient will remain free from falls  Outcome: Progressing       Patient is not progressing towards the following goals:

## 2022-06-24 NOTE — PROGRESS NOTES
Pt refusing bed alarm. Educated on risks and need to call for help when getting out of bed. Patient says he will call for assistance, but still wants bed alarm off. Pt A&Ox4, calling appropriately. Bed alarm off per patient refusal.

## 2022-06-24 NOTE — PROGRESS NOTES
[facility] Medicine Daily Progress Note    Date of Service  6/24/2022    Chief Complaint  Fede Parikh Jr. is a 77 y.o. male admitted 6/21/2022 with chest and abdominal pain    Hospital Course  Patient is a 77-year-old male with past medical history of COPD, hypertension, CKD stage III, dyslipidemia, alcohol abuse and tobacco abuse. He presented to [facility] on 6/21/22 with chest pain and abdominal pain. On admission he was found to have acute pancreatitis with lactic acidosis.  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  A MRCP was ordered to further evaluate the dilated CBD.  Patient started on weight-based Lovenox for mural thrombus of distal aorta.  Echocardiogram ordered.      He will need outpatient follow-up of thyroid nodules with thyroid ultrasound.    Interval Problem Update  Axox3, no abdominal or chest pain today, echo wnl. He denies melena or hematochezia, h/h did drop some. We discussed training for coumadin and lovenox bridge - he would like his wife to come in for lovenox training. If stable in am will d/c and follow up at the coumadin clinic. Etoh cessation was again discussed    I have discussed this patient's plan of care and discharge plan at IDT rounds today with Case Management, Nursing, Nursing leadership, and other members of the IDT team.    Consultants/Specialty  Vascular - [facility]    Code Status  Full Code    Disposition  Patient is not medically cleared for discharge.   Anticipate discharge to be determined.    Review of Systems  Review of Systems   Constitutional: Negative.  Negative for chills, fever, malaise/fatigue and weight loss.   HENT: Negative.  Negative for sore throat.    Eyes: Negative.    Respiratory: Negative.  Negative for cough and  shortness of breath.    Cardiovascular: Negative.  Negative for chest pain, orthopnea and leg swelling.   Gastrointestinal: Negative for abdominal pain, constipation, heartburn and nausea.   Genitourinary: Negative.    Musculoskeletal: Negative.  Negative for myalgias.   Skin: Negative.    Neurological: Negative.  Negative for dizziness, tingling and sensory change.   Endo/Heme/Allergies: Negative.  Does not bruise/bleed easily.   Psychiatric/Behavioral: Positive for substance abuse. Negative for depression.   All other systems reviewed and are negative.       Physical Exam  Temp:  [36.3 °C (97.3 °F)-36.9 °C (98.4 °F)] 36.6 °C (97.9 °F)  Pulse:  [68-94] 94  Resp:  [17-18] 18  BP: (131-153)/(72-99) 153/99  SpO2:  [94 %-96 %] 95 %    Physical Exam  Vitals and nursing note reviewed. Exam conducted with a chaperone present.   Constitutional:       General: He is not in acute distress.     Appearance: Normal appearance. He is not diaphoretic.   HENT:      Head: Normocephalic.      Nose: Nose normal.      Mouth/Throat:      Mouth: Mucous membranes are moist.   Eyes:      Pupils: Pupils are equal, round, and reactive to light.   Cardiovascular:      Rate and Rhythm: Normal rate and regular rhythm.      Pulses: Normal pulses.      Heart sounds: Normal heart sounds.   Pulmonary:      Effort: Pulmonary effort is normal.      Breath sounds: Normal breath sounds.   Abdominal:      General: Abdomen is flat. Bowel sounds are normal.      Palpations: Abdomen is soft.      Tenderness: There is no abdominal tenderness. There is no right CVA tenderness, left CVA tenderness, guarding or rebound.      Comments: Palpable ?scar tissue/ mesh - he reports this is chronic   Musculoskeletal:         General: No swelling or deformity. Normal range of motion.   Skin:     General: Skin is warm and dry.      Capillary Refill: Capillary refill takes less than 2 seconds.   Neurological:      General: No focal deficit present.      Mental Status:  He is alert and oriented to person, place, and time.      Cranial Nerves: No cranial nerve deficit.   Psychiatric:         Mood and Affect: Mood normal.         Behavior: Behavior normal.         Fluids    Intake/Output Summary (Last 24 hours) at 6/24/2022 0751  Last data filed at 6/24/2022 0731  Gross per 24 hour   Intake --   Output 600 ml   Net -600 ml       Laboratory  Recent Labs     06/22/22  0235 06/23/22  0222 06/24/22  0247   WBC 6.5 6.2 6.3   RBC 3.28* 3.35* 3.26*   HEMOGLOBIN 7.8* 7.9* 7.7*   HEMATOCRIT 24.8* 25.3* 24.7*   MCV 75.6* 75.5* 75.8*   MCH 23.8* 23.6* 23.6*   MCHC 31.5* 31.2* 31.2*   RDW 57.9* 57.6* 58.5*   PLATELETCT 189 208 262   MPV 9.4 10.0 10.5     Recent Labs     06/22/22  0235 06/23/22  0222   SODIUM 138 136   POTASSIUM 4.3 3.9   CHLORIDE 105 103   CO2 22 22   GLUCOSE 97 95   BUN 13 10   CREATININE 1.57* 1.38   CALCIUM 8.9 8.6     Recent Labs     06/23/22  1437 06/24/22  0247   INR 1.06 0.97               Imaging  EC-ECHOCARDIOGRAM COMPLETE W/O CONT   Final Result      CS-EYORPTW-P/O   Final Result      1. No choledocholithiasis or biliary dilatation.   2. Cholelithiasis. No cholecystitis.   3. Sequela of acute pancreatitis, with several pockets of fluid along the pancreatic head/neck measuring up to 2.5 cm. They likely represent small acute peripancreatic fluid collections related to recent pancreatitis. They are too small to drain.         DX-CHEST-PORTABLE (1 VIEW)   Final Result         1.  No acute cardiopulmonary disease.   2.  Atherosclerosis      CT-CTA COMPLETE THORACOABDOMINAL AORTA   Final Result         1.  Occlusion of right common iliac artery stent.   2.  High-grade stenosis of the left common iliac artery   3.  Femorofemoral bypass graft which is nonopacified and likely occluded.   4.  Mural thrombus of the distal aorta near the aortic bifurcation   5.  Hazy peripancreatic fat stranding suggests changes of pancreatitis. Dilated common bile duct, follow-up ERCP or MRCP  recommended to exclude obstructing pancreatic mass or stenosis.   6.  Bilateral thyroid nodules, greater on the right, follow-up thyroid sonography for further characterization due to nodule size   7.  Atherosclerosis and atherosclerotic coronary artery disease      These findings were discussed with the patient's clinician, UCHE JUAREZ, on 6/21/2022 3:49 AM.                 Assessment/Plan  * Chest pain- (present on admission)  Assessment & Plan  Probably secondary to pancreatitis  EKG did not show acute changes  Troponin minimally elevated and decreasing  Pain resolved  Following  Echo pending    Aortic thrombus (HCC)  Assessment & Plan  Mural thrombus of distal aorta  Discussed with Dr. White - will follow up in clinic  Continue anticoagulation - transition to coumadin - not candidate for dac due to chronic kidney disease    Thyroid nodule  Assessment & Plan  Incidental finding  Discussed with patient  He agrees to have this worked up as an outpatient    Anemia  Assessment & Plan  Acute on chronic  Chronic kidney disease contributing  No melena or hematochezia, no hematuria - following  Suspect hemodilution from iv fluids contributing, stable overnight    Hypomagnesemia  Assessment & Plan  Remains low  Continue replacement  following    Pancreatitis  Assessment & Plan  Patient endorses drinking 6 shots a day  Lipase elevated   CT showed peripancreatic stranding and pancreatic duct dilation, concerning for possible pancreatic duct obstruction versus mass  MRCP shows no stones, pockets of peripancreatic fluid consistent with pancreatitis noted - too small for drainage - discussed with patient and again stressed risk for worsening pancreatic damage if he resumes etoh use  following    Peripheral artery disease (HCC)- (present on admission)  Assessment & Plan  CTA of thoracoabdominal aorta revealed   occlusion of common right iliac artery stent, left common iliac artery stenosis, occluded femorofemoral bypass  graft, moral thrombus of the distal aorta near bifurcation  Unclear if this is contributing to his abdominal pain or if that is mainly from the pancreatitis  Lost occlusion seem to be chronic.  Patient had an outpatient appointment with Dr. Diaz/vascular surgery for 6/20 1:11 AM  Smoking cessation discussed and recommended  Dr. Briones to eval  Continue full dose lovenox    Lactic acid acidosis  Assessment & Plan  Likely secondary to transient hypoperfusion/hypotension and thiamine deficiency due to alcohol abuse  resolved    CKD (chronic kidney disease), stage III ([facility])- (present on admission)  Assessment & Plan  Avoid nephrotoxins  Creatinine increasing   Following  Continue hydration    Alcohol use- (present on admission)  Assessment & Plan  Continue to monitor for withdrawal with CIWA protocol, no s/o w/d currently  Thiamine supplementation  Cessation discussed and recommended    Tobacco use- (present on admission)  Assessment & Plan  Smoking cessation discussed and recommended  Nicotine patch    Substance abuse ([facility])  Assessment & Plan  Drug screen positive for cocaine and opiates  Cessation discussed and recommended    Essential hypertension- (present on admission)  Assessment & Plan  Amlodipine was held as patient was hypotensive per EMS  Continue to follow    COPD (chronic obstructive pulmonary disease) ([facility])- (present on admission)  Assessment & Plan  No evidence of exacerbation at this time  following  Continue home inhalers Symbicort, Spiriva  Albuterol as needed       VTE prophylaxis: enoxaparin ppx    I have performed a physical exam and reviewed and updated ROS and Plan today (6/24/2022). In review of yesterday's note (6/23/2022), there are no changes except as documented above.

## 2022-06-24 NOTE — PROGRESS NOTES
Inpatient Anticoagulation Service Note    Date: 6/24/2022    Reason for Anticoagulation:  (mural thrombus)   Target INR: 2.0 to 3.0         Hemoglobin Value: (!) 7.7  Hematocrit Value: (!) 24.7  Lab Platelet Value: 262    INR from last 7 days     Date/Time INR Value    06/24/22 0247 0.97    06/23/22 1437 1.06    06/21/22 0300 1.04        Dose from last 7 days     Date/Time Dose (mg)    06/24/22 1133 4    06/23/22 1618 4        Bridge Therapy: Yes  Date of Last VTE Event: 06/21/22  Bridge Therapy Start Date: 06/23/22  Days of Overlap Therapy: 2  INR Value Greater than 2 Prior to Discontinuation of Parenteral Anticoagulation: Not Applicable   Reversal Agent Administered: Not Applicable  Comments: Anticipate lower end of dosing given low body weight, normal albumin.  Start with 4 mg and continue to trend INR.  Education Material Provided?: Yes  Pharmacist suggested discharge dosing: warfarin 4 mg and INR check within 48 hours of discharge.     Dyana Pitts, AbbyD  [id number]

## 2022-06-25 ENCOUNTER — PHARMACY VISIT (OUTPATIENT)
Dept: PHARMACY | Facility: MEDICAL CENTER | Age: 77
End: 2022-06-25
Payer: COMMERCIAL

## 2022-06-25 VITALS
WEIGHT: 124.12 LBS | RESPIRATION RATE: 19 BRPM | BODY MASS INDEX: 18.38 KG/M2 | HEIGHT: 69 IN | DIASTOLIC BLOOD PRESSURE: 87 MMHG | HEART RATE: 86 BPM | SYSTOLIC BLOOD PRESSURE: 146 MMHG | TEMPERATURE: 97.6 F | OXYGEN SATURATION: 94 %

## 2022-06-25 PROBLEM — E83.42 HYPOMAGNESEMIA: Status: RESOLVED | Noted: 2022-06-22 | Resolved: 2022-06-25

## 2022-06-25 PROBLEM — K85.90 PANCREATITIS: Status: RESOLVED | Noted: 2022-06-21 | Resolved: 2022-06-25

## 2022-06-25 PROBLEM — E87.20 LACTIC ACID ACIDOSIS: Status: RESOLVED | Noted: 2020-09-04 | Resolved: 2022-06-25

## 2022-06-25 PROBLEM — R07.9 CHEST PAIN: Status: RESOLVED | Noted: 2022-06-21 | Resolved: 2022-06-25

## 2022-06-25 PROBLEM — K85.00 IDIOPATHIC ACUTE PANCREATITIS: Status: RESOLVED | Noted: 2022-06-21 | Resolved: 2022-06-25

## 2022-06-25 LAB
BASOPHILS # BLD AUTO: 0.4 % (ref 0–1.8)
BASOPHILS # BLD: 0.02 K/UL (ref 0–0.12)
EOSINOPHIL # BLD AUTO: 0.45 K/UL (ref 0–0.51)
EOSINOPHIL NFR BLD: 9.6 % (ref 0–6.9)
ERYTHROCYTE [DISTWIDTH] IN BLOOD BY AUTOMATED COUNT: 59 FL (ref 35.9–50)
HCT VFR BLD AUTO: 23.9 % (ref 42–52)
HGB BLD-MCNC: 7.5 G/DL (ref 14–18)
IMM GRANULOCYTES # BLD AUTO: 0.01 K/UL (ref 0–0.11)
IMM GRANULOCYTES NFR BLD AUTO: 0.2 % (ref 0–0.9)
INR PPP: 1.04 (ref 0.87–1.13)
LYMPHOCYTES # BLD AUTO: 1.47 K/UL (ref 1–4.8)
LYMPHOCYTES NFR BLD: 31.3 % (ref 22–41)
MCH RBC QN AUTO: 23.6 PG (ref 27–33)
MCHC RBC AUTO-ENTMCNC: 31.4 G/DL (ref 33.7–35.3)
MCV RBC AUTO: 75.2 FL (ref 81.4–97.8)
MONOCYTES # BLD AUTO: 0.74 K/UL (ref 0–0.85)
MONOCYTES NFR BLD AUTO: 15.7 % (ref 0–13.4)
NEUTROPHILS # BLD AUTO: 2.01 K/UL (ref 1.82–7.42)
NEUTROPHILS NFR BLD: 42.8 % (ref 44–72)
NRBC # BLD AUTO: 0 K/UL
NRBC BLD-RTO: 0 /100 WBC
PLATELET # BLD AUTO: 239 K/UL (ref 164–446)
PMV BLD AUTO: 9.6 FL (ref 9–12.9)
PROTHROMBIN TIME: 13.3 SEC (ref 12–14.6)
RBC # BLD AUTO: 3.18 M/UL (ref 4.7–6.1)
WBC # BLD AUTO: 4.7 K/UL (ref 4.8–10.8)

## 2022-06-25 PROCEDURE — 85025 COMPLETE CBC W/AUTO DIFF WBC: CPT

## 2022-06-25 PROCEDURE — 85610 PROTHROMBIN TIME: CPT

## 2022-06-25 PROCEDURE — 700111 HCHG RX REV CODE 636 W/ 250 OVERRIDE (IP): Performed by: HOSPITALIST

## 2022-06-25 PROCEDURE — A9270 NON-COVERED ITEM OR SERVICE: HCPCS | Performed by: INTERNAL MEDICINE

## 2022-06-25 PROCEDURE — 700102 HCHG RX REV CODE 250 W/ 637 OVERRIDE(OP): Performed by: INTERNAL MEDICINE

## 2022-06-25 PROCEDURE — 99239 HOSP IP/OBS DSCHRG MGMT >30: CPT | Performed by: HOSPITALIST

## 2022-06-25 PROCEDURE — RXMED WILLOW AMBULATORY MEDICATION CHARGE: Performed by: HOSPITALIST

## 2022-06-25 PROCEDURE — 36415 COLL VENOUS BLD VENIPUNCTURE: CPT

## 2022-06-25 RX ORDER — ENOXAPARIN SODIUM 100 MG/ML
1 INJECTION SUBCUTANEOUS EVERY 12 HOURS
Qty: 6 EACH | Refills: 0 | Status: SHIPPED | OUTPATIENT
Start: 2022-06-25 | End: 2022-06-28

## 2022-06-25 RX ORDER — WARFARIN SODIUM 4 MG/1
4 TABLET ORAL DAILY
Qty: 30 TABLET | Refills: 3 | Status: ON HOLD | OUTPATIENT
Start: 2022-06-25 | End: 2022-10-17

## 2022-06-25 RX ADMIN — TIOTROPIUM BROMIDE INHALATION SPRAY 5 MCG: 3.12 SPRAY, METERED RESPIRATORY (INHALATION) at 05:17

## 2022-06-25 RX ADMIN — BUDESONIDE AND FORMOTEROL FUMARATE DIHYDRATE 2 PUFF: 160; 4.5 AEROSOL RESPIRATORY (INHALATION) at 05:17

## 2022-06-25 RX ADMIN — ENOXAPARIN SODIUM 60 MG: 60 INJECTION SUBCUTANEOUS at 05:16

## 2022-06-25 RX ADMIN — NICOTINE 14 MG: 14 PATCH TRANSDERMAL at 05:16

## 2022-06-25 RX ADMIN — OMEPRAZOLE 20 MG: 20 CAPSULE, DELAYED RELEASE ORAL at 05:17

## 2022-06-25 NOTE — PROGRESS NOTES
IV and telemetry box removed. AVS reviewed with patient and signed by patient. Medications delivered to bedside. All belongings accounted for per patient.     Followed up with  regarding coumadin clinic/INR check; per , they will follow up on Monday with the clinic to get patient his appointment, and the clinic will contact the patient. Case workers also requested orders to draw INR with home health if needed. Informed patient of this plan.

## 2022-06-25 NOTE — PROGRESS NOTES
Inpatient Anticoagulation Service Note    Date: 6/25/2022  Reason for Anticoagulation: Mural thrombus     Hemoglobin Value: (!) 7.5  Hematocrit Value: (!) 23.9  Lab Platelet Value: 239  Target INR: 2.0 to 3.0    INR from last 7 days     Date/Time INR Value    06/25/22 0226 1.04    06/24/22 0247 0.97    06/23/22 1437 1.06    06/21/22 0300 1.04        Dose from last 7 days     Date/Time Dose (mg)    06/25/22 1038 4    06/24/22 1133 4    06/23/22 1618 4        Bridge Therapy: Yes  Date of Last VTE Event: 06/21/22  Bridge Therapy Start Date: 06/23/22  Days of Overlap Therapy: 2 (If less than 5 days and overlap therapy discontinued -- document reason (i.e. Bleed Risk))  INR Value Greater than 2 Prior to Discontinuation of Parenteral Anticoagulation: Not Applicable (If still on overlap therapy, if No -- document reason (i.e. Bleed Risk))    Reversal Agent Administered: Not Applicable  Comments: Continue 4 mg daily, anticipate INR increase within next 1-2 days as warfarin begins to take effect. Repeat INR in AM.    Plan:  4 mg tonight. Repeat INR in AM.  Education Material Provided?: Yes  Pharmacist suggested discharge dosing: TBD pending clinical response.     Judith Kim, PharmD  PGY1 Pharmacy Practice Resident

## 2022-06-25 NOTE — DISCHARGE SUMMARY
"Discharge Summary    CHIEF COMPLAINT ON ADMISSION  Chief Complaint   Patient presents with   • Chest Pain     X 2 hours, patient states \"I'm having a slight heart attack.\" Patient unable to describe the pain. Patient took 2 of his home nitro tablets without relief. Patient given 324 ASA per EMS   • Abdominal Pain     X 2 hours \"my stomach is killing me.\" Patient unable to describe the pain.    • Hypotension     51/30 per EMS. Blood pressure increased to 104/61 upon arrival.        Reason for Admission  EMS     Admission Date  6/21/2022    CODE STATUS  Full Code    HPI & HOSPITAL COURSE  Patient is a 77-year-old male with past medical history of COPD, hypertension, CKD stage III, dyslipidemia, alcohol abuse and tobacco abuse. He presented to [facility] on 6/21/22 with chest pain and abdominal pain. On admission he was found to have acute pancreatitis with lactic acidosis.  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  This was dicussed with vascular surgery who noted that the occlusion has been chronic and they will follow up with him as an outpatient for possible revision.    A MRCP was ordered to further evaluate the dilated CBD.  Patient started on weight-based Lovenox for mural thrombus of distal aorta.   He and his wife received teaching for the lovenox and the coumadin bridging. Case management was unable to contact the coumadin clinic so they arranged for home health to draw an INR and cbc on 6/27/22 with results to his pcp and will work on a coumadin clinic appointment next week. Patient agrees to this. We discussed at length the risk of bleeding, the importance of etoh cessation and close follow up and he agrees to return to the ER if signs or symptoms of bleeding as he is aware of " his baseline anemia and concern for the possibility of bleeding.  His initial chest pain resolved and it was likely due to his acute pancreatitis. An echocardiogram was obtained and was within normal limits.     He is also aware of the thyroid nodule that was incidentally found and the need for a thyroid ultrasound and workup as an outpatient and the need for further monitoring of his pancrease. Magnesium levels and kidney function in addition to his other issues.     Nicotine and alcohol cessation and strategies were discussed and recommended    He is being discharged with home health and repeat lab draw in 2 days and he agrees to close outpatient follow up and to return to the ER if needed.     Therefore, he is discharged in fair and stable condition to home with close outpatient follow-up.    The patient met 2-midnight criteria for an inpatient stay at the time of discharge.    Discharge Date  6/25/22    FOLLOW UP ITEMS POST DISCHARGE  Pcp  Home health  Coumadin clinic  AA    DISCHARGE DIAGNOSES  Principal Problem:    Chest pain POA: Yes  Active Problems:    COPD (chronic obstructive pulmonary disease) (HCC) POA: Yes    Essential hypertension (Chronic) POA: Yes    Substance abuse (HCC) POA: Unknown    Tobacco use POA: Yes    Alcohol use POA: Yes    CKD (chronic kidney disease), stage III (HCC) POA: Yes    Lactic acid acidosis POA: Unknown    Peripheral artery disease (HCC) POA: Yes    Pancreatitis POA: Unknown    Idiopathic acute pancreatitis POA: Yes    Hypomagnesemia POA: Unknown    Anemia POA: Unknown    Thyroid nodule POA: Unknown    Aortic thrombus (HCC) POA: Unknown  Resolved Problems:    * No resolved hospital problems. *      FOLLOW UP  Future Appointments   Date Time Provider Department Center   6/28/2022  1:00 PM [facility]EL[facility] Val[facility]     No follow-up provider specified.    MEDICATIONS ON DISCHARGE     Medication List      ASK your doctor about these medications      Instructions    albuterol 108 (90 Base) MCG/ACT Aers inhalation aerosol  Commonly known as: Ventolin HFA   Doctor's comments: Pt needs appt for med refills  Inhale 2 Puffs every 6 hours as needed for Shortness of Breath. INHALE TWO PUFFS BY MOUTH EVERY SIX HOURS AS NEEDED  Dose: 2 Puff     amLODIPine 5 MG Tabs  Commonly known as: NORVASC   Doctor's comments: Will need follow up with PCP for further prescriptions  Take 1 Tablet by mouth every day.  Dose: 5 mg     aspirin EC 81 MG Tbec  Commonly known as: ECOTRIN   Take 1 Tablet by mouth every day.  Dose: 81 mg     Breo Ellipta 100-25 MCG/INH Aepb  Generic drug: Fluticasone Furoate-Vilanterol   Inhale 1 Puff every day.  Dose: 1 Puff     docusate sodium 100 MG Caps  Commonly known as: COLACE   Take 1 Capsule by mouth in the morning and 1 Capsule in the evening.  Dose: 100 mg     hydrOXYzine HCl 25 MG Tabs  Commonly known as: ATARAX   Take 1 Tablet by mouth 1 time a day as needed for Itching.  Dose: 25 mg     omeprazole 20 MG delayed-release capsule  Commonly known as: PRILOSEC   TAKE ONE CAPSULE BY MOUTH ONE TIME DAILY     Spiriva HandiHaler 18 MCG Caps  Generic drug: tiotropium   INHALE 1 CAPSULE VIA HANDIHALER ONCE DAILY            Allergies  Allergies   Allergen Reactions   • Ace Inhibitors Swelling       DIET  Orders Placed This Encounter   Procedures   • Diet Order Diet: Level 7 - Easy to Chew; Liquid level: Level 0 - Thin; Second Modifier: (optional): Cardiac     Standing Status:   Standing     Number of Occurrences:   1     Order Specific Question:   Diet:     Answer:   Level 7 - Easy to Chew [22]     Order Specific Question:   Liquid level     Answer:   Level 0 - Thin     Order Specific Question:   Second Modifier: (optional)     Answer:   Cardiac [6]       ACTIVITY  As tolerated.  Weight bearing as tolerated    CONSULTATIONS  [facility] vascular surgery    PROCEDURES  EC-ECHOCARDIOGRAM COMPLETE W/O CONT   Final Result      [id number]   Final Result      1. No  choledocholithiasis or biliary dilatation.   2. Cholelithiasis. No cholecystitis.   3. Sequela of acute pancreatitis, with several pockets of fluid along the pancreatic head/neck measuring up to 2.5 cm. They likely represent small acute peripancreatic fluid collections related to recent pancreatitis. They are too small to drain.         DX-CHEST-PORTABLE (1 VIEW)   Final Result         1.  No acute cardiopulmonary disease.   2.  Atherosclerosis      CT-CTA COMPLETE THORACOABDOMINAL AORTA   Final Result         1.  Occlusion of right common iliac artery stent.   2.  High-grade stenosis of the left common iliac artery   3.  Femorofemoral bypass graft which is nonopacified and likely occluded.   4.  Mural thrombus of the distal aorta near the aortic bifurcation   5.  Hazy peripancreatic fat stranding suggests changes of pancreatitis. Dilated common bile duct, follow-up ERCP or MRCP recommended to exclude obstructing pancreatic mass or stenosis.   6.  Bilateral thyroid nodules, greater on the right, follow-up thyroid sonography for further characterization due to nodule size   7.  Atherosclerosis and atherosclerotic coronary artery disease      These findings were discussed with the patient's clinician, UCHE JUAREZ, on 6/21/2022 3:49 AM.                  LABORATORY  Lab Results   Component Value Date    SODIUM 136 06/23/2022    POTASSIUM 3.9 06/23/2022    CHLORIDE 103 06/23/2022    CO2 22 06/23/2022    GLUCOSE 95 06/23/2022    BUN 10 06/23/2022    CREATININE 1.38 06/23/2022    CREATININE 1.4 08/23/2005        Lab Results   Component Value Date    WBC 4.7 (L) 06/25/2022    HEMOGLOBIN 7.5 (L) 06/25/2022    HEMATOCRIT 23.9 (L) 06/25/2022    PLATELETCT 239 06/25/2022        Total time of the discharge process exceeds 45 minutes.

## 2022-06-25 NOTE — PROGRESS NOTES
Assumed care of patient. Pt is A+O x4. NSR on telemetry. Room air. No chest pain or SOB. LR infusing as ordered. No active bleeding noted. Informed of safety and call system, and call light within reach. Bed locked in lowest position. All needs met at this time.

## 2022-06-25 NOTE — CARE PLAN
The patient is Watcher - Medium risk of patient condition declining or worsening    Shift Goals  Clinical Goals: Hemodynamically stable, Safety, Pain management, Monitor labs and vs  Patient Goals: Comfort  Family Goals: RADHA    Progress made toward(s) clinical / shift goals:    Problem: Pain - Standard  Goal: Alleviation of pain or a reduction in pain to the patient’s comfort goal  Outcome: Progressing     Problem: Knowledge Deficit - Standard  Goal: Patient and family/care givers will demonstrate understanding of plan of care, disease process/condition, diagnostic tests and medications  Outcome: Progressing     Problem: Lifestyle Changes  Goal: Patient's ability to identify lifestyle changes and available resources to help reduce recurrence of condition will improve  Outcome: Progressing     Problem: Psychosocial  Goal: Patient's level of anxiety will decrease  Outcome: Progressing     Problem: Risk for Aspiration  Goal: Patient's risk for aspiration will be absent or decrease  Outcome: Progressing     Problem: Fall Risk  Goal: Patient will remain free from falls  Outcome: Progressing

## 2022-06-25 NOTE — CARE PLAN
Problem: Pain - Standard  Goal: Alleviation of pain or a reduction in pain to the patient’s comfort goal  Outcome: Met     Problem: Knowledge Deficit - Standard  Goal: Patient and family/care givers will demonstrate understanding of plan of care, disease process/condition, diagnostic tests and medications  Outcome: Met     Problem: Optimal Care for Alcohol Withdrawal  Goal: Optimal Care for the alcohol withdrawal patient  Outcome: Met     Problem: Seizure Precautions  Goal: Implementation of seizure precautions  Outcome: Met     Problem: Lifestyle Changes  Goal: Patient's ability to identify lifestyle changes and available resources to help reduce recurrence of condition will improve  Outcome: Met     Problem: Psychosocial  Goal: Patient's level of anxiety will decrease  Outcome: Met  Goal: Spiritual and cultural needs incorporated into hospitalization  Outcome: Met     Problem: Risk for Aspiration  Goal: Patient's risk for aspiration will be absent or decrease  Outcome: Met     Problem: Fall Risk  Goal: Patient will remain free from falls  Outcome: Met       The patient is Stable - Low risk of patient condition declining or worsening    Shift Goals  Clinical Goals: Discharge safely.  Patient Goals: Discharge.  Family Goals: None at bedside    Progress made toward(s) clinical / shift goals:  Pt discharging    Patient is not progressing towards the following goals:

## 2022-06-25 NOTE — FACE TO FACE
Face to Face Supporting Documentation - Home Health    The encounter with this patient was in whole or in part the primary reason for home health admission.    Date of encounter:   Patient:                    MRN:                       YOB: 2024  Fede Parikh Jr.  [id number]  1945     Home health to see patient for:  Skilled Nursing care for assessment, interventions & education    Skilled need for:  Exacerbation of Chronic Disease State aortic thrombus, pancreatitis    Skilled nursing interventions to include:  Comment: blood draw, education, exam vitals teaching    Homebound status evidenced by:  Needs the assistance of another person in order to leave the home. Leaving home requires a considerable and taxing effort. There is a normal inability to leave the home.    Community Physician to provide follow up care: Alexx Reynaga M.D.     Optional Interventions? No      I certify the face to face encounter for this home health care referral meets the CMS requirements and the encounter/clinical assessment with the patient was, in whole, or in part, for the medical condition(s) listed above, which is the primary reason for home health care. Based on my clinical findings: the service(s) are medically necessary, support the need for home health care, and the homebound criteria are met.  I certify that this patient has had a face to face encounter by myself.  Maria Luisa Geiger M.D. - NPI: [id number]

## 2022-06-25 NOTE — DISCHARGE INSTRUCTIONS
Discharge Instructions    Discharged to home by taxi with self. Discharged via wheelchair, hospital escort: Yes.  Special equipment needed: Not Applicable    Be sure to schedule a follow-up appointment with your primary care doctor or any specialists as instructed.     Discharge Plan:   Diet Plan: Discussed  Activity Level: Discussed  Confirmed Follow up Appointment: Patient to Call and Schedule Appointment  Confirmed Symptoms Management: Discussed  Medication Reconciliation Updated: Yes    I understand that a diet low in cholesterol, fat, and sodium is recommended for good health. Unless I have been given specific instructions below for another diet, I accept this instruction as my diet prescription.   Other diet:       Special Instructions: None    Is patient discharged on Warfarin / Coumadin?   Yes    You are receiving the drug warfarin. Please understand the importance of monitoring warfarin with scheduled PT/INR blood draws.  Follow-up with a call to your personal Doctor's office or clinic in 3 days to schedule a PT/INR. .    IMPORTANT: HOW TO USE THIS INFORMATION:  This is a summary and does NOT have all possible information about this product. This information does not assure that this product is safe, effective, or appropriate for you. This information is not individual medical advice and does not substitute for the advice of your health care professional. Always ask your health care professional for complete information about this product and your specific health needs.      WARFARIN - ORAL (WARF-uh-rin)      COMMON BRAND NAME(S): Coumadin      WARNING:  Warfarin can cause very serious (possibly fatal) bleeding. This is more likely to occur when you first start taking this medication or if you take too much warfarin. To decrease your risk for bleeding, your doctor or other health care provider will monitor you closely and check your lab results (INR test) to make sure you are not taking too much warfarin.  "Keep all medical and laboratory appointments. Tell your doctor right away if you notice any signs of serious bleeding. See also Side Effects section.      USES:  This medication is used to treat blood clots (such as in deep vein thrombosis-DVT or pulmonary embolus-PE) and/or to prevent new clots from forming in your body. Preventing harmful blood clots helps to reduce the risk of a stroke or heart attack. Conditions that increase your risk of developing blood clots include a certain type of irregular heart rhythm (atrial fibrillation), heart valve replacement, recent heart attack, and certain surgeries (such as hip/knee replacement). Warfarin is commonly called a \"blood thinner,\" but the more correct term is \"anticoagulant.\" It helps to keep blood flowing smoothly in your body by decreasing the amount of certain substances (clotting proteins) in your blood.      HOW TO USE:  Read the Medication Guide provided by your pharmacist before you start taking warfarin and each time you get a refill. If you have any questions, ask your doctor or pharmacist. Take this medication by mouth with or without food as directed by your doctor or other health care professional, usually once a day. It is very important to take it exactly as directed. Do not increase the dose, take it more frequently, or stop using it unless directed by your doctor. Dosage is based on your medical condition, laboratory tests (such as INR), and response to treatment. Your doctor or other health care provider will monitor you closely while you are taking this medication to determine the right dose for you. Use this medication regularly to get the most benefit from it. To help you remember, take it at the same time each day. It is important to eat a balanced, consistent diet while taking warfarin. Some foods can affect how warfarin works in your body and may affect your treatment and dose. Avoid sudden large increases or decreases in your intake of foods " high in vitamin K (such as broccoli, cauliflower, cabbage, brussels sprouts, kale, spinach, and other green leafy vegetables, liver, green tea, certain vitamin supplements). If you are trying to lose weight, check with your doctor before you try to go on a diet. Cranberry products may also affect how your warfarin works. Limit the amount of cranberry juice (16 ounces/480 milliliters a day) or other cranberry products you may drink or eat.      SIDE EFFECTS:  Nausea, loss of appetite, or stomach/abdominal pain may occur. If any of these effects persist or worsen, tell your doctor or pharmacist promptly. Remember that your doctor has prescribed this medication because he or she has judged that the benefit to you is greater than the risk of side effects. Many people using this medication do not have serious side effects. This medication can cause serious bleeding if it affects your blood clotting proteins too much (shown by unusually high INR lab results). Even if your doctor stops your medication, this risk of bleeding can continue for up to a week. Tell your doctor right away if you have any signs of serious bleeding, including: unusual pain/swelling/discomfort, unusual/easy bruising, prolonged bleeding from cuts or gums, persistent/frequent nosebleeds, unusually heavy/prolonged menstrual flow, pink/dark urine, coughing up blood, vomit that is bloody or looks like coffee grounds, severe headache, dizziness/fainting, unusual or persistent tiredness/weakness, bloody/black/tarry stools, chest pain, shortness of breath, difficulty swallowing. Tell your doctor right away if any of these unlikely but serious side effects occur: persistent nausea/vomiting, severe stomach/abdominal pain, yellowing eyes/skin. This drug rarely has caused very serious (possibly fatal) problems if its effects lead to small blood clots (usually at the beginning of treatment). This can lead to severe skin/tissue damage that may require surgery or  amputation if left untreated. Patients with certain blood conditions (protein C or S deficiency) may be at greater risk. Get medical help right away if any of these rare but serious side effects occur: painful/red/purplish patches on the skin (such as on the toe, breast, abdomen), change in the amount of urine, vision changes, confusion, slurred speech, weakness on one side of the body. A very serious allergic reaction to this drug is rare. However, get medical help right away if you notice any symptoms of a serious allergic reaction, including: rash, itching/swelling (especially of the face/tongue/throat), severe dizziness, trouble breathing. This is not a complete list of possible side effects. If you notice other effects not listed above, contact your doctor or pharmacist. In the [place] - Call your doctor for medical advice about side effects. You may report side effects to FDA at 1-334-IEK-6906. In [place] - Call your doctor for medical advice about side effects. You may report side effects to [facility] [place] at 1-697.949.2170.      PRECAUTIONS:  Before taking warfarin, tell your doctor or pharmacist if you are allergic to it; or if you have any other allergies. This product may contain inactive ingredients, which can cause allergic reactions or other problems. Talk to your pharmacist for more details. Before using this medication, tell your doctor or pharmacist your medical history, especially of: blood disorders (such as anemia, hemophilia), bleeding problems (such as bleeding of the stomach/intestines, bleeding in the brain), blood vessel disorders (such as aneurysms), recent major injury/surgery, liver disease, alcohol use, mental/mood disorders (including memory problems), frequent falls/injuries. It is important that all your doctors and dentists know that you take warfarin. Before having surgery or any medical/dental procedures, tell your doctor or dentist that you are taking this medication and about all the  products you use (including prescription drugs, nonprescription drugs, and herbal products). Avoid getting injections into the muscles. If you must have an injection into a muscle (for example, a flu shot), it should be given in the arm. This way, it will be easier to check for bleeding and/or apply pressure bandages. This medication may cause stomach bleeding. Daily use of alcohol while using this medicine will increase your risk for stomach bleeding and may also affect how this medication works. Limit or avoid alcoholic beverages. If you have not been eating well, if you have an illness or infection that causes fever, vomiting, or diarrhea for more than 2 days, or if you start using any antibiotic medications, contact your doctor or pharmacist immediately because these conditions can affect how warfarin works. This medication can cause heavy bleeding. To lower the chance of getting cut, bruised, or injured, use great caution with sharp objects like safety razors and nail cutters. Use an electric razor when shaving and a soft toothbrush when brushing your teeth. Avoid activities such as contact sports. If you fall or injure yourself, especially if you hit your head, call your doctor immediately. Your doctor may need to check you. The Food & Drug Administration has stated that generic warfarin products are interchangeable. However, consult your doctor or pharmacist before switching warfarin products. Be careful not to take more than one medication that contains warfarin unless specifically directed by the doctor or health care provider who is monitoring your warfarin treatment. Older adults may be at greater risk for bleeding while using this drug. This medication is not recommended for use during pregnancy because of serious (possibly fatal) harm to an unborn baby. Discuss the use of reliable forms of birth control with your doctor. If you become pregnant or think you may be pregnant, tell your doctor immediately.  "If you are planning pregnancy, discuss a plan for managing your condition with your doctor before you become pregnant. Your doctor may switch the type of medication you use during pregnancy. Very small amounts of this medication may pass into breast milk but is unlikely to harm a nursing infant. Consult your doctor before breast-feeding.      DRUG INTERACTIONS:  Drug interactions may change how your medications work or increase your risk for serious side effects. This document does not contain all possible drug interactions. Keep a list of all the products you use (including prescription/nonprescription drugs and herbal products) and share it with your doctor and pharmacist. Do not start, stop, or change the dosage of any medicines without your doctor's approval. Warfarin interacts with many prescription, nonprescription, vitamin, and herbal products. This includes medications that are applied to the skin or inside the vagina or rectum. The interactions with warfarin usually result in an increase or decrease in the \"blood-thinning\" (anticoagulant) effect. Your doctor or other health care professional should closely monitor you to prevent serious bleeding or clotting problems. While taking warfarin, it is very important to tell your doctor or pharmacist of any changes in medications, vitamins, or herbal products that you are taking. Some products that may interact with this drug include: capecitabine, imatinib, mifepristone. Aspirin, aspirin-like drugs (salicylates), and nonsteroidal anti-inflammatory drugs (NSAIDs such as ibuprofen, naproxen, celecoxib) may have effects similar to warfarin. These drugs may increase the risk of bleeding problems if taken during treatment with warfarin. Carefully check all prescription/nonprescription product labels (including drugs applied to the skin such as pain-relieving creams) since the products may contain NSAIDs or salicylates. Talk to your doctor about using a different " medication (such as acetaminophen) to treat pain/fever. Low-dose aspirin and related drugs (such as clopidogrel, ticlopidine) should be continued if prescribed by your doctor for specific medical reasons such as heart attack or stroke prevention. Consult your doctor or pharmacist for more details. Many herbal products interact with warfarin. Tell your doctor before taking any herbal products, especially bromelains, coenzyme Q10, cranberry, danshen, dong quai, fenugreek, garlic, ginkgo biloba, ginseng, and Numa's wort, among others. This medication may interfere with a certain laboratory test to measure theophylline levels, possibly causing false test results. Make sure laboratory personnel and all your doctors know you use this drug.      OVERDOSE:  If overdose is suspected, contact a poison control center or emergency room immediately. [place] residents can call the [place] National Poison Hotline at 1-588.674.8831. [place] residents can call a provincial poison control center. Symptoms of overdose may include: bloody/black/tarry stools, pink/dark urine, unusual/prolonged bleeding.      NOTES:  Do not share this medication with others. Laboratory and/or medical tests (such as INR, complete blood count) must be performed periodically to monitor your progress or check for side effects. Consult your doctor for more details.      MISSED DOSE:  For the best possible benefit, do not miss any doses. If you do miss a dose and remember on the same day, take it as soon as you remember. If you remember on the next day, skip the missed dose and resume your usual dosing schedule. Do not double the dose to catch up because this could increase your risk for bleeding. Keep a record of missed doses to give to your doctor or pharmacist. Contact your doctor or pharmacist if you miss 2 or more doses in a row.      STORAGE:  Store at room temperature away from light and moisture. Do not store in the bathroom. Keep all medications away from  children and pets. Do not flush medications down the toilet or pour them into a drain unless instructed to do so. Properly discard this product when it is  or no longer needed. Consult your pharmacist or local waste disposal company for more details about how to safely discard your product.      MEDICAL ALERT:  Your condition and medication can cause complications in a medical emergency. For information about enrolling in MedicAlert, call 1-156.414.1400 ([place]) or 1-738.681.7857 ([place]).      Information last revised 2010 Copyright(c)  [employer]           Depression / Suicide Risk    As you are discharged from this [facility] facility, it is important to learn how to keep safe from harming yourself.    Recognize the warning signs:  Abrupt changes in personality, positive or negative- including increase in energy   Giving away possessions  Change in eating patterns- significant weight changes-  positive or negative  Change in sleeping patterns- unable to sleep or sleeping all the time   Unwillingness or inability to communicate  Depression  Unusual sadness, discouragement and loneliness  Talk of wanting to die  Neglect of personal appearance   Rebelliousness- reckless behavior  Withdrawal from people/activities they love  Confusion- inability to concentrate     If you or a loved one observes any of these behaviors or has concerns about self-harm, here's what you can do:  Talk about it- your feelings and reasons for harming yourself  Remove any means that you might use to hurt yourself (examples: pills, rope, extension cords, firearm)  Get professional help from the community (Mental Health, Substance Abuse, psychological counseling)  Do not be alone:Call your Safe Contact- someone whom you trust who will be there for you.  Call your local CRISIS HOTLINE 762-7875 or 527-457-4048  Call your local Children's Mobile Crisis Response Team [place] (936) 712-0058 or  www.[place].cn  Call the toll free National Suicide Prevention Hotlines   National Suicide Prevention Lifeline 297-699-JIYK (9689)  [facility] Line Network 800-SUICIDE (495-4881)              Acute Pancreatitis    Acute pancreatitis happens when the pancreas gets swollen. The pancreas is a large gland in the body that helps to control blood sugar. It also makes enzymes that help to digest food.  This condition can last a few days and cause serious problems. The lungs, heart, and kidneys may stop working.  What are the causes?  Causes include:  Alcohol abuse.  Drug abuse.  Gallstones.  A tumor in the pancreas.  Other causes include:  Some medicines.  Some chemicals.  Diabetes.  An infection.  Damage caused by an accident.  The poison (venom) from a scorpion bite.  Belly (abdominal) surgery.  The body's defense system (immune system) attacking the pancreas (autoimmune pancreatitis).  Genes that are passed from parent to child (inherited).  In some cases, the cause is not known.  What are the signs or symptoms?  Pain in the upper belly that may be felt in the back. The pain may be very bad.  Swelling of the belly.  Feeling sick to your stomach (nauseous) and throwing up (vomiting).  Fever.  How is this treated?  You will likely have to stay in the hospital. Treatment may include:  Pain medicine.  Fluid through an IV tube.  Placing a tube in the stomach to take out the stomach contents. This may help you stop throwing up.  Not eating for 3-4 days.  Antibiotic medicines, if you have an infection.  Treating any other problems that may be the cause.  Steroid medicines, if your problem is caused by your defense system attacking your body's own tissues.  Surgery.  Follow these instructions at home:  Eating and drinking    Follow instructions from your doctor about what to eat and drink.  Eat foods that do not have a lot of fat in them.  Eat small meals often. Do not eat big meals.  Drink enough fluid to keep your pee  (urine) pale yellow.  Do not drink alcohol if it caused your condition.  Medicines  Take over-the-counter and prescription medicines only as told by your doctor.  Ask your doctor if the medicine prescribed to you:  Requires you to avoid driving or using heavy machinery.  Can cause trouble pooping (constipation). You may need to take steps to prevent or treat trouble pooping:  Take over-the-counter or prescription medicines.  Eat foods that are high in fiber. These include beans, whole grains, and fresh fruits and vegetables.  Limit foods that are high in fat and sugar. These include fried or sweet foods.  General instructions  Do not use any products that contain nicotine or tobacco, such as cigarettes, e-cigarettes, and chewing tobacco. If you need help quitting, ask your doctor.  Get plenty of rest.  Check your blood sugar at home as told by your doctor.  Keep all follow-up visits as told by your doctor. This is important.  Contact a doctor if:  You do not get better as quickly as expected.  You have new symptoms.  Your symptoms get worse.  You have pain or weakness that lasts a long time.  You keep feeling sick to your stomach.  You get better and then you have pain again.  You have a fever.  Get help right away if:  You cannot eat or keep fluids down.  Your pain gets very bad.  Your skin or the white part of your eyes turns yellow.  You have sudden swelling in your belly.  You throw up.  You feel dizzy or you pass out (faint).  Your blood sugar is high (over 300 mg/dL).  Summary  Acute pancreatitis happens when the pancreas gets swollen.  This condition is often caused by alcohol abuse, drug abuse, or gallstones.  You will likely have to stay in the hospital for treatment.  This information is not intended to replace advice given to you by your health care provider. Make sure you discuss any questions you have with your health care provider.  Document Released: 06/05/2009 Document Revised: 10/07/2019 Document  Reviewed: 10/07/2019  [employer] Patient Education © 2020 [employer]  Anticoagulant Injection Instructions Using a Prefilled Syringe    Injectable blood thinners (anticoagulants) are medicines that help prevent blood clots from developing. Two common injectable anticoagulant medicines that are used are fondaparinux and enoxaparin.  Injectable anticoagulant medicines are given with a single-use syringe that already has medicine inside of it (prefilled syringe). You inject the medicine through a needle into the layer of fat and tissue between skin and muscle (subcutaneous) in your belly.  Before your first injection, your health care provider will instruct you on how to take your anticoagulant medicine at home. Also read the medication guide or package insert that came with the prefilled syringe. Follow directions from the guide about how to prepare and give the injection.  What are the risks?  Generally, self-injection of anticoagulant medicine is safe. However, mild problems can occur, including mild bleeding, itching, or rash at the injection site.  Other risks may include:  Bleeding and bruising.  A low platelet count (thrombocytopenia).  An allergic reaction to the medication.  Liver damage.  A low red blood cell count (anemia).  Supplies needed:  To inject the medicine, you will need:  Alcohol wipes.  A prefilled syringe with needle.  A container for syringe disposal. This may be a puncture-proof sharps container or a hard-sided plastic container with a cover, such as an empty laundry detergent bottle.  How to inject anticoagulant medicine  Wash your hands with soap and water. If soap and water are not available, use alcohol-based hand .  Locate the site on your belly where the medicine should be injected. Avoid the area within 2 inches (5 cm) of your navel (umbilicus).  Use an alcohol wipe to clean the site where you will be injecting the needle. Let the site air-dry.  Remove the plastic cover from  the needle on the syringe. Do not let the needle touch anything.  Hold the syringe with one hand and use your other hand to twist the rigid needle guard (covering the needle) counterclockwise. Pull the needle guard straight off the needle. Discard the needle guard.  When using a prefilled syringe, do not push the air bubble out of the syringe before the injection. The air bubble will help you get all of the medicine out of the syringe and into your subcutaneous tissue.  Hold the syringe in your writing hand like a pencil.  Use your other hand to pinch and hold about an inch (2.5 cm) of skin. Do not directly touch the cleaned part of the skin.  Insert the entire needle straight into the fold of skin. The needle should be at a 90-degree angle (perpendicular) to the skin. Push the needle all the way against the skin.  After the needle is completely inserted into the skin, release the skin that you are pinching. Continue to hold the syringe with your writing hand.  Use the thumb or index finger of your writing hand to push the plunger all the way into the syringe to inject the medicine.  Pull the needle straight out of the skin.  Press and hold an alcohol wipe over the injection site until the bleeding stops. Do not rub the area.  Cover the injection site with a bandage, if needed.  Do not recap the needle.  If your syringe has a safety system for shielding the needle after injection:  Firmly push down on the plunger after you complete the injection. The protective sleeve will automatically cover the needle, and you will hear a click. The click means that the needle is safely covered.  Place the syringe and needle in the disposal container.  What else do I need to know?  Do not use the syringe or needle more than one time.  Change the injection site each time you give yourself a shot.  Before an injection, make sure the medicine is a clear and colorless or pale yellow. Do not use your medicine if it is discolored or has  particles in it. Let your health care provider know right away.  Tell your health care providers, including dentists:  That you are taking an anticoagulant, especially if you are injured or plan to have a procedure.  If there is a change in your illness, and any other changes in medicines, supplements, or diet.  Take over-the-counter and prescription medicines only as told by your health care provider.  Keep your medicine safely stored at room temperature.  Keep all follow-up visits as told by your health care provider.  Contact a health care provider if:  You develop any rashes on your skin.  You have large areas of bruising on your skin.  Your condition worsens.  You develop a fever.  There is blood in your urine.  You are bleeding from your gums.  Get help right away if:  You develop bleeding problems such as:  Vomiting blood or coughing up blood.  Dark red blood in your urine.  Blood in your stool or your stool has a dark, tarry, or coffee-ground appearance.  You have bleeding that does not stop.  You develop chest pain or shortness of breath.  You develop a severe headache or confusion.  These symptoms may represent a serious problem that is an emergency. Do not wait to see if the symptoms will go away. Get medical help right away. Call your local emergency services (911 in the [place] Do not drive yourself to the hospital.  Summary  Injectable blood thinners (anticoagulants) are medicines that help prevent blood clots from developing in the veins.  You inject the medicine through a needle into the layer of fat and tissue between skin and muscle (subcutaneous) in your belly.  Keep all follow-up visits as told by your health care provider. Follow-up visits include visits for lab tests.  This information is not intended to replace advice given to you by your health care provider. Make sure you discuss any questions you have with your health care provider.  Document Released: 03/16/2010 Document Revised: 01/26/2019  Document Reviewed: 01/26/2019  Elsevier Patient Education © 2020 Elsevier Inc.

## 2022-06-25 NOTE — DISCHARGE PLANNING
Case Management Discharge Planning    Admission Date: 6/21/2022  GMLOS: 3.1  ALOS: 4    6-Clicks ADL Score: 21  6-Clicks Mobility Score: 21      Anticipated Discharge Dispo: Discharge Disposition: D/T to home under [facility] care in anticipation of covered skilled care (06)    DME Needed: No    Action(s) Taken: Pt is stable and medically cleared for DC. Pt was started on Coumadin. Not able to verify if Coumadin clinic appt has been scheduled. No appt on AVS noted.   PC to Nikki with [facility] and states that they are able to provide SN for Monday for education, med rec and lab draw.   Choice form obtained and faxed to [facility].   HH referral faxed to  with request for SOC on 6/27/2022  Pending review and acceptance.   IMM letter presented, signed, copy given to pt and copy faxed to [facility].   Pt is taking a taxi as a DC transport to home. Informed the pt that he will need to wait to leave as we need to get HH confirmed. Pt verbalized understanding.   CM will send a hand off to weekday CM to f/u with Coumadin clinic to set up an appt.     LYNDON Mora notified this CM that pt left and didn't want to wait for HH confirmation.     Escalations Completed: Provider    Medically Clear: Yes    Next Steps: f/u with [facility] for acceptance and confirmation of Monday appt    Barriers to Discharge: Outpatient referrals pending    Is the patient up for discharge tomorrow: na    ADDENDUM:  1248 - PC from [facility] with [facility]. States they accept the pt and will see the pt on Monday 6/272022.

## 2022-06-27 ENCOUNTER — TELEPHONE (OUTPATIENT)
Dept: VASCULAR LAB | Facility: MEDICAL CENTER | Age: 77
End: 2022-06-27
Payer: MEDICARE

## 2022-06-27 DIAGNOSIS — I74.10 AORTIC THROMBUS (HCC): ICD-10-CM

## 2022-06-27 NOTE — TELEPHONE ENCOUNTER
[facility]    Spoke with pt and HH ([facility]).  HH will attempt to get an INR today or tomorrow (the soonest they possibly can).   Pt also scheduled for initial appt on 6/29/22 given he is uncertain he wants to have HH.  INR order will be faxed to .    Krut Collado, PharmD     [phone] LAYO Rene

## 2022-06-27 NOTE — TELEPHONE ENCOUNTER
Pt has appt 6/29/22, He declined to get his INR today via     He is currently bridging with Lovenox 60 mg BID.   Advised pt he need to continue Lovenox with warfarin. Discussed why they wanted INR today but he declines. Will check INR 6/29/22      Tamiko Stringer, Annie    ----- Message from Gen Fletcher [facility] sent at 6/27/2022  1:50 PM PDT -----  Received call from [facility] on this patient. Patient is declining INR today stating that he has an appt Wednesday and does not want another blood draw today.     Thank you.

## 2022-06-28 ENCOUNTER — HOSPITAL ENCOUNTER (OUTPATIENT)
Dept: LAB | Facility: MEDICAL CENTER | Age: 77
End: 2022-06-28
Attending: INTERNAL MEDICINE
Payer: MEDICARE

## 2022-06-28 ENCOUNTER — APPOINTMENT (OUTPATIENT)
Dept: INTERNAL MEDICINE | Facility: OTHER | Age: 77
End: 2022-06-28
Payer: MEDICARE

## 2022-06-28 ENCOUNTER — ANTICOAGULATION MONITORING (OUTPATIENT)
Dept: VASCULAR LAB | Facility: MEDICAL CENTER | Age: 77
End: 2022-06-28

## 2022-06-28 DIAGNOSIS — I74.10 AORTIC THROMBUS (HCC): ICD-10-CM

## 2022-06-28 LAB
INR PPP: 1.08 (ref 0.87–1.13)
PROTHROMBIN TIME: 13.7 SEC (ref 12–14.6)

## 2022-06-28 PROCEDURE — 36415 COLL VENOUS BLD VENIPUNCTURE: CPT

## 2022-06-28 PROCEDURE — 85610 PROTHROMBIN TIME: CPT

## 2022-06-28 NOTE — PROGRESS NOTES
Anticoagulation Summary  As of 6/28/2022    INR goal:  2.0-3.0   TTR:  --   INR used for dosing:     Warfarin maintenance plan:  4 mg (4 mg x 1) every day   Weekly warfarin total:  28 mg   Plan last modified:  Kurt Collado, PharmD (6/28/2022)   Next INR check:  6/30/2022   Target end date:      Indications    Aortic thrombus (HCC) [I74.10]             Anticoagulation Episode Summary     INR check location:      Preferred lab:      Send INR reminders to:      Comments:  Please provide initial educatoin when pt is willing to discuss and forward to Dr Bloch.      Anticoagulation Care Providers     Provider Role Specialty Phone number    Maria Luisa Geiger M.D. Referring Internal Medicine [phone]    [facility] Responsible  [phone]        Anticoagulation Patient Findings          I was not able to do an initial encounter today.  Pt was extremely combative and did not want me to tell him what to do.  I tried calming him down but pt was agitated.  He is willing to increase warfarin, but not willing to take any more enoxaparin shots. I explained he is in critical danger without enoxaparin but pt declines.    He reports he will increase warfarin to 8 mg daily, but I'm uncertain of his ability to follow through. He states he is seeing his doctor tomorrow.  When I questioned which doctor- he did not want to tell me.  I was hoping I could reach out to that doctor so they could discuss with the pt of the importance of following our instructions.      Will retest INR again in 48 hours.    CC  [facility]    Fax Dictation #1  MRN:[id number]  [facility]:[id number]

## 2022-06-29 ENCOUNTER — TELEPHONE (OUTPATIENT)
Dept: VASCULAR LAB | Facility: MEDICAL CENTER | Age: 77
End: 2022-06-29
Payer: MEDICARE

## 2022-06-29 NOTE — TELEPHONE ENCOUNTER
Received call from [facility]  Pt declined their services  Pt also no showed their appt today    Rescheduled for 7/5 as this is the soonest pt can come in to clinic  Pt refused to resume Lovenox injections  He will continue with warfarin 8 mg daily    Mirella Hillman, PharmD

## 2022-06-30 DIAGNOSIS — L29.9 PRURITUS: ICD-10-CM

## 2022-06-30 DIAGNOSIS — K21.9 GASTROESOPHAGEAL REFLUX DISEASE: ICD-10-CM

## 2022-06-30 RX ORDER — OMEPRAZOLE 20 MG/1
CAPSULE, DELAYED RELEASE ORAL
Qty: 30 CAPSULE | Refills: 5 | Status: SHIPPED | OUTPATIENT
Start: 2022-06-30 | End: 2022-10-11 | Stop reason: SDUPTHER

## 2022-06-30 RX ORDER — HYDROXYZINE HYDROCHLORIDE 25 MG/1
25 TABLET, FILM COATED ORAL
Qty: 30 TABLET | Refills: 2 | Status: SHIPPED | OUTPATIENT
Start: 2022-06-30 | End: 2023-03-31 | Stop reason: SDUPTHER

## 2022-06-30 NOTE — TELEPHONE ENCOUNTER
Last seen: 02.17.2022 by Dr. Reynaga  Next appt: 07.12.2022 with Dr. Reynaga    Was the patient seen in the last year in this department? Yes   Does patient have an active prescription for medications requested? No   Received Request Via: Pharmacy

## 2022-07-06 ENCOUNTER — TELEPHONE (OUTPATIENT)
Dept: VASCULAR LAB | Facility: MEDICAL CENTER | Age: 77
End: 2022-07-06
Payer: MEDICARE

## 2022-07-06 NOTE — TELEPHONE ENCOUNTER
Pt missed his last 2 appts to establish care.     R/s NP visit for 7/8.    Landen Molina, AbbyD, BCACP

## 2022-07-11 ENCOUNTER — DOCUMENTATION (OUTPATIENT)
Dept: VASCULAR LAB | Facility: MEDICAL CENTER | Age: 77
End: 2022-07-11
Payer: MEDICARE

## 2022-07-11 NOTE — LETTER
Fede Parikh Jr.  [address]  [address]  [place] [address]    07/11/22      Dear Fede Parikh Jr.,    We have been unsuccessful in our attempts to contact you regarding your Anticoagulation Service appointments and necessary INR testing.  Warfarin is a potent blood-thinning agent that requires frequent monitoring to measure its level in the blood.  If your level becomes too high, you could develop serious, sometimes life-threatening bleeding problems.  If your level becomes too low, life-threatening blood clots or stroke could result.        To monitor your warfarin effectively, we need to be able to communicate with you.  This is a requirement to be followed by our Service.  If we are unable to contact you through repeated occasions, you are at risk of being discharged from the Anticoagulation Service.     It is extremely important that you have your lab work drawn as soon as possible.  Alternatively, you may schedule an appointment for a fingerstick INR at our clinic.  We are open Monday-Friday 8 am until 5 pm.  You may reach our Service at [phone].        Sincerely,           Kurt Collado, PharmD, [facility]  Clinic Supervisor  [facility]  Outpatient Anticoagulation Service

## 2022-07-11 NOTE — PROGRESS NOTES
[facility] & [facility]        Patient no-showed again (3rd time) for NP appt in anticoagulation clinic.   Previous notes indicate that the patient is uncooperative making it very hard to try and manage the patient's anticoagulation.     Patient did not answer and VM is not set up.   Will attempt to contact the patient again at a later time.     Will send a non-compliance letter.       Jony MoraD

## 2022-07-13 ENCOUNTER — TELEPHONE (OUTPATIENT)
Dept: VASCULAR LAB | Facility: MEDICAL CENTER | Age: 77
End: 2022-07-13
Payer: MEDICARE

## 2022-07-13 NOTE — TELEPHONE ENCOUNTER
Pt missed his last 3 appts to establish care.      R/s NP visit for 7/18 - soonest available per pt.    Of note, pt hung up on me 5x while we were attempting to r/s his missed visit.     Landen Molina, PharmD, BCACP

## 2022-07-18 ENCOUNTER — TELEPHONE (OUTPATIENT)
Dept: VASCULAR LAB | Facility: MEDICAL CENTER | Age: 77
End: 2022-07-18
Payer: MEDICARE

## 2022-07-18 DIAGNOSIS — J44.9 CHRONIC OBSTRUCTIVE PULMONARY DISEASE, UNSPECIFIED COPD TYPE (HCC): ICD-10-CM

## 2022-07-18 RX ORDER — ALBUTEROL SULFATE 90 UG/1
2 AEROSOL, METERED RESPIRATORY (INHALATION) EVERY 6 HOURS PRN
Qty: 18 G | Refills: 1 | Status: SHIPPED | OUTPATIENT
Start: 2022-07-18 | End: 2022-09-24 | Stop reason: SDUPTHER

## 2022-07-18 NOTE — TELEPHONE ENCOUNTER
Pt missed their initial anticoagulation appointment on 7/18.    This is the 4th time he no showed this appt    Rescheduled on 7/26 as this is the soonest he can come in.    Mirella Hillman, AbbyD

## 2022-07-18 NOTE — TELEPHONE ENCOUNTER
Last seen: 02/17/22 by Dr. BILLY  Next appt: NONE     Was the patient seen in the last year in this department? Yes   Does patient have an active prescription for medications requested? No   Received Request Via: Pharmacy

## 2022-07-27 ENCOUNTER — TELEPHONE (OUTPATIENT)
Dept: VASCULAR LAB | Facility: MEDICAL CENTER | Age: 77
End: 2022-07-27
Payer: MEDICARE

## 2022-07-27 NOTE — LETTER
July 27, 2022        Fede Parikh Jr.  [address]  [address]  [place] [address]            Dear Fede Parikh Jr. ,    We have been unsuccessful in our attempts to contact you regarding your Anticoagulation Service referral.  Anticoagulants are medications that can cause potential harmful side effects when not monitored properly. Without proper monitoring there is potential for serious, sometimes life-threatening bleeding problems or life-threatening blood clots or stroke could result.    Please contact our clinic so we may assist you.  We are open Monday-Friday 8 am until 5 pm.  You may reach our Service at [phone].    To monitor your anticoagulant effectively, we need to be able to communicate with you.  This is a requirement to be followed by our Service.  Please contact the clinic as soon as possible to establish care and provide your current contact information.  Thank you.        Sincerely,      Kurt MoraD, [facility]  Pharmacy Clinical Supervisor  [facility]  Outpatient Anticoagulation Service

## 2022-07-27 NOTE — TELEPHONE ENCOUNTER
Pt missed their initial anticoagulation appointment on 7/26     This is the 5th time he no showed this appt     LM to r/s  Sent letter     Mirella Hillman, PharmD    Pt has missed 5 appointments, will defer back to referring physician.    Kurt Collado, PharmD     CC  Dr Ann Barnet Dr Michael Bloch

## 2022-08-02 ENCOUNTER — ANTICOAGULATION MONITORING (OUTPATIENT)
Dept: VASCULAR LAB | Facility: MEDICAL CENTER | Age: 77
End: 2022-08-02
Payer: MEDICARE

## 2022-08-02 DIAGNOSIS — I74.10 AORTIC THROMBUS (HCC): ICD-10-CM

## 2022-08-08 DIAGNOSIS — I10 ESSENTIAL HYPERTENSION: Chronic | ICD-10-CM

## 2022-08-08 RX ORDER — AMLODIPINE BESYLATE 5 MG/1
5 TABLET ORAL DAILY
Qty: 30 TABLET | Refills: 5 | Status: SHIPPED | OUTPATIENT
Start: 2022-08-08 | End: 2022-10-11 | Stop reason: SDUPTHER

## 2022-08-08 NOTE — TELEPHONE ENCOUNTER
Last seen: 02.17.2022 by Dr. Reynaga  Next appt: none    Was the patient seen in the last year in this department? Yes   Does patient have an active prescription for medications requested? No   Received Request Via: Pharmacy

## 2022-09-22 ENCOUNTER — TELEPHONE (OUTPATIENT)
Dept: INTERNAL MEDICINE | Facility: OTHER | Age: 77
End: 2022-09-22
Payer: MEDICARE

## 2022-09-22 DIAGNOSIS — J44.9 CHRONIC OBSTRUCTIVE PULMONARY DISEASE, UNSPECIFIED COPD TYPE (HCC): ICD-10-CM

## 2022-09-22 NOTE — TELEPHONE ENCOUNTER
Albuterol refill    Last seen: 2/17/22 by Dr. Reynaga  Next appt: 10/5/22 with Dr. Reynaga    Was the patient seen in the last year in this department? Yes   Does patient have an active prescription for medications requested? No   Received Request Via: Pharmacy

## 2022-09-22 NOTE — TELEPHONE ENCOUNTER
Pt is requesting refill of albuterol inhaler sent to [facility] on [address] but didn't specify which one.

## 2022-09-24 RX ORDER — ALBUTEROL SULFATE 90 UG/1
2 AEROSOL, METERED RESPIRATORY (INHALATION) EVERY 6 HOURS PRN
Qty: 18 G | Refills: 1 | Status: SHIPPED | OUTPATIENT
Start: 2022-09-24 | End: 2023-03-31 | Stop reason: SDUPTHER

## 2022-10-11 ENCOUNTER — APPOINTMENT (OUTPATIENT)
Dept: RADIOLOGY | Facility: MEDICAL CENTER | Age: 77
DRG: 438 | End: 2022-10-11
Attending: EMERGENCY MEDICINE
Payer: MEDICARE

## 2022-10-11 ENCOUNTER — OFFICE VISIT (OUTPATIENT)
Dept: INTERNAL MEDICINE | Facility: OTHER | Age: 77
End: 2022-10-11
Payer: MEDICARE

## 2022-10-11 ENCOUNTER — HOSPITAL ENCOUNTER (INPATIENT)
Facility: MEDICAL CENTER | Age: 77
LOS: 3 days | DRG: 438 | End: 2022-10-17
Attending: EMERGENCY MEDICINE | Admitting: STUDENT IN AN ORGANIZED HEALTH CARE EDUCATION/TRAINING PROGRAM
Payer: MEDICARE

## 2022-10-11 VITALS
SYSTOLIC BLOOD PRESSURE: 82 MMHG | WEIGHT: 106 LBS | HEART RATE: 116 BPM | DIASTOLIC BLOOD PRESSURE: 50 MMHG | BODY MASS INDEX: 15.7 KG/M2 | HEIGHT: 69 IN | OXYGEN SATURATION: 98 % | TEMPERATURE: 98.1 F

## 2022-10-11 DIAGNOSIS — K21.9 GASTROESOPHAGEAL REFLUX DISEASE: ICD-10-CM

## 2022-10-11 DIAGNOSIS — N17.9 AKI (ACUTE KIDNEY INJURY) (HCC): ICD-10-CM

## 2022-10-11 DIAGNOSIS — I10 ESSENTIAL HYPERTENSION: Chronic | ICD-10-CM

## 2022-10-11 DIAGNOSIS — D64.9 ANEMIA, UNSPECIFIED TYPE: ICD-10-CM

## 2022-10-11 DIAGNOSIS — E04.1 THYROID NODULE: ICD-10-CM

## 2022-10-11 DIAGNOSIS — J44.9 CHRONIC OBSTRUCTIVE PULMONARY DISEASE, UNSPECIFIED COPD TYPE (HCC): ICD-10-CM

## 2022-10-11 DIAGNOSIS — Z79.01 CHRONIC ANTICOAGULATION: ICD-10-CM

## 2022-10-11 DIAGNOSIS — R63.4 WEIGHT LOSS: ICD-10-CM

## 2022-10-11 DIAGNOSIS — R53.1 WEAKNESS: ICD-10-CM

## 2022-10-11 DIAGNOSIS — I74.10 AORTIC THROMBUS (HCC): ICD-10-CM

## 2022-10-11 DIAGNOSIS — Z23 ENCOUNTER FOR VACCINATION: ICD-10-CM

## 2022-10-11 DIAGNOSIS — R06.02 SHORTNESS OF BREATH: ICD-10-CM

## 2022-10-11 DIAGNOSIS — K21.9 GASTROESOPHAGEAL REFLUX DISEASE, UNSPECIFIED WHETHER ESOPHAGITIS PRESENT: ICD-10-CM

## 2022-10-11 PROBLEM — R53.81 DEBILITY: Status: ACTIVE | Noted: 2022-10-11

## 2022-10-11 PROBLEM — E46 PROTEIN CALORIE MALNUTRITION (HCC): Status: ACTIVE | Noted: 2022-10-11

## 2022-10-11 LAB
ALBUMIN SERPL BCP-MCNC: 3.8 G/DL (ref 3.2–4.9)
ALBUMIN/GLOB SERPL: 0.8 G/DL
ALP SERPL-CCNC: 60 U/L (ref 30–99)
ALT SERPL-CCNC: 19 U/L (ref 2–50)
ANION GAP SERPL CALC-SCNC: 15 MMOL/L (ref 7–16)
APTT PPP: 25.8 SEC (ref 24.7–36)
AST SERPL-CCNC: 37 U/L (ref 12–45)
BASOPHILS # BLD AUTO: 0.4 % (ref 0–1.8)
BASOPHILS # BLD: 0.02 K/UL (ref 0–0.12)
BILIRUB SERPL-MCNC: 0.7 MG/DL (ref 0.1–1.5)
BUN SERPL-MCNC: 26 MG/DL (ref 8–22)
CALCIUM SERPL-MCNC: 9.6 MG/DL (ref 8.5–10.5)
CHLORIDE SERPL-SCNC: 105 MMOL/L (ref 96–112)
CO2 SERPL-SCNC: 21 MMOL/L (ref 20–33)
CREAT SERPL-MCNC: 1.95 MG/DL (ref 0.5–1.4)
EKG IMPRESSION: NORMAL
EOSINOPHIL # BLD AUTO: 0.33 K/UL (ref 0–0.51)
EOSINOPHIL NFR BLD: 5.8 % (ref 0–6.9)
ERYTHROCYTE [DISTWIDTH] IN BLOOD BY AUTOMATED COUNT: 63.1 FL (ref 35.9–50)
FERRITIN SERPL-MCNC: 36.4 NG/ML (ref 22–322)
GFR SERPLBLD CREATININE-BSD FMLA CKD-EPI: 34 ML/MIN/1.73 M 2
GLOBULIN SER CALC-MCNC: 4.5 G/DL (ref 1.9–3.5)
GLUCOSE SERPL-MCNC: 122 MG/DL (ref 65–99)
HCT VFR BLD AUTO: 28.5 % (ref 42–52)
HGB BLD-MCNC: 8.7 G/DL (ref 14–18)
HGB RETIC QN AUTO: 23.8 PG/CELL (ref 29–35)
IMM GRANULOCYTES # BLD AUTO: 0.02 K/UL (ref 0–0.11)
IMM GRANULOCYTES NFR BLD AUTO: 0.4 % (ref 0–0.9)
IMM RETICS NFR: 33.4 % (ref 9.3–17.4)
INR PPP: 1.11 (ref 0.87–1.13)
IRON SATN MFR SERPL: 7 % (ref 15–55)
IRON SERPL-MCNC: 29 UG/DL (ref 50–180)
LYMPHOCYTES # BLD AUTO: 1.04 K/UL (ref 1–4.8)
LYMPHOCYTES NFR BLD: 18.3 % (ref 22–41)
MAGNESIUM SERPL-MCNC: 1.6 MG/DL (ref 1.5–2.5)
MCH RBC QN AUTO: 23.3 PG (ref 27–33)
MCHC RBC AUTO-ENTMCNC: 30.5 G/DL (ref 33.7–35.3)
MCV RBC AUTO: 76.4 FL (ref 81.4–97.8)
MONOCYTES # BLD AUTO: 0.61 K/UL (ref 0–0.85)
MONOCYTES NFR BLD AUTO: 10.8 % (ref 0–13.4)
NEUTROPHILS # BLD AUTO: 3.65 K/UL (ref 1.82–7.42)
NEUTROPHILS NFR BLD: 64.3 % (ref 44–72)
NRBC # BLD AUTO: 0.02 K/UL
NRBC BLD-RTO: 0.4 /100 WBC
PHOSPHATE SERPL-MCNC: 3.8 MG/DL (ref 2.5–4.5)
PLATELET # BLD AUTO: 291 K/UL (ref 164–446)
PMV BLD AUTO: 9.6 FL (ref 9–12.9)
POTASSIUM SERPL-SCNC: 4.6 MMOL/L (ref 3.6–5.5)
PROT SERPL-MCNC: 8.3 G/DL (ref 6–8.2)
PROTHROMBIN TIME: 14.2 SEC (ref 12–14.6)
RBC # BLD AUTO: 3.73 M/UL (ref 4.7–6.1)
RETICS # AUTO: 0.08 M/UL (ref 0.04–0.06)
RETICS/RBC NFR: 2.1 % (ref 0.8–2.1)
SODIUM SERPL-SCNC: 141 MMOL/L (ref 135–145)
TIBC SERPL-MCNC: 393 UG/DL (ref 250–450)
UIBC SERPL-MCNC: 364 UG/DL (ref 110–370)
WBC # BLD AUTO: 5.7 K/UL (ref 4.8–10.8)

## 2022-10-11 PROCEDURE — 99215 OFFICE O/P EST HI 40 MIN: CPT | Mod: 25,GC | Performed by: STUDENT IN AN ORGANIZED HEALTH CARE EDUCATION/TRAINING PROGRAM

## 2022-10-11 PROCEDURE — G0008 ADMIN INFLUENZA VIRUS VAC: HCPCS | Performed by: STUDENT IN AN ORGANIZED HEALTH CARE EDUCATION/TRAINING PROGRAM

## 2022-10-11 PROCEDURE — G0378 HOSPITAL OBSERVATION PER HR: HCPCS

## 2022-10-11 PROCEDURE — 700111 HCHG RX REV CODE 636 W/ 250 OVERRIDE (IP): Performed by: EMERGENCY MEDICINE

## 2022-10-11 PROCEDURE — 96367 TX/PROPH/DG ADDL SEQ IV INF: CPT

## 2022-10-11 PROCEDURE — 85520 HEPARIN ASSAY: CPT

## 2022-10-11 PROCEDURE — 85025 COMPLETE CBC W/AUTO DIFF WBC: CPT

## 2022-10-11 PROCEDURE — 93005 ELECTROCARDIOGRAM TRACING: CPT | Performed by: EMERGENCY MEDICINE

## 2022-10-11 PROCEDURE — 90662 IIV NO PRSV INCREASED AG IM: CPT | Performed by: STUDENT IN AN ORGANIZED HEALTH CARE EDUCATION/TRAINING PROGRAM

## 2022-10-11 PROCEDURE — 71045 X-RAY EXAM CHEST 1 VIEW: CPT

## 2022-10-11 PROCEDURE — 82728 ASSAY OF FERRITIN: CPT

## 2022-10-11 PROCEDURE — 83540 ASSAY OF IRON: CPT

## 2022-10-11 PROCEDURE — 700105 HCHG RX REV CODE 258: Performed by: EMERGENCY MEDICINE

## 2022-10-11 PROCEDURE — 96365 THER/PROPH/DIAG IV INF INIT: CPT

## 2022-10-11 PROCEDURE — 700102 HCHG RX REV CODE 250 W/ 637 OVERRIDE(OP): Performed by: STUDENT IN AN ORGANIZED HEALTH CARE EDUCATION/TRAINING PROGRAM

## 2022-10-11 PROCEDURE — 80053 COMPREHEN METABOLIC PANEL: CPT

## 2022-10-11 PROCEDURE — 36415 COLL VENOUS BLD VENIPUNCTURE: CPT

## 2022-10-11 PROCEDURE — 85730 THROMBOPLASTIN TIME PARTIAL: CPT

## 2022-10-11 PROCEDURE — 85046 RETICYTE/HGB CONCENTRATE: CPT

## 2022-10-11 PROCEDURE — A9270 NON-COVERED ITEM OR SERVICE: HCPCS | Performed by: STUDENT IN AN ORGANIZED HEALTH CARE EDUCATION/TRAINING PROGRAM

## 2022-10-11 PROCEDURE — 99220 PR INITIAL OBSERVATION CARE,LEVL III: CPT | Mod: AI,GC | Performed by: STUDENT IN AN ORGANIZED HEALTH CARE EDUCATION/TRAINING PROGRAM

## 2022-10-11 PROCEDURE — 85610 PROTHROMBIN TIME: CPT

## 2022-10-11 PROCEDURE — 84100 ASSAY OF PHOSPHORUS: CPT

## 2022-10-11 PROCEDURE — 99285 EMERGENCY DEPT VISIT HI MDM: CPT

## 2022-10-11 PROCEDURE — 83735 ASSAY OF MAGNESIUM: CPT

## 2022-10-11 PROCEDURE — 87040 BLOOD CULTURE FOR BACTERIA: CPT

## 2022-10-11 PROCEDURE — 700105 HCHG RX REV CODE 258: Performed by: STUDENT IN AN ORGANIZED HEALTH CARE EDUCATION/TRAINING PROGRAM

## 2022-10-11 PROCEDURE — 93005 ELECTROCARDIOGRAM TRACING: CPT

## 2022-10-11 PROCEDURE — 83550 IRON BINDING TEST: CPT

## 2022-10-11 RX ORDER — OMEPRAZOLE 20 MG/1
20 CAPSULE, DELAYED RELEASE ORAL DAILY
Status: DISCONTINUED | OUTPATIENT
Start: 2022-10-12 | End: 2022-10-12

## 2022-10-11 RX ORDER — HEPARIN SODIUM 5000 [USP'U]/100ML
0-30 INJECTION, SOLUTION INTRAVENOUS CONTINUOUS
Status: DISCONTINUED | OUTPATIENT
Start: 2022-10-11 | End: 2022-10-13

## 2022-10-11 RX ORDER — ONDANSETRON 4 MG/1
4 TABLET, ORALLY DISINTEGRATING ORAL EVERY 4 HOURS PRN
Status: DISCONTINUED | OUTPATIENT
Start: 2022-10-11 | End: 2022-10-17 | Stop reason: HOSPADM

## 2022-10-11 RX ORDER — AZITHROMYCIN 500 MG/1
500 INJECTION, POWDER, LYOPHILIZED, FOR SOLUTION INTRAVENOUS ONCE
Status: COMPLETED | OUTPATIENT
Start: 2022-10-11 | End: 2022-10-11

## 2022-10-11 RX ORDER — LABETALOL HYDROCHLORIDE 5 MG/ML
10 INJECTION, SOLUTION INTRAVENOUS EVERY 4 HOURS PRN
Status: DISCONTINUED | OUTPATIENT
Start: 2022-10-11 | End: 2022-10-17 | Stop reason: HOSPADM

## 2022-10-11 RX ORDER — BISACODYL 10 MG
10 SUPPOSITORY, RECTAL RECTAL
Status: DISCONTINUED | OUTPATIENT
Start: 2022-10-11 | End: 2022-10-17 | Stop reason: HOSPADM

## 2022-10-11 RX ORDER — ACETAMINOPHEN 325 MG/1
650 TABLET ORAL EVERY 6 HOURS PRN
Status: DISCONTINUED | OUTPATIENT
Start: 2022-10-11 | End: 2022-10-17 | Stop reason: HOSPADM

## 2022-10-11 RX ORDER — GUAIFENESIN/DEXTROMETHORPHAN 100-10MG/5
10 SYRUP ORAL EVERY 6 HOURS PRN
Status: DISCONTINUED | OUTPATIENT
Start: 2022-10-11 | End: 2022-10-17 | Stop reason: HOSPADM

## 2022-10-11 RX ORDER — NICOTINE 21 MG/24HR
14 PATCH, TRANSDERMAL 24 HOURS TRANSDERMAL
Status: DISCONTINUED | OUTPATIENT
Start: 2022-10-12 | End: 2022-10-17 | Stop reason: HOSPADM

## 2022-10-11 RX ORDER — BUDESONIDE AND FORMOTEROL FUMARATE DIHYDRATE 160; 4.5 UG/1; UG/1
2 AEROSOL RESPIRATORY (INHALATION)
Status: DISCONTINUED | OUTPATIENT
Start: 2022-10-11 | End: 2022-10-17 | Stop reason: HOSPADM

## 2022-10-11 RX ORDER — OMEPRAZOLE 20 MG/1
CAPSULE, DELAYED RELEASE ORAL
Qty: 90 CAPSULE | Refills: 5 | Status: ON HOLD | OUTPATIENT
Start: 2022-10-11 | End: 2022-10-17 | Stop reason: SDUPTHER

## 2022-10-11 RX ORDER — WARFARIN SODIUM 5 MG/1
5 TABLET ORAL DAILY
Status: DISCONTINUED | OUTPATIENT
Start: 2022-10-11 | End: 2022-10-13

## 2022-10-11 RX ORDER — AMLODIPINE BESYLATE 5 MG/1
5 TABLET ORAL DAILY
COMMUNITY
End: 2023-03-31 | Stop reason: SDUPTHER

## 2022-10-11 RX ORDER — POLYETHYLENE GLYCOL 3350 17 G/17G
1 POWDER, FOR SOLUTION ORAL
Status: DISCONTINUED | OUTPATIENT
Start: 2022-10-11 | End: 2022-10-17 | Stop reason: HOSPADM

## 2022-10-11 RX ORDER — HEPARIN SODIUM 1000 [USP'U]/ML
40 INJECTION, SOLUTION INTRAVENOUS; SUBCUTANEOUS PRN
Status: DISCONTINUED | OUTPATIENT
Start: 2022-10-11 | End: 2022-10-13

## 2022-10-11 RX ORDER — AMLODIPINE BESYLATE 5 MG/1
5 TABLET ORAL DAILY
Status: DISCONTINUED | OUTPATIENT
Start: 2022-10-12 | End: 2022-10-17 | Stop reason: HOSPADM

## 2022-10-11 RX ORDER — TIOTROPIUM BROMIDE 18 UG/1
CAPSULE ORAL; RESPIRATORY (INHALATION)
Qty: 90 CAPSULE | Refills: 5 | Status: SHIPPED | OUTPATIENT
Start: 2022-10-11 | End: 2023-03-31 | Stop reason: SDUPTHER

## 2022-10-11 RX ORDER — HEPARIN SODIUM 1000 [USP'U]/ML
80 INJECTION, SOLUTION INTRAVENOUS; SUBCUTANEOUS ONCE
Status: COMPLETED | OUTPATIENT
Start: 2022-10-11 | End: 2022-10-12

## 2022-10-11 RX ORDER — ONDANSETRON 2 MG/ML
4 INJECTION INTRAMUSCULAR; INTRAVENOUS EVERY 4 HOURS PRN
Status: DISCONTINUED | OUTPATIENT
Start: 2022-10-11 | End: 2022-10-17 | Stop reason: HOSPADM

## 2022-10-11 RX ORDER — SODIUM CHLORIDE, SODIUM LACTATE, POTASSIUM CHLORIDE, AND CALCIUM CHLORIDE .6; .31; .03; .02 G/100ML; G/100ML; G/100ML; G/100ML
1000 INJECTION, SOLUTION INTRAVENOUS ONCE
Status: COMPLETED | OUTPATIENT
Start: 2022-10-11 | End: 2022-10-11

## 2022-10-11 RX ORDER — AMOXICILLIN 250 MG
2 CAPSULE ORAL 2 TIMES DAILY
Status: DISCONTINUED | OUTPATIENT
Start: 2022-10-11 | End: 2022-10-17 | Stop reason: HOSPADM

## 2022-10-11 RX ORDER — AMLODIPINE BESYLATE 5 MG/1
5 TABLET ORAL DAILY
Qty: 90 TABLET | Refills: 5 | Status: SHIPPED | OUTPATIENT
Start: 2022-10-11 | End: 2022-10-11

## 2022-10-11 RX ORDER — ALBUTEROL SULFATE 90 UG/1
2 AEROSOL, METERED RESPIRATORY (INHALATION) EVERY 6 HOURS PRN
Status: DISCONTINUED | OUTPATIENT
Start: 2022-10-11 | End: 2022-10-13

## 2022-10-11 RX ADMIN — SENNOSIDES AND DOCUSATE SODIUM 2 TABLET: 50; 8.6 TABLET ORAL at 20:40

## 2022-10-11 RX ADMIN — SODIUM CHLORIDE, POTASSIUM CHLORIDE, SODIUM LACTATE AND CALCIUM CHLORIDE 1000 ML: 600; 310; 30; 20 INJECTION, SOLUTION INTRAVENOUS at 23:03

## 2022-10-11 RX ADMIN — SODIUM CHLORIDE 3 G: 900 INJECTION INTRAVENOUS at 20:25

## 2022-10-11 RX ADMIN — AZITHROMYCIN MONOHYDRATE 500 MG: 500 INJECTION, POWDER, LYOPHILIZED, FOR SOLUTION INTRAVENOUS at 20:39

## 2022-10-11 ASSESSMENT — ENCOUNTER SYMPTOMS
CONSTIPATION: 0
SPUTUM PRODUCTION: 1
ABDOMINAL PAIN: 0
CHILLS: 0
BLOOD IN STOOL: 0
NECK PAIN: 0
VOMITING: 0
FEVER: 0
DIARRHEA: 0
NAUSEA: 0
PALPITATIONS: 0
BACK PAIN: 0
SORE THROAT: 0
COUGH: 1
SHORTNESS OF BREATH: 1

## 2022-10-11 ASSESSMENT — COGNITIVE AND FUNCTIONAL STATUS - GENERAL
DAILY ACTIVITIY SCORE: 20
MOBILITY SCORE: 15
STANDING UP FROM CHAIR USING ARMS: A LOT
TURNING FROM BACK TO SIDE WHILE IN FLAT BAD: A LITTLE
CLIMB 3 TO 5 STEPS WITH RAILING: TOTAL
DRESSING REGULAR LOWER BODY CLOTHING: A LITTLE
MOVING TO AND FROM BED TO CHAIR: A LITTLE
MOVING FROM LYING ON BACK TO SITTING ON SIDE OF FLAT BED: A LITTLE
TOILETING: A LITTLE
SUGGESTED CMS G CODE MODIFIER MOBILITY: CK
DRESSING REGULAR UPPER BODY CLOTHING: A LITTLE
HELP NEEDED FOR BATHING: A LITTLE
WALKING IN HOSPITAL ROOM: A LITTLE
SUGGESTED CMS G CODE MODIFIER DAILY ACTIVITY: CJ

## 2022-10-11 ASSESSMENT — LIFESTYLE VARIABLES
DO YOU DRINK ALCOHOL: NO
HOW MANY TIMES IN THE PAST YEAR HAVE YOU HAD 5 OR MORE DRINKS IN A DAY: 0
EVER HAD A DRINK FIRST THING IN THE MORNING TO STEADY YOUR NERVES TO GET RID OF A HANGOVER: NO
TOTAL SCORE: 0
ON A TYPICAL DAY WHEN YOU DRINK ALCOHOL HOW MANY DRINKS DO YOU HAVE: 0
TOTAL SCORE: 0
EVER FELT BAD OR GUILTY ABOUT YOUR DRINKING: NO
CONSUMPTION TOTAL: NEGATIVE
TOTAL SCORE: 0
HAVE PEOPLE ANNOYED YOU BY CRITICIZING YOUR DRINKING: NO
AVERAGE NUMBER OF DAYS PER WEEK YOU HAVE A DRINK CONTAINING ALCOHOL: 0
HAVE YOU EVER FELT YOU SHOULD CUT DOWN ON YOUR DRINKING: NO

## 2022-10-11 ASSESSMENT — PATIENT HEALTH QUESTIONNAIRE - PHQ9
SUM OF ALL RESPONSES TO PHQ9 QUESTIONS 1 AND 2: 0
2. FEELING DOWN, DEPRESSED, IRRITABLE, OR HOPELESS: NOT AT ALL
1. LITTLE INTEREST OR PLEASURE IN DOING THINGS: NOT AT ALL

## 2022-10-11 ASSESSMENT — FIBROSIS 4 INDEX
FIB4 SCORE: 2.66
FIB4 SCORE: 2.66
FIB4 SCORE: 2.3

## 2022-10-11 NOTE — PROGRESS NOTES
Fede Parikh Jr. is a 77 y.o. male here for Follow-Up (Patient is feeling very weak/Flu shot /Weight loss /Med refills )    HPI:     77 y.o M w/ pertinent pmhx  of 40+ packs year smoking, ?COPD/emphysema (on Breo/Spiriva, no PFT on file), alcohol use disorder, on chronic anticoagulation with warfarin for mural thrombus of distal aorta, PAD with chronic occulusion of common right iliac artery stent presented with c/o SOB x 1 weeks and weakness x 3 months     SOB-   Patient reports running out of his inhalers last week, unable to walk very far before becoming SOB. Mobility also limited by weakness of lower extremities   Denies recent fever, chills, cough. Denies chest pain     Weakness-   Progressive, since admission in June although has been worse over the last week  Can walk from room to room at home, however, not very far without resting when out. Was previously active in choir at [employer], no longer attending   Has not had follow up appointment with Dr. Prado after discharge  Declines PT referral     Vital signs significant for  (sinus). Orthostatic vitals: 105/51  sitting, 82/50 standing. He reports dizziness with change in position, also has symptoms at home. Denies any syncope. Denies any melena, [place] bleeding, hemoptysis or other bleeding. Patient reports that he takes his warfarin daily, unknown which dose, reports that he has only 2 pills left (record review reveals not filled since June?)     Discussed concerns for symptomatic anemia with patient, particularly with last H/H 7.5 and uncertainty with anticoagulation compliance, initially planned for close follow up with next days, however, with orthostatic symptoms and patient unable to get next day labs, recommended ED evaluation. He is concerned for return transport from hospital and we discussed that if he does not need to be admitted, we can provide cab voucher to return home. Patient agreeable to transfer.     EMS called. [facility] Transfer  center notified and handoff given to EDP and EMS      Current medicines (including changes today)  Current Outpatient Medications   Medication Sig Dispense Refill    tiotropium (SPIRIVA HANDIHALER) 18 MCG Cap INHALE 1 CAPSULE VIA HANDIHALER ONCE DAILY 90 Capsule 5    omeprazole (PRILOSEC) 20 MG delayed-release capsule TAKE ONE CAPSULE BY MOUTH ONE TIME DAILY 90 Capsule 5    albuterol (VENTOLIN HFA) 108 (90 Base) MCG/ACT Aero Soln inhalation aerosol Inhale 2 Puffs every 6 hours as needed for Shortness of Breath. INHALE TWO PUFFS BY MOUTH EVERY SIX HOURS AS NEEDED 18 g 1    hydrOXYzine HCl (ATARAX) 25 MG Tab Take 1 Tablet by mouth 1 time a day as needed for Itching. 30 Tablet 2    warfarin (COUMADIN) 4 MG Tab Take 1 Tablet by mouth every day at 6 PM. 30 Tablet 3    warfarin (COUMADIN) 4 MG Tab Take 1 Tablet by mouth every day at 6 PM. 30 Tablet 3    Fluticasone Furoate-Vilanterol (BREO ELLIPTA) 100-25 MCG/INH AEROSOL POWDER, BREATH ACTIVATED Inhale 1 Puff every day. 1 Each 5     No current facility-administered medications for this visit.     He  has a past medical history of Hepatitis, Hyperlipemia, Hypertension, and Pulmonary emphysema (HCC).  He  has a past surgical history that includes femoral femoral bypass (Bilateral, 2020).    Social History     Tobacco Use    Smoking status: Some Days     Packs/day: 0.50     Types: Cigarettes    Smokeless tobacco: Never   Vaping Use    Vaping Use: Never used   Substance Use Topics    Alcohol use: Yes     Alcohol/week: 4.2 oz     Types: 7 Shots of liquor per week    Drug use: Not Currently     Social History     Social History Narrative    Not on file     Family History   Problem Relation Age of Onset    Heart Disease Mother     Cancer Mother     Diabetes Brother      Family Status   Relation Name Status    Mo      Fa      Bro  Alive    MGMo      MGFa      PGMo      PGFa       ROS    The pertinent  ROS findings can be seen in  "the HPI above.     All other systems reviewed and are negative     Objective:     BP (!) 82/50 (BP Location: Right arm, Patient Position: Standing)   Pulse (!) 116   Temp 36.7 °C (98.1 °F) (Temporal)   Ht 1.74 m (5' 8.5\")   Wt 48.1 kg (106 lb)   SpO2 98%  Body mass index is 15.88 kg/m².        Physical Exam:    Constitutional: Cathectic, alert, no distress.  Skin: No suspicious lesions  Eye: Equal, round and reactive, icterus present   ENMT: Lips without lesions, multiple missing teeth,  oropharynx clear.  Neck: Trachea midline, no masses, no thyromegaly. No cervical or supraclavicular lymphadenopathy.  Respiratory: Unlabored respiratory effort, lungs clear to auscultation, no wheezes, no ronchi.  Cardiovascular: Normal S1, S2, no murmur, no edema  Musculoskeletal: Adequately aligned spine. Bilateral lower extremities atrophic   Neuro: CN II-XII intact.  Psych: Mood stable, affect appropriate, thoughts and speech appropriate    Assessment and Plan:   Shortness of breath  - possible exacerbation of chronic lung disease, however, lung P/E benign and considering hx of anemia and chronic anticoagulation therapy, concerning for occult bleed and symptomatic anemia   - risks/benefits discussion with patient regarding next day labs and follow up vs ED evaluation. Patient agreeable for ED evaluation   - Patient transported by EMS to [facility] ED    Weakness  - progressive, with lower extremities muscle atrophy. Debility contributing although will r.o symptomatic anemia as above   - evaluation of nutrition status + PT referral at next visit     Thyroid nodule  - found on CTA in 6/2022  - will need TSH and US thyroid on follow up     Anemia  - microcytic anemia, no workup on file   - per patient, colonoscopy 3-5 years ago, unknown result  - see A&P for SOB     Aortic thrombus (HCC)  - mural thrombus of aorta, has not follow up with vascular outpatient  - will need repeat coag panel to monitor warfarin therapy, per previous " hospital note, not candidate for DOAC due to kidney function (although eGFR >15?),  consider discussion with cardiology and/or vascular surgery for DOAC to increase therapy adherence     COPD (chronic obstructive pulmonary disease) (HCC)  - will need PFT for diagnosis and staging  - will refill inhalers for now    GERD (gastroesophageal reflux disease)  - with ongoing alcohol use- will need discussion in cessation after discharge        Followup: No follow-ups on file.

## 2022-10-11 NOTE — LETTER
October 11, 2022        Fede Parikh Jr.    Current Outpatient Medications   Medication Sig Dispense Refill   • tiotropium (SPIRIVA HANDIHALER) 18 MCG Cap INHALE 1 CAPSULE VIA HANDIHALER ONCE DAILY 90 Capsule 5   • omeprazole (PRILOSEC) 20 MG delayed-release capsule TAKE ONE CAPSULE BY MOUTH ONE TIME DAILY 90 Capsule 5   • albuterol (VENTOLIN HFA) 108 (90 Base) MCG/ACT Aero Soln inhalation aerosol Inhale 2 Puffs every 6 hours as needed for Shortness of Breath. INHALE TWO PUFFS BY MOUTH EVERY SIX HOURS AS NEEDED 18 g 1   • hydrOXYzine HCl (ATARAX) 25 MG Tab Take 1 Tablet by mouth 1 time a day as needed for Itching. 30 Tablet 2   • warfarin (COUMADIN) 4 MG Tab Take 1 Tablet by mouth every day at 6 PM. 30 Tablet 3   • warfarin (COUMADIN) 4 MG Tab Take 1 Tablet by mouth every day at 6 PM. 30 Tablet 3   • docusate sodium (COLACE) 100 MG Cap Take 1 Capsule by mouth in the morning and 1 Capsule in the evening. 60 Capsule 0   • Fluticasone Furoate-Vilanterol (BREO ELLIPTA) 100-25 MCG/INH AEROSOL POWDER, BREATH ACTIVATED Inhale 1 Puff every day. 1 Each 5     No current facility-administered medications for this visit.

## 2022-10-11 NOTE — ED TRIAGE NOTES
"Chief Complaint   Patient presents with    Sent by MD     Pt went to [facility] today because he has been feeling short of breath x1 month and weak x1 week. Pt had positive orthostatics at the clinic so was sent to the ER.      Pt BIB EMS from [facility] with above complaint. Per report, pt has history of COPD and anemia. Orthostatics at clinic were 105/51 sitting 82/50 standing. Pt denies chest pain.     Protocol ordered. Pt educated on triage process, placed back in lobby, and instructed to inform staff of any changes.       /74   Pulse (!) 101   Temp 36.1 °C (97 °F) (Temporal)   Resp 16   Ht 1.74 m (5' 8.5\")   Wt 48.7 kg (107 lb 5.8 oz)   SpO2 98%   BMI 16.09 kg/m²       "

## 2022-10-12 ENCOUNTER — APPOINTMENT (OUTPATIENT)
Dept: RADIOLOGY | Facility: MEDICAL CENTER | Age: 77
DRG: 438 | End: 2022-10-12
Payer: MEDICARE

## 2022-10-12 PROBLEM — N17.9 AKI (ACUTE KIDNEY INJURY) (HCC): Status: RESOLVED | Noted: 2022-10-11 | Resolved: 2022-10-12

## 2022-10-12 LAB
ALBUMIN SERPL BCP-MCNC: 3.4 G/DL (ref 3.2–4.9)
ALBUMIN/GLOB SERPL: 1.1 G/DL
ALP SERPL-CCNC: 49 U/L (ref 30–99)
ALT SERPL-CCNC: 12 U/L (ref 2–50)
AMPHET UR QL SCN: NEGATIVE
ANION GAP SERPL CALC-SCNC: 12 MMOL/L (ref 7–16)
AST SERPL-CCNC: 28 U/L (ref 12–45)
BARBITURATES UR QL SCN: NEGATIVE
BASOPHILS # BLD AUTO: 0.4 % (ref 0–1.8)
BASOPHILS # BLD: 0.02 K/UL (ref 0–0.12)
BENZODIAZ UR QL SCN: NEGATIVE
BILIRUB SERPL-MCNC: 0.5 MG/DL (ref 0.1–1.5)
BUN SERPL-MCNC: 26 MG/DL (ref 8–22)
BZE UR QL SCN: POSITIVE
CALCIUM SERPL-MCNC: 8.9 MG/DL (ref 8.5–10.5)
CANNABINOIDS UR QL SCN: NEGATIVE
CHLORIDE SERPL-SCNC: 107 MMOL/L (ref 96–112)
CO2 SERPL-SCNC: 22 MMOL/L (ref 20–33)
CREAT SERPL-MCNC: 1.86 MG/DL (ref 0.5–1.4)
EOSINOPHIL # BLD AUTO: 0.56 K/UL (ref 0–0.51)
EOSINOPHIL NFR BLD: 9.9 % (ref 0–6.9)
ERYTHROCYTE [DISTWIDTH] IN BLOOD BY AUTOMATED COUNT: 64.3 FL (ref 35.9–50)
FERRITIN SERPL-MCNC: 39.6 NG/ML (ref 22–322)
GFR SERPLBLD CREATININE-BSD FMLA CKD-EPI: 36 ML/MIN/1.73 M 2
GLOBULIN SER CALC-MCNC: 3.1 G/DL (ref 1.9–3.5)
GLUCOSE SERPL-MCNC: 102 MG/DL (ref 65–99)
HCT VFR BLD AUTO: 23.8 % (ref 42–52)
HEMOCCULT STL QL: NEGATIVE
HGB BLD-MCNC: 7.2 G/DL (ref 14–18)
IMM GRANULOCYTES # BLD AUTO: 0.03 K/UL (ref 0–0.11)
IMM GRANULOCYTES NFR BLD AUTO: 0.5 % (ref 0–0.9)
IRON SATN MFR SERPL: 7 % (ref 15–55)
IRON SERPL-MCNC: 23 UG/DL (ref 50–180)
LYMPHOCYTES # BLD AUTO: 1.2 K/UL (ref 1–4.8)
LYMPHOCYTES NFR BLD: 21.3 % (ref 22–41)
MAGNESIUM SERPL-MCNC: 1.6 MG/DL (ref 1.5–2.5)
MCH RBC QN AUTO: 23.3 PG (ref 27–33)
MCHC RBC AUTO-ENTMCNC: 30.3 G/DL (ref 33.7–35.3)
MCV RBC AUTO: 77 FL (ref 81.4–97.8)
METHADONE UR QL SCN: NEGATIVE
MONOCYTES # BLD AUTO: 0.65 K/UL (ref 0–0.85)
MONOCYTES NFR BLD AUTO: 11.5 % (ref 0–13.4)
NEUTROPHILS # BLD AUTO: 3.17 K/UL (ref 1.82–7.42)
NEUTROPHILS NFR BLD: 56.4 % (ref 44–72)
NRBC # BLD AUTO: 0 K/UL
NRBC BLD-RTO: 0 /100 WBC
OPIATES UR QL SCN: NEGATIVE
OXYCODONE UR QL SCN: NEGATIVE
PCP UR QL SCN: NEGATIVE
PLATELET # BLD AUTO: 233 K/UL (ref 164–446)
PMV BLD AUTO: 9.7 FL (ref 9–12.9)
POTASSIUM SERPL-SCNC: 4.7 MMOL/L (ref 3.6–5.5)
PROPOXYPH UR QL SCN: NEGATIVE
PROT SERPL-MCNC: 6.5 G/DL (ref 6–8.2)
RBC # BLD AUTO: 3.09 M/UL (ref 4.7–6.1)
SODIUM SERPL-SCNC: 141 MMOL/L (ref 135–145)
TIBC SERPL-MCNC: 349 UG/DL (ref 250–450)
UFH PPP CHRO-ACNC: 0.81 IU/ML
UFH PPP CHRO-ACNC: 0.84 IU/ML
UFH PPP CHRO-ACNC: <0.1 IU/ML
UFH PPP CHRO-ACNC: <0.1 IU/ML
UIBC SERPL-MCNC: 326 UG/DL (ref 110–370)
WBC # BLD AUTO: 5.6 K/UL (ref 4.8–10.8)

## 2022-10-12 PROCEDURE — 97165 OT EVAL LOW COMPLEX 30 MIN: CPT

## 2022-10-12 PROCEDURE — 700117 HCHG RX CONTRAST REV CODE 255

## 2022-10-12 PROCEDURE — 700101 HCHG RX REV CODE 250: Performed by: INTERNAL MEDICINE

## 2022-10-12 PROCEDURE — 80053 COMPREHEN METABOLIC PANEL: CPT

## 2022-10-12 PROCEDURE — G0378 HOSPITAL OBSERVATION PER HR: HCPCS

## 2022-10-12 PROCEDURE — 700102 HCHG RX REV CODE 250 W/ 637 OVERRIDE(OP): Performed by: STUDENT IN AN ORGANIZED HEALTH CARE EDUCATION/TRAINING PROGRAM

## 2022-10-12 PROCEDURE — 80307 DRUG TEST PRSMV CHEM ANLYZR: CPT

## 2022-10-12 PROCEDURE — 96368 THER/DIAG CONCURRENT INF: CPT

## 2022-10-12 PROCEDURE — 700111 HCHG RX REV CODE 636 W/ 250 OVERRIDE (IP): Performed by: STUDENT IN AN ORGANIZED HEALTH CARE EDUCATION/TRAINING PROGRAM

## 2022-10-12 PROCEDURE — 700105 HCHG RX REV CODE 258: Performed by: INTERNAL MEDICINE

## 2022-10-12 PROCEDURE — 85025 COMPLETE CBC W/AUTO DIFF WBC: CPT

## 2022-10-12 PROCEDURE — 36415 COLL VENOUS BLD VENIPUNCTURE: CPT

## 2022-10-12 PROCEDURE — 96376 TX/PRO/DX INJ SAME DRUG ADON: CPT

## 2022-10-12 PROCEDURE — 99226 PR SUBSEQUENT OBSERVATION CARE,LEVEL III: CPT | Mod: GC | Performed by: INTERNAL MEDICINE

## 2022-10-12 PROCEDURE — 97161 PT EVAL LOW COMPLEX 20 MIN: CPT

## 2022-10-12 PROCEDURE — 83540 ASSAY OF IRON: CPT

## 2022-10-12 PROCEDURE — 82728 ASSAY OF FERRITIN: CPT

## 2022-10-12 PROCEDURE — 74176 CT ABD & PELVIS W/O CONTRAST: CPT

## 2022-10-12 PROCEDURE — 96366 THER/PROPH/DIAG IV INF ADDON: CPT

## 2022-10-12 PROCEDURE — 83735 ASSAY OF MAGNESIUM: CPT

## 2022-10-12 PROCEDURE — A9270 NON-COVERED ITEM OR SERVICE: HCPCS | Performed by: STUDENT IN AN ORGANIZED HEALTH CARE EDUCATION/TRAINING PROGRAM

## 2022-10-12 PROCEDURE — 700111 HCHG RX REV CODE 636 W/ 250 OVERRIDE (IP)

## 2022-10-12 PROCEDURE — 85520 HEPARIN ASSAY: CPT | Mod: 91

## 2022-10-12 PROCEDURE — 82272 OCCULT BLD FECES 1-3 TESTS: CPT

## 2022-10-12 PROCEDURE — 83550 IRON BINDING TEST: CPT

## 2022-10-12 PROCEDURE — 700111 HCHG RX REV CODE 636 W/ 250 OVERRIDE (IP): Performed by: INTERNAL MEDICINE

## 2022-10-12 PROCEDURE — 96367 TX/PROPH/DG ADDL SEQ IV INF: CPT

## 2022-10-12 RX ORDER — FOLIC ACID 1 MG/1
1 TABLET ORAL DAILY
Status: COMPLETED | OUTPATIENT
Start: 2022-10-13 | End: 2022-10-16

## 2022-10-12 RX ORDER — GAUZE BANDAGE 2" X 2"
100 BANDAGE TOPICAL DAILY
Status: COMPLETED | OUTPATIENT
Start: 2022-10-13 | End: 2022-10-16

## 2022-10-12 RX ORDER — MAGNESIUM SULFATE HEPTAHYDRATE 40 MG/ML
2 INJECTION, SOLUTION INTRAVENOUS ONCE
Status: COMPLETED | OUTPATIENT
Start: 2022-10-12 | End: 2022-10-12

## 2022-10-12 RX ORDER — OMEPRAZOLE 20 MG/1
40 CAPSULE, DELAYED RELEASE ORAL DAILY
Status: DISCONTINUED | OUTPATIENT
Start: 2022-10-13 | End: 2022-10-14

## 2022-10-12 RX ADMIN — ALBUTEROL SULFATE 2 PUFF: 90 AEROSOL, METERED RESPIRATORY (INHALATION) at 18:43

## 2022-10-12 RX ADMIN — THIAMINE HYDROCHLORIDE 1000 ML: 100 INJECTION, SOLUTION INTRAMUSCULAR; INTRAVENOUS at 18:43

## 2022-10-12 RX ADMIN — SENNOSIDES AND DOCUSATE SODIUM 2 TABLET: 50; 8.6 TABLET ORAL at 06:00

## 2022-10-12 RX ADMIN — ALBUTEROL SULFATE 2 PUFF: 90 AEROSOL, METERED RESPIRATORY (INHALATION) at 12:15

## 2022-10-12 RX ADMIN — ALBUTEROL SULFATE 2 PUFF: 90 AEROSOL, METERED RESPIRATORY (INHALATION) at 07:47

## 2022-10-12 RX ADMIN — SODIUM CHLORIDE 250 MG: 9 INJECTION, SOLUTION INTRAVENOUS at 21:02

## 2022-10-12 RX ADMIN — HEPARIN SODIUM 1900 UNITS: 1000 INJECTION, SOLUTION INTRAVENOUS; SUBCUTANEOUS at 20:55

## 2022-10-12 RX ADMIN — WARFARIN SODIUM 5 MG: 5 TABLET ORAL at 16:53

## 2022-10-12 RX ADMIN — HEPARIN SODIUM 3900 UNITS: 1000 INJECTION, SOLUTION INTRAVENOUS; SUBCUTANEOUS at 00:46

## 2022-10-12 RX ADMIN — IOHEXOL 20 ML: 240 INJECTION, SOLUTION INTRATHECAL; INTRAVASCULAR; INTRAVENOUS; ORAL at 18:30

## 2022-10-12 RX ADMIN — WARFARIN SODIUM 5 MG: 5 TABLET ORAL at 00:49

## 2022-10-12 RX ADMIN — TIOTROPIUM BROMIDE INHALATION SPRAY 5 MCG: 3.12 SPRAY, METERED RESPIRATORY (INHALATION) at 06:00

## 2022-10-12 RX ADMIN — ALBUTEROL SULFATE 2 PUFF: 90 AEROSOL, METERED RESPIRATORY (INHALATION) at 03:00

## 2022-10-12 RX ADMIN — AMLODIPINE BESYLATE 5 MG: 5 TABLET ORAL at 06:01

## 2022-10-12 RX ADMIN — MAGNESIUM SULFATE HEPTAHYDRATE 2 G: 40 INJECTION, SOLUTION INTRAVENOUS at 15:14

## 2022-10-12 RX ADMIN — HEPARIN SODIUM 18 UNITS/KG/HR: 5000 INJECTION, SOLUTION INTRAVENOUS at 00:55

## 2022-10-12 RX ADMIN — SENNOSIDES AND DOCUSATE SODIUM 2 TABLET: 50; 8.6 TABLET ORAL at 16:54

## 2022-10-12 RX ADMIN — OMEPRAZOLE 20 MG: 20 CAPSULE, DELAYED RELEASE ORAL at 06:00

## 2022-10-12 RX ADMIN — BUDESONIDE AND FORMOTEROL FUMARATE DIHYDRATE 2 PUFF: 160; 4.5 AEROSOL RESPIRATORY (INHALATION) at 18:55

## 2022-10-12 ASSESSMENT — COGNITIVE AND FUNCTIONAL STATUS - GENERAL
STANDING UP FROM CHAIR USING ARMS: A LITTLE
CLIMB 3 TO 5 STEPS WITH RAILING: TOTAL
DAILY ACTIVITIY SCORE: 24
SUGGESTED CMS G CODE MODIFIER MOBILITY: CK
MOVING TO AND FROM BED TO CHAIR: A LITTLE
WALKING IN HOSPITAL ROOM: A LITTLE
SUGGESTED CMS G CODE MODIFIER DAILY ACTIVITY: CH
TURNING FROM BACK TO SIDE WHILE IN FLAT BAD: A LITTLE
MOVING FROM LYING ON BACK TO SITTING ON SIDE OF FLAT BED: A LITTLE
MOBILITY SCORE: 16

## 2022-10-12 ASSESSMENT — ENCOUNTER SYMPTOMS
FOCAL WEAKNESS: 0
SENSORY CHANGE: 0
HEADACHES: 0
BLURRED VISION: 0
SHORTNESS OF BREATH: 1
HEARTBURN: 0
PALPITATIONS: 0
WEIGHT LOSS: 1
NAUSEA: 0
DIARRHEA: 0
CHILLS: 0
BLOOD IN STOOL: 0
FEVER: 0
DIZZINESS: 0
TINGLING: 0
CONSTIPATION: 0
COUGH: 1
ABDOMINAL PAIN: 0
CLAUDICATION: 0
DOUBLE VISION: 0
HEMOPTYSIS: 0
WEAKNESS: 1
VOMITING: 0

## 2022-10-12 ASSESSMENT — LIFESTYLE VARIABLES
EVER_SMOKED: YES
SUBSTANCE_ABUSE: 0

## 2022-10-12 ASSESSMENT — COPD QUESTIONNAIRES
DO YOU EVER COUGH UP ANY MUCUS OR PHLEGM?: YES, A FEW DAYS A WEEK OR MONTH
COPD SCREENING SCORE: 8
DURING THE PAST 4 WEEKS HOW MUCH DID YOU FEEL SHORT OF BREATH: MOST  OR ALL OF THE TIME
HAVE YOU SMOKED AT LEAST 100 CIGARETTES IN YOUR ENTIRE LIFE: YES

## 2022-10-12 ASSESSMENT — ACTIVITIES OF DAILY LIVING (ADL): TOILETING: INDEPENDENT

## 2022-10-12 ASSESSMENT — GAIT ASSESSMENTS
GAIT LEVEL OF ASSIST: STANDBY ASSIST
ASSISTIVE DEVICE: FRONT WHEEL WALKER
DISTANCE (FEET): 40
DEVIATION: SHUFFLED GAIT

## 2022-10-12 ASSESSMENT — PAIN DESCRIPTION - PAIN TYPE
TYPE: ACUTE PAIN
TYPE: ACUTE PAIN

## 2022-10-12 NOTE — ASSESSMENT & PLAN NOTE
Likely 2/2 hypoperfusion in the setting of poor PO intake and alcohol use. Cr 1.95; baseline ~1.4. Orthostatic positive at [facility] clinic.    -1L LR bolus x1  -Avoid nephrotoxic meds  -CTM

## 2022-10-12 NOTE — CARE PLAN
The patient is Stable - Low risk of patient condition declining or worsening    Shift Goals  Clinical Goals: Safety, therapeutic anti-xa  Patient Goals: Rest  Family Goals: chano    Progress made toward(s) clinical / shift goals: Pt safety maintained with use of call light and bed alarm.       Problem: Fall Risk  Goal: Patient will remain free from falls  Outcome: Progressing       Patient is not progressing towards the following goals:

## 2022-10-12 NOTE — ASSESSMENT & PLAN NOTE
Iron studies consistent with iron deficiency. Hgb 8.7 on admission and MCV 76.4; around baseline 7-8.  Hemoglobin 6.7 10/14 and received 1 unit of blood and will repeat fecal occult blood (negative 10/12).  Denies hematemesis, hematochezia/melena. May have chronic slow bleed, possibly alcoholic gastritis. Per PCP note, colonoscopy 3-5yrs ago with unknown result. Also with very poor oral intake and likely inadequate iron intake. CT chest abdomen pelvis without acute abnormality or findings to suggest malignancy or GI bleed.   Recheck hemoglobin daily  - Finished IV iron 10/14  - Consider GI consult for EGD and colonoscopy if hemoglobin continues to drop

## 2022-10-12 NOTE — HOSPITAL COURSE
Fede Parikh . is a 77 year old male with history of COPD (no PFTs), hypertension, hyperlipidemia, CKD3a, anemia, polysubstance abuse (alcohol, cocaine, tobacco), hepC s/p tx, PAD with chronic occlusion of bilateral common iliac arteries, distal aorta mural thrombus (previously on warfarin, but not taking) that is admitted with progressive generalized weakness, shortness of breath, failure to thrive.  Chronic anemia near baseline hgb 7-8, but transfusing 1 unit RBCs 10/14 for hgb 6.7.  Ongoing work-up for abdominal pain and vomiting, CT without acute findings, labs and ultrasound concerning for biliary colic vs choledocholithiasis -MRCP awaiting read.  Holding all anticoagulation for now, planning to start aspirin or Plavix (not warfarin/Lovenox) once stable from a bleeding standpoint.

## 2022-10-12 NOTE — ASSESSMENT & PLAN NOTE
CTA thoracoabdominal aorta 06/21/22 showing mural thrombus of the distal aorta near the aortic bifurcation and he was subsequently started on warfarin for anticoagulation. However, he hasn't taken warfarin for at least 1 month and INR 1.11 on admission; subtherapeutic, goal 2-3.  Does not appear that vascular surgery was formally consulted during the admission when warfarin was started.  Per discussion with vascular surgery 10/14, there is limited utility for warfarin in the setting of a chronic abdominal aortic mural thrombus, but recommend either aspirin or Plavix.  Of note, mural thrombus associated with abdominal aortic aneurysm is not typically treated with anticoagulation. Therefore, we will stop warfarin/Lovenox and continue with aspirin or Plavix once stable from bleeding standpoint.   -He will need outpatient vascular follow-up to discuss severe PAD and mural thrombus management

## 2022-10-12 NOTE — H&P
[facility] History & Physical Note    Date of Service  10/11/2022    [facility] Team: NATHAN   Attending: Sudha Ruelas M.d.  Senior Resident: Dr. Kwame Junior MD  Contact Number: [phone]    Primary Care Physician  Alexx Reynaga M.D.    Consultants  None    Specialist Names: N/A    Code Status  Full Code    Chief Complaint  Chief Complaint   Patient presents with    Sent by MD     Pt went to [facility] today because he has been feeling short of breath x1 month and weak x1 week. Pt had positive orthostatics at the clinic so was sent to the ER.        History of Presenting Illness (HPI):   Patient is a 76yo male with PMH of COPD (no PFTs), HTN, DLD, CKD3a, alcohol use disorder, nicotine use disorder, hepC s/p tx, PAD s/p stent, and distal aorta mural thrombus on warfarin that presents after one month history of shortness of breath. Patient states that they haven't had any of their inhalers for about a month, and most of their medications for that time period as well. States that they haven't been able to get refills. States that they have been having more lightheadedness when standing up; was orthostatic [facility] at [facility] today. States that they use a walker.    In ED, Hgb 8.7, MCV 76.4, Cr 1.95. Received Unasyn/azithromycin x1. Blood cultures drawn.    I discussed the plan of care with patient.    Review of Systems  Review of Systems   Constitutional:  Positive for malaise/fatigue. Negative for chills and fever.   HENT:  Negative for ear pain and sore throat.    Respiratory:  Positive for cough (chronic, unchanged), sputum production (chronic, unchanged) and shortness of breath.    Cardiovascular:  Negative for chest pain, palpitations and leg swelling.        Lightheadedness   Gastrointestinal:  Negative for abdominal pain, blood in stool, constipation, diarrhea, melena, nausea and vomiting.   Genitourinary:  Negative for dysuria and hematuria.   Musculoskeletal:  Negative for back pain,  joint pain and neck pain.     Past Medical History   has a past medical history of Hepatitis, Hyperlipemia, Hypertension, and Pulmonary emphysema (HCC).    Surgical History   has a past surgical history that includes femoral femoral bypass (Bilateral, 9/4/2020).     Family History  family history includes Cancer in his mother; Diabetes in his brother; Heart Disease in his mother.   Family history reviewed with patient.     Social History  Tobacco: 40py smoker; smokes 2-3cig/day currently.  Alcohol: Drinks 5 shots of gin per day.  Recreational drugs (illegal or prescription): Quit cocaine many years ago  Employment: Retired  Living Situation: Lives with girlfriend in [place].  Recent Travel: Denies  Primary Care Provider: Reviewed Dr. Reynaga  Other (stressors, spirituality, exposures): None    Allergies  Allergies   Allergen Reactions    Ace Inhibitors Swelling       Medications  Prior to Admission Medications   Prescriptions Last Dose Informant Patient Reported? Taking?   Fluticasone Furoate-Vilanterol (BREO ELLIPTA) 100-25 MCG/INH AEROSOL POWDER, BREATH ACTIVATED UNK at [facility]K Patient No No   Sig: Inhale 1 Puff every day.   albuterol (VENTOLIN HFA) 108 (90 Base) MCG/ACT Aero Soln inhalation aerosol 4 weeks ago at [facility] out Patient No No   Sig: Inhale 2 Puffs every 6 hours as needed for Shortness of Breath. INHALE TWO PUFFS BY MOUTH EVERY SIX HOURS AS NEEDED   amLODIPine (NORVASC) 5 MG Tab 10/10/2022 at AM Patient Yes Yes   Sig: Take 5 mg by mouth every day.   hydrOXYzine HCl (ATARAX) 25 MG Tab 4 weeks ago at [facility] out Patient No No   Sig: Take 1 Tablet by mouth 1 time a day as needed for Itching.   omeprazole (PRILOSEC) 20 MG delayed-release capsule 10/10/2022 at AM Patient No No   Sig: TAKE ONE CAPSULE BY MOUTH ONE TIME DAILY   tiotropium (SPIRIVA HANDIHALER) 18 MCG Cap 4 weeks ago at [facility] out Patient No No   Sig: INHALE 1 CAPSULE VIA HANDIHALER ONCE DAILY   warfarin (COUMADIN) 4 MG Tab UNk at UNK Patient No No   Sig: Take  1 Tablet by mouth every day at 6 PM.   warfarin (COUMADIN) 4 MG Tab UNK at UNK Patient No No   Sig: Take 1 Tablet by mouth every day at 6 PM.      Facility-Administered Medications: None       Physical Exam  Temp:  [36.1 °C (97 °F)-36.7 °C (98.1 °F)] 36.4 °C (97.6 °F)  Pulse:  [] 94  Resp:  [16] 16  BP: ()/(50-77) 100/67  SpO2:  [95 %-98 %] 95 %  Blood Pressure : (!) 144/73   Temperature: 36.1 °C (97 °F)   Pulse: 87   Respiration: 16   Pulse Oximetry: 97 %       Physical Exam  Constitutional:       General: He is not in acute distress.     Appearance: He is not diaphoretic.      Comments: Unkempt   HENT:      Head: Normocephalic and atraumatic.      Mouth/Throat:      Mouth: Mucous membranes are dry.      Pharynx: No oropharyngeal exudate or posterior oropharyngeal erythema.   Eyes:      General: No scleral icterus.     Extraocular Movements: Extraocular movements intact.      Pupils: Pupils are equal, round, and reactive to light.   Cardiovascular:      Rate and Rhythm: Normal rate and regular rhythm.      Heart sounds: Normal heart sounds. No murmur heard.    No friction rub. No gallop.   Pulmonary:      Effort: Pulmonary effort is normal. No respiratory distress.      Breath sounds: Normal breath sounds. No wheezing, rhonchi or rales.      Comments: Distant posterior breath sounds  Abdominal:      General: Abdomen is flat. There is no distension.      Palpations: Abdomen is soft. There is no mass.      Tenderness: There is no abdominal tenderness. There is no guarding or rebound.   Musculoskeletal:         General: No swelling or tenderness.      Cervical back: Neck supple. No tenderness.      Right lower leg: No edema.      Left lower leg: No edema.   Lymphadenopathy:      Cervical: No cervical adenopathy.   Skin:     General: Skin is warm and dry.      Coloration: Skin is not jaundiced.   Neurological:      Mental Status: He is alert and oriented to person, place, and time.      Cranial Nerves: No  cranial nerve deficit.       Laboratory:  Recent Labs     10/11/22  1600   WBC 5.7   RBC 3.73*   HEMOGLOBIN 8.7*   HEMATOCRIT 28.5*   MCV 76.4*   MCH 23.3*   MCHC 30.5*   RDW 63.1*   PLATELETCT 291   MPV 9.6     Recent Labs     10/11/22  1600   SODIUM 141   POTASSIUM 4.6   CHLORIDE 105   CO2 21   GLUCOSE 122*   BUN 26*   CREATININE 1.95*   CALCIUM 9.6     Recent Labs     10/11/22  1600   ALTSGPT 19   ASTSGOT 37   ALKPHOSPHAT 60   TBILIRUBIN 0.7   GLUCOSE 122*     Recent Labs     10/11/22  1600   INR 1.11     No results for input(s): NTPROBNP in the last 72 hours.      No results for input(s): TROPONINT in the last 72 hours.    Imaging:  DX-CHEST-PORTABLE (1 VIEW)   Final Result      1.  Ill-defined hazy opacities bilaterally possibly areas of atelectasis with developing pneumonitis not excluded.   2.  There is atherosclerosis.          X-Ray:  I have personally reviewed the images and compared with prior images.  EKG:  I have personally reviewed the images and compared with prior images.    Assessment/Plan:  Problem Representation:   Patient is a 78yo male with PMH of COPD (no PFTs), HTN, DLD, CKD3a, alcohol use disorder, nicotine use disorder, hepC s/p tx, PAD s/p stent, and distal aorta mural thrombus on warfarin that presents after one month history of shortness of breath. Likely SHANNAN 2/2 poor PO intake and alcohol use. Shortness of breath likely d/t medication noncompliance as patient hasn't been able to get refills. No sign of COPD exacerbation at this time.    I anticipate this patient is appropriate for observation status at this time because likely just needs fluid resuscitation and medications.    Patient will need a Med/Surg bed on MEDICAL service .  The need is secondary to IV fluids and observation.    * SHANNAN (acute kidney injury) (HCC)- (present on admission)  Assessment & Plan  Likely 2/2 hypoperfusion in the setting of poor PO intake and alcohol use. Cr 1.95; baseline ~1.4. Orthostatic positive at [facility]  "clinic.    -1L LR bolus x1  -Avoid nephrotoxic meds  -CTM    Aortic thrombus (HCC)- (present on admission)  Assessment & Plan  CTA thoracoabdominal aorta 06/21/22 showing \"Mural thrombus of the distal aorta near the aortic bifurcation.\" Was subsequently started on warfarin for anticoagulation. States hasn't taken warfarin for at least 1 month.    -INR 1.11; subtherapeutic, goal 2-3  -Restart warfarin; pharm to dose  -Heparin bridge  -Outpatient vascular follow-up  -Consider transitioning to rivaroxaban prior to discharge d/t medication noncompliance; can consider discussion with vascular and/or cardiology    Anemia- (present on admission)  Assessment & Plan  Likely 2/2 JHOANA c/b CKD. No iron panel/ferritin. Hgb 8.7 and MCV 76.4; around baseline. Denies hematochezia/melena. Per PCP note, colonoscopy 3-5yrs ago with unknown result.    -Iron panel/ferritin  -Ordered FOBT  -GI consult inpatient vs. outpatient for EGD and colonoscopy based on FOBT results; anemia with GERD like alcoholic gastritis    COPD (chronic obstructive pulmonary disease) (HCC)- (present on admission)  Assessment & Plan  No PFTs. Without any bronchospasm on physical exam and not requiring supplemental oxygen. Hasn't had inhalers for at least one month; Spiriva, Breo Ellipta, and albuterol PRN.    -Continue home inhalers  -Will need outpatient PFTs    Protein calorie malnutrition (HCC)  Assessment & Plan  Likely 2/2 poor PO intake and COPD. BMI 16.09.    -Nutrition consult    Debility  Assessment & Plan  Likely 2/2 age related debility with muscular atrophy from poor PO intake.    -Nutrition consult  -PT/OT    CKD (chronic kidney disease), stage III (HCC)- (present on admission)  Assessment & Plan  Likely 2/2 multiple AKIs in the setting of alcohol abuse. Previous renal ultrasounds showing atrophic kidneys. Baseline Cr ~1.4.    -Continue home amlodipine  -Avoid nephrotoxic meds    Alcohol use- (present on admission)  Assessment & Plan  Drinks half a " pint of gin daily. Last drink last night. Has low threshold for CIWA protocol, but not currently in alcohol withdrawal.    - on alcohol cessation  -No sign of withdrawal at this time    Tobacco use- (present on admission)  Assessment & Plan  40py smoker; currently smokes 2-3cigs/day.    - on tobacco cessation; was not interested in discussing  -Nicotine patch and gum    Essential hypertension- (present on admission)  Assessment & Plan  -Continue home amlodipine    GERD (gastroesophageal reflux disease)- (present on admission)  Assessment & Plan  Likely 2/2 alcohol use.    -Continue home omeprazole        VTE prophylaxis: SCDs/TEDs and therapeutic anticoagulation with warfarin and heparin bridge.

## 2022-10-12 NOTE — PROGRESS NOTES
[facility] Internal Medicine Daily Progress Note    Date of Service  10/12/2022    [facility] Team: R IM White Team   Attending: Rebel Chaudhry M.d.  Senior Resident: Dr. Harper  Intern:  Dr. Thomas  Contact Number: [phone]    Chief Complaint  Fede Parikh Jr. is a 77 y.o. male admitted 10/11/2022 with generalized weakness and shortness of breath    Hospital Course  Fede Parikh Jr. is a 77 year old male with history of COPD (no PFTs), hypertension, hyperlipidemia, CKD3a, anemia, alcohol use disorder, nicotine use disorder, hepC s/p tx, PAD with chronic occlusion of bilateral common iliac arteries, distal aorta mural thrombus (on warfarin, but not taking) that is admitted with progressive generalized weakness, shortness of breath, failure to thrive. No signs of acute worsening of chronic anemia, COPD exacerbation, progression of CKD. Planning for PT/OT/Nutrition and CT for malignancy work up.     Interval Problem Update  - No acute events overnight  - Resumption of anticoagulation for aortic mural thrombus (warfarin with heparin bridge), has not been taking warfarin  - Low concern for acute GI bleed - hgb within baseline  - Patient reports worsening functional status overall with generalized weakness, only able to walk a block as he becomes fatigued and short of breath. Out of inhalers. Smokes 2-3 cigarettes per day. Also has chronic cough. Reports low appetite over the last 6 months and maybe weight loss.   - No signs of GI bleeding including hematemesis, melena, hematochezia    I have discussed this patient's plan of care and discharge plan at IDT rounds today with Case Management, Nursing, Nursing leadership, and other members of the IDT team.    Consultants/Specialty  None    Code Status  Full Code    Disposition  Patient is not medically cleared for discharge.   Anticipate discharge to to home with close outpatient follow-up.  I have placed the appropriate orders for post-discharge needs.    Review  of Systems  Review of Systems   Constitutional:  Positive for malaise/fatigue and weight loss. Negative for chills and fever.   Eyes:  Negative for blurred vision and double vision.   Respiratory:  Positive for cough and shortness of breath. Negative for hemoptysis.    Cardiovascular:  Negative for chest pain, palpitations, claudication and leg swelling.   Gastrointestinal:  Negative for abdominal pain, blood in stool, constipation, diarrhea, heartburn, melena, nausea and vomiting.   Genitourinary:  Negative for dysuria, frequency and hematuria.   Musculoskeletal:  Positive for joint pain (right knee).   Neurological:  Positive for weakness. Negative for dizziness, tingling, sensory change, focal weakness and headaches.   Psychiatric/Behavioral:  Negative for substance abuse.       Physical Exam  Temp:  [36.4 °C (97.6 °F)-37.1 °C (98.7 °F)] 36.9 °C (98.4 °F)  Pulse:  [77-94] 77  Resp:  [14-16] 14  BP: (100-163)/(56-77) 117/56  SpO2:  [95 %-100 %] 100 %    Physical Exam  Constitutional:       General: He is not in acute distress.     Comments: Cachectic, groggy, but answers questions   HENT:      Head: Normocephalic.      Mouth/Throat:      Mouth: Mucous membranes are moist.      Pharynx: Oropharynx is clear.   Eyes:      General: Scleral icterus present.      Extraocular Movements: Extraocular movements intact.      Conjunctiva/sclera: Conjunctivae normal.      Pupils: Pupils are equal, round, and reactive to light.   Cardiovascular:      Rate and Rhythm: Normal rate and regular rhythm.      Heart sounds: Normal heart sounds.   Pulmonary:      Effort: Pulmonary effort is normal.      Breath sounds: Normal breath sounds.   Abdominal:      Palpations: Abdomen is soft.      Tenderness: There is no abdominal tenderness.   Musculoskeletal:         General: No swelling.      Cervical back: Normal range of motion.   Skin:     General: Skin is warm and dry.   Neurological:      General: No focal deficit present.      Mental  Status: He is alert and oriented to person, place, and time.      Cranial Nerves: No cranial nerve deficit.      Sensory: No sensory deficit.      Motor: Weakness (generalized weakness, but non-focal) present.      Coordination: Coordination normal.   Psychiatric:         Mood and Affect: Mood normal.         Behavior: Behavior normal.       Fluids    Intake/Output Summary (Last 24 hours) at 10/12/2022 1516  Last data filed at 10/12/2022 0802  Gross per 24 hour   Intake 540 ml   Output 200 ml   Net 340 ml       Laboratory  Recent Labs     10/11/22  1600 10/12/22  0410   WBC 5.7 5.6   RBC 3.73* 3.09*   HEMOGLOBIN 8.7* 7.2*   HEMATOCRIT 28.5* 23.8*   MCV 76.4* 77.0*   MCH 23.3* 23.3*   MCHC 30.5* 30.3*   RDW 63.1* 64.3*   PLATELETCT 291 233   MPV 9.6 9.7     Recent Labs     10/11/22  1600 10/12/22  0410   SODIUM 141 141   POTASSIUM 4.6 4.7   CHLORIDE 105 107   CO2 21 22   GLUCOSE 122* 102*   BUN 26* 26*   CREATININE 1.95* 1.86*   CALCIUM 9.6 8.9     Recent Labs     10/11/22  1600 10/11/22  2230   APTT  --  25.8   INR 1.11  --                Imaging  DX-CHEST-PORTABLE (1 VIEW)   Final Result      1.  Ill-defined hazy opacities bilaterally possibly areas of atelectasis with developing pneumonitis not excluded.   2.  There is atherosclerosis.      CT-CHEST (THORAX) W/O    (Results Pending)   CT-ABDOMEN-PELVIS W/O    (Results Pending)        Assessment/Plan  Problem Representation:    Severe Malnutrition (HCC)  Assessment & Plan  Cachexia. BMI 16.09 with recent weight loss, poor appetite and intake. Likely contributions from multiple chronic medical conditions including chronic alcoholism, COPD, history of pancreatitis, severe anemia, deconditioning.  -Nutrition consult  - Malignancy work up with CT chest, abdomen, pelvis  - PSA    Anemia- (present on admission)  Assessment & Plan  Iron studies consistent with iron deficiency. Hgb 8.7 on admission and MCV 76.4; around baseline. Dropped to 7.2 overnight, but may be  "dilutional. Denies hematochezia/melena and fecal occult blood negative and so low concern for acute GI bleed, but may have chronic slow bleed, possibly alcoholic gastritis. Per PCP note, colonoscopy 3-5yrs ago with unknown result. Also with very poor oral intake and likely inadequate iron intake.   - IV iron  - Consider GI consult outpatient for EGD and colonoscopy  - OK to continue with anticoagulation given low concern for acute GI bleed    CKD (chronic kidney disease), stage III (HCC)- (present on admission)  Assessment & Plan  Etiology unclear, likely contributions for multiple prerenal AKIs in the setting of alcohol abuse, chronic hypertension. Previous renal ultrasounds showing atrophic kidneys. Baseline Cr ~1.6-1.9.  -Continue home amlodipine  -Avoid nephrotoxic meds    Debility  Assessment & Plan  Likely due to multiple chronic medical conditions including chronic alcoholism, COPD, history of pancreatitis, severe anemia, deconditioning, poor oral intake.   -Nutrition consult  -PT/OT    Aortic thrombus (HCC)- (present on admission)  Assessment & Plan  CTA thoracoabdominal aorta 06/21/22 showing \"Mural thrombus of the distal aorta near the aortic bifurcation.\" Was subsequently started on warfarin for anticoagulation. States hasn't taken warfarin for at least 1 month and INR 1.11 on admission; subtherapeutic, goal 2-3.  - OK to continue with anticoagulation given low concern for acute GI bleed  -Restart warfarin; pharm to dose  -Heparin bridge  -Outpatient vascular follow-up  -Consider transitioning to rivaroxaban prior to discharge d/t medication noncompliance; can consider discussion with vascular and/or cardiology    Thyroid nodule- (present on admission)  Assessment & Plan  Needs outpatient follow up.    Peripheral artery disease (HCC)- (present on admission)  Assessment & Plan  Severe, with chronic occlusion to bilateral common iliac arteries and previous stent. Denies claudication.  - Continue to " monitor  - Continue anticoagulation for aortic thrombus    Alcohol use- (present on admission)  Assessment & Plan  Drinks half a pint of gin daily. Last drink was night prior to admission. Denies history of alcohol withdrawal. Has low threshold for CIWA protocol, but not currently in alcohol withdrawal.  - on alcohol cessation  -No sign of withdrawal at this time    Tobacco use- (present on admission)  Assessment & Plan  40 pack year smoker; currently smokes 2-3cigs/day. Chronic cough and weight loss.  -CT chest to evaluate for malignancy  - on tobacco cessation; was not interested in discussing  -Nicotine patch and gum (refusing)    Substance abuse (HCC)- (present on admission)  Assessment & Plan  Denies recent drug use (aside from alcohol and cigarettes), but previoiusly tested positive for cocaine during prior admission.  -Urine drug screen    Essential hypertension- (present on admission)  Assessment & Plan  -Continue home amlodipine    GERD (gastroesophageal reflux disease)- (present on admission)  Assessment & Plan  Likely secondary alcohol use / alcoholic gastritis.  -Continue home omeprazole    COPD (chronic obstructive pulmonary disease) ([facility])- (present on admission)  Assessment & Plan  No PFTs. Without any bronchospasm on physical exam and not requiring supplemental oxygen. Hasn't had inhalers for at least one month; Spiriva, Breo Ellipta, and albuterol PRN. Received antibiotics on day of admission, but will not continue as low suspicion for pneumonia.  -Resume home inhalers  -Will need outpatient PFTs  - RT protocol       VTE prophylaxis: therapeutic anticoagulation with heparin bridge to warfarin    I have performed a physical exam and reviewed and updated ROS and Plan today (10/12/2022). In review of yesterday's note (10/11/2022), there are no changes except as documented above.    Electronically signed by Gt Thomas MD, 10/12/2022 3:15 PM  Internal Medicine PGY1

## 2022-10-12 NOTE — ASSESSMENT & PLAN NOTE
Likely due to multiple chronic medical conditions including chronic alcoholism, COPD, history of pancreatitis, severe anemia, deconditioning, poor oral intake.   -Nutrition consult  -PT/OT recommending no further therapy needs

## 2022-10-12 NOTE — PROGRESS NOTES
Inpatient Anticoagulation Service Note    Date: 10/11/2022    Reason for Anticoagulation: Other (Comments) (Aortic Thrombus)   Target INR: 2.0 to 3.0         Hemoglobin Value: (!) 8.7  Hematocrit Value: (!) 28.5  Lab Platelet Value: 291    INR from last 7 days       Date/Time INR Value    10/11/22 1600 1.11          Dose from last 7 days       Date/Time Dose (mg)    10/11/22 2052 5          Average Dose (mg): 5  Significant Interactions: Antibiotics  Bridge Therapy: Yes  Date of Last VTE Event: 10/11/22  Bridge Therapy Start Date: 10/11/22  Days of Overlap Therapy: 0 (If less than 5 days and overlap therapy discontinued -- document reason (i.e. Bleed Risk))  INR Value Greater than 2 Prior to Discontinuation of Parenteral Anticoagulation: Not Applicable (If still on overlap therapy, if No -- document reason (i.e. Bleed Risk))         Plan:  Patient has not taken warfarin outpatient in over a month. Plan to re-start heparin as new at 5mg daily and adjust. Next INR scheduled for 10/13 AM. Heparin drip with Xa monitoring initiated 10/11 for bridge.   Education Material Provided?: No  Pharmacist suggested discharge dosing: Pending repeat INR     Abby OliviaD

## 2022-10-12 NOTE — ASSESSMENT & PLAN NOTE
- microcytic anemia, no workup on file   - per patient, colonoscopy 3-5 years ago, unknown result  - see A&P for SOB

## 2022-10-12 NOTE — ASSESSMENT & PLAN NOTE
No PFTs. Without any bronchospasm on physical exam and not requiring supplemental oxygen. Hasn't had inhalers for at least one month; Spiriva, Breo Ellipta, and albuterol PRN. Received antibiotics on day of admission, but will not continue as low suspicion for pneumonia.  -Resume home inhalers  -Will need outpatient PFTs  - RT protocol

## 2022-10-12 NOTE — DIETARY
"Nutrition services: Day 0 of admit.  Fede Parikh Jr. is a 77 y.o. male with admitting DX of SHANNAN     Consult received for unintentional weight loss; MST 3, BMI <19, Failure to thrive     Met with pt at bedside, pt is extremely [place] and difficult to communicate with. Pt shared that he has lost 30 lbs in the last 6 months (22%), this is severe. Pt shared that PO intake has been low (<50%) for the last 6 months due to a decrease in appetite. Unable to complete a nutrition focused physical exam as pt was covered in blankets. Pt not agreeable to Boost supplements or high protein snacks.   Assessment:  Height: 174 cm (5' 8.5\")  Weight: 48.7 kg (107 lb 5.8 oz) via stand up scale   Body mass index is 16.09 kg/m²., BMI classification: Underweight   Diet/Intake: Regular; PO intake 50-75% x 2 meal     Evaluation:   Pt with SHANNAN, malnutrition, anemia, CKD, alcohol use, COPD, GERD, HTN  Per chart review, pt with weight loss of 14% in <6 months which is severe.  Labs: Bun 26, Cr 1.86, GFR 36  Meds: CIWA protocol, magnesium sulfate, senna, warfarin, omeprazole  +BM: 10/12    Malnutrition Risk: Pt meets ASPEN criteria for severe malnutrition in the context of starvation illness related to loss of appetite AEB 22% weight loss in 6 months per pt report and poor PO intake <50% for 6 months per pt report.     Recommendations/Plan:  Encourage intake of ~50% of meals   Document intake of all meals as % taken in ADL's to provide interdisciplinary communication across all shifts.   Monitor weight.  Nutrition rep will continue to see patient for ongoing meal and snack preferences.     RD following    "

## 2022-10-12 NOTE — DISCHARGE PLANNING
Case Management Discharge Planning    Admission Date: 10/11/2022  GMLOS:    ALOS: 0    6-Clicks ADL Score: 20  6-Clicks Mobility Score: 16  PT and/or OT Eval ordered: Yes  Post-acute Referrals Ordered: No  Post-acute Choice Obtained: NA  Has referral(s) been sent to post-acute provider:  [facility]      Anticipated Discharge Dispo: Discharge Disposition: Discharged to home/self care (01)    DME Needed: No    Action(s) Taken: Updated Provider/Nurse on Discharge Plan and DC Assessment Complete (See below)    Escalations Completed: None    Medically Clear: No    Next Steps: Patient discussed with team during morning rounds.  Patient admitted for SHANNAN, currently on heparin gtt.  Patient lives at home with his significant other in a single story apartment.  His PCP is Dr. Reynaga.  His preferred pharmacy is the [facility].  He has Medicare and Medicaid FFS insurances on file.  No CM needs noted at this time.  HCM to continue to follow and assist with any discharge needs.      Barriers to Discharge: Medical clearance and Pending PT Evaluation    Is the patient up for discharge tomorrow: No    Care Transition Team Assessment    Information Source  Orientation Level: Oriented X4  Information Given By: Patient  Informant's Name: Fede Parikh  Who is responsible for making decisions for patient? : Patient    Readmission Evaluation  Is this a readmission?: No    Elopement Risk  Legal Hold: No  Ambulatory or Self Mobile in Wheelchair: No-Not an Elopement Risk  Elopement Risk: Not at Risk for Elopement    Interdisciplinary Discharge Planning  Lives with - Patient's Self Care Capacity: Significant Other  Patient or legal guardian wants to designate a caregiver: Yes  Caregiver name: antonet green  Caregiver contact info: [phone]  [place] [place] Authorization for Release of Health Information has been completed: Yes  Support Systems: Spouse / Significant Other  Housing / Facility: 1 Story Apartment / [facility]  Durable  Medical Equipment: Walker    Discharge Preparedness  What is your plan after discharge?: Home with help  What are your discharge supports?: Partner  Prior Functional Level: Ambulatory (uses walker)    Functional Assesment  Prior Functional Level: Ambulatory (uses walker)    Finances  Financial Barriers to Discharge: No  Prescription Coverage: Yes    Vision / Hearing Impairment  Vision Impairment : Yes  Right Eye Vision: Wears Glasses  Left Eye Vision: Wears Glasses  Hearing Impairment : Yes  Hearing Impairment: Both Ears, Hearing Device Not Available  Does Pt Need Special Equipment for the Hearing Impaired?: Yes-But Does not Need for Facility to Arrange Equipment     Advance Directive  Advance Directive?: None    Domestic Abuse  Have you ever been the victim of abuse or violence?: No  Physical Abuse or Sexual Abuse: No  Verbal Abuse or Emotional Abuse: No  Possible Abuse/Neglect Reported to:: Not Applicable    Psychological Assessment  History of Substance Abuse: Alcohol  History of Psychiatric Problems: No  Non-compliant with Treatment: No  Newly Diagnosed Illness: Yes    Discharge Risks or Barriers  Discharge risks or barriers?: No    Anticipated Discharge Information  Discharge Disposition: Discharged to home/self care (01)

## 2022-10-12 NOTE — ASSESSMENT & PLAN NOTE
Cachexia. BMI 16.09 with recent weight loss, poor appetite and intake. Likely contributions from multiple chronic medical conditions including chronic alcoholism, active cocaine use, COPD, history of pancreatitis, severe anemia, deconditioning. CT chest abdomen pelvis without acute abnormality or findings to suggest malignancy. PSA within normal limits.  - Nutrition consult

## 2022-10-12 NOTE — ASSESSMENT & PLAN NOTE
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 Denies claudication.  -Continue to monitor  -Plan to start aspirin and Plavix once stable from a bleeding standpoint  -He will need outpatient vascular follow-up to discuss severe PAD and mural thrombus management   Normal muscle tone/strength

## 2022-10-12 NOTE — ASSESSMENT & PLAN NOTE
Likely secondary alcohol use / alcoholic gastritis.  -Pantoprazole IV (resume home omeprazole when stable from bleeding standpoint)

## 2022-10-12 NOTE — ASSESSMENT & PLAN NOTE
- possible exacerbation of chronic lung disease, however, lung P/E benign and considering hx of anemia and chronic anticoagulation therapy, concerning for occult bleed and symptomatic anemia   - risks/benefits discussion with patient regarding next day labs and follow up vs ED evaluation. Patient agreeable for ED evaluation   - Patient transported by EMS to [facility] ED

## 2022-10-12 NOTE — PROGRESS NOTES
Received report from previous shift RN, assumed care of patient. Patient is A&O4, vital signs are stable, on room air. Patient denies pain at this time, but does report shortness of breath. Medicated with PRN inhaler. Sitting up in bed for breakfast. Call light and belongings within reach. Bed alarm on. Bed in lowest/locked position. Hourly rounding in place.

## 2022-10-12 NOTE — ASSESSMENT & PLAN NOTE
- mural thrombus of aorta, has not follow up with vascular outpatient  - will need repeat coag panel to monitor warfarin therapy, per previous hospital note, not candidate for DOAC due to kidney function (although eGFR >15?),  consider discussion with cardiology and/or vascular surgery for DOAC to increase therapy adherence

## 2022-10-12 NOTE — ASSESSMENT & PLAN NOTE
40 pack year smoker; currently smokes 2-3cigs/day. Chronic cough and weight loss.  -CT chest to evaluate for malignancy  - on tobacco cessation; patient has no plans to quit smoking, but is considering quitting alcohol and cocaine  -Nicotine patch and gum (refusing)

## 2022-10-12 NOTE — PROGRESS NOTES
Inpatient Anticoagulation Service Note    Date: 10/12/2022    Reason for Anticoagulation: Other (Comments) (Aortic Thrombus)   Target INR: 2.0 to 3.0         Hemoglobin Value: (!) 7.2  Hematocrit Value: (!) 23.8  Lab Platelet Value: 233    INR from last 7 days       Date/Time INR Value    10/11/22 1600 1.11          Dose from last 7 days       Date/Time Dose (mg)    10/12/22 1304 5    10/11/22 2052 5          Average Dose (mg): 5  Significant Interactions: Antibiotics  Bridge Therapy: Yes (Heparin drip)  Date of Last VTE Event: 10/11/22  Bridge Therapy Start Date: 10/11/22  Days of Overlap Therapy: 1   INR Value Greater than 2 Prior to Discontinuation of Parenteral Anticoagulation: Not Applicable     Comments: Today is day 1 of at least 5 days of overlap therapy with warfarin and heparin drip. Will continue warfarin 5 mg daily for now. INR tomorrow.    Education Material Provided?: No (chronic warfain therapy)  Pharmacist suggested discharge dosing: TBD, warfarin 5 mg daily for now. Pt will need a follow up INR within 2-3 days of discharge.     Barak Crocker, PharmD

## 2022-10-12 NOTE — ASSESSMENT & PLAN NOTE
"Drinks half a pint of gin daily. Last drink was night prior to admission. Denies history of alcohol withdrawal. Has low threshold for CIWA protocol, but not currently in alcohol withdrawal.  -Counseled the patient on the risks of ongoing drug and alcohol use given his current age and chronic medical conditions.  At this point patient is agreeable to abstinence and seems to recognize \"I'm drinking myself to death.\"  -No sign of withdrawal at this time  "

## 2022-10-12 NOTE — ASSESSMENT & PLAN NOTE
"Active cocaine use - smokes, but does not inject drugs. Also with alcohol and tobacco abuse.   -Counseled the patient on the risks of ongoing drug and alcohol use given his current age and chronic medical conditions.  At this point patient is agreeable to abstinence and seems to recognize \"I'm drinking myself to death.\"  "

## 2022-10-12 NOTE — RESPIRATORY CARE
COPD EDUCATION by COPD CLINICAL EDUCATOR  10/12/2022 at 2:23 PM by Paige Wolff RRT     Patient interviewed by COPD education team. Patient refused COPD program at this time. An Action Plan was completed in the EMR to reflect current Respiratory Medication use.                  COPD Screen  COPD Risk Screening  Do you have a history of COPD?: Yes  Do you have a Pulmonologist?: No  COPD Population Screener  During the past 4 weeks, how much did you feel short of breath?: Most  or all of the time  Do you ever cough up any mucus or phlegm?: Yes, a few days a week or month  In the past 12 months, you do less than you used to because of your breathing problems: Agree  Have you smoked at least 100 cigarettes in your entire life?: Yes  How old are you?: 60+  COPD Screening Score: 8  COPD Coordinator Recommended: Yes    COPD Assessment  COPD Clinical Specialists ONLY  COPD Education Initiated: Yes--Short Intervention (very [place] acknowledges inhalers and COPDhx/dx no PFT on file; continues to smoke;declined education program; Review of meds and Meds to Beds requeted)  DME Company: none  Physician Name: [facility] team declined appointment assist  Pulmonologist Name: refused  Referrals Initiated: Yes  Smoking Cessation: Declined (no desire to quit)  Hospice: N/A  Home Health Care:  (TBD no 6 clicks yet)  [facility]iatric Specialty Group: N/A  [facility]: Declined  Is this a COPD exacerbation patient?: No    PFT Results  No results found for: PFT    Meds to Beds  Would the patient like to opt in for Bedside Medication Delivery at Discharge?: Yes, interested (difficult to get to pharmacy)     MY COPD ACTION PLAN     It is recommended that patients and physicians /healthcare providers complete this action plan together. This plan should be discussed at each physician visit and updated as needed.    The green, yellow and red zones show groups of symptoms of COPD. This list of symptoms is not comprehensive, and you may experience  "other symptoms. In the \"Actions\" column, your healthcare provider has recommended actions for you to take based on your symptoms.    Patient Name: Fede Parikh Jr.   YOB: 1945   Last Updated on:     Green Zone:  I am doing well today Actions     Usual activitiy and exercise level   Take daily medications     Usual amounts of cough and phlegm/mucus   Use oxygen as prescribed     Sleep well at night   Continue regular exercise/diet plan     Appetite is good   At all times avoid cigarette smoke, inhaled irritants     Daily Medications (these medications are taken every day):   Fluticasone Furoate and Vilanterol trifenatate (Breo)  Tiotropium Bromide Monohydrate (Spiriva) 1 Puff  1 capsule Once daily  Once daily     Additional Information:  Remember to rinse mouth after using your Breo inhaler    Yellow Zone:  I am having a bad day or a COPD flare Actions     More breathless than usual   Continue daily medications     I have less energy for my daily activities   Use quick relief inhaler as ordered     Increased or thicker phlegm/mucus   Use oxygen as prescribed     Using quick relief inhaler/nebulizer more often   Get plenty of rest     Swelling of ankles more than usual   Use pursed lip breathing     More coughing than usual   At all times avoid cigarette smoke, inhaled irritants     I feel like I have a \"chest cold\"     Poor sleep and my symptoms woke me up     My appetite is not good     My medicine is not helping      Call provider immediately if symptoms don’t improve     Continue daily medications, add rescue medications:   Albuterol 2 Puffs Every 6 hours PRN       Medications to be used during a flare up, (as Discussed with Provider):           Additional Information:  Use with spacer    Red Zone:  I need urgent medical care Actions     Severe shortness of breath even at rest   Call 911 or seek medical care immediately     Not able to do any activity because of breathing      Fever or " shaking chills      Feeling confused or very drowsy       Chest pains      Coughing up blood

## 2022-10-12 NOTE — CARE PLAN
The patient is Watcher - Medium risk of patient condition declining or worsening    Shift Goals  Clinical Goals: rest, safety  Patient Goals: rest  Family Goals: chano    Progress made toward(s) clinical / shift goals:  Patient resting comfortably in bed. Has been educated on fall risk and asking for help.    Patient is not progressing towards the following goals:

## 2022-10-12 NOTE — THERAPY
Physical Therapy   Initial Evaluation     Patient Name: Fede Parikh Jr.  Age:  77 y.o., Sex:  male  Medical Record #: [id number]  Today's Date: 10/12/2022    Assessment  Patient is 77 y.o. male admitted with SOB x 1 month and anemia.  PMH includes COPD, HTN, dyslipidemia, CKD, alcohol use disorder, nicotine use disorder, hx of Hep C, PAD s/p stent, and hx of aortal thrombus on warfarin.  Patient also reported non-compliance with COPD treatments.  Today patient mobilized as detailed below, required SBA-SPV for all mobility.  He ambulated with slow, shuffled pace and flexed posture, taking one standing rest break leaning forward on FWW due to fatigue.  Patient impulsively left walker at sink when ambulating back to bed, able to walk ~4 ft without FWW.  Patient appears at/near his functional baseline.  No further acute PT needs.    Plan    Recommend Physical Therapy for Evaluation only.    DC Equipment Recommendations: None  Discharge Recommendations: Anticipate that the patient will have no further physical therapy needs after discharge from the hospital     Objective     10/12/22 1109   Cognition    Cognition / Consciousness X   Level of Consciousness Alert   Comments WFL, minimal verbal responses   Active ROM Lower Body    Active ROM Lower Body  WDL   Strength Lower Body   Lower Body Strength  WDL   Comments WFL with functional mobility   Balance Assessment   Sitting Balance (Static) Fair +   Sitting Balance (Dynamic) Fair   Standing Balance (Static) Fair   Standing Balance (Dynamic) Fair   Weight Shift Sitting Fair   Weight Shift Standing Fair   Gait Analysis   Gait Level Of Assist Standby Assist   Assistive Device Front Wheel Walker   Distance (Feet) 40   # of Times Distance was Traveled 2   Deviation Shuffled Gait (flexed posture)   Weight Bearing Status No restrictions   Comments One standing rest break leaning forward on FWW, reported felt tired.  SBA for safety   Bed Mobility    Supine to Sit Supervised    Sit to Supine Supervised   Scooting Supervised   Functional Mobility   Sit to Stand Supervised   Bed, Chair, Wheelchair Transfer Supervised   Transfer Method Stand Step   Mobility bed mobility, ambulation   Activity Tolerance   Sitting Edge of Bed 2 min   Standing 8 min   Anticipated Discharge Equipment and Recommendations   DC Equipment Recommendations None   Discharge Recommendations Anticipate that the patient will have no further physical therapy needs after discharge from the hospital

## 2022-10-12 NOTE — ASSESSMENT & PLAN NOTE
Etiology unclear, likely contributions for multiple prerenal AKIs in the setting of alcohol abuse, chronic hypertension. Previous renal ultrasounds showing atrophic kidneys. Baseline Cr ~1.6-1.9, though occasionally within upper level of normal ~1.2.  -Avoid nephrotoxic meds

## 2022-10-12 NOTE — ED NOTES
Med Rec Complete per patient  Allergies Reviewed with patient  No antibiotics within the last 30 days  Patient's Preferred Pharmacy: [facility]       Patient shared that he is very hard of hearing, making communication difficult during med rec process. The patient also shared that he has not had many of his medications refilled due to unspecified reasons, to include warfarin which is currently on his medication list.   The patient was hesitant to provide the last time he took many of his medications and simply stated that he had not taken them for about a month.

## 2022-10-12 NOTE — ED PROVIDER NOTES
"ED Provider Note    CHIEF COMPLAINT  Chief Complaint   Patient presents with    Sent by MD     Pt went to [facility] today because he has been feeling short of breath x1 month and weak x1 week. Pt had positive orthostatics at the clinic so was sent to the ER.        HPI  Fede Parikh Jr. is a 77 y.o. male here for evaluation of shortness of breath cough.  The patient states that he has been feeling fatigued, over the last few days.  He also complains of a chronic cough, when he was taken to the clinic earlier today, it was noted that he was probably \"anemic.\"  He was referred here.  Patient has no chest pain, no back pain, and no headache.  Nothing seems to exacerbate any of his symptoms, but he states that \"I get short of breath over the last few months.\"      ROS  See HPI for further details, o/w negative.     PAST MEDICAL HISTORY   has a past medical history of Hepatitis, Hyperlipemia, Hypertension, and Pulmonary emphysema (HCC).    SOCIAL HISTORY  Social History     Tobacco Use    Smoking status: Some Days     Packs/day: 0.50     Types: Cigarettes    Smokeless tobacco: Never   Vaping Use    Vaping Use: Never used   Substance and Sexual Activity    Alcohol use: Yes     Alcohol/week: 4.2 oz     Types: 7 Shots of liquor per week    Drug use: Not Currently    Sexual activity: Not on file       Family History  No bleeding disorders    SURGICAL HISTORY   has a past surgical history that includes femoral femoral bypass (Bilateral, 9/4/2020).    CURRENT MEDICATIONS  Home Medications       Reviewed by Elise Cruz (Pharmacy Tech) on 10/11/22 at 1816  Med List Status: Complete     Medication Last Dose Status   albuterol (VENTOLIN HFA) 108 (90 Base) MCG/ACT Aero Soln inhalation aerosol 4 weeks ago Active   amLODIPine (NORVASC) 5 MG Tab 10/10/2022 Active   Fluticasone Furoate-Vilanterol (BREO ELLIPTA) 100-25 MCG/INH AEROSOL POWDER, BREATH ACTIVATED UNK Active   hydrOXYzine HCl (ATARAX) 25 MG Tab 4 weeks ago " Active   omeprazole (PRILOSEC) 20 MG delayed-release capsule 10/10/2022 Active   tiotropium (SPIRIVA HANDIHALER) 18 MCG Cap 4 weeks ago Active   warfarin (COUMADIN) 4 MG Tab UNk Active   warfarin (COUMADIN) 4 MG Tab UNK Active                    ALLERGIES  Allergies   Allergen Reactions    Ace Inhibitors Swelling       REVIEW OF SYSTEMS  See HPI for further details. Review of systems as above, otherwise all other systems are negative.     PHYSICAL EXAM  Constitutional: Well developed, well nourished. No acute distress.  HEENT: Normocephalic, atraumatic. Posterior pharynx clear and moist.  Eyes:  EOMI. Normal sclera.  Neck: Supple, Full range of motion, nontender.  Chest/Pulmonary: clear to ausculation. Symmetrical expansion.   Cardio: Regular rate and rhythm with no murmur.   Abdomen: Soft, nontender. No peritoneal signs. No guarding. No palpable masses.  Musculoskeletal: No deformity, no edema, neurovascular intact.   Neuro: Clear speech, appropriate, cooperative, cranial nerves II-XII grossly intact.  Psych: Normal mood and affect    PROCEDURES     MEDICAL RECORD  I have reviewed patient's medical record and pertinent results are listed.    COURSE & MEDICAL DECISION MAKING  I have reviewed any medical record information, laboratory studies and radiographic results as noted above.    Results for orders placed or performed during the hospital encounter of 10/11/22   CBC with Differential   Result Value Ref Range    WBC 5.7 4.8 - 10.8 K/uL    RBC 3.73 (L) 4.70 - 6.10 M/uL    Hemoglobin 8.7 (L) 14.0 - 18.0 g/dL    Hematocrit 28.5 (L) 42.0 - 52.0 %    MCV 76.4 (L) 81.4 - 97.8 fL    MCH 23.3 (L) 27.0 - 33.0 pg    MCHC 30.5 (L) 33.7 - 35.3 g/dL    RDW 63.1 (H) 35.9 - 50.0 fL    Platelet Count 291 164 - 446 K/uL    MPV 9.6 9.0 - 12.9 fL    Neutrophils-Polys 64.30 44.00 - 72.00 %    Lymphocytes 18.30 (L) 22.00 - 41.00 %    Monocytes 10.80 0.00 - 13.40 %    Eosinophils 5.80 0.00 - 6.90 %    Basophils 0.40 0.00 - 1.80 %     Immature Granulocytes 0.40 0.00 - 0.90 %    Nucleated RBC 0.40 /100 WBC    Neutrophils (Absolute) 3.65 1.82 - 7.42 K/uL    Lymphs (Absolute) 1.04 1.00 - 4.80 K/uL    Monos (Absolute) 0.61 0.00 - 0.85 K/uL    Eos (Absolute) 0.33 0.00 - 0.51 K/uL    Baso (Absolute) 0.02 0.00 - 0.12 K/uL    Immature Granulocytes (abs) 0.02 0.00 - 0.11 K/uL    NRBC (Absolute) 0.02 K/uL   Comp Metabolic Panel   Result Value Ref Range    Sodium 141 135 - 145 mmol/L    Potassium 4.6 3.6 - 5.5 mmol/L    Chloride 105 96 - 112 mmol/L    Co2 21 20 - 33 mmol/L    Anion Gap 15.0 7.0 - 16.0    Glucose 122 (H) 65 - 99 mg/dL    Bun 26 (H) 8 - 22 mg/dL    Creatinine 1.95 (H) 0.50 - 1.40 mg/dL    Calcium 9.6 8.5 - 10.5 mg/dL    AST(SGOT) 37 12 - 45 U/L    ALT(SGPT) 19 2 - 50 U/L    Alkaline Phosphatase 60 30 - 99 U/L    Total Bilirubin 0.7 0.1 - 1.5 mg/dL    Albumin 3.8 3.2 - 4.9 g/dL    Total Protein 8.3 (H) 6.0 - 8.2 g/dL    Globulin 4.5 (H) 1.9 - 3.5 g/dL    A-G Ratio 0.8 g/dL   ESTIMATED GFR   Result Value Ref Range    GFR (CKD-EPI) 34 (A) >60 mL/min/1.73 m 2   EKG   Result Value Ref Range    Report       [facility] Emergency Dept.    Test Date:  2022-10-11  Pt Name:    SADE ISSA               Department: ER  MRN:        [id number]                      Room:  Gender:     Male                         Technician: [id number]  :        1945                   Requested By:ER TRIAGE PROTOCOL  Order #:    [id number]                    Reading MD:    Measurements  Intervals                                Axis  Rate:       99                           P:          85  ME:         178                          QRS:        41  QRSD:       87                           T:          75  QT:         346  QTc:        444    Interpretive Statements  Sinus rhythm  Compared to ECG 2022 16:17:43  First degree AV block no longer present  Poor R-wave progression no longer present       DX-CHEST-PORTABLE (1 VIEW)   Final Result      1.   Ill-defined hazy opacities bilaterally possibly areas of atelectasis with developing pneumonitis not excluded.   2.  There is atherosclerosis.        Ekg; normal sinus rhythm at a rate of 99.  No ST elevation, no ST depression.  QTC is 444.  Compared to EKG from 6/21/2022.      FINAL IMPRESSION  1. SHANNAN (acute kidney injury) (HCC)        2. Anemia, unspecified type                Electronically signed by: Rafael Barber D.O., 10/11/2022 7:11 PM

## 2022-10-12 NOTE — ASSESSMENT & PLAN NOTE
- progressive, with lower extremities muscle atrophy. Debility contributing although will r.o symptomatic anemia as above   - evaluation of nutrition status + PT referral at next visit

## 2022-10-12 NOTE — THERAPY
Occupational Therapy   Initial Evaluation     Patient Name: Fede Parikh Jr.  Age:  77 y.o., Sex:  male  Medical Record #: [id number]  Today's Date: 10/12/2022     Precautions  Precautions: (P) Fall Risk    Assessment    Patient is 77 y.o. male admitted with SOB x 1 month and anemia.  PMH includes COPD, HTN, dyslipidemia, CKD, alcohol use disorder, nicotine use disorder, hx of Hep C, PAD s/p stent, and hx of aortal thrombus on warfarin.  Patient also reported non-compliance with COPD treatments. Pt able to complete all functional mobility and ADLs with supervision, pt had limited engagement throughout session and very [place]. Would anticipate pt is at his functional baseline, no skilled OT needs identified will complete order at this time. Patient will not be actively followed for occupational therapy services at this time, however may be seen if requested by physician for 1 more visit within 30 days to address any discharge or equipment needs.        Plan    Recommend Occupational Therapy for Evaluation only.    DC Equipment Recommendations: (P) None  Discharge Recommendations: (P) Anticipate that the patient will have no further occupational therapy needs after discharge from the hospital     Objective       10/12/22 1111   Prior Living Situation   Prior Services None   Housing / Facility 1 Story Apartment / Condo   Elevator Yes   Bathroom Set up Bathtub / Shower Combination   Equipment Owned Front-Wheel Walker   Lives with - Patient's Self Care Capacity Significant Other   Prior Level of ADL Function   Self Feeding Independent   Grooming / Hygiene Independent   Bathing Independent   Dressing Independent   Toileting Independent   Prior Level of IADL Function   Medication Management Independent   Laundry Independent   Kitchen Mobility Independent   Finances Independent   Home Management Independent   Shopping Independent   Prior Level Of Mobility Independent With Device in Home   Precautions   Precautions Fall  Risk   Cognition    Cognition / Consciousness WDL   Level of Consciousness Alert   Comments Likely at functional baseline   Active ROM Upper Body   Active ROM Upper Body  WDL   Dominant Hand Right   Strength Upper Body   Upper Body Strength  WDL   Sensation Upper Body   Upper Extremity Sensation  WDL   Upper Body Muscle Tone   Upper Body Muscle Tone  WDL   Neurological Concerns   Neurological Concerns No   Coordination Upper Body   Coordination WDL   Balance Assessment   Sitting Balance (Static) Fair +   Sitting Balance (Dynamic) Fair   Standing Balance (Static) Fair   Standing Balance (Dynamic) Fair   Weight Shift Sitting Fair   Weight Shift Standing Fair   Comments w/ FWW   Bed Mobility    Supine to Sit Supervised   Sit to Supine Supervised   Scooting Supervised   ADL Assessment   Grooming Supervision   Upper Body Dressing Supervision   Lower Body Dressing Supervision   Toileting   (NT-refused need)   How much help from another person does the patient currently need...   Putting on and taking off regular lower body clothing? 4   Bathing (including washing, rinsing, and drying)? 4   Toileting, which includes using a toilet, bedpan, or urinal? 4   Putting on and taking off regular upper body clothing? 4   Taking care of personal grooming such as brushing teeth? 4   Eating meals? 4   6 Clicks Daily Activity Score 24   Functional Mobility   Sit to Stand Supervised   Bed, Chair, Wheelchair Transfer Supervised   Transfer Method Stand Step   Mobility bed mobility, hallway mobility, back to bed   Comments w/ FWW   Activity Tolerance   Sitting Edge of Bed 2 min   Standing 8 min   Education Group   Education Provided Role of Occupational Therapist   Role of Occupational Therapist Patient Response Patient;Acceptance;Explanation   Problem List   Problem List None   Anticipated Discharge Equipment and Recommendations   DC Equipment Recommendations None   Discharge Recommendations Anticipate that the patient will have no  further occupational therapy needs after discharge from the hospital   Interdisciplinary Plan of Care Collaboration   IDT Collaboration with  Nursing;Physical Therapist   Patient Position at End of Therapy In Bed;Bed Alarm On;Call Light within Reach;Tray Table within Reach;Phone within Reach   Collaboration Comments RN updated

## 2022-10-13 LAB
ALBUMIN SERPL BCP-MCNC: 3.2 G/DL (ref 3.2–4.9)
ALBUMIN/GLOB SERPL: 0.9 G/DL
ALP SERPL-CCNC: 50 U/L (ref 30–99)
ALT SERPL-CCNC: 14 U/L (ref 2–50)
ANION GAP SERPL CALC-SCNC: 9 MMOL/L (ref 7–16)
AST SERPL-CCNC: 25 U/L (ref 12–45)
BILIRUB SERPL-MCNC: 0.3 MG/DL (ref 0.1–1.5)
BUN SERPL-MCNC: 18 MG/DL (ref 8–22)
CALCIUM SERPL-MCNC: 8.8 MG/DL (ref 8.5–10.5)
CHLORIDE SERPL-SCNC: 106 MMOL/L (ref 96–112)
CO2 SERPL-SCNC: 23 MMOL/L (ref 20–33)
CREAT SERPL-MCNC: 1.26 MG/DL (ref 0.5–1.4)
ERYTHROCYTE [DISTWIDTH] IN BLOOD BY AUTOMATED COUNT: 62.7 FL (ref 35.9–50)
GFR SERPLBLD CREATININE-BSD FMLA CKD-EPI: 58 ML/MIN/1.73 M 2
GLOBULIN SER CALC-MCNC: 3.4 G/DL (ref 1.9–3.5)
GLUCOSE SERPL-MCNC: 105 MG/DL (ref 65–99)
HCT VFR BLD AUTO: 22.8 % (ref 42–52)
HGB BLD-MCNC: 7.1 G/DL (ref 14–18)
INR PPP: 1.16 (ref 0.87–1.13)
MAGNESIUM SERPL-MCNC: 2 MG/DL (ref 1.5–2.5)
MCH RBC QN AUTO: 23.5 PG (ref 27–33)
MCHC RBC AUTO-ENTMCNC: 31.1 G/DL (ref 33.7–35.3)
MCV RBC AUTO: 75.5 FL (ref 81.4–97.8)
PHOSPHATE SERPL-MCNC: 2.9 MG/DL (ref 2.5–4.5)
PLATELET # BLD AUTO: 229 K/UL (ref 164–446)
PMV BLD AUTO: 9.6 FL (ref 9–12.9)
POTASSIUM SERPL-SCNC: 3.9 MMOL/L (ref 3.6–5.5)
PROT SERPL-MCNC: 6.6 G/DL (ref 6–8.2)
PROTHROMBIN TIME: 14.7 SEC (ref 12–14.6)
PSA SERPL-MCNC: 1.52 NG/ML (ref 0–4)
RBC # BLD AUTO: 3.02 M/UL (ref 4.7–6.1)
SODIUM SERPL-SCNC: 138 MMOL/L (ref 135–145)
UFH PPP CHRO-ACNC: 0.69 IU/ML
UFH PPP CHRO-ACNC: 0.98 IU/ML
WBC # BLD AUTO: 5.5 K/UL (ref 4.8–10.8)

## 2022-10-13 PROCEDURE — 700111 HCHG RX REV CODE 636 W/ 250 OVERRIDE (IP): Performed by: STUDENT IN AN ORGANIZED HEALTH CARE EDUCATION/TRAINING PROGRAM

## 2022-10-13 PROCEDURE — 96366 THER/PROPH/DIAG IV INF ADDON: CPT

## 2022-10-13 PROCEDURE — 36415 COLL VENOUS BLD VENIPUNCTURE: CPT

## 2022-10-13 PROCEDURE — 96372 THER/PROPH/DIAG INJ SC/IM: CPT

## 2022-10-13 PROCEDURE — 85610 PROTHROMBIN TIME: CPT

## 2022-10-13 PROCEDURE — A9270 NON-COVERED ITEM OR SERVICE: HCPCS | Performed by: INTERNAL MEDICINE

## 2022-10-13 PROCEDURE — 84100 ASSAY OF PHOSPHORUS: CPT

## 2022-10-13 PROCEDURE — A9270 NON-COVERED ITEM OR SERVICE: HCPCS | Performed by: STUDENT IN AN ORGANIZED HEALTH CARE EDUCATION/TRAINING PROGRAM

## 2022-10-13 PROCEDURE — A9270 NON-COVERED ITEM OR SERVICE: HCPCS

## 2022-10-13 PROCEDURE — 96375 TX/PRO/DX INJ NEW DRUG ADDON: CPT

## 2022-10-13 PROCEDURE — 700102 HCHG RX REV CODE 250 W/ 637 OVERRIDE(OP): Performed by: INTERNAL MEDICINE

## 2022-10-13 PROCEDURE — 700102 HCHG RX REV CODE 250 W/ 637 OVERRIDE(OP)

## 2022-10-13 PROCEDURE — 85520 HEPARIN ASSAY: CPT

## 2022-10-13 PROCEDURE — 700105 HCHG RX REV CODE 258: Performed by: INTERNAL MEDICINE

## 2022-10-13 PROCEDURE — 99226 PR SUBSEQUENT OBSERVATION CARE,LEVEL III: CPT | Mod: GC | Performed by: INTERNAL MEDICINE

## 2022-10-13 PROCEDURE — 700111 HCHG RX REV CODE 636 W/ 250 OVERRIDE (IP): Performed by: INTERNAL MEDICINE

## 2022-10-13 PROCEDURE — 700102 HCHG RX REV CODE 250 W/ 637 OVERRIDE(OP): Performed by: STUDENT IN AN ORGANIZED HEALTH CARE EDUCATION/TRAINING PROGRAM

## 2022-10-13 PROCEDURE — 80053 COMPREHEN METABOLIC PANEL: CPT

## 2022-10-13 PROCEDURE — G0378 HOSPITAL OBSERVATION PER HR: HCPCS

## 2022-10-13 PROCEDURE — 84153 ASSAY OF PSA TOTAL: CPT

## 2022-10-13 PROCEDURE — 85027 COMPLETE CBC AUTOMATED: CPT

## 2022-10-13 PROCEDURE — 83735 ASSAY OF MAGNESIUM: CPT

## 2022-10-13 RX ORDER — METOCLOPRAMIDE HYDROCHLORIDE 5 MG/ML
10 INJECTION INTRAMUSCULAR; INTRAVENOUS EVERY 6 HOURS
Status: DISCONTINUED | OUTPATIENT
Start: 2022-10-14 | End: 2022-10-14

## 2022-10-13 RX ORDER — WARFARIN SODIUM 5 MG/1
5 TABLET ORAL DAILY
Status: DISCONTINUED | OUTPATIENT
Start: 2022-10-14 | End: 2022-10-14

## 2022-10-13 RX ORDER — OXYCODONE HYDROCHLORIDE 5 MG/1
5 TABLET ORAL ONCE
Status: COMPLETED | OUTPATIENT
Start: 2022-10-13 | End: 2022-10-13

## 2022-10-13 RX ORDER — PETROLATUM 42 G/100G
OINTMENT TOPICAL 3 TIMES DAILY PRN
Status: DISCONTINUED | OUTPATIENT
Start: 2022-10-13 | End: 2022-10-17 | Stop reason: HOSPADM

## 2022-10-13 RX ORDER — ENOXAPARIN SODIUM 100 MG/ML
1 INJECTION SUBCUTANEOUS EVERY 12 HOURS
Status: DISCONTINUED | OUTPATIENT
Start: 2022-10-13 | End: 2022-10-14

## 2022-10-13 RX ORDER — WARFARIN SODIUM 7.5 MG/1
7.5 TABLET ORAL
Status: COMPLETED | OUTPATIENT
Start: 2022-10-13 | End: 2022-10-13

## 2022-10-13 RX ORDER — UREA 10 %
1 LOTION (ML) TOPICAL NIGHTLY
Status: DISCONTINUED | OUTPATIENT
Start: 2022-10-13 | End: 2022-10-17 | Stop reason: HOSPADM

## 2022-10-13 RX ORDER — ALBUTEROL SULFATE 90 UG/1
2 AEROSOL, METERED RESPIRATORY (INHALATION) EVERY 4 HOURS PRN
Status: DISCONTINUED | OUTPATIENT
Start: 2022-10-13 | End: 2022-10-17 | Stop reason: HOSPADM

## 2022-10-13 RX ORDER — ACETAMINOPHEN 325 MG/1
650 TABLET ORAL ONCE
Status: COMPLETED | OUTPATIENT
Start: 2022-10-13 | End: 2022-10-13

## 2022-10-13 RX ORDER — BENZONATATE 100 MG/1
100 CAPSULE ORAL 3 TIMES DAILY PRN
Status: DISCONTINUED | OUTPATIENT
Start: 2022-10-13 | End: 2022-10-17 | Stop reason: HOSPADM

## 2022-10-13 RX ORDER — ENOXAPARIN SODIUM 100 MG/ML
60 INJECTION SUBCUTANEOUS EVERY 12 HOURS
Qty: 8 EACH | Refills: 0 | Status: SHIPPED | OUTPATIENT
Start: 2022-10-13 | End: 2022-10-17

## 2022-10-13 RX ADMIN — ACETAMINOPHEN 650 MG: 325 TABLET, FILM COATED ORAL at 08:26

## 2022-10-13 RX ADMIN — OXYCODONE 5 MG: 5 TABLET ORAL at 22:53

## 2022-10-13 RX ADMIN — ONDANSETRON 4 MG: 2 INJECTION INTRAMUSCULAR; INTRAVENOUS at 22:17

## 2022-10-13 RX ADMIN — BUDESONIDE AND FORMOTEROL FUMARATE DIHYDRATE 2 PUFF: 160; 4.5 AEROSOL RESPIRATORY (INHALATION) at 08:26

## 2022-10-13 RX ADMIN — Medication 1 MG: at 21:05

## 2022-10-13 RX ADMIN — OMEPRAZOLE 40 MG: 20 CAPSULE, DELAYED RELEASE ORAL at 05:04

## 2022-10-13 RX ADMIN — Medication 100 MG: at 11:19

## 2022-10-13 RX ADMIN — HEPARIN SODIUM 18 UNITS/KG/HR: 5000 INJECTION, SOLUTION INTRAVENOUS at 08:45

## 2022-10-13 RX ADMIN — WARFARIN SODIUM 7.5 MG: 7.5 TABLET ORAL at 17:11

## 2022-10-13 RX ADMIN — THERA TABS 1 TABLET: TAB at 11:19

## 2022-10-13 RX ADMIN — FOLIC ACID 1 MG: 1 TABLET ORAL at 11:19

## 2022-10-13 RX ADMIN — METOCLOPRAMIDE 10 MG: 5 INJECTION, SOLUTION INTRAMUSCULAR; INTRAVENOUS at 23:54

## 2022-10-13 RX ADMIN — ALBUTEROL SULFATE 2 PUFF: 90 AEROSOL, METERED RESPIRATORY (INHALATION) at 20:41

## 2022-10-13 RX ADMIN — ALBUTEROL SULFATE 2 PUFF: 90 AEROSOL, METERED RESPIRATORY (INHALATION) at 16:00

## 2022-10-13 RX ADMIN — ALBUTEROL SULFATE 2 PUFF: 90 AEROSOL, METERED RESPIRATORY (INHALATION) at 07:03

## 2022-10-13 RX ADMIN — ENOXAPARIN SODIUM 40 MG: 40 INJECTION SUBCUTANEOUS at 15:55

## 2022-10-13 RX ADMIN — TIOTROPIUM BROMIDE INHALATION SPRAY 5 MCG: 3.12 SPRAY, METERED RESPIRATORY (INHALATION) at 05:00

## 2022-10-13 RX ADMIN — ACETAMINOPHEN 650 MG: 325 TABLET, FILM COATED ORAL at 21:05

## 2022-10-13 RX ADMIN — BUDESONIDE AND FORMOTEROL FUMARATE DIHYDRATE 2 PUFF: 160; 4.5 AEROSOL RESPIRATORY (INHALATION) at 19:08

## 2022-10-13 RX ADMIN — BENZONATATE 100 MG: 100 CAPSULE ORAL at 14:19

## 2022-10-13 RX ADMIN — SODIUM CHLORIDE 250 MG: 9 INJECTION, SOLUTION INTRAVENOUS at 08:25

## 2022-10-13 RX ADMIN — ALBUTEROL SULFATE 2 PUFF: 90 AEROSOL, METERED RESPIRATORY (INHALATION) at 11:17

## 2022-10-13 RX ADMIN — ACETAMINOPHEN 650 MG: 325 TABLET, FILM COATED ORAL at 15:55

## 2022-10-13 RX ADMIN — NICOTINE 14 MG: 14 PATCH TRANSDERMAL at 05:00

## 2022-10-13 RX ADMIN — SODIUM CHLORIDE 250 MG: 9 INJECTION, SOLUTION INTRAVENOUS at 20:41

## 2022-10-13 RX ADMIN — ALBUTEROL SULFATE 2 PUFF: 90 AEROSOL, METERED RESPIRATORY (INHALATION) at 00:35

## 2022-10-13 RX ADMIN — AMLODIPINE BESYLATE 5 MG: 5 TABLET ORAL at 04:59

## 2022-10-13 ASSESSMENT — ENCOUNTER SYMPTOMS
FEVER: 0
TINGLING: 0
CHILLS: 0
WEAKNESS: 1
HEADACHES: 0
DOUBLE VISION: 0
DIZZINESS: 0
WEIGHT LOSS: 1
CLAUDICATION: 0
NAUSEA: 0
SENSORY CHANGE: 0
BLURRED VISION: 0
CONSTIPATION: 0
BLOOD IN STOOL: 0
ABDOMINAL PAIN: 0
DIARRHEA: 0
HEMOPTYSIS: 0
SHORTNESS OF BREATH: 1
HEARTBURN: 0
COUGH: 1
FOCAL WEAKNESS: 0
VOMITING: 0
PALPITATIONS: 0

## 2022-10-13 ASSESSMENT — PAIN DESCRIPTION - PAIN TYPE
TYPE: ACUTE PAIN

## 2022-10-13 ASSESSMENT — LIFESTYLE VARIABLES: SUBSTANCE_ABUSE: 0

## 2022-10-13 NOTE — CARE PLAN
The patient is Stable - Low risk of patient condition declining or worsening    Shift Goals  Clinical Goals: monitor heparin infusion, maintain safety  Patient Goals: rest  Family Goals: chano    Problem: Knowledge Deficit - Standard  Goal: Patient and family/care givers will demonstrate understanding of plan of care, disease process/condition, diagnostic tests and medications  Outcome: Progressing     Problem: Fall Risk  Goal: Patient will remain free from falls  Outcome: Progressing     Progress made toward(s) clinical / shift goals: RN discussed plan of care for shift with patient and patient verbalized understanding.  Anti-xa drawn q6h to monitor therapeutic heparin infusion rate. Patient remained free from falls throughout this shift. Bed alarm on, bed in lowest/locked position, and call bell within arms reach.

## 2022-10-13 NOTE — CARE PLAN
The patient is Stable - Low risk of patient condition declining or worsening    Shift Goals  Clinical Goals: Heparin drip, maintain pt safety  Patient Goals: Lotion to decrease itchiness  Family Goals: chano    Progress made toward(s) clinical / shift goals: Heparin drip discontinued. Safety maintained with use of call light and bed alarm.     Problem: Fall Risk  Goal: Patient will remain free from falls  Outcome: Progressing       Patient is not progressing towards the following goals:

## 2022-10-13 NOTE — PROGRESS NOTES
Inpatient Anticoagulation Service Note    Date: 10/13/2022    Reason for Anticoagulation: Other (Comments) (Aortic Thrombus)   Target INR: 2.0 to 3.0         Hemoglobin Value: (!) 7.1  Hematocrit Value: (!) 22.8  Lab Platelet Value: 229    INR from last 7 days       Date/Time INR Value    10/13/22 0304 1.16    10/11/22 1600 1.11          Dose from last 7 days       Date/Time Dose (mg)    10/13/22 1300 7.5    10/12/22 1304 5    10/11/22 2052 5          Average Dose (mg): 5  Significant Interactions: Antibiotics  Bridge Therapy: Yes (Heparin drip)  Date of Last VTE Event: 10/11/22  Bridge Therapy Start Date: 10/11/22  Days of Overlap Therapy: 2   INR Value Greater than 2 Prior to Discontinuation of Parenteral Anticoagulation: Not Applicable     Comments: Today is day 2 of at least 5 days of overlap therapy. Pt being bridged with heparin drip. Pt has had warfarin 5 mg x 2 doses so far. Will give warfarin 7.5 mg today and then resume warfarin 5 mg daily tomorrow. Will check INR daily for now. The pt will need at least 5 days of overlap therapy and two consecutive days of therapeutic INR before the heparin drip can be stopped.    Education Material Provided?: No (chronic warfain therapy)  Pharmacist suggested discharge dosing: TBD, warfarin 5 mg daily for now     Barak Crocker, PharmD

## 2022-10-13 NOTE — PROGRESS NOTES
Received report from previous shift RN, assumed care of patient. Patient is A&Ox4, vital signs are stable, on room air. Patient reports 3/10 stomach pain at this time, medicated per MAR. Pt also reports itchiness on their back. No redness or other abnormalities noted upon assessment. Provided lotion for the pts skin.  Sitting up in bed for breakfast. Call light and belongings within reach. Bed in lowest/locked position. Bed alarm on. Hourly rounding in place.

## 2022-10-14 ENCOUNTER — APPOINTMENT (OUTPATIENT)
Dept: RADIOLOGY | Facility: MEDICAL CENTER | Age: 77
DRG: 438 | End: 2022-10-14
Attending: INTERNAL MEDICINE
Payer: MEDICARE

## 2022-10-14 PROBLEM — R10.9 ABDOMINAL PAIN: Status: ACTIVE | Noted: 2022-10-14

## 2022-10-14 LAB
ABO GROUP BLD: NORMAL
ANION GAP SERPL CALC-SCNC: 10 MMOL/L (ref 7–16)
BARCODED ABORH UBTYP: 9500
BARCODED PRD CODE UBPRD: NORMAL
BARCODED UNIT NUM UBUNT: NORMAL
BLD GP AB SCN SERPL QL: NORMAL
BUN SERPL-MCNC: 16 MG/DL (ref 8–22)
CALCIUM SERPL-MCNC: 8.3 MG/DL (ref 8.5–10.5)
CHLORIDE SERPL-SCNC: 106 MMOL/L (ref 96–112)
CO2 SERPL-SCNC: 20 MMOL/L (ref 20–33)
COMPONENT R 8504R: NORMAL
CREAT SERPL-MCNC: 1.63 MG/DL (ref 0.5–1.4)
ERYTHROCYTE [DISTWIDTH] IN BLOOD BY AUTOMATED COUNT: 62.5 FL (ref 35.9–50)
GFR SERPLBLD CREATININE-BSD FMLA CKD-EPI: 42 ML/MIN/1.73 M 2
GLUCOSE SERPL-MCNC: 129 MG/DL (ref 65–99)
HCT VFR BLD AUTO: 21.9 % (ref 42–52)
HCT VFR BLD AUTO: 24.6 % (ref 42–52)
HGB BLD-MCNC: 6.7 G/DL (ref 14–18)
HGB BLD-MCNC: 7.9 G/DL (ref 14–18)
INR PPP: 1.17 (ref 0.87–1.13)
LIPASE SERPL-CCNC: 202 U/L (ref 11–82)
MAGNESIUM SERPL-MCNC: 1.7 MG/DL (ref 1.5–2.5)
MCH RBC QN AUTO: 23.4 PG (ref 27–33)
MCHC RBC AUTO-ENTMCNC: 30.6 G/DL (ref 33.7–35.3)
MCV RBC AUTO: 76.6 FL (ref 81.4–97.8)
PHOSPHATE SERPL-MCNC: 3.1 MG/DL (ref 2.5–4.5)
PLATELET # BLD AUTO: 217 K/UL (ref 164–446)
PMV BLD AUTO: 10.1 FL (ref 9–12.9)
POTASSIUM SERPL-SCNC: 4.2 MMOL/L (ref 3.6–5.5)
PRODUCT TYPE UPROD: NORMAL
PROTHROMBIN TIME: 14.8 SEC (ref 12–14.6)
RBC # BLD AUTO: 2.86 M/UL (ref 4.7–6.1)
RH BLD: NORMAL
SODIUM SERPL-SCNC: 136 MMOL/L (ref 135–145)
UNIT STATUS USTAT: NORMAL
WBC # BLD AUTO: 4 K/UL (ref 4.8–10.8)

## 2022-10-14 PROCEDURE — 85610 PROTHROMBIN TIME: CPT | Mod: 91

## 2022-10-14 PROCEDURE — 84100 ASSAY OF PHOSPHORUS: CPT

## 2022-10-14 PROCEDURE — 83735 ASSAY OF MAGNESIUM: CPT

## 2022-10-14 PROCEDURE — 76705 ECHO EXAM OF ABDOMEN: CPT

## 2022-10-14 PROCEDURE — 700102 HCHG RX REV CODE 250 W/ 637 OVERRIDE(OP): Performed by: INTERNAL MEDICINE

## 2022-10-14 PROCEDURE — 86923 COMPATIBILITY TEST ELECTRIC: CPT

## 2022-10-14 PROCEDURE — 36430 TRANSFUSION BLD/BLD COMPNT: CPT

## 2022-10-14 PROCEDURE — 80048 BASIC METABOLIC PNL TOTAL CA: CPT

## 2022-10-14 PROCEDURE — 700102 HCHG RX REV CODE 250 W/ 637 OVERRIDE(OP): Performed by: STUDENT IN AN ORGANIZED HEALTH CARE EDUCATION/TRAINING PROGRAM

## 2022-10-14 PROCEDURE — 700105 HCHG RX REV CODE 258: Performed by: INTERNAL MEDICINE

## 2022-10-14 PROCEDURE — A9270 NON-COVERED ITEM OR SERVICE: HCPCS | Performed by: INTERNAL MEDICINE

## 2022-10-14 PROCEDURE — 94760 N-INVAS EAR/PLS OXIMETRY 1: CPT

## 2022-10-14 PROCEDURE — 83690 ASSAY OF LIPASE: CPT

## 2022-10-14 PROCEDURE — 94640 AIRWAY INHALATION TREATMENT: CPT

## 2022-10-14 PROCEDURE — 85027 COMPLETE CBC AUTOMATED: CPT

## 2022-10-14 PROCEDURE — 96376 TX/PRO/DX INJ SAME DRUG ADON: CPT

## 2022-10-14 PROCEDURE — 30233N1 TRANSFUSION OF NONAUTOLOGOUS RED BLOOD CELLS INTO PERIPHERAL VEIN, PERCUTANEOUS APPROACH: ICD-10-PCS | Performed by: INTERNAL MEDICINE

## 2022-10-14 PROCEDURE — 96375 TX/PRO/DX INJ NEW DRUG ADDON: CPT

## 2022-10-14 PROCEDURE — P9016 RBC LEUKOCYTES REDUCED: HCPCS

## 2022-10-14 PROCEDURE — 99233 SBSQ HOSP IP/OBS HIGH 50: CPT | Mod: GC | Performed by: INTERNAL MEDICINE

## 2022-10-14 PROCEDURE — 86901 BLOOD TYPING SEROLOGIC RH(D): CPT

## 2022-10-14 PROCEDURE — 770001 HCHG ROOM/CARE - MED/SURG/GYN PRIV*

## 2022-10-14 PROCEDURE — 86850 RBC ANTIBODY SCREEN: CPT

## 2022-10-14 PROCEDURE — 700111 HCHG RX REV CODE 636 W/ 250 OVERRIDE (IP): Performed by: STUDENT IN AN ORGANIZED HEALTH CARE EDUCATION/TRAINING PROGRAM

## 2022-10-14 PROCEDURE — 96366 THER/PROPH/DIAG IV INF ADDON: CPT

## 2022-10-14 PROCEDURE — 96372 THER/PROPH/DIAG INJ SC/IM: CPT

## 2022-10-14 PROCEDURE — 85014 HEMATOCRIT: CPT

## 2022-10-14 PROCEDURE — 36415 COLL VENOUS BLD VENIPUNCTURE: CPT

## 2022-10-14 PROCEDURE — 85018 HEMOGLOBIN: CPT

## 2022-10-14 PROCEDURE — C9113 INJ PANTOPRAZOLE SODIUM, VIA: HCPCS | Performed by: INTERNAL MEDICINE

## 2022-10-14 PROCEDURE — A9270 NON-COVERED ITEM OR SERVICE: HCPCS | Performed by: STUDENT IN AN ORGANIZED HEALTH CARE EDUCATION/TRAINING PROGRAM

## 2022-10-14 PROCEDURE — 86900 BLOOD TYPING SEROLOGIC ABO: CPT

## 2022-10-14 PROCEDURE — 700111 HCHG RX REV CODE 636 W/ 250 OVERRIDE (IP): Performed by: INTERNAL MEDICINE

## 2022-10-14 RX ORDER — PANTOPRAZOLE SODIUM 40 MG/10ML
40 INJECTION, POWDER, LYOPHILIZED, FOR SOLUTION INTRAVENOUS DAILY
Status: DISCONTINUED | OUTPATIENT
Start: 2022-10-14 | End: 2022-10-16

## 2022-10-14 RX ORDER — SODIUM CHLORIDE 9 MG/ML
INJECTION, SOLUTION INTRAVENOUS CONTINUOUS
Status: DISCONTINUED | OUTPATIENT
Start: 2022-10-14 | End: 2022-10-16

## 2022-10-14 RX ORDER — METOCLOPRAMIDE HYDROCHLORIDE 5 MG/ML
10 INJECTION INTRAMUSCULAR; INTRAVENOUS EVERY 6 HOURS PRN
Status: DISCONTINUED | OUTPATIENT
Start: 2022-10-14 | End: 2022-10-16

## 2022-10-14 RX ORDER — SODIUM CHLORIDE 9 MG/ML
INJECTION, SOLUTION INTRAVENOUS CONTINUOUS
Status: ACTIVE | OUTPATIENT
Start: 2022-10-14 | End: 2022-10-14

## 2022-10-14 RX ADMIN — METOCLOPRAMIDE 10 MG: 5 INJECTION, SOLUTION INTRAMUSCULAR; INTRAVENOUS at 17:48

## 2022-10-14 RX ADMIN — FOLIC ACID 1 MG: 1 TABLET ORAL at 05:26

## 2022-10-14 RX ADMIN — ALBUTEROL SULFATE 2 PUFF: 90 AEROSOL, METERED RESPIRATORY (INHALATION) at 23:08

## 2022-10-14 RX ADMIN — OMEPRAZOLE 40 MG: 20 CAPSULE, DELAYED RELEASE ORAL at 05:25

## 2022-10-14 RX ADMIN — BUDESONIDE AND FORMOTEROL FUMARATE DIHYDRATE 2 PUFF: 160; 4.5 AEROSOL RESPIRATORY (INHALATION) at 18:49

## 2022-10-14 RX ADMIN — SODIUM CHLORIDE 250 MG: 9 INJECTION, SOLUTION INTRAVENOUS at 09:55

## 2022-10-14 RX ADMIN — Medication 1 MG: at 20:18

## 2022-10-14 RX ADMIN — METOCLOPRAMIDE 10 MG: 5 INJECTION, SOLUTION INTRAMUSCULAR; INTRAVENOUS at 11:56

## 2022-10-14 RX ADMIN — ENOXAPARIN SODIUM 40 MG: 40 INJECTION SUBCUTANEOUS at 05:26

## 2022-10-14 RX ADMIN — SODIUM CHLORIDE: 9 INJECTION, SOLUTION INTRAVENOUS at 11:57

## 2022-10-14 RX ADMIN — BUDESONIDE AND FORMOTEROL FUMARATE DIHYDRATE 2 PUFF: 160; 4.5 AEROSOL RESPIRATORY (INHALATION) at 07:52

## 2022-10-14 RX ADMIN — THERA TABS 1 TABLET: TAB at 05:26

## 2022-10-14 RX ADMIN — ALBUTEROL SULFATE 2 PUFF: 90 AEROSOL, METERED RESPIRATORY (INHALATION) at 18:50

## 2022-10-14 RX ADMIN — Medication 100 MG: at 05:26

## 2022-10-14 RX ADMIN — PANTOPRAZOLE SODIUM 40 MG: 40 INJECTION, POWDER, FOR SOLUTION INTRAVENOUS at 11:56

## 2022-10-14 RX ADMIN — METOCLOPRAMIDE 10 MG: 5 INJECTION, SOLUTION INTRAMUSCULAR; INTRAVENOUS at 05:26

## 2022-10-14 RX ADMIN — ALBUTEROL SULFATE 2 PUFF: 90 AEROSOL, METERED RESPIRATORY (INHALATION) at 05:29

## 2022-10-14 RX ADMIN — ALBUTEROL SULFATE 2 PUFF: 90 AEROSOL, METERED RESPIRATORY (INHALATION) at 13:00

## 2022-10-14 RX ADMIN — TIOTROPIUM BROMIDE INHALATION SPRAY 5 MCG: 3.12 SPRAY, METERED RESPIRATORY (INHALATION) at 05:26

## 2022-10-14 ASSESSMENT — ENCOUNTER SYMPTOMS
HEMOPTYSIS: 0
NAUSEA: 0
DIARRHEA: 0
DOUBLE VISION: 0
VOMITING: 1
CONSTIPATION: 0
PALPITATIONS: 0
FOCAL WEAKNESS: 0
BLURRED VISION: 0
DIZZINESS: 0
CLAUDICATION: 0
ABDOMINAL PAIN: 1
BLOOD IN STOOL: 0
TINGLING: 0
WEIGHT LOSS: 1
SENSORY CHANGE: 0
WEAKNESS: 1
COUGH: 1
FEVER: 0
HEADACHES: 0
HEARTBURN: 0
SHORTNESS OF BREATH: 1
CHILLS: 0

## 2022-10-14 ASSESSMENT — PAIN DESCRIPTION - PAIN TYPE: TYPE: ACUTE PAIN

## 2022-10-14 ASSESSMENT — LIFESTYLE VARIABLES: SUBSTANCE_ABUSE: 0

## 2022-10-14 NOTE — ASSESSMENT & PLAN NOTE
Developed sharp upper abdominal pain 10/13 and nonbloody vomiting overnight with soft blood pressures.  Tenderness on exam in epigastrium and right upper quadrant.  Patient does have history of pancreatitis (hospitalized in June 2022).  Etiology concerning for pancreatitis (elevated lipase) with alcohol vs gallstone as likely causes.  RUQ US showing common bile duct dilation, elevated LFTs on 10/15 concerns for choledocholithiasis.  Abdominal pain, liver enzymes, cholestatic labs are all improving.  Abdominal pain possibly biliary colic.   -MRCP pending, will discuss with GI vs general surgery pending results.  Per discussion with general surgery, if MRCP shows cholecystitis or choledocholithiasis, will consult surgery for possible cholecystectomy.  If negative for either, appropriate for outpatient follow-up with general surgery.  -Maintenance fluids with normal saline, wean as oral intake increases  -Regular diet

## 2022-10-14 NOTE — CARE PLAN
The patient is Stable - Low risk of patient condition declining or worsening    Shift Goals  Clinical Goals: maintain safety  Patient Goals: pain control and rest  Family Goals: chano    Progress made toward(s) clinical / shift goals: Patient progressing towards goals. Call light within reach. Hourly rounding.   Problem: Knowledge Deficit - Standard  Goal: Patient and family/care givers will demonstrate understanding of plan of care, disease process/condition, diagnostic tests and medications  Outcome: Progressing     Problem: Fall Risk  Goal: Patient will remain free from falls  Outcome: Progressing     Problem: Pain - Standard  Goal: Alleviation of pain or a reduction in pain to the patient’s comfort goal  Outcome: Progressing       Patient is not progressing towards the following goals:

## 2022-10-14 NOTE — CARE PLAN
The patient is Stable - Low risk of patient condition declining or worsening    Shift Goals  Clinical Goals: maintain safety  Patient Goals: pain control and rest  Family Goals: chano    Problem: Fall Risk  Goal: Patient will remain free from falls  Outcome: Progressing     Problem: Pain - Standard  Goal: Alleviation of pain or a reduction in pain to the patient’s comfort goal  Outcome: Progressing     Progress made toward(s) clinical / shift goals:  Patient remained free from falls throughout this shift. Bed alarm on, bed in lowest/locked position, non-skid socks on, and call bell within arms reach.  Patient verbalized pain better controlled post scheduled and PRN medication.

## 2022-10-14 NOTE — PROGRESS NOTES
[facility] Internal Medicine Daily Progress Note    Date of Service  10/13/2022    [facility] Team: R IM White Team   Attending: Rebel Chaudhry M.d.  Senior Resident: Dr. Harper  Intern:  Dr. Thomas  Contact Number: [phone]    Chief Complaint  Fede Parikh Jr. is a 77 y.o. male admitted 10/11/2022 with generalized weakness and shortness of breath    Hospital Course  Fede Parikh Jr. is a 77 year old male with history of COPD (no PFTs), hypertension, hyperlipidemia, CKD3a, anemia, polysubstance abuse (alcohol, cocaine, tobacco), hepC s/p tx, PAD with chronic occlusion of bilateral common iliac arteries, distal aorta mural thrombus (on warfarin, but not taking) that is admitted with progressive generalized weakness, shortness of breath, failure to thrive. No signs of acute worsening of chronic anemia, COPD exacerbation, progression of CKD. CT chest abdomen pelvis negative for acute findings. On warfarin with enoxaparin bridge.     Interval Problem Update  - No acute events overnight  -CT chest abdomen pelvis without acute abnormality or findings to suggest malignancy  -Anticoagulation switched from heparin drip to Lovenox bridge to warfarin.  Again discussed the risks and benefits of anticoagulation with patient and agreed to proceed with shared decision making.  - Low concern for acute GI bleed - hgb stable and within baseline  -Continues to complain of cough and generalized weakness, using home inhalers with improvement  -Has had some intermittent sharp abdominal pain, but no signs of GI bleeding including hematemesis, melena, hematochezia    I have discussed this patient's plan of care and discharge plan at IDT rounds today with Case Management, Nursing, Nursing leadership, and other members of the IDT team.    Consultants/Specialty  None    Code Status  Full Code    Disposition  Patient is not medically cleared for discharge.   Anticipate discharge to to home with close outpatient follow-up.  I have  placed the appropriate orders for post-discharge needs.    Review of Systems  Review of Systems   Constitutional:  Positive for malaise/fatigue and weight loss. Negative for chills and fever.   Eyes:  Negative for blurred vision and double vision.   Respiratory:  Positive for cough and shortness of breath. Negative for hemoptysis.    Cardiovascular:  Negative for chest pain, palpitations, claudication and leg swelling.   Gastrointestinal:  Negative for abdominal pain, blood in stool, constipation, diarrhea, heartburn, melena, nausea and vomiting.   Genitourinary:  Negative for dysuria, frequency and hematuria.   Musculoskeletal:  Positive for joint pain (right knee).   Neurological:  Positive for weakness. Negative for dizziness, tingling, sensory change, focal weakness and headaches.   Psychiatric/Behavioral:  Negative for substance abuse.       Physical Exam  Temp:  [36.7 °C (98.1 °F)-37 °C (98.6 °F)] 36.9 °C (98.4 °F)  Pulse:  [] 85  Resp:  [14-18] 18  BP: (129-134)/(66-77) 134/66  SpO2:  [98 %] 98 %    Physical Exam  Constitutional:       General: He is not in acute distress.     Comments: Cachectic   HENT:      Head: Normocephalic.      Mouth/Throat:      Mouth: Mucous membranes are moist.      Pharynx: Oropharynx is clear.   Eyes:      General: Scleral icterus present.      Extraocular Movements: Extraocular movements intact.      Conjunctiva/sclera: Conjunctivae normal.      Pupils: Pupils are equal, round, and reactive to light.   Cardiovascular:      Rate and Rhythm: Normal rate and regular rhythm.      Heart sounds: Normal heart sounds.   Pulmonary:      Effort: Pulmonary effort is normal.      Breath sounds: Normal breath sounds.   Abdominal:      Palpations: Abdomen is soft.      Tenderness: There is no abdominal tenderness.   Musculoskeletal:         General: No swelling.      Cervical back: Normal range of motion.   Skin:     General: Skin is warm and dry.   Neurological:      General: No focal  deficit present.      Mental Status: He is alert and oriented to person, place, and time.      Cranial Nerves: No cranial nerve deficit.      Sensory: No sensory deficit.      Motor: Weakness (generalized weakness, but non-focal) present.      Coordination: Coordination normal.   Psychiatric:         Mood and Affect: Mood normal.         Behavior: Behavior normal.       Fluids    Intake/Output Summary (Last 24 hours) at 10/13/2022 1912  Last data filed at 10/13/2022 1300  Gross per 24 hour   Intake 1010 ml   Output 525 ml   Net 485 ml       Laboratory  Recent Labs     10/11/22  1600 10/12/22  0410 10/13/22  0304   WBC 5.7 5.6 5.5   RBC 3.73* 3.09* 3.02*   HEMOGLOBIN 8.7* 7.2* 7.1*   HEMATOCRIT 28.5* 23.8* 22.8*   MCV 76.4* 77.0* 75.5*   MCH 23.3* 23.3* 23.5*   MCHC 30.5* 30.3* 31.1*   RDW 63.1* 64.3* 62.7*   PLATELETCT 291 233 229   MPV 9.6 9.7 9.6     Recent Labs     10/11/22  1600 10/12/22  0410 10/13/22  0304   SODIUM 141 141 138   POTASSIUM 4.6 4.7 3.9   CHLORIDE 105 107 106   CO2 21 22 23   GLUCOSE 122* 102* 105*   BUN 26* 26* 18   CREATININE 1.95* 1.86* 1.26   CALCIUM 9.6 8.9 8.8     Recent Labs     10/11/22  1600 10/11/22  2230 10/13/22  0304   APTT  --  25.8  --    INR 1.11  --  1.16*               Imaging  CT-CHEST,ABDOMEN,PELVIS W/O   Final Result            1.  Images are degraded by motion artifact.      2.  1.7 cm cyst in the pancreatic head is identified which was also present on the prior MRI. Probable pseudocyst.      3.  Extensive calcific atherosclerosis with right-sided iliac artery stent and femoral femoral bypass graft again noted.      4.  No change in thyroid nodules.      DX-CHEST-PORTABLE (1 VIEW)   Final Result      1.  Ill-defined hazy opacities bilaterally possibly areas of atelectasis with developing pneumonitis not excluded.   2.  There is atherosclerosis.           Assessment/Plan  Problem Representation:    Severe Malnutrition (HCC)  Assessment & Plan  Cachexia. BMI 16.09 with recent  "weight loss, poor appetite and intake. Likely contributions from multiple chronic medical conditions including chronic alcoholism, active cocaine use, COPD, history of pancreatitis, severe anemia, deconditioning. CT chest abdomen pelvis without acute abnormality or findings to suggest malignancy. PSA within normal limits.  - Nutrition consult    Anemia- (present on admission)  Assessment & Plan  Iron studies consistent with iron deficiency. Hgb 8.7 on admission and MCV 76.4; around baseline. Dropped to 7.2 overnight, but may be dilutional. Denies hematochezia/melena and fecal occult blood negative and so low concern for acute GI bleed, but may have chronic slow bleed, possibly alcoholic gastritis. Per PCP note, colonoscopy 3-5yrs ago with unknown result. Also with very poor oral intake and likely inadequate iron intake. CT chest abdomen pelvis without acute abnormality or findings to suggest malignancy or GI bleed.   - Anticoagulation switched from heparin drip to Lovenox bridge to warfarin.  Again discussed the risks and benefits of anticoagulation with patient and agreed to proceed with shared decision making.  - Continue IV iron  - Consider GI consult outpatient for EGD and colonoscopy    CKD (chronic kidney disease), stage III (HCC)- (present on admission)  Assessment & Plan  Etiology unclear, likely contributions for multiple prerenal AKIs in the setting of alcohol abuse, chronic hypertension. Previous renal ultrasounds showing atrophic kidneys. Baseline Cr ~1.6-1.9, though improved to 1.26.   -Continue home amlodipine  -Avoid nephrotoxic meds    Debility  Assessment & Plan  Likely due to multiple chronic medical conditions including chronic alcoholism, COPD, history of pancreatitis, severe anemia, deconditioning, poor oral intake.   -Nutrition consult  -PT/OT    Aortic thrombus (HCC)- (present on admission)  Assessment & Plan  CTA thoracoabdominal aorta 06/21/22 showing \"Mural thrombus of the distal aorta near " "the aortic bifurcation.\" Was subsequently started on warfarin for anticoagulation. States hasn't taken warfarin for at least 1 month and INR 1.11 on admission; subtherapeutic, goal 2-3.  - Anticoagulation switched from heparin drip to Lovenox bridge to warfarin.  Again discussed the risks and benefits of anticoagulation with patient and agreed to proceed with shared decision making.  Checking with case management to see if Lovenox can be covered as an outpatient medicine.  If so, patient can be discharged with Lovenox bridge.  -Outpatient vascular follow-up    Thyroid nodule- (present on admission)  Assessment & Plan  Needs outpatient follow up.    Peripheral artery disease (HCC)- (present on admission)  Assessment & Plan  Severe, with chronic occlusion to bilateral common iliac arteries and previous stent. Denies claudication.  - Continue to monitor  - Anticoagulation switched from heparin drip to Lovenox bridge to warfarin.  Again discussed the risks and benefits of anticoagulation with patient and agreed to proceed with shared decision making.  Checking with case management to see if Lovenox can be covered as an outpatient medicine.  If so, patient can be discharged with Lovenox bridge.    Alcohol use- (present on admission)  Assessment & Plan  Drinks half a pint of gin daily. Last drink was night prior to admission. Denies history of alcohol withdrawal. Has low threshold for CIWA protocol, but not currently in alcohol withdrawal.  -Counseled the patient on the risks of ongoing drug and alcohol use given his current age and chronic medical conditions.  At this point patient is agreeable to abstinence and seems to recognize \"I'm drinking myself to death.\"  -No sign of withdrawal at this time    Tobacco use- (present on admission)  Assessment & Plan  40 pack year smoker; currently smokes 2-3cigs/day. Chronic cough and weight loss.  -CT chest to evaluate for malignancy  - on tobacco cessation; patient has no " "plans to quit smoking, but is considering quitting alcohol and cocaine  -Nicotine patch and gum (refusing)    Substance abuse (HCC)- (present on admission)  Assessment & Plan  Active cocaine use - smokes, but does not inject drugs. Also with alcohol and tobacco abuse.   -Counseled the patient on the risks of ongoing drug and alcohol use given his current age and chronic medical conditions.  At this point patient is agreeable to abstinence and seems to recognize \"I'm drinking myself to death.\"    Essential hypertension- (present on admission)  Assessment & Plan  -Continue home amlodipine    GERD (gastroesophageal reflux disease)- (present on admission)  Assessment & Plan  Likely secondary alcohol use / alcoholic gastritis.  -Continue home omeprazole    COPD (chronic obstructive pulmonary disease) ([facility])- (present on admission)  Assessment & Plan  No PFTs. Without any bronchospasm on physical exam and not requiring supplemental oxygen. Hasn't had inhalers for at least one month; Spiriva, Breo Ellipta, and albuterol PRN. Received antibiotics on day of admission, but will not continue as low suspicion for pneumonia.  -Resume home inhalers  -Will need outpatient PFTs  - RT protocol       VTE prophylaxis: therapeutic anticoagulation with heparin bridge to warfarin    I have performed a physical exam and reviewed and updated ROS and Plan today (10/13/2022). In review of yesterday's note (10/12/2022), there are no changes except as documented above.    Electronically signed by Gt Thomas MD, 10/13/2022 7:12 PM  Internal Medicine PGY1          "

## 2022-10-14 NOTE — PROGRESS NOTES
[facility] Internal Medicine Daily Progress Note    Date of Service  10/14/2022    [facility] Team: R IM White Team   Attending: Rebel Chaudhry M.d.  Senior Resident: Dr. Harper  Intern:  Dr. Thomas  Contact Number: [phone]    Chief Complaint  Fede Parikh Jr. is a 77 y.o. male admitted 10/11/2022 with generalized weakness and shortness of breath    Hospital Course  Fede Parikh Jr. is a 77 year old male with history of COPD (no PFTs), hypertension, hyperlipidemia, CKD3a, anemia, polysubstance abuse (alcohol, cocaine, tobacco), hepC s/p tx, PAD with chronic occlusion of bilateral common iliac arteries, distal aorta mural thrombus (previously on warfarin, but not taking) that is admitted with progressive generalized weakness, shortness of breath, failure to thrive.  Chronic anemia near baseline hgb 7-8, but transfusing 1 unit RBCs 10/14 for hgb 6.7.  Ongoing work-up for abdominal pain and vomiting, CT without acute findings.  Holding all anticoagulation for now, planning to start aspirin or Plavix (not warfarin/Lovenox) once stable from a bleeding standpoint.    Interval Problem Update  -Overnight, patient developed upper abdominal pain described as sharp and vomited a few times.  No blood in vomit or coffee-ground appearance.  Abdominal pain has since been intermittent.  Patient also reports chronic abdominal pain at home that is somewhat similar in character.  Does not appear to be associated with eating  -Also overnight had some low blood pressures in the 80s and 90s systolic, patient asymptomatic from these low blood pressures.  -Hemoglobin worsened to 6.7, planning for transfusion 1 unit, and close monitoring, reinitiated work-up for GI bleed  -Per discussion with vascular surgery, there is no indication for warfarin in the setting of a chronic abdominal aortic mural thrombus.  Given his bleeding risk, poor compliance, will continue with aspirin or Plavix once bleeding risk has been  addressed  -Continues to have cough and generalized weakness, using home inhalers with improvement    I have discussed this patient's plan of care and discharge plan at IDT rounds today with Case Management, Nursing, Nursing leadership, and other members of the IDT team.    Consultants/Specialty  None    Code Status  Full Code    Disposition  Patient is not medically cleared for discharge.   Anticipate discharge to to home with close outpatient follow-up.  I have placed the appropriate orders for post-discharge needs.    Review of Systems  Review of Systems   Constitutional:  Positive for malaise/fatigue and weight loss. Negative for chills and fever.   Eyes:  Negative for blurred vision and double vision.   Respiratory:  Positive for cough and shortness of breath. Negative for hemoptysis.    Cardiovascular:  Negative for chest pain, palpitations, claudication and leg swelling.   Gastrointestinal:  Positive for abdominal pain and vomiting. Negative for blood in stool, constipation, diarrhea, heartburn, melena and nausea.   Genitourinary:  Negative for dysuria, frequency and hematuria.   Musculoskeletal:  Positive for joint pain (right knee).   Neurological:  Positive for weakness. Negative for dizziness, tingling, sensory change, focal weakness and headaches.   Psychiatric/Behavioral:  Negative for substance abuse.       Physical Exam  Temp:  [36.7 °C (98 °F)-37 °C (98.6 °F)] 36.9 °C (98.5 °F)  Pulse:  [85-95] 86  Resp:  [14-20] 18  BP: ()/(48-66) 92/48  SpO2:  [93 %-95 %] 93 %    Physical Exam  Constitutional:       General: He is not in acute distress.     Comments: Cachectic   HENT:      Head: Normocephalic.      Mouth/Throat:      Mouth: Mucous membranes are moist.      Pharynx: Oropharynx is clear.   Eyes:      General: Scleral icterus present.      Extraocular Movements: Extraocular movements intact.      Conjunctiva/sclera: Conjunctivae normal.      Pupils: Pupils are equal, round, and reactive to  light.   Cardiovascular:      Rate and Rhythm: Normal rate and regular rhythm.      Heart sounds: Normal heart sounds.   Pulmonary:      Effort: Pulmonary effort is normal.      Breath sounds: Normal breath sounds.   Abdominal:      Palpations: Abdomen is soft.      Tenderness: There is no abdominal tenderness (epigastric and RUQ). There is guarding (Voluntary). There is no rebound.   Musculoskeletal:         General: No swelling.      Cervical back: Normal range of motion.   Skin:     General: Skin is warm and dry.   Neurological:      General: No focal deficit present.      Mental Status: He is alert and oriented to person, place, and time.      Cranial Nerves: No cranial nerve deficit.      Sensory: No sensory deficit.      Motor: Weakness (generalized weakness, but non-focal) present.      Coordination: Coordination normal.   Psychiatric:         Mood and Affect: Mood normal.         Behavior: Behavior normal.       Fluids    Intake/Output Summary (Last 24 hours) at 10/14/2022 1256  Last data filed at 10/14/2022 1000  Gross per 24 hour   Intake 1230 ml   Output 50 ml   Net 1180 ml       Laboratory  Recent Labs     10/12/22  0410 10/13/22  0304 10/14/22  1007   WBC 5.6 5.5 4.0*   RBC 3.09* 3.02* 2.86*   HEMOGLOBIN 7.2* 7.1* 6.7*   HEMATOCRIT 23.8* 22.8* 21.9*   MCV 77.0* 75.5* 76.6*   MCH 23.3* 23.5* 23.4*   MCHC 30.3* 31.1* 30.6*   RDW 64.3* 62.7* 62.5*   PLATELETCT 233 229 217   MPV 9.7 9.6 10.1     Recent Labs     10/12/22  0410 10/13/22  0304 10/14/22  0109   SODIUM 141 138 136   POTASSIUM 4.7 3.9 4.2   CHLORIDE 107 106 106   CO2 22 23 20   GLUCOSE 102* 105* 129*   BUN 26* 18 16   CREATININE 1.86* 1.26 1.63*   CALCIUM 8.9 8.8 8.3*     Recent Labs     10/11/22  1600 10/11/22  2230 10/13/22  0304 10/14/22  1007   APTT  --  25.8  --   --    INR 1.11  --  1.16* 1.17*               Imaging  CT-CHEST,ABDOMEN,PELVIS W/O   Final Result            1.  Images are degraded by motion artifact.      2.  1.7 cm cyst in the  pancreatic head is identified which was also present on the prior MRI. Probable pseudocyst.      3.  Extensive calcific atherosclerosis with right-sided iliac artery stent and femoral femoral bypass graft again noted.      4.  No change in thyroid nodules.      DX-CHEST-PORTABLE (1 VIEW)   Final Result      1.  Ill-defined hazy opacities bilaterally possibly areas of atelectasis with developing pneumonitis not excluded.   2.  There is atherosclerosis.      US-RUQ    (Results Pending)        Assessment/Plan  Problem Representation:    Abdominal pain  Assessment & Plan  Developed sharp upper abdominal pain 10/13 and nonbloody vomiting overnight with soft blood pressures.  Tenderness on exam in epigastrium and right upper quadrant.  Patient does have history of pancreatitis (hospitalized in June 2022).  -Follow-up right upper quadrant ultrasound  -Follow-up lipase  -Trend hemoglobin  -Maintenance fluids with normal saline    Severe Malnutrition (HCC)  Assessment & Plan  Cachexia. BMI 16.09 with recent weight loss, poor appetite and intake. Likely contributions from multiple chronic medical conditions including chronic alcoholism, active cocaine use, COPD, history of pancreatitis, severe anemia, deconditioning. CT chest abdomen pelvis without acute abnormality or findings to suggest malignancy. PSA within normal limits.  - Nutrition consult    Aortic thrombus (HCC)- (present on admission)  Assessment & Plan  CTA thoracoabdominal aorta 06/21/22 showing mural thrombus of the distal aorta near the aortic bifurcation and he was subsequently started on warfarin for anticoagulation. However, he hasn't taken warfarin for at least 1 month and INR 1.11 on admission; subtherapeutic, goal 2-3.  Does not appear that vascular surgery was formally consulted during the admission when warfarin was started.  Per discussion with vascular surgery 10/14, there is limited utility for warfarin in the setting of a chronic abdominal aortic  mural thrombus, but recommend either aspirin or Plavix.  Of note, mural thrombus associated with abdominal aortic aneurysm is not typically treated with anticoagulation. Therefore, we will stop warfarin/Lovenox and continue with aspirin or Plavix once stable from bleeding standpoint.   -He will need outpatient vascular follow-up to discuss severe PAD and mural thrombus management    Anemia- (present on admission)  Assessment & Plan  Iron studies consistent with iron deficiency. Hgb 8.7 on admission and MCV 76.4; around baseline 7-8.  Hemoglobin 6.7 today, 10/14 and we will proceed with giving 1 unit of blood and will repeat fecal occult blood (negative 10/12).  Denies hematemesis, hematochezia/melena. May have chronic slow bleed, possibly alcoholic gastritis. Per PCP note, colonoscopy 3-5yrs ago with unknown result. Also with very poor oral intake and likely inadequate iron intake. CT chest abdomen pelvis without acute abnormality or findings to suggest malignancy or GI bleed.   -Recheck hemoglobin every 6 hours and transfuse for hemoglobin less than 7  - Continue IV iron (finishes 10/14)  - Consider GI consult for EGD and colonoscopy if hemoglobin continues to drop    CKD (chronic kidney disease), stage III (HCC)- (present on admission)  Assessment & Plan  Etiology unclear, likely contributions for multiple prerenal AKIs in the setting of alcohol abuse, chronic hypertension. Previous renal ultrasounds showing atrophic kidneys. Baseline Cr ~1.6-1.9, though occasionally within upper level of normal ~1.2.  -Holding home amlodipine due to low blood pressure  -Avoid nephrotoxic meds    Debility  Assessment & Plan  Likely due to multiple chronic medical conditions including chronic alcoholism, COPD, history of pancreatitis, severe anemia, deconditioning, poor oral intake.   -Nutrition consult  -PT/OT recommending no further therapy needs    Thyroid nodule- (present on admission)  Assessment & Plan  Needs outpatient  "follow up.    Peripheral artery disease (HCC)- (present on admission)  Assessment & Plan  Severe, with chronic occlusion to bilateral common iliac arteries and previous stent and femorofemoral bypass are both occluded. Denies claudication.  -Continue to monitor  -Plan to start aspirin and Plavix once stable from a bleeding standpoint  -He will need outpatient vascular follow-up to discuss severe PAD and mural thrombus management    Alcohol use- (present on admission)  Assessment & Plan  Drinks half a pint of gin daily. Last drink was night prior to admission. Denies history of alcohol withdrawal. Has low threshold for CIWA protocol, but not currently in alcohol withdrawal.  -Counseled the patient on the risks of ongoing drug and alcohol use given his current age and chronic medical conditions.  At this point patient is agreeable to abstinence and seems to recognize \"I'm drinking myself to death.\"  -No sign of withdrawal at this time    Tobacco use- (present on admission)  Assessment & Plan  40 pack year smoker; currently smokes 2-3cigs/day. Chronic cough and weight loss.  -CT chest to evaluate for malignancy  - on tobacco cessation; patient has no plans to quit smoking, but is considering quitting alcohol and cocaine  -Nicotine patch and gum (refusing)    Substance abuse (HCC)- (present on admission)  Assessment & Plan  Active cocaine use - smokes, but does not inject drugs. Also with alcohol and tobacco abuse.   -Counseled the patient on the risks of ongoing drug and alcohol use given his current age and chronic medical conditions.  At this point patient is agreeable to abstinence and seems to recognize \"I'm drinking myself to death.\"    Essential hypertension- (present on admission)  Assessment & Plan  -Continue home amlodipine    GERD (gastroesophageal reflux disease)- (present on admission)  Assessment & Plan  Likely secondary alcohol use / alcoholic gastritis.  -Pantoprazole IV (resume home omeprazole " when stable from bleeding standpoint)    COPD (chronic obstructive pulmonary disease) (HCC)- (present on admission)  Assessment & Plan  No PFTs. Without any bronchospasm on physical exam and not requiring supplemental oxygen. Hasn't had inhalers for at least one month; Spiriva, Breo Ellipta, and albuterol PRN. Received antibiotics on day of admission, but will not continue as low suspicion for pneumonia.  -Resume home inhalers  -Will need outpatient PFTs  - RT protocol       VTE prophylaxis: therapeutic anticoagulation with heparin bridge to warfarin    I have performed a physical exam and reviewed and updated ROS and Plan today (10/14/2022). In review of yesterday's note (10/13/2022), there are no changes except as documented above.    Electronically signed by Gt Thomas MD, 10/14/2022 12:28 PM  Internal Medicine PGY1

## 2022-10-14 NOTE — PROGRESS NOTES
10/13/22 at 2056  This RN spoke to Dr. Leavitt regarding patient's request for pain medication and a sleeping aid. Ordered Melatonin and Tylenol, given to patient.     10/13/22 at 2210  This RN spoke to Dr. Leavitt to inform patient's pain still 7/10 post Tylenol. Oxycodone 5mg ordered and given to patient.     10/13/22 at 2335  This RN informed Dr. Leavitt patient is still very nauseous post Zofran. Reglan ordered and given to patient.     10/14//22 at 0538  This RN spoke to Dr. Leavitt to inform him of patient's BP 84/51, HR 88. Pain and nausea improved, asymptomatic otherwise. Advised to hold BP medications and encourage PO intake. Will reassess with day team.

## 2022-10-15 ENCOUNTER — APPOINTMENT (OUTPATIENT)
Dept: RADIOLOGY | Facility: MEDICAL CENTER | Age: 77
DRG: 438 | End: 2022-10-15
Attending: INTERNAL MEDICINE
Payer: MEDICARE

## 2022-10-15 LAB
ALBUMIN SERPL BCP-MCNC: 3.1 G/DL (ref 3.2–4.9)
ALBUMIN/GLOB SERPL: 1.1 G/DL
ALP SERPL-CCNC: 188 U/L (ref 30–99)
ALT SERPL-CCNC: 180 U/L (ref 2–50)
ANION GAP SERPL CALC-SCNC: 10 MMOL/L (ref 7–16)
AST SERPL-CCNC: 365 U/L (ref 12–45)
BILIRUB SERPL-MCNC: 2.5 MG/DL (ref 0.1–1.5)
BUN SERPL-MCNC: 18 MG/DL (ref 8–22)
CALCIUM SERPL-MCNC: 8.1 MG/DL (ref 8.5–10.5)
CHLORIDE SERPL-SCNC: 107 MMOL/L (ref 96–112)
CO2 SERPL-SCNC: 21 MMOL/L (ref 20–33)
CREAT SERPL-MCNC: 1.54 MG/DL (ref 0.5–1.4)
ERYTHROCYTE [DISTWIDTH] IN BLOOD BY AUTOMATED COUNT: 66.4 FL (ref 35.9–50)
GFR SERPLBLD CREATININE-BSD FMLA CKD-EPI: 45 ML/MIN/1.73 M 2
GLOBULIN SER CALC-MCNC: 2.8 G/DL (ref 1.9–3.5)
GLUCOSE SERPL-MCNC: 107 MG/DL (ref 65–99)
HCT VFR BLD AUTO: 23.4 % (ref 42–52)
HCT VFR BLD AUTO: 25.3 % (ref 42–52)
HGB BLD-MCNC: 7.6 G/DL (ref 14–18)
HGB BLD-MCNC: 8 G/DL (ref 14–18)
INR PPP: 1.2 (ref 0.87–1.13)
LIPASE SERPL-CCNC: 153 U/L (ref 11–82)
MAGNESIUM SERPL-MCNC: 1.6 MG/DL (ref 1.5–2.5)
MCH RBC QN AUTO: 25.9 PG (ref 27–33)
MCHC RBC AUTO-ENTMCNC: 32.5 G/DL (ref 33.7–35.3)
MCV RBC AUTO: 79.6 FL (ref 81.4–97.8)
PHOSPHATE SERPL-MCNC: 2.3 MG/DL (ref 2.5–4.5)
PLATELET # BLD AUTO: 204 K/UL (ref 164–446)
PMV BLD AUTO: 10.6 FL (ref 9–12.9)
POTASSIUM SERPL-SCNC: 4.2 MMOL/L (ref 3.6–5.5)
PROT SERPL-MCNC: 5.9 G/DL (ref 6–8.2)
PROTHROMBIN TIME: 15 SEC (ref 12–14.6)
RBC # BLD AUTO: 2.94 M/UL (ref 4.7–6.1)
SODIUM SERPL-SCNC: 138 MMOL/L (ref 135–145)
WBC # BLD AUTO: 5.4 K/UL (ref 4.8–10.8)

## 2022-10-15 PROCEDURE — 700105 HCHG RX REV CODE 258: Performed by: INTERNAL MEDICINE

## 2022-10-15 PROCEDURE — 700111 HCHG RX REV CODE 636 W/ 250 OVERRIDE (IP): Performed by: INTERNAL MEDICINE

## 2022-10-15 PROCEDURE — 84100 ASSAY OF PHOSPHORUS: CPT

## 2022-10-15 PROCEDURE — 99222 1ST HOSP IP/OBS MODERATE 55: CPT | Performed by: INTERNAL MEDICINE

## 2022-10-15 PROCEDURE — 36415 COLL VENOUS BLD VENIPUNCTURE: CPT

## 2022-10-15 PROCEDURE — A9270 NON-COVERED ITEM OR SERVICE: HCPCS | Performed by: INTERNAL MEDICINE

## 2022-10-15 PROCEDURE — 85018 HEMOGLOBIN: CPT

## 2022-10-15 PROCEDURE — A9270 NON-COVERED ITEM OR SERVICE: HCPCS | Performed by: STUDENT IN AN ORGANIZED HEALTH CARE EDUCATION/TRAINING PROGRAM

## 2022-10-15 PROCEDURE — 80053 COMPREHEN METABOLIC PANEL: CPT

## 2022-10-15 PROCEDURE — 83735 ASSAY OF MAGNESIUM: CPT

## 2022-10-15 PROCEDURE — C9113 INJ PANTOPRAZOLE SODIUM, VIA: HCPCS | Performed by: INTERNAL MEDICINE

## 2022-10-15 PROCEDURE — 83690 ASSAY OF LIPASE: CPT

## 2022-10-15 PROCEDURE — 74181 MRI ABDOMEN W/O CONTRAST: CPT

## 2022-10-15 PROCEDURE — 700102 HCHG RX REV CODE 250 W/ 637 OVERRIDE(OP): Performed by: INTERNAL MEDICINE

## 2022-10-15 PROCEDURE — 770001 HCHG ROOM/CARE - MED/SURG/GYN PRIV*

## 2022-10-15 PROCEDURE — 85014 HEMATOCRIT: CPT

## 2022-10-15 PROCEDURE — 99233 SBSQ HOSP IP/OBS HIGH 50: CPT | Mod: GC | Performed by: INTERNAL MEDICINE

## 2022-10-15 PROCEDURE — 700102 HCHG RX REV CODE 250 W/ 637 OVERRIDE(OP): Performed by: STUDENT IN AN ORGANIZED HEALTH CARE EDUCATION/TRAINING PROGRAM

## 2022-10-15 PROCEDURE — 85027 COMPLETE CBC AUTOMATED: CPT

## 2022-10-15 RX ADMIN — SENNOSIDES AND DOCUSATE SODIUM 2 TABLET: 50; 8.6 TABLET ORAL at 17:06

## 2022-10-15 RX ADMIN — TIOTROPIUM BROMIDE INHALATION SPRAY 5 MCG: 3.12 SPRAY, METERED RESPIRATORY (INHALATION) at 06:19

## 2022-10-15 RX ADMIN — ALBUTEROL SULFATE 2 PUFF: 90 AEROSOL, METERED RESPIRATORY (INHALATION) at 18:25

## 2022-10-15 RX ADMIN — Medication 100 MG: at 06:18

## 2022-10-15 RX ADMIN — Medication 1 MG: at 20:26

## 2022-10-15 RX ADMIN — BUDESONIDE AND FORMOTEROL FUMARATE DIHYDRATE 2 PUFF: 160; 4.5 AEROSOL RESPIRATORY (INHALATION) at 19:34

## 2022-10-15 RX ADMIN — ALBUTEROL SULFATE 2 PUFF: 90 AEROSOL, METERED RESPIRATORY (INHALATION) at 03:21

## 2022-10-15 RX ADMIN — PANTOPRAZOLE SODIUM 40 MG: 40 INJECTION, POWDER, FOR SOLUTION INTRAVENOUS at 06:19

## 2022-10-15 RX ADMIN — FOLIC ACID 1 MG: 1 TABLET ORAL at 06:18

## 2022-10-15 RX ADMIN — SODIUM CHLORIDE: 9 INJECTION, SOLUTION INTRAVENOUS at 18:23

## 2022-10-15 RX ADMIN — SODIUM CHLORIDE: 9 INJECTION, SOLUTION INTRAVENOUS at 03:05

## 2022-10-15 RX ADMIN — THERA TABS 1 TABLET: TAB at 06:18

## 2022-10-15 RX ADMIN — ALBUTEROL SULFATE 2 PUFF: 90 AEROSOL, METERED RESPIRATORY (INHALATION) at 13:52

## 2022-10-15 ASSESSMENT — ENCOUNTER SYMPTOMS
FOCAL WEAKNESS: 0
SENSORY CHANGE: 0
WEAKNESS: 1
VOMITING: 0
FEVER: 0
CONSTIPATION: 0
BLURRED VISION: 0
ABDOMINAL PAIN: 1
DOUBLE VISION: 0
HEADACHES: 0
NAUSEA: 0
HEARTBURN: 0
CHILLS: 0
DIZZINESS: 0
TINGLING: 0
CLAUDICATION: 0
WEIGHT LOSS: 1
PALPITATIONS: 0
COUGH: 1
HEMOPTYSIS: 0
DIARRHEA: 0
BLOOD IN STOOL: 0
SHORTNESS OF BREATH: 1

## 2022-10-15 ASSESSMENT — FIBROSIS 4 INDEX
FIB4 SCORE: 10.54
FIB4 SCORE: 10.54

## 2022-10-15 ASSESSMENT — LIFESTYLE VARIABLES: SUBSTANCE_ABUSE: 0

## 2022-10-15 NOTE — CARE PLAN
The patient is Watcher - Medium risk of patient condition declining or worsening    Shift Goals  Clinical Goals: maintain safety, monitor H&H post transfusion  Patient Goals: rest  Family Goals: chano    Problem: Knowledge Deficit - Standard  Goal: Patient and family/care givers will demonstrate understanding of plan of care, disease process/condition, diagnostic tests and medications  Outcome: Progressing     Problem: Fall Risk  Goal: Patient will remain free from falls  Outcome: Progressing     Progress made toward(s) clinical / shift goals: RN discussed plan of care for shift with patient and patient verbalized understanding.  Hgb 7.9, HCT 24.6 post PRBC transfusion. Continuing to monitor labs q8h. Patient remained free from falls throughout this shift. Bed alarm on, bed in lowest/locked position, npn-skid socks on, and call bell within arms reach.

## 2022-10-15 NOTE — DISCHARGE PLANNING
Case Management Discharge Planning    Admission Date: 10/11/2022  GMLOS:    ALOS: 0    6-Clicks ADL Score: 24  6-Clicks Mobility Score: 16  PT and/or OT Eval ordered: yes  Post-acute Referrals Ordered: n/a  Post-acute Choice Obtained: n/a  Has referral(s) been sent to post-acute provider:  n/a      Anticipated Discharge Dispo: Discharge Disposition: Discharged to home/self care (01) HOME tomorrow, per PT/OT no further services needed.     DME Needed: No    Action(s) Taken: Discussed with IDT, possible dc tomorrow. Per [place] at [facility], the Lovenox is $36.48 if pt can pay this or MD would need to transfer to a [facility] or another pharmacy.       Escalations Completed: no    Medically Clear: per IDT possible dc tomorrow.     Next Steps: follow up with pt and MD tomorrow.     Barriers to Discharge: medical clearance-pt being transitioned to Lovenox today from Heparin Gtt    Is the patient up for discharge tomorrow: tomorrow.

## 2022-10-15 NOTE — CONSULTS
Date of Consultation:  10/15/2022    Patient: : Fede Parikh Jr.  MRN: [id number]    Referring Physician:  DR. Chaudhry.      GI:Tiffany Minor M.D.     Reason for Consultation: Pancreatitis, alcohol.     History of Present Illness:   77M with hx of alcohol use, previous pancreatitis presented to the hospital for abd pain and recurrent pancreatitis.     The patient had ED scan showing small cyst in the pancrdatic head and some inflammation.   No other major complication found by THe CT scan.     At Bedside, the patient is hungry, still reports some abd tenderness, but not getting worse compared to yesterday.       Constitutional: Denies fevers.  Eyes: Denies changes in vision.  Ears/Nose/Throat/Mouth: Denies choking.  Cardiovascular: Denies chest pain.   Respiratory: Denies shortness of breath.  Gastrointestinal/Hepatic: Abd pain.   Genitourinary: Denies burning.  Musculoskeletal/Rheum: No complaints   Skin/Breast: Denies rash   Neurological: Denied focal weakness/parasthesias  Psychiatric: No complaints  All other systems were reviewed and are negative (AMA/CMS criteria)        Past Medical History:   Diagnosis Date    Hepatitis     Hyperlipemia     Hypertension     Pulmonary emphysema (HCC)          Past Surgical History:   Procedure Laterality Date    FEMORAL FEMORAL BYPASS Bilateral 9/4/2020    Procedure: CREATION, BYPASS, ARTERIAL, FEMORAL TO FEMORAL, USING GRAFT;  Surgeon: Jean-Claude Prado M.D.;  Location: SURGERY [facility];  Service: General       Family History   Problem Relation Age of Onset    Heart Disease Mother     Cancer Mother     Diabetes Brother        Social History     Socioeconomic History    Marital status: Single   Tobacco Use    Smoking status: Some Days     Packs/day: 0.50     Types: Cigarettes    Smokeless tobacco: Never   Vaping Use    Vaping Use: Never used   Substance and Sexual Activity    Alcohol use: Yes     Alcohol/week: 4.2 oz     Types: 7 Shots of liquor per week    Drug use:  Not Currently       Physical Exam:  Vitals:    10/14/22 2029 10/15/22 0321 10/15/22 0706 10/15/22 1057   BP: 130/76 131/69 138/77    Pulse: 84 97 77    Resp: 18 19 20    Temp: 36.9 °C (98.5 °F) 37 °C (98.6 °F) 36.1 °C (96.9 °F)    TempSrc: Temporal Temporal Temporal    SpO2: 94% 95% 97%    Weight:    50.6 kg (111 lb 8.8 oz)   Height:         Body mass index is 16.71 kg/m².    HEENT: grossly normal.  Cardiovascular: Normal heart rate.  Lungs: Respiratory effort is normal.   Abdomen: Soft, No tenderness.  Skin: No erythema, No rash.  Lower limbs: normal, no pitting edema.   Neurologic: Alert & oriented x 3, Normal motor function, No focal deficits noted.  PSY: stable mood.   Other systems also examined, grossly normal.         Labs:  Recent Labs     10/13/22  0304 10/14/22  1007 10/14/22  2020 10/15/22  0029 10/15/22  0942   WBC 5.5 4.0*  --  5.4  --    RBC 3.02* 2.86*  --  2.94*  --    HEMOGLOBIN 7.1* 6.7* 7.9* 7.6* 8.0*   HEMATOCRIT 22.8* 21.9* 24.6* 23.4* 25.3*   MCV 75.5* 76.6*  --  79.6*  --    MCH 23.5* 23.4*  --  25.9*  --    MCHC 31.1* 30.6*  --  32.5*  --    RDW 62.7* 62.5*  --  66.4*  --    PLATELETCT 229 217  --  204  --    MPV 9.6 10.1  --  10.6  --      Recent Labs     10/13/22  0304 10/14/22  0109 10/15/22  0029   SODIUM 138 136 138   POTASSIUM 3.9 4.2 4.2   CHLORIDE 106 106 107   CO2 23 20 21   GLUCOSE 105* 129* 107*   BUN 18 16 18     Recent Labs     10/13/22  0304 10/14/22  1007 10/14/22  2020   INR 1.16* 1.17* 1.20*       Recent Labs     10/13/22  0304 10/14/22  1007 10/14/22  2020 10/15/22  0029   ASTSGOT 25  --   --  365*   ALTSGPT 14  --   --  180*   TBILIRUBIN 0.3  --   --  2.5*   ALKPHOSPHAT 50  --   --  188*   GLOBULIN 3.4  --   --  2.8   INR 1.16* 1.17* 1.20*  --              Imaging:  US-RUQ  Narrative: 10/14/2022 2:56 PM    HISTORY/REASON FOR EXAM:  Pain; evaluate his GB and CBD    TECHNIQUE/EXAM DESCRIPTION AND NUMBER OF VIEWS:  Real-time sonography of the liver and biliary  tree.    COMPARISON: CT chest abdomen/pelvis 10/12/2022    FINDINGS:  Liver is diffusely echogenic.  No gross mass. The liver measures 13.60 cm.    Gallbladder shows multiple echogenic shadowing foci consistent with stones.  The gallbladder wall thickness measures 2.60 mm. There is no pericholecystic fluid.  The common duct measures 8.90 mm.    The visualized pancreas shows heterogeneous echotexture.  The visualized aorta is normal in caliber with atherosclerotic changes.    Intrahepatic IVC is patent.    The portal vein is patent with hepatopetal flow. The MPV measures 1.09 cm.    The right kidney measures 7.78 cm.    There is no ascites.  Impression: 1.  Echogenic liver suggesting fatty infiltration or fibrosis.  2.  Cholelithiasis.  3.  Mild biliary dilation raising concern for distal stricture, stone or mass.  4.  No evidence for acute cholecystitis.  5.  Heterogeneous pancreas, uncertain significance.      US-RUQ   Final Result      1.  Echogenic liver suggesting fatty infiltration or fibrosis.   2.  Cholelithiasis.   3.  Mild biliary dilation raising concern for distal stricture, stone or mass.   4.  No evidence for acute cholecystitis.   5.  Heterogeneous pancreas, uncertain significance.      CT-CHEST,ABDOMEN,PELVIS W/O   Final Result            1.  Images are degraded by motion artifact.      2.  1.7 cm cyst in the pancreatic head is identified which was also present on the prior MRI. Probable pseudocyst.      3.  Extensive calcific atherosclerosis with right-sided iliac artery stent and femoral femoral bypass graft again noted.      4.  No change in thyroid nodules.      DX-CHEST-PORTABLE (1 VIEW)   Final Result      1.  Ill-defined hazy opacities bilaterally possibly areas of atelectasis with developing pneumonitis not excluded.   2.  There is atherosclerosis.      [id number]/O    (Results Pending)                                     Results for orders placed during the hospital encounter of  10/11/22    CT-CHEST,ABDOMEN,PELVIS W/O    Impression  1.  Images are degraded by motion artifact.    2.  1.7 cm cyst in the pancreatic head is identified which was also present on the prior MRI. Probable pseudocyst.    3.  Extensive calcific atherosclerosis with right-sided iliac artery stent and femoral femoral bypass graft again noted.    4.  No change in thyroid nodules.                            Results for orders placed during the hospital encounter of 08/10/05    US-ABDOMEN COMPLETE SURVEY    Impression  IMPRESSION:    1. CHOLELITHIASIS WITH TWO SMALL GALLSTONES IDENTIFIED.  2. OTHERWISE NORMAL ABDOMINAL ULTRASOUND.    [email or web address]      Read By CE TRIPP MD on Aug 10 2005  9:55PM  : LVSTAN Transcription Date: Aug 11 2005  1:19AM  THIS DOCUMENT HAS BEEN ELECTRONICALLY SIGNED BY: CE TRIPP MD on  Aug 11 2005  2:08AM         Results for orders placed during the hospital encounter of 10/11/22    US-RUQ    Impression  1.  Echogenic liver suggesting fatty infiltration or fibrosis.  2.  Cholelithiasis.  3.  Mild biliary dilation raising concern for distal stricture, stone or mass.  4.  No evidence for acute cholecystitis.  5.  Heterogeneous pancreas, uncertain significance.                         Impressions:  Pancreatitis, possible recurrent. Probably from alcohol.     Pancreatic Disorders Risk Factors  Need to quit alcohol and smoking if any.   Gallstones: Consider outpatient surgery referral to have cholecystectomy.   Low suspension of other etiology of pancreatitis at this time.   Keep good supportive care per the primary team.     No endoscopic or radiology intervention is needed at this time.   Okay to advance diet and no contraindication of pea soup before discharge.   ====================================================================    GI team will follow up on the final MRI report and decide the next recommendation.       This note might be generated using voice recognition  software which has a small chance of producing errors of grammar and possibly content. I have made every reasonable attempt to find and correct any obvious errors, but expect that some may not be found prior to finalization of this note.

## 2022-10-15 NOTE — PROGRESS NOTES
[facility] Internal Medicine Daily Progress Note    Date of Service  10/15/2022    [facility] Team: R IM White Team   Attending: Rebel Chaudhry M.d.  Senior Resident: Dr. Harper  Intern:  Dr. Thomas  Contact Number: [phone]    Chief Complaint  Fede Parikh Jr. is a 77 y.o. male admitted 10/11/2022 with generalized weakness and shortness of breath    Hospital Course  Fede Parikh Jr. is a 77 year old male with history of COPD (no PFTs), hypertension, hyperlipidemia, CKD3a, anemia, polysubstance abuse (alcohol, cocaine, tobacco), hepC s/p tx, PAD with chronic occlusion of bilateral common iliac arteries, distal aorta mural thrombus (previously on warfarin, but not taking) that is admitted with progressive generalized weakness, shortness of breath, failure to thrive.  Chronic anemia near baseline hgb 7-8, but transfusing 1 unit RBCs 10/14 for hgb 6.7.  Ongoing work-up for abdominal pain and vomiting, CT without acute findings.  Holding all anticoagulation for now, planning to start aspirin or Plavix (not warfarin/Lovenox) once stable from a bleeding standpoint.  Elevated liver enzymes, bilirubin on 10/15, MRCP pending.      Interval Problem Update  -received 1 unit pRBC yesterday  -abdominal pain improving, no nausea vomiting overnight.  Hasn't eaten much since yesterday  -denies any melena, hematochezia, obvious bleeding from other sources.      I have discussed this patient's plan of care and discharge plan at IDT rounds today with Case Management, Nursing, Nursing leadership, and other members of the IDT team.    Consultants/Specialty  None    Code Status  Full Code    Disposition  Patient is not medically cleared for discharge.   Anticipate discharge to to home with close outpatient follow-up.  I have placed the appropriate orders for post-discharge needs.    Review of Systems  Review of Systems   Constitutional:  Positive for malaise/fatigue and weight loss. Negative for chills and fever.   Eyes:   Negative for blurred vision and double vision.   Respiratory:  Positive for cough (chronic) and shortness of breath (chronic). Negative for hemoptysis.    Cardiovascular:  Negative for chest pain, palpitations, claudication and leg swelling.   Gastrointestinal:  Positive for abdominal pain (mild, improved). Negative for blood in stool, constipation, diarrhea, heartburn, melena, nausea and vomiting.   Genitourinary:  Negative for dysuria, frequency and hematuria.   Musculoskeletal:  Positive for joint pain (right knee).   Neurological:  Positive for weakness. Negative for dizziness, tingling, sensory change, focal weakness and headaches.   Psychiatric/Behavioral:  Negative for substance abuse.       Physical Exam  Temp:  [36.1 °C (96.9 °F)-37 °C (98.6 °F)] 36.1 °C (96.9 °F)  Pulse:  [77-97] 77  Resp:  [18-20] 20  BP: (125-158)/(62-79) 138/77  SpO2:  [94 %-98 %] 97 %    Physical Exam  Constitutional:       General: He is not in acute distress.     Appearance: He is ill-appearing (chronically ill appearing). He is not toxic-appearing.      Comments: Cachectic   HENT:      Head: Normocephalic.      Mouth/Throat:      Mouth: Mucous membranes are moist.      Pharynx: Oropharynx is clear.   Eyes:      General: Scleral icterus present.      Extraocular Movements: Extraocular movements intact.      Conjunctiva/sclera: Conjunctivae normal.      Pupils: Pupils are equal, round, and reactive to light.   Cardiovascular:      Rate and Rhythm: Normal rate and regular rhythm.      Heart sounds: Normal heart sounds. No murmur heard.    No friction rub. No gallop.   Pulmonary:      Effort: Pulmonary effort is normal. No respiratory distress.      Breath sounds: Normal breath sounds.   Abdominal:      Palpations: Abdomen is soft.      Tenderness: There is no abdominal tenderness. There is no guarding or rebound.   Musculoskeletal:         General: No swelling.      Cervical back: Normal range of motion.   Skin:     General: Skin is  warm and dry.   Neurological:      General: No focal deficit present.      Mental Status: He is alert and oriented to person, place, and time.      Cranial Nerves: No cranial nerve deficit.      Sensory: No sensory deficit.      Motor: Weakness (generalized weakness, but non-focal) present.      Coordination: Coordination normal.   Psychiatric:         Mood and Affect: Mood normal.         Behavior: Behavior normal.       Fluids    Intake/Output Summary (Last 24 hours) at 10/15/2022 1406  Last data filed at 10/15/2022 1151  Gross per 24 hour   Intake 2187.13 ml   Output 550 ml   Net 1637.13 ml         Laboratory  Recent Labs     10/13/22  0304 10/14/22  1007 10/14/22  2020 10/15/22  0029 10/15/22  0942   WBC 5.5 4.0*  --  5.4  --    RBC 3.02* 2.86*  --  2.94*  --    HEMOGLOBIN 7.1* 6.7* 7.9* 7.6* 8.0*   HEMATOCRIT 22.8* 21.9* 24.6* 23.4* 25.3*   MCV 75.5* 76.6*  --  79.6*  --    MCH 23.5* 23.4*  --  25.9*  --    MCHC 31.1* 30.6*  --  32.5*  --    RDW 62.7* 62.5*  --  66.4*  --    PLATELETCT 229 217  --  204  --    MPV 9.6 10.1  --  10.6  --        Recent Labs     10/13/22  0304 10/14/22  0109 10/15/22  0029   SODIUM 138 136 138   POTASSIUM 3.9 4.2 4.2   CHLORIDE 106 106 107   CO2 23 20 21   GLUCOSE 105* 129* 107*   BUN 18 16 18   CREATININE 1.26 1.63* 1.54*   CALCIUM 8.8 8.3* 8.1*       Recent Labs     10/13/22  0304 10/14/22  1007 10/14/22  2020   INR 1.16* 1.17* 1.20*                 Imaging  US-RUQ   Final Result      1.  Echogenic liver suggesting fatty infiltration or fibrosis.   2.  Cholelithiasis.   3.  Mild biliary dilation raising concern for distal stricture, stone or mass.   4.  No evidence for acute cholecystitis.   5.  Heterogeneous pancreas, uncertain significance.      CT-CHEST,ABDOMEN,PELVIS W/O   Final Result            1.  Images are degraded by motion artifact.      2.  1.7 cm cyst in the pancreatic head is identified which was also present on the prior MRI. Probable pseudocyst.      3.  Extensive  calcific atherosclerosis with right-sided iliac artery stent and femoral femoral bypass graft again noted.      4.  No change in thyroid nodules.      DX-CHEST-PORTABLE (1 VIEW)   Final Result      1.  Ill-defined hazy opacities bilaterally possibly areas of atelectasis with developing pneumonitis not excluded.   2.  There is atherosclerosis.      [id number]/O    (Results Pending)          Assessment/Plan  Problem Representation:    Abdominal pain  Assessment & Plan  Developed sharp upper abdominal pain 10/13 and nonbloody vomiting overnight with soft blood pressures.  Tenderness on exam in epigastrium and right upper quadrant.  Patient does have history of pancreatitis (hospitalized in June 2022).  Etiology concerning for pancreatitis (elevated lipase) with alcohol vs gallstone as likely causes.  RUQ US showing common bile duct dilation, elevated LFTs on 10/15 concerns for choledocholithiasis.    -Trend hemoglobin  -Maintenance fluids with normal saline  -Clear liquid diet  -MRCP pending, will discuss with GI vs general surgery pending results    Severe Malnutrition (HCC)  Assessment & Plan  Cachexia. BMI 16.09 with recent weight loss, poor appetite and intake. Likely contributions from multiple chronic medical conditions including chronic alcoholism, active cocaine use, COPD, history of pancreatitis, severe anemia, deconditioning. CT chest abdomen pelvis without acute abnormality or findings to suggest malignancy. PSA within normal limits.  - Nutrition consult    Debility  Assessment & Plan  Likely due to multiple chronic medical conditions including chronic alcoholism, COPD, history of pancreatitis, severe anemia, deconditioning, poor oral intake.   -Nutrition consult  -PT/OT recommending no further therapy needs    Aortic thrombus (HCC)- (present on admission)  Assessment & Plan  CTA thoracoabdominal aorta 06/21/22 showing mural thrombus of the distal aorta near the aortic bifurcation and he was subsequently  started on warfarin for anticoagulation. However, he hasn't taken warfarin for at least 1 month and INR 1.11 on admission; subtherapeutic, goal 2-3.  Does not appear that vascular surgery was formally consulted during the admission when warfarin was started.  Per discussion with vascular surgery 10/14, there is limited utility for warfarin in the setting of a chronic abdominal aortic mural thrombus, but recommend either aspirin or Plavix.  Of note, mural thrombus associated with abdominal aortic aneurysm is not typically treated with anticoagulation. Therefore, we will stop warfarin/Lovenox and continue with aspirin or Plavix once stable from bleeding standpoint.   -He will need outpatient vascular follow-up to discuss severe PAD and mural thrombus management    Thyroid nodule- (present on admission)  Assessment & Plan  Needs outpatient follow up.    Anemia- (present on admission)  Assessment & Plan  Iron studies consistent with iron deficiency. Hgb 8.7 on admission and MCV 76.4; around baseline 7-8.  Hemoglobin 6.7 today, 10/14 and we will proceed with giving 1 unit of blood and will repeat fecal occult blood (negative 10/12).  Denies hematemesis, hematochezia/melena. May have chronic slow bleed, possibly alcoholic gastritis. Per PCP note, colonoscopy 3-5yrs ago with unknown result. Also with very poor oral intake and likely inadequate iron intake. CT chest abdomen pelvis without acute abnormality or findings to suggest malignancy or GI bleed.   -Recheck hemoglobin every 6 hours and transfuse for hemoglobin less than 7  - Continue IV iron (finishes 10/14)  - Consider GI consult for EGD and colonoscopy if hemoglobin continues to drop    Peripheral artery disease (HCC)- (present on admission)  Assessment & Plan  Severe, with chronic occlusion to bilateral common iliac arteries and previous stent and femorofemoral bypass are both occluded. Denies claudication.  -Continue to monitor  -Plan to start aspirin and Plavix  "once stable from a bleeding standpoint  -He will need outpatient vascular follow-up to discuss severe PAD and mural thrombus management    CKD (chronic kidney disease), stage III (HCC)- (present on admission)  Assessment & Plan  Etiology unclear, likely contributions for multiple prerenal AKIs in the setting of alcohol abuse, chronic hypertension. Previous renal ultrasounds showing atrophic kidneys. Baseline Cr ~1.6-1.9, though occasionally within upper level of normal ~1.2.  -Holding home amlodipine due to low blood pressure  -Avoid nephrotoxic meds    Alcohol use- (present on admission)  Assessment & Plan  Drinks half a pint of gin daily. Last drink was night prior to admission. Denies history of alcohol withdrawal. Has low threshold for CIWA protocol, but not currently in alcohol withdrawal.  -Counseled the patient on the risks of ongoing drug and alcohol use given his current age and chronic medical conditions.  At this point patient is agreeable to abstinence and seems to recognize \"I'm drinking myself to death.\"  -No sign of withdrawal at this time    Tobacco use- (present on admission)  Assessment & Plan  40 pack year smoker; currently smokes 2-3cigs/day. Chronic cough and weight loss.  -CT chest to evaluate for malignancy  - on tobacco cessation; patient has no plans to quit smoking, but is considering quitting alcohol and cocaine  -Nicotine patch and gum (refusing)    Substance abuse (HCC)- (present on admission)  Assessment & Plan  Active cocaine use - smokes, but does not inject drugs. Also with alcohol and tobacco abuse.   -Counseled the patient on the risks of ongoing drug and alcohol use given his current age and chronic medical conditions.  At this point patient is agreeable to abstinence and seems to recognize \"I'm drinking myself to death.\"    Essential hypertension- (present on admission)  Assessment & Plan  -Continue home amlodipine    GERD (gastroesophageal reflux disease)- (present on " admission)  Assessment & Plan  Likely secondary alcohol use / alcoholic gastritis.  -Pantoprazole IV (resume home omeprazole when stable from bleeding standpoint)    COPD (chronic obstructive pulmonary disease) ([facility])- (present on admission)  Assessment & Plan  No PFTs. Without any bronchospasm on physical exam and not requiring supplemental oxygen. Hasn't had inhalers for at least one month; Spiriva, Breo Ellipta, and albuterol PRN. Received antibiotics on day of admission, but will not continue as low suspicion for pneumonia.  -Resume home inhalers  -Will need outpatient PFTs  - RT protocol       VTE prophylaxis: therapeutic anticoagulation with heparin bridge to warfarin    I have performed a physical exam and reviewed and updated ROS and Plan today (10/15/2022). In review of yesterday's note (10/14/2022), there are no changes except as documented above.

## 2022-10-15 NOTE — DISCHARGE PLANNING
Per MD progress note, holding all anticoagulation for now, patient will not be started on Lovenox or warfarin at [facility]. Plan is to start asa or plavix once stable.

## 2022-10-15 NOTE — CARE PLAN
The patient is Stable - Low risk of patient condition declining or worsening    Shift Goals  Clinical Goals: Safety  Patient Goals: Rest  Family Goals: RADHA    Progress made toward(s) clinical / shift goals:  Discussed plan of care with patient, reminded to call for help and wait for nurse, remaining on clear liquid diet     Problem: Knowledge Deficit - Standard  Goal: Patient and family/care givers will demonstrate understanding of plan of care, disease process/condition, diagnostic tests and medications  Outcome: Progressing     Problem: Fall Risk  Goal: Patient will remain free from falls  Outcome: Progressing     Problem: Pain - Standard  Goal: Alleviation of pain or a reduction in pain to the patient’s comfort goal  Outcome: Progressing     Patient is not progressing towards the following goals:

## 2022-10-16 LAB
ALBUMIN SERPL BCP-MCNC: 3.3 G/DL (ref 3.2–4.9)
ALBUMIN/GLOB SERPL: 1 G/DL
ALP SERPL-CCNC: 156 U/L (ref 30–99)
ALT SERPL-CCNC: 118 U/L (ref 2–50)
ANION GAP SERPL CALC-SCNC: 10 MMOL/L (ref 7–16)
ANISOCYTOSIS BLD QL SMEAR: ABNORMAL
AST SERPL-CCNC: 135 U/L (ref 12–45)
BACTERIA BLD CULT: NORMAL
BACTERIA BLD CULT: NORMAL
BASOPHILS # BLD AUTO: 0 % (ref 0–1.8)
BASOPHILS # BLD: 0 K/UL (ref 0–0.12)
BILIRUB SERPL-MCNC: 0.8 MG/DL (ref 0.1–1.5)
BUN SERPL-MCNC: 12 MG/DL (ref 8–22)
CALCIUM SERPL-MCNC: 8.2 MG/DL (ref 8.5–10.5)
CHLORIDE SERPL-SCNC: 110 MMOL/L (ref 96–112)
CO2 SERPL-SCNC: 20 MMOL/L (ref 20–33)
CREAT SERPL-MCNC: 0.98 MG/DL (ref 0.5–1.4)
EOSINOPHIL # BLD AUTO: 0.64 K/UL (ref 0–0.51)
EOSINOPHIL NFR BLD: 13.4 % (ref 0–6.9)
ERYTHROCYTE [DISTWIDTH] IN BLOOD BY AUTOMATED COUNT: 70.2 FL (ref 35.9–50)
GFR SERPLBLD CREATININE-BSD FMLA CKD-EPI: 78 ML/MIN/1.73 M 2
GLOBULIN SER CALC-MCNC: 3.3 G/DL (ref 1.9–3.5)
GLUCOSE SERPL-MCNC: 87 MG/DL (ref 65–99)
HCT VFR BLD AUTO: 24 % (ref 42–52)
HEMOCCULT STL QL: POSITIVE
HGB BLD-MCNC: 7.5 G/DL (ref 14–18)
HYPOCHROMIA BLD QL SMEAR: ABNORMAL
INR PPP: 1.08 (ref 0.87–1.13)
LYMPHOCYTES # BLD AUTO: 1.37 K/UL (ref 1–4.8)
LYMPHOCYTES NFR BLD: 28.6 % (ref 22–41)
MANUAL DIFF BLD: NORMAL
MCH RBC QN AUTO: 25.2 PG (ref 27–33)
MCHC RBC AUTO-ENTMCNC: 31.3 G/DL (ref 33.7–35.3)
MCV RBC AUTO: 80.5 FL (ref 81.4–97.8)
MICROCYTES BLD QL SMEAR: ABNORMAL
MONOCYTES # BLD AUTO: 0.72 K/UL (ref 0–0.85)
MONOCYTES NFR BLD AUTO: 15.1 % (ref 0–13.4)
MORPHOLOGY BLD-IMP: NORMAL
NEUTROPHILS # BLD AUTO: 2.06 K/UL (ref 1.82–7.42)
NEUTROPHILS NFR BLD: 42.9 % (ref 44–72)
NRBC # BLD AUTO: 0.02 K/UL
NRBC BLD-RTO: 0.4 /100 WBC
OVALOCYTES BLD QL SMEAR: NORMAL
PLATELET # BLD AUTO: 202 K/UL (ref 164–446)
PLATELET BLD QL SMEAR: NORMAL
PMV BLD AUTO: 10.2 FL (ref 9–12.9)
POIKILOCYTOSIS BLD QL SMEAR: NORMAL
POTASSIUM SERPL-SCNC: 3.8 MMOL/L (ref 3.6–5.5)
PROT SERPL-MCNC: 6.6 G/DL (ref 6–8.2)
PROTHROMBIN TIME: 13.9 SEC (ref 12–14.6)
RBC # BLD AUTO: 2.98 M/UL (ref 4.7–6.1)
RBC BLD AUTO: PRESENT
SIGNIFICANT IND 70042: NORMAL
SIGNIFICANT IND 70042: NORMAL
SITE SITE: NORMAL
SITE SITE: NORMAL
SMUDGE CELLS BLD QL SMEAR: NORMAL
SODIUM SERPL-SCNC: 140 MMOL/L (ref 135–145)
SOURCE SOURCE: NORMAL
SOURCE SOURCE: NORMAL
TARGETS BLD QL SMEAR: NORMAL
WBC # BLD AUTO: 4.8 K/UL (ref 4.8–10.8)

## 2022-10-16 PROCEDURE — 82272 OCCULT BLD FECES 1-3 TESTS: CPT

## 2022-10-16 PROCEDURE — 700111 HCHG RX REV CODE 636 W/ 250 OVERRIDE (IP): Performed by: INTERNAL MEDICINE

## 2022-10-16 PROCEDURE — 85007 BL SMEAR W/DIFF WBC COUNT: CPT

## 2022-10-16 PROCEDURE — 99232 SBSQ HOSP IP/OBS MODERATE 35: CPT | Mod: GC | Performed by: NURSE PRACTITIONER

## 2022-10-16 PROCEDURE — C9113 INJ PANTOPRAZOLE SODIUM, VIA: HCPCS | Performed by: INTERNAL MEDICINE

## 2022-10-16 PROCEDURE — 94640 AIRWAY INHALATION TREATMENT: CPT

## 2022-10-16 PROCEDURE — 80053 COMPREHEN METABOLIC PANEL: CPT

## 2022-10-16 PROCEDURE — 700102 HCHG RX REV CODE 250 W/ 637 OVERRIDE(OP): Performed by: STUDENT IN AN ORGANIZED HEALTH CARE EDUCATION/TRAINING PROGRAM

## 2022-10-16 PROCEDURE — 85025 COMPLETE CBC W/AUTO DIFF WBC: CPT

## 2022-10-16 PROCEDURE — 770001 HCHG ROOM/CARE - MED/SURG/GYN PRIV*

## 2022-10-16 PROCEDURE — 99233 SBSQ HOSP IP/OBS HIGH 50: CPT | Mod: GC | Performed by: INTERNAL MEDICINE

## 2022-10-16 PROCEDURE — A9270 NON-COVERED ITEM OR SERVICE: HCPCS | Performed by: STUDENT IN AN ORGANIZED HEALTH CARE EDUCATION/TRAINING PROGRAM

## 2022-10-16 PROCEDURE — 85610 PROTHROMBIN TIME: CPT

## 2022-10-16 PROCEDURE — A9270 NON-COVERED ITEM OR SERVICE: HCPCS | Performed by: INTERNAL MEDICINE

## 2022-10-16 PROCEDURE — 700102 HCHG RX REV CODE 250 W/ 637 OVERRIDE(OP): Performed by: INTERNAL MEDICINE

## 2022-10-16 RX ORDER — PANTOPRAZOLE SODIUM 40 MG/10ML
40 INJECTION, POWDER, LYOPHILIZED, FOR SOLUTION INTRAVENOUS 2 TIMES DAILY
Status: DISCONTINUED | OUTPATIENT
Start: 2022-10-17 | End: 2022-10-17 | Stop reason: HOSPADM

## 2022-10-16 RX ORDER — METOCLOPRAMIDE HYDROCHLORIDE 5 MG/ML
10 INJECTION INTRAMUSCULAR; INTRAVENOUS EVERY 6 HOURS PRN
Status: DISCONTINUED | OUTPATIENT
Start: 2022-10-16 | End: 2022-10-17 | Stop reason: HOSPADM

## 2022-10-16 RX ORDER — ENOXAPARIN SODIUM 100 MG/ML
30 INJECTION SUBCUTANEOUS DAILY
Status: DISCONTINUED | OUTPATIENT
Start: 2022-10-16 | End: 2022-10-17 | Stop reason: HOSPADM

## 2022-10-16 RX ADMIN — TIOTROPIUM BROMIDE INHALATION SPRAY 5 MCG: 3.12 SPRAY, METERED RESPIRATORY (INHALATION) at 04:30

## 2022-10-16 RX ADMIN — ALBUTEROL SULFATE 2 PUFF: 90 AEROSOL, METERED RESPIRATORY (INHALATION) at 16:31

## 2022-10-16 RX ADMIN — Medication 100 MG: at 04:31

## 2022-10-16 RX ADMIN — ALBUTEROL SULFATE 2 PUFF: 90 AEROSOL, METERED RESPIRATORY (INHALATION) at 23:20

## 2022-10-16 RX ADMIN — FOLIC ACID 1 MG: 1 TABLET ORAL at 04:31

## 2022-10-16 RX ADMIN — PANTOPRAZOLE SODIUM 40 MG: 40 INJECTION, POWDER, FOR SOLUTION INTRAVENOUS at 04:28

## 2022-10-16 RX ADMIN — Medication 1 MG: at 20:25

## 2022-10-16 RX ADMIN — ALBUTEROL SULFATE 2 PUFF: 90 AEROSOL, METERED RESPIRATORY (INHALATION) at 07:36

## 2022-10-16 RX ADMIN — NICOTINE 14 MG: 14 PATCH TRANSDERMAL at 05:00

## 2022-10-16 RX ADMIN — ENOXAPARIN SODIUM 30 MG: 100 INJECTION SUBCUTANEOUS at 17:31

## 2022-10-16 RX ADMIN — SENNOSIDES AND DOCUSATE SODIUM 2 TABLET: 50; 8.6 TABLET ORAL at 04:31

## 2022-10-16 RX ADMIN — BUDESONIDE AND FORMOTEROL FUMARATE DIHYDRATE 2 PUFF: 160; 4.5 AEROSOL RESPIRATORY (INHALATION) at 07:45

## 2022-10-16 RX ADMIN — BUDESONIDE AND FORMOTEROL FUMARATE DIHYDRATE 2 PUFF: 160; 4.5 AEROSOL RESPIRATORY (INHALATION) at 18:41

## 2022-10-16 RX ADMIN — THERA TABS 1 TABLET: TAB at 04:31

## 2022-10-16 RX ADMIN — ALBUTEROL SULFATE 2 PUFF: 90 AEROSOL, METERED RESPIRATORY (INHALATION) at 11:54

## 2022-10-16 ASSESSMENT — ENCOUNTER SYMPTOMS
BLOOD IN STOOL: 0
DIARRHEA: 0
HEADACHES: 0
TINGLING: 0
CHILLS: 0
CONSTIPATION: 0
NAUSEA: 0
SENSORY CHANGE: 0
COUGH: 1
PALPITATIONS: 0
FEVER: 0
WEIGHT LOSS: 1
BLURRED VISION: 0
DOUBLE VISION: 0
FOCAL WEAKNESS: 0
HEMOPTYSIS: 0
DIZZINESS: 0
HEARTBURN: 0
CLAUDICATION: 0
WEAKNESS: 1
SHORTNESS OF BREATH: 1
VOMITING: 0
ABDOMINAL PAIN: 1

## 2022-10-16 ASSESSMENT — LIFESTYLE VARIABLES: SUBSTANCE_ABUSE: 0

## 2022-10-16 NOTE — PROGRESS NOTES
"Gastroenterology Progress Note               Author:  Hero Alexandre NP Date & Time Created: 10/16/2022 10:41 AM       Patient ID:  Name:             Fede Parikh Jr.   YOB: 1945   Age:                 77 y.o.    MRN:               [id number]      Medical Decision Making, by Problem:  Active Hospital Problems    Diagnosis     Abdominal pain [R10.9]     Severe Malnutrition (HCC) [E46]     Debility [R53.81]     Aortic thrombus (HCC) [I74.10]     Anemia [D64.9]     Thyroid nodule [E04.1]     Peripheral artery disease (HCC) [I73.9]     CKD (chronic kidney disease), stage III (HCC) [N18.30]     Alcohol use [Z78.9]     Tobacco use [Z72.0]     COPD (chronic obstructive pulmonary disease) (HCC) [J44.9]     Essential hypertension [I10]     GERD (gastroesophageal reflux disease) [K21.9]     Substance abuse (HCC) [F19.10]            Presenting Chief Complaint:  Chief Complaint   Patient presents with    Sent by MD     Pt went to [facility] today because he has been feeling short of breath x1 month and weak x1 week. Pt had positive orthostatics at the clinic so was sent to the ER.          History of Present Illness:  From consult: \"77M with hx of alcohol use, previous pancreatitis presented to the hospital for abd pain and recurrent pancreatitis.      The patient had ED scan showing small cyst in the pancrdatic head and some inflammation.   No other major complication found by THe CT scan.\"      Interval History:  The patient is lying in bed, alert. He wishes to go home. He denies abdominal pain, nausea, and vomiting. He has not had a bowel movement overnight. He is very hungry and wishes to advance his diet.       Hospital Medications:  Current Facility-Administered Medications   Medication Dose Frequency Provider Last Rate Last Admin    enoxaparin (Lovenox) inj 30 mg  30 mg DAILY AT 1800 Rebel Chaudhry M.D.        pantoprazole (Protonix) injection 40 mg  40 mg DAILY Rebel Chaudhry M.D.   40 mg at " 10/16/22 0428    NS infusion   Continuous Rebel Chaudhry M.D. 83 mL/hr at 10/15/22 1823 New Bag at 10/15/22 1823    metoclopramide (REGLAN) injection 10 mg  10 mg Q6HRS PRN Rebel Chaudhry M.D.        mineral oil-pet hydrophilic (AQUAPHOR) ointment   TID PRN Gt Thomas M.D.        benzonatate (TESSALON) capsule 100 mg  100 mg TID PRN Gt Thomas M.D.   100 mg at 10/13/22 1419    albuterol inhaler 2 Puff  2 Puff Q4HRS PRN Gt Thomas M.D.   2 Puff at 10/16/22 0736    melatonin tablet 1 mg  1 mg Nightly Hesham Leavitt M.D.   1 mg at 10/15/22 2026    Respiratory Therapy Consult   Continuous RT Gt Thomas M.D.        senna-docusate (PERICOLACE or SENOKOT S) 8.6-50 MG per tablet 2 Tablet  2 Tablet BID Kwame Junior M.D.   2 Tablet at 10/16/22 0431    And    polyethylene glycol/lytes (MIRALAX) PACKET 1 Packet  1 Packet QDAY PRN Kwame Junior M.D.        And    magnesium hydroxide (MILK OF MAGNESIA) suspension 30 mL  30 mL QDAY PRN Kwame Junior M.D.        And    bisacodyl (DULCOLAX) suppository 10 mg  10 mg QDAY PRN Kwame Junior M.D.        acetaminophen (Tylenol) tablet 650 mg  650 mg Q6HRS PRN Kwame Junior M.D.   650 mg at 10/13/22 1555    labetalol (NORMODYNE/TRANDATE) injection 10 mg  10 mg Q4HRS PRN Kwame Junior M.D.        ondansetron (ZOFRAN) syringe/vial injection 4 mg  4 mg Q4HRS PRN Kwame Junior M.D.   4 mg at 10/13/22 2217    ondansetron (ZOFRAN ODT) dispertab 4 mg  4 mg Q4HRS PRN Kwame Junior M.D.        nicotine (NICODERM) 14 MG/24HR 14 mg  14 mg Daily-0600 Kwame Junior M.D.   14 mg at 10/16/22 0500    And    nicotine polacrilex (NICORETTE) 2 MG piece 2 mg  2 mg Q HOUR PRN Kwame Junior M.D.        guaiFENesin dextromethorphan (ROBITUSSIN DM) 100-10 MG/5ML syrup 10 mL  10 mL Q6HRS PRN Kwame Juinor M.D.        amLODIPine (NORVASC) tablet 5 mg  5 mg DAILY Kwame Junior M.D.   5 mg at 10/13/22  "0459    tiotropium (Spiriva Respimat) 2.5 mcg/Act inhalation spray 5 mcg  5 mcg DAILY Kwame Junior M.D.   5 mcg at 10/16/22 0430    budesonide-formoterol (SYMBICORT) 160-4.5 MCG/ACT inhaler 2 Puff  2 Puff BID (RT) Kwame Junior M.D.   2 Puff at 10/16/22 0745   Last reviewed on 10/11/2022  6:16 PM by Elise Cruz       Vital signs:  Weight/BMI: Body mass index is 18.47 kg/m².  BP (!) 140/77   Pulse 72   Temp 36.2 °C (97.2 °F)   Resp 18   Ht 1.74 m (5' 8.5\")   Wt 55.9 kg (123 lb 3.8 oz)   SpO2 94%   Vitals:    10/15/22 1859 10/16/22 0433 10/16/22 0730 10/16/22 0736   BP:  (!) 140/77     Pulse:  78 77 72   Resp:  19 18 18   Temp:  36.4 °C (97.5 °F) 36.2 °C (97.2 °F)    TempSrc:  Temporal     SpO2:  98% 95% 94%   Weight: 55.9 kg (123 lb 3.8 oz)      Height:         Oxygen Therapy:  Pulse Oximetry: 94 %, O2 (LPM): 0, O2 Delivery Device: None - Room Air    Intake/Output Summary (Last 24 hours) at 10/16/2022 1041  Last data filed at 10/16/2022 0000  Gross per 24 hour   Intake 720 ml   Output 400 ml   Net 320 ml           Physical Exam:  Physical Exam  Vitals and nursing note reviewed.   Constitutional:       General: He is awake. He is not in acute distress.     Appearance: He is underweight. He is ill-appearing.   Pulmonary:      Effort: Pulmonary effort is normal. No respiratory distress.      Breath sounds: Normal breath sounds.   Abdominal:      General: Abdomen is flat. Bowel sounds are normal. There is no distension.      Palpations: Abdomen is soft. There is no mass.      Tenderness: There is no abdominal tenderness. There is no guarding.   Skin:     General: Skin is warm and dry.   Neurological:      Mental Status: He is alert.   Psychiatric:         Attention and Perception: He is inattentive.         Mood and Affect: Mood is anxious.         Speech: Speech is tangential.         Behavior: Behavior is agitated.           Labs:  Recent Labs     10/14/22  0109 10/15/22  0029 10/16/22  0354 "   SODIUM 136 138 140   POTASSIUM 4.2 4.2 3.8   CHLORIDE 106 107 110   CO2 20 21 20   BUN 16 18 12   CREATININE 1.63* 1.54* 0.98   MAGNESIUM 1.7 1.6  --    PHOSPHORUS 3.1 2.3*  --    CALCIUM 8.3* 8.1* 8.2*       Recent Labs     10/14/22  0109 10/14/22  2020 10/15/22  0029 10/16/22  0354   ALTSGPT  --   --  180* 118*   ASTSGOT  --   --  365* 135*   ALKPHOSPHAT  --   --  188* 156*   TBILIRUBIN  --   --  2.5* 0.8   LIPASE  --  202* 153*  --    GLUCOSE 129*  --  107* 87       Recent Labs     10/14/22  1007 10/15/22  0029 10/16/22  0354   WBC 4.0* 5.4 4.8   NEUTSPOLYS  --   --  42.90*   LYMPHOCYTES  --   --  28.60   MONOCYTES  --   --  15.10*   EOSINOPHILS  --   --  13.40*   BASOPHILS  --   --  0.00   ASTSGOT  --  365* 135*   ALTSGPT  --  180* 118*   ALKPHOSPHAT  --  188* 156*   TBILIRUBIN  --  2.5* 0.8       Recent Labs     10/14/22  1007 10/14/22  2020 10/15/22  0029 10/15/22  0942 10/16/22  0354   RBC 2.86*  --  2.94*  --  2.98*   HEMOGLOBIN 6.7* 7.9* 7.6* 8.0* 7.5*   HEMATOCRIT 21.9* 24.6* 23.4* 25.3* 24.0*   PLATELETCT 217  --  204  --  202   PROTHROMBTM 14.8* 15.0*  --   --  13.9   INR 1.17* 1.20*  --   --  1.08       Recent Results (from the past 24 hour(s))   PROTHROMBIN TIME    Collection Time: 10/16/22  3:54 AM   Result Value Ref Range    PT 13.9 12.0 - 14.6 sec    INR 1.08 0.87 - 1.13   CBC WITH DIFFERENTIAL    Collection Time: 10/16/22  3:54 AM   Result Value Ref Range    WBC 4.8 4.8 - 10.8 K/uL    RBC 2.98 (L) 4.70 - 6.10 M/uL    Hemoglobin 7.5 (L) 14.0 - 18.0 g/dL    Hematocrit 24.0 (L) 42.0 - 52.0 %    MCV 80.5 (L) 81.4 - 97.8 fL    MCH 25.2 (L) 27.0 - 33.0 pg    MCHC 31.3 (L) 33.7 - 35.3 g/dL    RDW 70.2 (H) 35.9 - 50.0 fL    Platelet Count 202 164 - 446 K/uL    MPV 10.2 9.0 - 12.9 fL    Neutrophils-Polys 42.90 (L) 44.00 - 72.00 %    Lymphocytes 28.60 22.00 - 41.00 %    Monocytes 15.10 (H) 0.00 - 13.40 %    Eosinophils 13.40 (H) 0.00 - 6.90 %    Basophils 0.00 0.00 - 1.80 %    Nucleated RBC 0.40 /100 WBC     Neutrophils (Absolute) 2.06 1.82 - 7.42 K/uL    Lymphs (Absolute) 1.37 1.00 - 4.80 K/uL    Monos (Absolute) 0.72 0.00 - 0.85 K/uL    Eos (Absolute) 0.64 (H) 0.00 - 0.51 K/uL    Baso (Absolute) 0.00 0.00 - 0.12 K/uL    NRBC (Absolute) 0.02 K/uL    Hypochromia 2+ (A)     Anisocytosis 2+ (A)     Microcytosis 2+ (A)    Comp Metabolic Panel    Collection Time: 10/16/22  3:54 AM   Result Value Ref Range    Sodium 140 135 - 145 mmol/L    Potassium 3.8 3.6 - 5.5 mmol/L    Chloride 110 96 - 112 mmol/L    Co2 20 20 - 33 mmol/L    Anion Gap 10.0 7.0 - 16.0    Glucose 87 65 - 99 mg/dL    Bun 12 8 - 22 mg/dL    Creatinine 0.98 0.50 - 1.40 mg/dL    Calcium 8.2 (L) 8.5 - 10.5 mg/dL    AST(SGOT) 135 (H) 12 - 45 U/L    ALT(SGPT) 118 (H) 2 - 50 U/L    Alkaline Phosphatase 156 (H) 30 - 99 U/L    Total Bilirubin 0.8 0.1 - 1.5 mg/dL    Albumin 3.3 3.2 - 4.9 g/dL    Total Protein 6.6 6.0 - 8.2 g/dL    Globulin 3.3 1.9 - 3.5 g/dL    A-G Ratio 1.0 g/dL   ESTIMATED GFR    Collection Time: 10/16/22  3:54 AM   Result Value Ref Range    GFR (CKD-EPI) 78 >60 mL/min/1.73 m 2   DIFFERENTIAL MANUAL    Collection Time: 10/16/22  3:54 AM   Result Value Ref Range    Manual Diff Status PERFORMED    PERIPHERAL SMEAR REVIEW    Collection Time: 10/16/22  3:54 AM   Result Value Ref Range    Peripheral Smear Review see below    PLATELET ESTIMATE    Collection Time: 10/16/22  3:54 AM   Result Value Ref Range    Plt Estimation Normal    MORPHOLOGY    Collection Time: 10/16/22  3:54 AM   Result Value Ref Range    RBC Morphology Present     Poikilocytosis 1+     Ovalocytes 1+     Target Cells 1+     Smudge Cells Few        Radiology Review:  US-RUQ   Final Result      1.  Echogenic liver suggesting fatty infiltration or fibrosis.   2.  Cholelithiasis.   3.  Mild biliary dilation raising concern for distal stricture, stone or mass.   4.  No evidence for acute cholecystitis.   5.  Heterogeneous pancreas, uncertain significance.      CT-CHEST,ABDOMEN,PELVIS  W/O   Final Result            1.  Images are degraded by motion artifact.      2.  1.7 cm cyst in the pancreatic head is identified which was also present on the prior MRI. Probable pseudocyst.      3.  Extensive calcific atherosclerosis with right-sided iliac artery stent and femoral femoral bypass graft again noted.      4.  No change in thyroid nodules.      DX-CHEST-PORTABLE (1 VIEW)   Final Result      1.  Ill-defined hazy opacities bilaterally possibly areas of atelectasis with developing pneumonitis not excluded.   2.  There is atherosclerosis.      [id number]    (Results Pending)         MDM (Data Review):   -Records reviewed and summarized in current documentation  -I personally reviewed and interpreted the laboratory results  -I personally reviewed the radiology images  -Discussed with Dr. Minor    Assessment/Recommendations:  -Pancreatitis, possibly recurrent.  Probably from alcohol.  Pancreatic Disorders Risk Factors  Need to quit alcohol, cocaine and smoking if any.   Gallstones: Consider outpatient surgery referral to have cholecystectomy.   Low suspension of other etiology of pancreatitis at this time.   Keep good supportive care per the primary team.      No endoscopic or radiology intervention is needed at this time.   Okay to advance diet    -Awaiting MRI results, GI will follow  -Liver enzymes improving  -Fatty liver on ultrasound      Core Quality Measures   Reviewed items::  Labs, Medications and Radiology reports reviewed

## 2022-10-16 NOTE — PROGRESS NOTES
[facility] Daily Progress Note    Date of Service  10/16/2022    [facility] Team: [facility] White Team   Attending: Rebel Chaudhry M.d.  Senior Resident: Dr. Harper  Intern:  Dr. Thomas  Contact Number: [phone]    Chief Complaint  Fede Parikh Jr. is a 77 y.o. male admitted 10/11/2022 with generalized weakness and shortness of breath    Hospital Course  Fede Parikh Jr. is a 77 year old male with history of COPD (no PFTs), hypertension, hyperlipidemia, CKD3a, anemia, polysubstance abuse (alcohol, cocaine, tobacco), hepC s/p tx, PAD with chronic occlusion of bilateral common iliac arteries, distal aorta mural thrombus (previously on warfarin, but not taking) that is admitted with progressive generalized weakness, shortness of breath, failure to thrive.  Chronic anemia near baseline hgb 7-8, but transfusing 1 unit RBCs 10/14 for hgb 6.7.  Ongoing work-up for abdominal pain and vomiting, CT without acute findings, labs and ultrasound concerning for biliary colic vs choledocholithiasis -MRCP awaiting read.  Holding all anticoagulation for now, planning to start aspirin or Plavix (not warfarin/Lovenox) once stable from a bleeding standpoint.    Interval Problem Update  -abdominal pain improving, no nausea vomiting overnight.  Hepatic and cholestatic labs are all improving.  MRCP pending.  -denies any melena, hematochezia, obvious bleeding from other sources.      I have discussed this patient's plan of care and discharge plan at IDT rounds today with Case Management, Nursing, Nursing leadership, and other members of the IDT team.    Consultants/Specialty  None    Code Status  Full Code    Disposition  Patient is not medically cleared for discharge.   Anticipate discharge to to home with close outpatient follow-up.  I have placed the appropriate orders for post-discharge needs.    Review of Systems  Review of Systems   Constitutional:  Positive for malaise/fatigue and weight loss. Negative for  chills and fever.   Eyes:  Negative for blurred vision and double vision.   Respiratory:  Positive for cough (chronic) and shortness of breath (chronic). Negative for hemoptysis.    Cardiovascular:  Negative for chest pain, palpitations, claudication and leg swelling.   Gastrointestinal:  Positive for abdominal pain (mild, improved). Negative for blood in stool, constipation, diarrhea, heartburn, melena, nausea and vomiting.   Genitourinary:  Negative for dysuria, frequency and hematuria.   Musculoskeletal:  Positive for joint pain (right knee).   Neurological:  Positive for weakness. Negative for dizziness, tingling, sensory change, focal weakness and headaches.   Psychiatric/Behavioral:  Negative for substance abuse.       Physical Exam  Temp:  [36.2 °C (97.2 °F)-37 °C (98.6 °F)] 37 °C (98.6 °F)  Pulse:  [72-86] 78  Resp:  [18-24] 19  BP: (140-156)/(77-84) 156/84  SpO2:  [94 %-98 %] 97 %    Physical Exam  Constitutional:       General: He is not in acute distress.     Appearance: He is ill-appearing (chronically ill appearing). He is not toxic-appearing.      Comments: Cachectic   HENT:      Head: Normocephalic.      Mouth/Throat:      Mouth: Mucous membranes are moist.      Pharynx: Oropharynx is clear.   Eyes:      General: Scleral icterus present.      Extraocular Movements: Extraocular movements intact.      Conjunctiva/sclera: Conjunctivae normal.      Pupils: Pupils are equal, round, and reactive to light.   Cardiovascular:      Rate and Rhythm: Normal rate and regular rhythm.      Heart sounds: Normal heart sounds. No murmur heard.    No friction rub. No gallop.   Pulmonary:      Effort: Pulmonary effort is normal. No respiratory distress.      Breath sounds: Normal breath sounds.   Abdominal:      Palpations: Abdomen is soft.      Tenderness: There is no abdominal tenderness. There is no guarding or rebound.   Musculoskeletal:         General: No swelling.      Cervical back: Normal range of motion.    Skin:     General: Skin is warm and dry.   Neurological:      General: No focal deficit present.      Mental Status: He is alert and oriented to person, place, and time.      Cranial Nerves: No cranial nerve deficit.      Sensory: No sensory deficit.      Motor: Weakness (generalized weakness, but non-focal) present.      Coordination: Coordination normal.   Psychiatric:         Mood and Affect: Mood normal.         Behavior: Behavior normal.       Fluids    Intake/Output Summary (Last 24 hours) at 10/16/2022 1554  Last data filed at 10/16/2022 0000  Gross per 24 hour   Intake 360 ml   Output 100 ml   Net 260 ml       Laboratory  Recent Labs     10/14/22  1007 10/14/22  2020 10/15/22  0029 10/15/22  0942 10/16/22  0354   WBC 4.0*  --  5.4  --  4.8   RBC 2.86*  --  2.94*  --  2.98*   HEMOGLOBIN 6.7*   < > 7.6* 8.0* 7.5*   HEMATOCRIT 21.9*   < > 23.4* 25.3* 24.0*   MCV 76.6*  --  79.6*  --  80.5*   MCH 23.4*  --  25.9*  --  25.2*   MCHC 30.6*  --  32.5*  --  31.3*   RDW 62.5*  --  66.4*  --  70.2*   PLATELETCT 217  --  204  --  202   MPV 10.1  --  10.6  --  10.2    < > = values in this interval not displayed.     Recent Labs     10/14/22  0109 10/15/22  0029 10/16/22  0354   SODIUM 136 138 140   POTASSIUM 4.2 4.2 3.8   CHLORIDE 106 107 110   CO2 20 21 20   GLUCOSE 129* 107* 87   BUN 16 18 12   CREATININE 1.63* 1.54* 0.98   CALCIUM 8.3* 8.1* 8.2*     Recent Labs     10/14/22  1007 10/14/22  2020 10/16/22  0354   INR 1.17* 1.20* 1.08               Imaging  US-RUQ   Final Result      1.  Echogenic liver suggesting fatty infiltration or fibrosis.   2.  Cholelithiasis.   3.  Mild biliary dilation raising concern for distal stricture, stone or mass.   4.  No evidence for acute cholecystitis.   5.  Heterogeneous pancreas, uncertain significance.      CT-CHEST,ABDOMEN,PELVIS W/O   Final Result            1.  Images are degraded by motion artifact.      2.  1.7 cm cyst in the pancreatic head is identified which was also  present on the prior MRI. Probable pseudocyst.      3.  Extensive calcific atherosclerosis with right-sided iliac artery stent and femoral femoral bypass graft again noted.      4.  No change in thyroid nodules.      DX-CHEST-PORTABLE (1 VIEW)   Final Result      1.  Ill-defined hazy opacities bilaterally possibly areas of atelectasis with developing pneumonitis not excluded.   2.  There is atherosclerosis.      [id number]/O    (Results Pending)          Assessment/Plan  Problem Representation:    Abdominal pain  Assessment & Plan  Developed sharp upper abdominal pain 10/13 and nonbloody vomiting overnight with soft blood pressures.  Tenderness on exam in epigastrium and right upper quadrant.  Patient does have history of pancreatitis (hospitalized in June 2022).  Etiology concerning for pancreatitis (elevated lipase) with alcohol vs gallstone as likely causes.  RUQ US showing common bile duct dilation, elevated LFTs on 10/15 concerns for choledocholithiasis.  Abdominal pain, liver enzymes, cholestatic labs are all improving.  Abdominal pain possibly biliary colic.   -MRCP pending, will discuss with GI vs general surgery pending results.  Per discussion with general surgery, if MRCP shows cholecystitis or choledocholithiasis, will consult surgery for possible cholecystectomy.  If negative for either, appropriate for outpatient follow-up with general surgery.  -Maintenance fluids with normal saline, wean as oral intake increases  -Regular diet      Severe Malnutrition (HCC)  Assessment & Plan  Cachexia. BMI 16.09 with recent weight loss, poor appetite and intake. Likely contributions from multiple chronic medical conditions including chronic alcoholism, active cocaine use, COPD, history of pancreatitis, severe anemia, deconditioning. CT chest abdomen pelvis without acute abnormality or findings to suggest malignancy. PSA within normal limits.  - Nutrition consult    Aortic thrombus (HCC)- (present on  admission)  Assessment & Plan  CTA thoracoabdominal aorta 06/21/22 showing mural thrombus of the distal aorta near the aortic bifurcation and he was subsequently started on warfarin for anticoagulation. However, he hasn't taken warfarin for at least 1 month and INR 1.11 on admission; subtherapeutic, goal 2-3.  Does not appear that vascular surgery was formally consulted during the admission when warfarin was started.  Per discussion with vascular surgery 10/14, there is limited utility for warfarin in the setting of a chronic abdominal aortic mural thrombus, but recommend either aspirin or Plavix.  Of note, mural thrombus associated with abdominal aortic aneurysm is not typically treated with anticoagulation. Therefore, we will stop warfarin/Lovenox and continue with aspirin or Plavix once stable from bleeding standpoint.   -He will need outpatient vascular follow-up to discuss severe PAD and mural thrombus management    Anemia- (present on admission)  Assessment & Plan  Iron studies consistent with iron deficiency. Hgb 8.7 on admission and MCV 76.4; around baseline 7-8.  Hemoglobin 6.7 10/14 and received 1 unit of blood and will repeat fecal occult blood (negative 10/12).  Denies hematemesis, hematochezia/melena. May have chronic slow bleed, possibly alcoholic gastritis. Per PCP note, colonoscopy 3-5yrs ago with unknown result. Also with very poor oral intake and likely inadequate iron intake. CT chest abdomen pelvis without acute abnormality or findings to suggest malignancy or GI bleed.   Recheck hemoglobin daily  - Finished IV iron 10/14  - Consider GI consult for EGD and colonoscopy if hemoglobin continues to drop    CKD (chronic kidney disease), stage III (HCC)- (present on admission)  Assessment & Plan  Etiology unclear, likely contributions for multiple prerenal AKIs in the setting of alcohol abuse, chronic hypertension. Previous renal ultrasounds showing atrophic kidneys. Baseline Cr ~1.6-1.9, though  "occasionally within upper level of normal ~1.2.  -Avoid nephrotoxic meds    Debility  Assessment & Plan  Likely due to multiple chronic medical conditions including chronic alcoholism, COPD, history of pancreatitis, severe anemia, deconditioning, poor oral intake.   -Nutrition consult  -PT/OT recommending no further therapy needs    Thyroid nodule- (present on admission)  Assessment & Plan  Needs outpatient follow up.    Peripheral artery disease (HCC)- (present on admission)  Assessment & Plan  Severe, with chronic occlusion to bilateral common iliac arteries and previous stent and femorofemoral bypass are both occluded. Denies claudication.  -Continue to monitor  -Plan to start aspirin and Plavix once stable from a bleeding standpoint  -He will need outpatient vascular follow-up to discuss severe PAD and mural thrombus management    Alcohol use- (present on admission)  Assessment & Plan  Drinks half a pint of gin daily. Last drink was night prior to admission. Denies history of alcohol withdrawal. Has low threshold for CIWA protocol, but not currently in alcohol withdrawal.  -Counseled the patient on the risks of ongoing drug and alcohol use given his current age and chronic medical conditions.  At this point patient is agreeable to abstinence and seems to recognize \"I'm drinking myself to death.\"  -No sign of withdrawal at this time    Tobacco use- (present on admission)  Assessment & Plan  40 pack year smoker; currently smokes 2-3cigs/day. Chronic cough and weight loss.  -CT chest to evaluate for malignancy  - on tobacco cessation; patient has no plans to quit smoking, but is considering quitting alcohol and cocaine  -Nicotine patch and gum (refusing)    Substance abuse (HCC)- (present on admission)  Assessment & Plan  Active cocaine use - smokes, but does not inject drugs. Also with alcohol and tobacco abuse.   -Counseled the patient on the risks of ongoing drug and alcohol use given his current age and " "chronic medical conditions.  At this point patient is agreeable to abstinence and seems to recognize \"I'm drinking myself to death.\"    Essential hypertension- (present on admission)  Assessment & Plan  -Continue home amlodipine    GERD (gastroesophageal reflux disease)- (present on admission)  Assessment & Plan  Likely secondary alcohol use / alcoholic gastritis.  -Pantoprazole IV (resume home omeprazole when stable from bleeding standpoint)    COPD (chronic obstructive pulmonary disease) (HCC)- (present on admission)  Assessment & Plan  No PFTs. Without any bronchospasm on physical exam and not requiring supplemental oxygen. Hasn't had inhalers for at least one month; Spiriva, Breo Ellipta, and albuterol PRN. Received antibiotics on day of admission, but will not continue as low suspicion for pneumonia.  -Resume home inhalers  -Will need outpatient PFTs  - RT protocol       VTE prophylaxis: therapeutic anticoagulation with heparin bridge to warfarin    I have performed a physical exam and reviewed and updated ROS and Plan today (10/16/2022). In review of yesterday's note (10/15/2022), there are no changes except as documented above.      "

## 2022-10-16 NOTE — CARE PLAN
The patient is Stable - Low risk of patient condition declining or worsening    Shift Goals  Clinical Goals: Safety  Patient Goals: Rest  Family Goals: RADHA    Progress made toward(s) clinical / shift goals:  Reviewed plan of care with patient, diet progression today.     Patient is not progressing towards the following goals:

## 2022-10-16 NOTE — CARE PLAN
The patient is Stable - Low risk of patient condition declining or worsening    Shift Goals  Clinical Goals: comfort  Patient Goals: pt will be able to rest and sleep comfortably  Family Goals: RADHA    Progress made toward(s) clinical / shift goals:      Patient is not progressing towards the following goals:

## 2022-10-17 VITALS
HEART RATE: 80 BPM | OXYGEN SATURATION: 99 % | TEMPERATURE: 96.8 F | HEIGHT: 69 IN | DIASTOLIC BLOOD PRESSURE: 85 MMHG | WEIGHT: 123.24 LBS | SYSTOLIC BLOOD PRESSURE: 143 MMHG | BODY MASS INDEX: 18.25 KG/M2 | RESPIRATION RATE: 18 BRPM

## 2022-10-17 LAB
ALBUMIN SERPL BCP-MCNC: 3.3 G/DL (ref 3.2–4.9)
ALBUMIN/GLOB SERPL: 1.1 G/DL
ALP SERPL-CCNC: 142 U/L (ref 30–99)
ALT SERPL-CCNC: 83 U/L (ref 2–50)
ANION GAP SERPL CALC-SCNC: 10 MMOL/L (ref 7–16)
AST SERPL-CCNC: 70 U/L (ref 12–45)
BILIRUB SERPL-MCNC: 0.6 MG/DL (ref 0.1–1.5)
BUN SERPL-MCNC: 9 MG/DL (ref 8–22)
CALCIUM SERPL-MCNC: 8.3 MG/DL (ref 8.5–10.5)
CHLORIDE SERPL-SCNC: 113 MMOL/L (ref 96–112)
CO2 SERPL-SCNC: 18 MMOL/L (ref 20–33)
CREAT SERPL-MCNC: 1.11 MG/DL (ref 0.5–1.4)
ERYTHROCYTE [DISTWIDTH] IN BLOOD BY AUTOMATED COUNT: 76.2 FL (ref 35.9–50)
GFR SERPLBLD CREATININE-BSD FMLA CKD-EPI: 67 ML/MIN/1.73 M 2
GLOBULIN SER CALC-MCNC: 2.9 G/DL (ref 1.9–3.5)
GLUCOSE SERPL-MCNC: 91 MG/DL (ref 65–99)
HCT VFR BLD AUTO: 25.8 % (ref 42–52)
HGB BLD-MCNC: 7.9 G/DL (ref 14–18)
INR PPP: 0.98 (ref 0.87–1.13)
MAGNESIUM SERPL-MCNC: 1.4 MG/DL (ref 1.5–2.5)
MCH RBC QN AUTO: 25.6 PG (ref 27–33)
MCHC RBC AUTO-ENTMCNC: 30.6 G/DL (ref 33.7–35.3)
MCV RBC AUTO: 83.8 FL (ref 81.4–97.8)
PHOSPHATE SERPL-MCNC: 1.6 MG/DL (ref 2.5–4.5)
PLATELET # BLD AUTO: 152 K/UL (ref 164–446)
PMV BLD AUTO: 9.7 FL (ref 9–12.9)
POTASSIUM SERPL-SCNC: 4.2 MMOL/L (ref 3.6–5.5)
PROT SERPL-MCNC: 6.2 G/DL (ref 6–8.2)
PROTHROMBIN TIME: 12.9 SEC (ref 12–14.6)
RBC # BLD AUTO: 3.08 M/UL (ref 4.7–6.1)
SODIUM SERPL-SCNC: 141 MMOL/L (ref 135–145)
WBC # BLD AUTO: 5 K/UL (ref 4.8–10.8)

## 2022-10-17 PROCEDURE — 700102 HCHG RX REV CODE 250 W/ 637 OVERRIDE(OP)

## 2022-10-17 PROCEDURE — A9270 NON-COVERED ITEM OR SERVICE: HCPCS

## 2022-10-17 PROCEDURE — 80053 COMPREHEN METABOLIC PANEL: CPT

## 2022-10-17 PROCEDURE — 700111 HCHG RX REV CODE 636 W/ 250 OVERRIDE (IP)

## 2022-10-17 PROCEDURE — 94640 AIRWAY INHALATION TREATMENT: CPT

## 2022-10-17 PROCEDURE — 85610 PROTHROMBIN TIME: CPT

## 2022-10-17 PROCEDURE — C9113 INJ PANTOPRAZOLE SODIUM, VIA: HCPCS | Performed by: INTERNAL MEDICINE

## 2022-10-17 PROCEDURE — 700102 HCHG RX REV CODE 250 W/ 637 OVERRIDE(OP): Performed by: STUDENT IN AN ORGANIZED HEALTH CARE EDUCATION/TRAINING PROGRAM

## 2022-10-17 PROCEDURE — 700111 HCHG RX REV CODE 636 W/ 250 OVERRIDE (IP): Performed by: INTERNAL MEDICINE

## 2022-10-17 PROCEDURE — 83735 ASSAY OF MAGNESIUM: CPT

## 2022-10-17 PROCEDURE — 84100 ASSAY OF PHOSPHORUS: CPT

## 2022-10-17 PROCEDURE — 99239 HOSP IP/OBS DSCHRG MGMT >30: CPT | Mod: GC | Performed by: INTERNAL MEDICINE

## 2022-10-17 PROCEDURE — A9270 NON-COVERED ITEM OR SERVICE: HCPCS | Performed by: STUDENT IN AN ORGANIZED HEALTH CARE EDUCATION/TRAINING PROGRAM

## 2022-10-17 PROCEDURE — 85027 COMPLETE CBC AUTOMATED: CPT

## 2022-10-17 RX ORDER — CLOPIDOGREL BISULFATE 75 MG/1
75 TABLET ORAL DAILY
Qty: 30 TABLET | Refills: 2 | Status: SHIPPED | OUTPATIENT
Start: 2022-10-17 | End: 2023-03-31 | Stop reason: SDUPTHER

## 2022-10-17 RX ORDER — OMEPRAZOLE 20 MG/1
40 CAPSULE, DELAYED RELEASE ORAL DAILY
Qty: 90 CAPSULE | Refills: 5 | Status: SHIPPED | OUTPATIENT
Start: 2022-10-17 | End: 2023-01-17 | Stop reason: SDUPTHER

## 2022-10-17 RX ORDER — OMEPRAZOLE 20 MG/1
40 CAPSULE, DELAYED RELEASE ORAL DAILY
Qty: 90 CAPSULE | Refills: 5 | Status: SHIPPED | OUTPATIENT
Start: 2022-10-17 | End: 2022-10-17 | Stop reason: SDUPTHER

## 2022-10-17 RX ORDER — MAGNESIUM SULFATE HEPTAHYDRATE 40 MG/ML
2 INJECTION, SOLUTION INTRAVENOUS ONCE
Status: COMPLETED | OUTPATIENT
Start: 2022-10-17 | End: 2022-10-17

## 2022-10-17 RX ORDER — CLOPIDOGREL BISULFATE 75 MG/1
75 TABLET ORAL DAILY
Qty: 30 TABLET | Refills: 2 | Status: SHIPPED | OUTPATIENT
Start: 2022-10-17 | End: 2022-10-17 | Stop reason: SDUPTHER

## 2022-10-17 RX ADMIN — TIOTROPIUM BROMIDE INHALATION SPRAY 5 MCG: 3.12 SPRAY, METERED RESPIRATORY (INHALATION) at 05:01

## 2022-10-17 RX ADMIN — ALBUTEROL SULFATE 2 PUFF: 90 AEROSOL, METERED RESPIRATORY (INHALATION) at 08:29

## 2022-10-17 RX ADMIN — PANTOPRAZOLE SODIUM 40 MG: 40 INJECTION, POWDER, LYOPHILIZED, FOR SOLUTION INTRAVENOUS at 05:06

## 2022-10-17 RX ADMIN — SENNOSIDES AND DOCUSATE SODIUM 2 TABLET: 50; 8.6 TABLET ORAL at 05:02

## 2022-10-17 RX ADMIN — MAGNESIUM SULFATE HEPTAHYDRATE 2 G: 40 INJECTION, SOLUTION INTRAVENOUS at 07:57

## 2022-10-17 RX ADMIN — DIBASIC SODIUM PHOSPHATE, MONOBASIC POTASSIUM PHOSPHATE AND MONOBASIC SODIUM PHOSPHATE 500 MG: 852; 155; 130 TABLET ORAL at 07:55

## 2022-10-17 RX ADMIN — BUDESONIDE AND FORMOTEROL FUMARATE DIHYDRATE 2 PUFF: 160; 4.5 AEROSOL RESPIRATORY (INHALATION) at 06:16

## 2022-10-17 RX ADMIN — AMLODIPINE BESYLATE 5 MG: 5 TABLET ORAL at 05:02

## 2022-10-17 NOTE — DISCHARGE SUMMARY
Discharge Summary    Date of Admission: 10/11/2022  Date of Discharge: 10/17/2022  Discharging Attending: Rebel Chaudhry MD   Discharging Senior Resident: Dr. Harper  Discharging Intern: Dr. Thomas    CHIEF COMPLAINT ON ADMISSION  Chief Complaint   Patient presents with    Sent by MD     Pt went to [facility] today because he has been feeling short of breath x1 month and weak x1 week. Pt had positive orthostatics at the clinic so was sent to the ER.        Reason for Admission  Generalized weakness, shortness of breath    Admission Date  10/11/2022    CODE STATUS  Full Code    HPI & HOSPITAL COURSE  Fede Parikh is a 78 y/o man w/ PMHx of COPD (no PFTs), recurrent pancreatitis hypertension, hyperlipidemia, CKD3a, anemia, polysubstance use (alcohol, tobacco, cocain), Hepatitis C s/p treatment, peripheral artery disease with stents in place, distal aorta mural thrombus who was admitted on 10/11/22 for generalized weakness and shortness of breath that had been going on for about a month.  He was found to have a worsening of his chronic anemia, was transfused 1 unit of RBCs on 10/14.  Etiology of worsening anemia likely due to some degree of lower GI bleeding, FOBT was positive.  While hospitalized he also had abdominal pain and some mild vomiting.  RUQ-US showed gallstones with concerns for common bile duct dilation.  MRCP was performed and showed some chronic cysts present at the head of the pancreas, which may be imposing upon the common bile duct or pancreatic duct, and changes concerning for chronic pancreatitis.  Case was discussed with general surgery, who will follow-up with the patient outpatient for possible cholecystectomy.  Pt was found to have a mural thrombus of the distal aorta at a previous hospitalization and was placed on warfarin during that stay.  This was discussed with vascular surgery, who reports that for a thrombus like this it is appropriate to place the patient on plavix, as they are also  known to have chronic stent complications as well.  At time of discharge the patient is feeling much better, his abdominal pain has resolved and he is stable and appropriate for discharge to home.        Therefore, he is discharged in good and stable condition to home with close outpatient follow-up.    The patient met 2-midnight criteria for an inpatient stay at the time of discharge.    PHYSICAL EXAM ON DISCHARGE  Temp:  [36 °C (96.8 °F)-37 °C (98.6 °F)] 36 °C (96.8 °F)  Pulse:  [78-90] 80  Resp:  [18-19] 18  BP: (137-156)/(68-85) 143/85  SpO2:  [97 %-99 %] 99 %    Physical Exam  Vitals and nursing note reviewed.   Constitutional:       General: He is not in acute distress.     Appearance: He is not ill-appearing or toxic-appearing.   HENT:      Head: Normocephalic and atraumatic.      Mouth/Throat:      Mouth: Mucous membranes are moist.      Pharynx: Oropharynx is clear.   Eyes:      Conjunctiva/sclera: Conjunctivae normal.   Cardiovascular:      Rate and Rhythm: Normal rate and regular rhythm.      Pulses: Normal pulses.      Heart sounds: Normal heart sounds. No murmur heard.    No friction rub. No gallop.   Pulmonary:      Effort: Pulmonary effort is normal. No respiratory distress.      Breath sounds: Normal breath sounds. No wheezing, rhonchi or rales.   Abdominal:      General: Abdomen is flat. Bowel sounds are normal. There is no distension.      Tenderness: There is abdominal tenderness (mild RUQ TTP w/ deep palpation, improved).   Musculoskeletal:      Right lower leg: No edema.      Left lower leg: No edema.   Skin:     General: Skin is warm and dry.   Neurological:      General: No focal deficit present.      Mental Status: He is alert.       Discharge Date  10/17/2022    FOLLOW UP ITEMS POST DISCHARGE  -continue following with vascular surgery, pt was placed on plavix following this hospital stay  -would recommend repeat CBC, as pt needed blood transfusion while in hospital  -follow-up with GI for  concerns for chronic anemia  -follow-up with general surgery for cholelithiasis    DISCHARGE DIAGNOSES  Principal Problem (Resolved):    SHANNAN (acute kidney injury) (HCC) POA: Yes  Active Problems:    COPD (chronic obstructive pulmonary disease) (HCC) POA: Yes    GERD (gastroesophageal reflux disease) POA: Yes    Essential hypertension (Chronic) POA: Yes    Substance abuse (HCC) POA: Yes    Tobacco use POA: Yes    Alcohol use POA: Yes    CKD (chronic kidney disease), stage III (HCC) POA: Yes    Peripheral artery disease (HCC) POA: Yes    Anemia POA: Yes    Thyroid nodule POA: Yes    Aortic thrombus (HCC) POA: Yes    Debility POA: Unknown    Severe Malnutrition (HCC) POA: Unknown    Abdominal pain POA: Unknown      FOLLOW UP  No future appointments.  Alexx Reynaga M.D.  [address] [facility] [address]  [phone]    Follow up in 1 week(s)      [facility]  [address] [id number] [phone]  Follow up in 2 week(s)      [facility]  [address]  [place] [place] [facility] [address]  [phone]        Miller Goldberg M.D.  [address] [facility] [id number] [phone]    Follow up in 1 month(s)        MEDICATIONS ON DISCHARGE     Medication List        START taking these medications        Instructions   clopidogrel 75 MG Tabs  Commonly known as: PLAVIX   Take 1 Tablet by mouth every day.  Dose: 75 mg            CHANGE how you take these medications        Instructions   omeprazole 20 MG delayed-release capsule  What changed:   how much to take  how to take this  when to take this  Commonly known as: PRILOSEC   Take 2 Capsules by mouth every day. TAKE ONE CAPSULE BY MOUTH ONE TIME DAILY  Dose: 40 mg            CONTINUE taking these medications        Instructions   albuterol 108 (90 Base) MCG/ACT Aers inhalation aerosol  Commonly known as: Ventolin HFA   Inhale 2 Puffs every 6 hours as needed for Shortness of Breath. INHALE TWO PUFFS  BY MOUTH EVERY SIX HOURS AS NEEDED  Dose: 2 Puff     amLODIPine 5 MG Tabs  Commonly known as: NORVASC   Take 5 mg by mouth every day.  Dose: 5 mg     Breo Ellipta 100-25 MCG/INH Aepb  Generic drug: Fluticasone Furoate-Vilanterol   Inhale 1 Puff every day.  Dose: 1 Puff     hydrOXYzine HCl 25 MG Tabs  Commonly known as: ATARAX   Take 1 Tablet by mouth 1 time a day as needed for Itching.  Dose: 25 mg     Spiriva HandiHaler 18 MCG Caps  Generic drug: tiotropium   INHALE 1 CAPSULE VIA HANDIHALER ONCE DAILY            STOP taking these medications      warfarin 4 MG Tabs  Commonly known as: COUMADIN              Allergies  Allergies   Allergen Reactions    Ace Inhibitors Swelling       DIET  Orders Placed This Encounter   Procedures    Diet Order Diet: Regular     Standing Status:   Standing     Number of Occurrences:   1     Order Specific Question:   Diet:     Answer:   Regular [1]    Discontinue Diet Tray     Standing Status:   Standing     Number of Occurrences:   1       ACTIVITY  As tolerated.  Weight bearing as tolerated    CONSULTATIONS  Dr. Minor with Gastroenterology consulted.  Treatment options were discussed and plan of care agreed upon.    PROCEDURES  N/a      Time spent on discharge:  45 minutes      Raudel Harper DO  PGY-2, [facility]

## 2022-10-17 NOTE — PROGRESS NOTES
1145 arrive to [facility] via wheelchair. A/O, dc instructions reviewed w/ pt and wife. Verbalized understanding. DC home via taxi w/ wife.

## 2022-10-17 NOTE — DISCHARGE INSTRUCTIONS
"Cholelithiasis    Cholelithiasis is also called \"gallstones.\" It is a kind of gallbladder disease. The gallbladder is an organ that stores a liquid (bile) that helps you digest fat. Gallstones may not cause symptoms (may be silent gallstones) until they cause a blockage, and then they can cause pain (gallbladder attack).  Follow these instructions at home:  Take over-the-counter and prescription medicines only as told by your doctor.  Stay at a healthy weight.  Eat healthy foods. This includes:  Eating fewer fatty foods, like fried foods.  Eating fewer refined carbs (refined carbohydrates). Refined carbs are breads and grains that are highly processed, like white bread and white rice. Instead, choose whole grains like whole-wheat bread and brown rice.  Eating more fiber. Almonds, fresh fruit, and beans are healthy sources of fiber.  Keep all follow-up visits as told by your doctor. This is important.  Contact a doctor if:  You have sudden pain in the upper right side of your belly (abdomen). Pain might spread to your right shoulder or your chest. This may be a sign of a gallbladder attack.  You feel sick to your stomach (are nauseous).  You throw up (vomit).  You have been diagnosed with gallstones that have no symptoms and you get:  Belly pain.  Discomfort, burning, or fullness in the upper part of your belly (indigestion).  Get help right away if:  You have sudden pain in the upper right side of your belly, and it lasts for more than 2 hours.  You have belly pain that lasts for more than 5 hours.  You have a fever or chills.  You keep feeling sick to your stomach or you keep throwing up.  Your skin or the whites of your eyes turn yellow (jaundice).  You have dark-colored pee (urine).  You have light-colored poop (stool).  Summary  Cholelithiasis is also called \"gallstones.\"  The gallbladder is an organ that stores a liquid (bile) that helps you digest fat.  Silent gallstones are gallstones that do not cause " symptoms.  A gallbladder attack may cause sudden pain in the upper right side of your belly. Pain might spread to your right shoulder or your chest. If this happens, contact your doctor.  If you have sudden pain in the upper right side of your belly that lasts for more than 2 hours, get help right away.  This information is not intended to replace advice given to you by your health care provider. Make sure you discuss any questions you have with your health care provider.  Document Released: 06/05/2009 Document Revised: 11/30/2018 Document Reviewed: 09/03/2017  [employer] Patient Education © 2020 [employer]  Acute Pancreatitis    The pancreas is a gland that is located behind the stomach on the left side of the abdomen. It produces enzymes that help to digest food. The pancreas also releases the hormones glucagon and insulin, which help to regulate blood sugar. Acute pancreatitis happens when inflammation of the pancreas suddenly occurs and the pancreas becomes irritated and swollen.  Most acute attacks last a few days and cause serious problems. Some people become dehydrated and develop low blood pressure. In severe cases, bleeding in the abdomen can lead to shock and can be life-threatening. The lungs, heart, and kidneys may fail.  What are the causes?  This condition may be caused by:  Alcohol abuse.  Drug abuse.  Gallstones or other conditions that can block the tube that drains the pancreas (pancreatic duct).  A tumor in the pancreas.  Other causes include:  Certain medicines.  Exposure to certain chemicals.  Diabetes.  An infection in the pancreas.  Damage caused by an accident (trauma).  The poison (venom) from a scorpion bite.  Abdominal surgery.  Autoimmune pancreatitis. This is when the body's disease-fighting (immune) system attacks the pancreas.  Genes that are passed from parent to child (inherited).  In some cases, the cause of this condition is not known.  What are the signs or symptoms?  Symptoms of  this condition include:  Pain in the upper abdomen that may radiate to the back. Pain may be severe.  Tenderness and swelling of the abdomen.  Nausea and vomiting.  Fever.  How is this diagnosed?  This condition may be diagnosed based on:  A physical exam.  Blood tests.  Imaging tests, such as X-rays, CT or MRI scans, or an ultrasound of the abdomen.  How is this treated?  Treatment for this condition usually requires a stay in the hospital. Treatment for this condition may include:  Pain medicine.  Fluid replacement through an IV.  Placing a tube in the stomach to remove stomach contents and to control vomiting (NG tube, or nasogastric tube).  Not eating for 3-4 days. This gives the pancreas a rest, because enzymes are not being produced that can cause further damage.  Antibiotic medicines, if your condition is caused by an infection.  Treating any underlying conditions that may be the cause.  Steroid medicines, if your condition is caused by your immune system attacking your body's own tissues (autoimmune disease).  Surgery on the pancreas or gallbladder.  Follow these instructions at home:  Eating and drinking    Follow instructions from your health care provider about diet. This may involve avoiding alcohol and decreasing the amount of fat in your diet.  Eat smaller, more frequent meals. This reduces the amount of digestive fluids that the pancreas produces.  Drink enough fluid to keep your urine pale yellow.  Do not drink alcohol if it caused your condition.  General instructions  Take over-the-counter and prescription medicines only as told by your health care provider.  Do not drive or use heavy machinery while taking prescription pain medicine.  Ask your health care provider if the medicine prescribed to you can cause constipation. You may need to take steps to prevent or treat constipation, such as:  Take an over-the-counter or prescription medicine for constipation.  Eat foods that are high in fiber such  as whole grains and beans.  Limit foods that are high in fat and processed sugars, such as fried or sweet foods.  Do not use any products that contain nicotine or tobacco, such as cigarettes, e-cigarettes, and chewing tobacco. If you need help quitting, ask your health care provider.  Get plenty of rest.  If directed, check your blood sugar at home as told by your health care provider.  Keep all follow-up visits as told by your health care provider. This is important.  Contact a health care provider if you:  Do not recover as quickly as expected.  Develop new or worsening symptoms.  Have persistent pain, weakness, or nausea.  Recover and then have another episode of pain.  Have a fever.  Get help right away if:  You cannot eat or keep fluids down.  Your pain becomes severe.  Your skin or the white part of your eyes turns yellow (jaundice).  You have sudden swelling in your abdomen.  You vomit.  You feel dizzy or you faint.  Your blood sugar is high (over 300 mg/dL).  Summary  Acute pancreatitis happens when inflammation of the pancreas suddenly occurs and the pancreas becomes irritated and swollen.  This condition is typically caused by alcohol abuse, drug abuse, or gallstones.  Treatment for this condition usually requires a stay in the hospital.  This information is not intended to replace advice given to you by your health care provider. Make sure you discuss any questions you have with your health care provider.  Document Released: 12/18/2006 Document Revised: 10/07/2019 Document Reviewed: 06/24/2019  [employer] Patient Education © 2020 [employer]  Gastrointestinal Bleeding  Gastrointestinal (GI) bleeding is bleeding somewhere along the path that food travels through the body (digestive tract). This path is anywhere between the mouth and the opening of the butt (anus). You may have blood in your poop (stool) or have black poop. If you throw up (vomit), there may be blood in it.  This condition can be mild,  serious, or even life-threatening. If you have a lot of bleeding, you may need to stay in the hospital.  What are the causes?  This condition may be caused by:  Irritation and swelling of the esophagus (esophagitis). The esophagus is part of the body that moves food from your mouth to your stomach.  Swollen veins in the butt (hemorrhoids).  Areas of painful tearing in the opening of the butt (anal fissures). These are often caused by passing hard poop.  Pouches that form on the colon over time (diverticulosis).  Irritation and swelling (diverticulitis) in areas where pouches have formed on the colon.  Growths (polyps) or cancer. Colon cancer often starts out as growths that are not cancer.  Irritation of the stomach lining (gastritis).  Sores (ulcers) in the stomach.  What increases the risk?  You are more likely to develop this condition if you:  Have a certain type of infection in your stomach (Helicobacter pylori infection).  Take certain medicines.  Smoke.  Drink alcohol.  What are the signs or symptoms?  Common symptoms of this condition include:  Throwing up (vomiting) material that has bright red blood in it. It may look like coffee grounds.  Changes in your poop. The poop may:  Have red blood in it.  Be black, look like tar, and smell stronger than normal.  Be red.  Pain or cramping in the belly (abdomen).  How is this treated?  Treatment for this condition depends on the cause of the bleeding. For example:  Sometimes, the bleeding can be stopped during a procedure that is done to find the problem (endoscopy or colonoscopy).  Medicines can be used to:  Help control irritation, swelling, or infection.  Reduce acid in your stomach.  Certain problems can be treated with:  Creams.  Medicines that are put in the butt (suppositories).  Warm baths.  Surgery is sometimes needed.  If you lose a lot of blood, you may need a blood transfusion.  If bleeding is mild, you may be allowed to go home. If there is a lot of  bleeding, you will need to stay in the hospital.  Follow these instructions at home:    Take over-the-counter and prescription medicines only as told by your doctor.  Eat foods that have a lot of fiber in them. These foods include beans, whole grains, and fresh fruits and vegetables. You can also try eating 1-3 prunes each day.  Drink enough fluid to keep your pee (urine) pale yellow.  Keep all follow-up visits as told by your doctor. This is important.  Contact a doctor if:  Your symptoms do not get better.  Get help right away if:  Your bleeding does not stop.  You feel dizzy or you pass out (faint).  You feel weak.  You have very bad cramps in your back or belly.  You pass large clumps of blood (clots) in your poop.  Your symptoms are getting worse.  You have chest pain or fast heartbeats.  Summary  GI bleeding is bleeding somewhere along the path that food travels through the body (digestive tract).  This bleeding can be caused by many things. Treatment depends on the cause of the bleeding.  Take medicines only as told by your doctor.  Keep all follow-up visits as told by your doctor. This is important.  This information is not intended to replace advice given to you by your health care provider. Make sure you discuss any questions you have with your health care provider.  Document Released: 09/26/2009 Document Revised: 07/31/2019 Document Reviewed: 07/31/2019  [employer] Patient Education © 2020 [employer]  Alcohol Use Disorder  Alcohol use disorder is when your drinking disrupts your daily life. When you have this condition, you drink too much alcohol and you cannot control your drinking.  Alcohol use disorder can cause serious problems with your physical health. It can affect your brain, heart, liver, pancreas, immune system, stomach, and intestines. Alcohol use disorder can increase your risk for certain cancers and cause problems with your mental health, such as depression, anxiety, psychosis, delirium,  and dementia. People with this disorder risk hurting themselves and others.  What are the causes?  This condition is caused by drinking too much alcohol over time. It is not caused by drinking too much alcohol only one or two times. Some people with this condition drink alcohol to cope with or escape from negative life events. Others drink to relieve pain or symptoms of mental illness.  What increases the risk?  You are more likely to develop this condition if:  You have a family history of alcohol use disorder.  Your culture encourages drinking to the point of intoxication, or makes alcohol easy to get.  You had a mood or conduct disorder in childhood.  You have been a victim of abuse.  You are an adolescent and:  You have poor grades or difficulties in school.  Your caregivers do not talk to you about saying no to alcohol, or supervise your activities.  You are impulsive or you have trouble with self-control.  What are the signs or symptoms?  Symptoms of this condition include:  Drinking more than you want to.  Drinking for longer than you want to.  Trying several times to drink less or to control your drinking.  Spending a lot of time getting alcohol, drinking, or recovering from drinking.  Craving alcohol.  Having problems at work, at school, or at home due to drinking.  Having problems in relationships due to drinking.  Drinking when it is dangerous to drink, such as before driving a car.  Continuing to drink even though you know you might have a physical or mental problem related to drinking.  Needing more and more alcohol to get the same effect you want from the alcohol (building up tolerance).  Having symptoms of withdrawal when you stop drinking. Symptoms of withdrawal include:  Fatigue.  Nightmares.  Trouble sleeping.  Depression.  Anxiety.  Fever.  Seizures.  Severe confusion.  Feeling or seeing things that are not there (hallucinations).  Tremors.  Rapid heart rate.  Rapid breathing.  High blood  pressure.  Drinking to avoid symptoms of withdrawal.  How is this diagnosed?  This condition is diagnosed with an assessment. Your health care provider may start the assessment by asking three or four questions about your drinking.  Your health care provider may perform a physical exam or do lab tests to see if you have physical problems resulting from alcohol use. She or he may refer you to a mental health professional for evaluation.  How is this treated?  Some people with alcohol use disorder are able to reduce their alcohol use to low-risk levels. Others need to completely quit drinking alcohol. When necessary, mental health professionals with specialized training in substance use treatment can help. Your health care provider can help you decide how severe your alcohol use disorder is and what type of treatment you need. The following forms of treatment are available:  Detoxification. Detoxification involves quitting drinking and using prescription medicines within the first week to help lessen withdrawal symptoms. This treatment is important for people who have had withdrawal symptoms before and for heavy drinkers who are likely to have withdrawal symptoms. Alcohol withdrawal can be dangerous, and in severe cases, it can cause death. Detoxification may be provided in a home, community, or primary care setting, or in a hospital or substance use treatment facility.  Counseling. This treatment is also called talk therapy. It is provided by substance use treatment counselors. A counselor can address the reasons you use alcohol and suggest ways to keep you from drinking again or to prevent problem drinking. The goals of talk therapy are to:  Find healthy activities and ways for you to cope with stress.  Identify and avoid the things that trigger your alcohol use.  Help you learn how to handle cravings.  Medicines. Medicines can help treat alcohol use disorder by:  Decreasing alcohol cravings.  Decreasing the  positive feeling you have when you drink alcohol.  Causing an uncomfortable physical reaction when you drink alcohol (aversion therapy).  Support groups. Support groups are led by people who have quit drinking. They provide emotional support, advice, and guidance.  These forms of treatment are often combined. Some people with this condition benefit from a combination of treatments provided by specialized substance use treatment centers.  Follow these instructions at home:  Take over-the-counter and prescription medicines only as told by your health care provider.  Check with your health care provider before starting any new medicines.  Ask friends and family members not to offer you alcohol.  Avoid situations where alcohol is served, including gatherings where others are drinking alcohol.  Create a plan for what to do when you are tempted to use alcohol.  Find hobbies or activities that you enjoy that do not include alcohol.  Keep all follow-up visits as told by your health care provider. This is important.  How is this prevented?  If you drink, limit alcohol intake to no more than 1 drink a day for nonpregnant women and 2 drinks a day for men. One drink equals 12 oz of beer, 5 oz of wine, or 1½ oz of hard liquor.  If you have a mental health condition, get treatment and support.  Do not give alcohol to adolescents.  If you are an adolescent:  Do not drink alcohol.  Do not be afraid to say no if someone offers you alcohol. Speak up about why you do not want to drink. You can be a positive role model for your friends and set a good example for those around you by not drinking alcohol.  If your friends drink, spend time with others who do not drink alcohol. Make new friends who do not use alcohol.  Find healthy ways to manage stress and emotions, such as meditation or deep breathing, exercise, spending time in nature, listening to music, or talking with a trusted friend or family member.  Contact a health care  provider if:  You are not able to take your medicines as told.  Your symptoms get worse.  You return to drinking alcohol (relapse) and your symptoms get worse.  Get help right away if:  You have thoughts about hurting yourself or others.  If you ever feel like you may hurt yourself or others, or have thoughts about taking your own life, get help right away. You can go to your nearest emergency department or call:  Your local emergency services (911 in the [place]  A suicide crisis helpline, such as the National Suicide Prevention Lifeline at 1-780.495.3627. This is open 24 hours a day.  Summary  Alcohol use disorder is when your drinking disrupts your daily life. When you have this condition, you drink too much alcohol and you cannot control your drinking.  Treatment may include detoxification, counseling, medicine, and support groups.  Ask friends and family members not to offer you alcohol. Avoid situations where alcohol is served.  Get help right away if you have thoughts about hurting yourself or others.  This information is not intended to replace advice given to you by your health care provider. Make sure you discuss any questions you have with your health care provider.  Document Released: 01/25/2006 Document Revised: 11/30/2018 Document Reviewed: 09/14/2017  [employer] Patient Education © 2020 [employer]    Discharge Instructions    Discharged to home by car with relative. Discharged via wheelchair, hospital escort: Yes.  Special equipment needed: Not Applicable    Be sure to schedule a follow-up appointment with your primary care doctor or any specialists as instructed.     Discharge Plan:   Diet Plan: Discussed  Activity Level: Discussed  Confirmed Follow up Appointment: Patient to Call and Schedule Appointment  Confirmed Symptoms Management: Discussed  Medication Reconciliation Updated: Yes  Influenza Vaccine Indication: Patient Refuses  Influenza Vaccine Given - only chart on this line when given:  Influenza Vaccine Given (See MAR)    I understand that a diet low in cholesterol, fat, and sodium is recommended for good health. Unless I have been given specific instructions below for another diet, I accept this instruction as my diet prescription.   Other diet:     Special Instructions: None    -Is this patient being discharged with medication to prevent blood clots?  No    Is patient discharged on Warfarin / Coumadin?   No

## 2022-10-17 NOTE — CARE PLAN
The patient is Stable - Low risk of patient condition declining or worsening    Shift Goals  Clinical Goals: Safety  Patient Goals: Rest  Family Goals: RADHA    Progress made toward(s) clinical / shift goals:  Discussed plan of care with patient, reminded to use call light to call for help as needed and report pain. Patient waiting to hear discharge plans    Problem: Knowledge Deficit - Standard  Goal: Patient and family/care givers will demonstrate understanding of plan of care, disease process/condition, diagnostic tests and medications  Outcome: Progressing     Patient is not progressing towards the following goals:

## 2023-01-17 DIAGNOSIS — K21.9 GASTROESOPHAGEAL REFLUX DISEASE: ICD-10-CM

## 2023-01-17 RX ORDER — OMEPRAZOLE 20 MG/1
40 CAPSULE, DELAYED RELEASE ORAL DAILY
Qty: 90 CAPSULE | Refills: 5 | Status: SHIPPED | OUTPATIENT
Start: 2023-01-17 | End: 2023-03-31 | Stop reason: SDUPTHER

## 2023-03-21 DIAGNOSIS — J44.9 CHRONIC OBSTRUCTIVE PULMONARY DISEASE, UNSPECIFIED COPD TYPE (HCC): ICD-10-CM

## 2023-03-21 NOTE — TELEPHONE ENCOUNTER
Received a fax from pharmacy patient insurance does not cover Breo please change to something else

## 2023-03-24 RX ORDER — FLUTICASONE FUROATE AND VILANTEROL 100; 25 UG/1; UG/1
POWDER RESPIRATORY (INHALATION)
Qty: 60 EACH | Refills: 1 | Status: SHIPPED | OUTPATIENT
Start: 2023-03-24 | End: 2023-03-31 | Stop reason: SDUPTHER

## 2023-03-27 ENCOUNTER — TELEPHONE (OUTPATIENT)
Dept: INTERNAL MEDICINE | Facility: OTHER | Age: 78
End: 2023-03-27
Payer: MEDICARE

## 2023-03-27 NOTE — TELEPHONE ENCOUNTER
Received fax from pharmacy regarding the Breo, this medication is not covered under patients insurance, please prescribe something else or advise?

## 2023-03-29 NOTE — TELEPHONE ENCOUNTER
Patient booked on Friday at 11:00 Dr Hernandez.  Will schedule [employer] for patient to be brought to office.

## 2023-03-31 ENCOUNTER — OFFICE VISIT (OUTPATIENT)
Dept: INTERNAL MEDICINE | Facility: OTHER | Age: 78
End: 2023-03-31
Payer: MEDICARE

## 2023-03-31 VITALS
HEART RATE: 87 BPM | DIASTOLIC BLOOD PRESSURE: 80 MMHG | HEIGHT: 68 IN | BODY MASS INDEX: 17.49 KG/M2 | OXYGEN SATURATION: 97 % | WEIGHT: 115.4 LBS | SYSTOLIC BLOOD PRESSURE: 122 MMHG | TEMPERATURE: 97.3 F

## 2023-03-31 DIAGNOSIS — R53.81 DEBILITY: ICD-10-CM

## 2023-03-31 DIAGNOSIS — J44.9 CHRONIC OBSTRUCTIVE PULMONARY DISEASE, UNSPECIFIED COPD TYPE (HCC): ICD-10-CM

## 2023-03-31 DIAGNOSIS — L29.9 PRURITUS: ICD-10-CM

## 2023-03-31 DIAGNOSIS — K21.9 GASTROESOPHAGEAL REFLUX DISEASE: ICD-10-CM

## 2023-03-31 PROCEDURE — 99213 OFFICE O/P EST LOW 20 MIN: CPT | Mod: GE

## 2023-03-31 RX ORDER — FLUTICASONE FUROATE AND VILANTEROL 100; 25 UG/1; UG/1
POWDER RESPIRATORY (INHALATION)
Qty: 60 EACH | Refills: 6 | Status: SHIPPED | OUTPATIENT
Start: 2023-03-31 | End: 2023-05-03 | Stop reason: SDUPTHER

## 2023-03-31 RX ORDER — CLOPIDOGREL BISULFATE 75 MG/1
75 TABLET ORAL DAILY
Qty: 30 TABLET | Refills: 6 | Status: SHIPPED | OUTPATIENT
Start: 2023-03-31 | End: 2023-05-03 | Stop reason: SDUPTHER

## 2023-03-31 RX ORDER — HYDROXYZINE HYDROCHLORIDE 25 MG/1
25 TABLET, FILM COATED ORAL
Qty: 30 TABLET | Refills: 6 | Status: SHIPPED | OUTPATIENT
Start: 2023-03-31 | End: 2023-05-03 | Stop reason: SDUPTHER

## 2023-03-31 RX ORDER — OMEPRAZOLE 20 MG/1
40 CAPSULE, DELAYED RELEASE ORAL DAILY
Qty: 90 CAPSULE | Refills: 6 | Status: SHIPPED | OUTPATIENT
Start: 2023-03-31 | End: 2023-05-03 | Stop reason: SDUPTHER

## 2023-03-31 RX ORDER — TIOTROPIUM BROMIDE 18 UG/1
CAPSULE ORAL; RESPIRATORY (INHALATION)
Qty: 90 CAPSULE | Refills: 6 | Status: SHIPPED | OUTPATIENT
Start: 2023-03-31 | End: 2024-03-20

## 2023-03-31 RX ORDER — AMLODIPINE BESYLATE 5 MG/1
5 TABLET ORAL DAILY
Qty: 30 TABLET | Refills: 6 | Status: SHIPPED | OUTPATIENT
Start: 2023-03-31 | End: 2023-05-03 | Stop reason: SDUPTHER

## 2023-03-31 RX ORDER — ATORVASTATIN CALCIUM 40 MG/1
40 TABLET, FILM COATED ORAL NIGHTLY
Qty: 30 TABLET | Refills: 6 | Status: SHIPPED | OUTPATIENT
Start: 2023-03-31 | End: 2023-05-03 | Stop reason: SDUPTHER

## 2023-03-31 RX ORDER — ALBUTEROL SULFATE 90 UG/1
2 AEROSOL, METERED RESPIRATORY (INHALATION) EVERY 6 HOURS PRN
Qty: 18 G | Refills: 6 | Status: SHIPPED | OUTPATIENT
Start: 2023-03-31 | End: 2023-05-03 | Stop reason: SDUPTHER

## 2023-03-31 ASSESSMENT — ENCOUNTER SYMPTOMS
FEVER: 0
WEIGHT LOSS: 0
PALPITATIONS: 0
WEAKNESS: 1
COUGH: 1
SHORTNESS OF BREATH: 1

## 2023-03-31 ASSESSMENT — PATIENT HEALTH QUESTIONNAIRE - PHQ9: CLINICAL INTERPRETATION OF PHQ2 SCORE: 0

## 2023-03-31 ASSESSMENT — FIBROSIS 4 INDEX: FIB4 SCORE: 4.04

## 2023-03-31 NOTE — PROGRESS NOTES
Date of Service:  3/31/2023    CC: Refills    HPI:  Fede Parikh Jr.  is a 78 y.o. male with a PMH of COPD, GERD, tobacco use, anemia, CKD stage III and peripheral artery disease presents for medication refill.   Patient has been smoking around one pack per week but notes worsening COPD symptoms. He has been feeling weak since his vascular surgery on is iliac. He is having a hard time ambulating and has knee and hip pain along with some gluteal pain. The surgery was 6/21/2022. Currently patient is able to walk around the block or two. He is currently out of his medications and would like them refilled.     Review of systems:  Review of Systems   Constitutional:  Positive for malaise/fatigue. Negative for fever and weight loss.   Respiratory:  Positive for cough and shortness of breath.    Cardiovascular:  Negative for palpitations.   Musculoskeletal:  Positive for joint pain.   Neurological:  Positive for weakness.      Past Medical History:  Patient Active Problem List    Diagnosis Date Noted    Debility 10/11/2022    Severe Malnutrition (HCC) 10/11/2022    Aortic thrombus (HCC) 06/23/2022    Thyroid nodule 06/22/2022    Health care maintenance 12/15/2021    Peripheral artery disease (HCC) 09/18/2020    Vitamin D deficiency 06/05/2019    Iron deficiency anemia secondary to inadequate dietary iron intake 06/05/2019    Pruritus 08/27/2017    CKD (chronic kidney disease), stage III (HCC) 07/03/2017    History of hepatitis C 02/15/2017    Tobacco use 02/15/2017    Alcohol use 02/15/2017    Multiple thyroid nodules 12/14/2016    COPD (chronic obstructive pulmonary disease) (HCC) 12/13/2016    GERD (gastroesophageal reflux disease) 12/13/2016    Essential hypertension 12/13/2016    Substance abuse (HCC) 12/13/2016       Past Surgical History:    has a past surgical history that includes femoral femoral bypass (Bilateral, 9/4/2020).    Medications:  Current Outpatient Medications   Medication Sig Dispense Refill     tiotropium (SPIRIVA HANDIHALER) 18 MCG Cap INHALE 1 CAPSULE VIA HANDIHALER ONCE DAILY 90 Capsule 6    omeprazole (PRILOSEC) 20 MG delayed-release capsule Take 2 Capsules by mouth every day. TAKE ONE CAPSULE BY MOUTH ONE TIME DAILY 90 Capsule 6    hydrOXYzine HCl (ATARAX) 25 MG Tab Take 1 Tablet by mouth 1 time a day as needed for Itching. 30 Tablet 6    fluticasone furoate-vilanterol (BREO) 100-25 MCG/ACT AEROSOL POWDER, BREATH ACTIVATED INHALE ONE PUFF BY MOUTH ONE TIME DAILY 60 Each 6    clopidogrel (PLAVIX) 75 MG Tab Take 1 Tablet by mouth every day. 30 Tablet 6    amLODIPine (NORVASC) 5 MG Tab Take 1 Tablet by mouth every day. 30 Tablet 6    albuterol (VENTOLIN HFA) 108 (90 Base) MCG/ACT Aero Soln inhalation aerosol Inhale 2 Puffs every 6 hours as needed for Shortness of Breath. INHALE TWO PUFFS BY MOUTH EVERY SIX HOURS AS NEEDED 18 g 6    atorvastatin (LIPITOR) 40 MG Tab Take 1 Tablet by mouth every evening. 30 Tablet 6     No current facility-administered medications for this visit.       Allergies:  Allergies   Allergen Reactions    Ace Inhibitors Swelling       Family History:   family history includes Cancer in his mother; Diabetes in his brother; Heart Disease in his mother.     Social History:    Social History     Tobacco Use    Smoking status: Some Days     Packs/day: 0.50     Types: Cigarettes    Smokeless tobacco: Never   Vaping Use    Vaping Use: Never used   Substance Use Topics    Alcohol use: Yes     Alcohol/week: 4.2 oz     Types: 7 Shots of liquor per week    Drug use: Not Currently         Physical Exam:  Vitals:    03/31/23 1058   BP: 122/80   Pulse: 87   Temp: 36.3 °C (97.3 °F)   SpO2: 97%     Body mass index is 17.78 kg/m².  Physical Exam  Constitutional:       Appearance: He is cachectic.   HENT:      Head: Normocephalic and atraumatic.   Eyes:      Extraocular Movements: Extraocular movements intact.      Pupils: Pupils are equal, round, and reactive to light.   Cardiovascular:       Rate and Rhythm: Normal rate and regular rhythm.      Pulses: Normal pulses.      Heart sounds: Normal heart sounds. No murmur heard.    No friction rub. No gallop.   Pulmonary:      Effort: Pulmonary effort is normal. No respiratory distress.      Breath sounds: Normal breath sounds. No stridor. No wheezing or rales.   Abdominal:      General: Abdomen is flat. Bowel sounds are normal.      Palpations: Abdomen is soft.      Tenderness: There is no right CVA tenderness or left CVA tenderness.   Musculoskeletal:         General: Normal range of motion.      Cervical back: Normal range of motion.      Right lower leg: No edema.      Left lower leg: No edema.   Skin:     General: Skin is warm and dry.   Neurological:      General: No focal deficit present.      Mental Status: He is alert and oriented to person, place, and time.   Psychiatric:         Mood and Affect: Mood normal.         Behavior: Behavior normal.        Labs:  none    Imaging:  None      Assessment/Plan:  COPD (chronic obstructive pulmonary disease) (HCC)  Worsening COPD symptoms. No wheezing or rhales heard on physical exam. Patient notes increased fatigue. He is having a hard time with ambulation as well.     Plan:  -PFTs (last 2017)  -Annual exam with Dr. Reynaga   -Refilled inhalers    Debility  Increased difficulty with ambulation. He is concerned that it has something to do with vascular surgery one year prior. He has fallen three times in the past year. Concern for possible debility because of malnutrition and COPD.     Plan:  -Physical therapy  -Patient agreed to using a cane, ordered via DME           All imaging results and lab results and consult notes are reviewed at this visit.  Followup: Return in about 4 weeks (around 4/28/2023).    Jelena Hernandez MD  Internal Medicine PGY-2

## 2023-03-31 NOTE — PATIENT INSTRUCTIONS
Annual wellness exam with Dr. Alexx Reynaga  Refilled medications  Physical therapy referral   Pulmonary function test referral

## 2023-04-01 PROBLEM — D64.9 ANEMIA: Status: RESOLVED | Noted: 2022-06-22 | Resolved: 2023-04-01

## 2023-04-01 PROBLEM — R06.02 SHORTNESS OF BREATH: Status: RESOLVED | Noted: 2022-10-11 | Resolved: 2023-04-01

## 2023-04-01 PROBLEM — R10.9 ABDOMINAL PAIN: Status: RESOLVED | Noted: 2022-10-14 | Resolved: 2023-04-01

## 2023-04-01 NOTE — ASSESSMENT & PLAN NOTE
Increased difficulty with ambulation. He is concerned that it has something to do with vascular surgery one year prior. He has fallen three times in the past year. Concern for possible debility because of malnutrition and COPD.     Plan:  -Physical therapy  -Patient agreed to using a cane, ordered via DME

## 2023-04-01 NOTE — ASSESSMENT & PLAN NOTE
Worsening COPD symptoms. No wheezing or rhales heard on physical exam. Patient notes increased fatigue. He is having a hard time with ambulation as well.     Plan:  -PFTs (last 2017)  -Annual exam with Dr. Reynaga   -Refilled inhalers

## 2023-04-03 ENCOUNTER — TELEPHONE (OUTPATIENT)
Dept: HEALTH INFORMATION MANAGEMENT | Facility: OTHER | Age: 78
End: 2023-04-03
Payer: MEDICARE

## 2023-05-03 ENCOUNTER — OFFICE VISIT (OUTPATIENT)
Dept: INTERNAL MEDICINE | Facility: OTHER | Age: 78
End: 2023-05-03
Payer: MEDICARE

## 2023-05-03 VITALS
HEIGHT: 68 IN | BODY MASS INDEX: 17.19 KG/M2 | WEIGHT: 113.4 LBS | DIASTOLIC BLOOD PRESSURE: 72 MMHG | TEMPERATURE: 98.9 F | OXYGEN SATURATION: 97 % | HEART RATE: 97 BPM | SYSTOLIC BLOOD PRESSURE: 114 MMHG

## 2023-05-03 DIAGNOSIS — K21.9 GASTROESOPHAGEAL REFLUX DISEASE: ICD-10-CM

## 2023-05-03 DIAGNOSIS — L29.9 PRURITUS: ICD-10-CM

## 2023-05-03 DIAGNOSIS — J44.9 CHRONIC OBSTRUCTIVE PULMONARY DISEASE, UNSPECIFIED COPD TYPE (HCC): ICD-10-CM

## 2023-05-03 PROCEDURE — G0439 PPPS, SUBSEQ VISIT: HCPCS | Performed by: STUDENT IN AN ORGANIZED HEALTH CARE EDUCATION/TRAINING PROGRAM

## 2023-05-03 RX ORDER — OMEPRAZOLE 20 MG/1
40 CAPSULE, DELAYED RELEASE ORAL DAILY
Qty: 90 CAPSULE | Refills: 6 | Status: SHIPPED | OUTPATIENT
Start: 2023-05-03 | End: 2024-03-20 | Stop reason: SDUPTHER

## 2023-05-03 RX ORDER — FLUTICASONE FUROATE AND VILANTEROL 100; 25 UG/1; UG/1
POWDER RESPIRATORY (INHALATION)
Qty: 60 EACH | Refills: 6 | Status: SHIPPED | OUTPATIENT
Start: 2023-05-03 | End: 2023-05-09 | Stop reason: CLARIF

## 2023-05-03 RX ORDER — ALBUTEROL SULFATE 90 UG/1
2 AEROSOL, METERED RESPIRATORY (INHALATION) EVERY 6 HOURS PRN
Qty: 18 G | Refills: 6 | Status: SHIPPED | OUTPATIENT
Start: 2023-05-03 | End: 2024-01-12

## 2023-05-03 RX ORDER — AMLODIPINE BESYLATE 5 MG/1
5 TABLET ORAL DAILY
Qty: 30 TABLET | Refills: 6 | Status: SHIPPED | OUTPATIENT
Start: 2023-05-03 | End: 2024-03-20

## 2023-05-03 RX ORDER — ATORVASTATIN CALCIUM 40 MG/1
40 TABLET, FILM COATED ORAL NIGHTLY
Qty: 30 TABLET | Refills: 6 | Status: SHIPPED | OUTPATIENT
Start: 2023-05-03 | End: 2023-09-18

## 2023-05-03 RX ORDER — CLOPIDOGREL BISULFATE 75 MG/1
75 TABLET ORAL DAILY
Qty: 30 TABLET | Refills: 6 | Status: SHIPPED | OUTPATIENT
Start: 2023-05-03 | End: 2024-03-20 | Stop reason: SDUPTHER

## 2023-05-03 RX ORDER — HYDROXYZINE HYDROCHLORIDE 25 MG/1
25 TABLET, FILM COATED ORAL
Qty: 30 TABLET | Refills: 6 | Status: SHIPPED | OUTPATIENT
Start: 2023-05-03 | End: 2024-03-20 | Stop reason: SDUPTHER

## 2023-05-03 ASSESSMENT — PATIENT HEALTH QUESTIONNAIRE - PHQ9: CLINICAL INTERPRETATION OF PHQ2 SCORE: 0

## 2023-05-03 ASSESSMENT — FIBROSIS 4 INDEX: FIB4 SCORE: 4.04

## 2023-05-03 ASSESSMENT — ENCOUNTER SYMPTOMS: GENERAL WELL-BEING: FAIR

## 2023-05-03 ASSESSMENT — ACTIVITIES OF DAILY LIVING (ADL): BATHING_REQUIRES_ASSISTANCE: 0

## 2023-05-03 NOTE — PATIENT INSTRUCTIONS
- Continue all home medications  - Schedule PFT's  - High calorie/protein diet  - Follow up with Vascular  - Follow up in 3-4 months to re-establish care with New PCP

## 2023-05-03 NOTE — PROGRESS NOTES
Chief Complaint   Patient presents with    Annual Exam       HPI:  Fede is a 78 y.o. here for Medicare Annual Wellness Visit    Patient is here for annual exam.  States feeling well, just limitations with ambulation sec to PAD.  He has lost weight recently he says is due to poor food intake.  Has had recent screening, negative results.    Patient Active Problem List    Diagnosis Date Noted    Debility 10/11/2022    Severe Malnutrition (HCC) 10/11/2022    Aortic thrombus (HCC) 06/23/2022    Thyroid nodule 06/22/2022    Health care maintenance 12/15/2021    Peripheral artery disease (HCC) 09/18/2020    Vitamin D deficiency 06/05/2019    Iron deficiency anemia secondary to inadequate dietary iron intake 06/05/2019    Pruritus 08/27/2017    CKD (chronic kidney disease), stage III (HCC) 07/03/2017    History of hepatitis C 02/15/2017    Tobacco use 02/15/2017    Alcohol use 02/15/2017    Multiple thyroid nodules 12/14/2016    COPD (chronic obstructive pulmonary disease) ([facility]) 12/13/2016    GERD (gastroesophageal reflux disease) 12/13/2016    Essential hypertension 12/13/2016    Substance abuse ([facility]) 12/13/2016       Current Outpatient Medications   Medication Sig Dispense Refill    tiotropium (SPIRIVA HANDIHALER) 18 MCG Cap INHALE 1 CAPSULE VIA HANDIHALER ONCE DAILY 90 Capsule 6    omeprazole (PRILOSEC) 20 MG delayed-release capsule Take 2 Capsules by mouth every day. TAKE ONE CAPSULE BY MOUTH ONE TIME DAILY 90 Capsule 6    hydrOXYzine HCl (ATARAX) 25 MG Tab Take 1 Tablet by mouth 1 time a day as needed for Itching. 30 Tablet 6    fluticasone furoate-vilanterol (BREO) 100-25 MCG/ACT AEROSOL POWDER, BREATH ACTIVATED INHALE ONE PUFF BY MOUTH ONE TIME DAILY 60 Each 6    clopidogrel (PLAVIX) 75 MG Tab Take 1 Tablet by mouth every day. 30 Tablet 6    amLODIPine (NORVASC) 5 MG Tab Take 1 Tablet by mouth every day. 30 Tablet 6    albuterol (VENTOLIN HFA) 108 (90 Base) MCG/ACT Aero Soln inhalation aerosol Inhale 2 Puffs  every 6 hours as needed for Shortness of Breath. INHALE TWO PUFFS BY MOUTH EVERY SIX HOURS AS NEEDED 18 g 6    atorvastatin (LIPITOR) 40 MG Tab Take 1 Tablet by mouth every evening. 30 Tablet 6     No current facility-administered medications for this visit.        Patient is taking medications as noted in medication list.  Current supplements as per medication list.     Allergies: Ace inhibitors    Current social contact/activities: none     Is patient current with immunizations? Yes.    He  reports that he has been smoking cigarettes. He has been smoking an average of .5 packs per day. He has never used smokeless tobacco. He reports current alcohol use of about 4.2 oz per week. He reports current drug use. Drug: Cocaine.  Ready to quit: Not Answered  Counseling given: Not Answered      ROS:    Gait: Uses a walker   Ostomy: No   Other tubes: No   Amputations: No   Chronic oxygen use No   Last eye exam long time ago   Wears hearing aids: No   : Denies any urinary leakage during the last 6 months    Screening:    Depression Screening  Little interest or pleasure in doing things?  0 - not at all  Feeling down, depressed, or hopeless? 0 - not at all  Patient Health Questionnaire Score: 0    If depressive symptoms identified deferred to follow up visit unless specifically addressed in assessment and plan.    Interpretation of PHQ-9 Total Score   Score Severity   1-4 No Depression   5-9 Mild Depression   10-14 Moderate Depression   15-19 Moderately Severe Depression   20-27 Severe Depression    Screening for Cognitive Impairment  Three Minute Recall (daughter, heaven, mountain)   /3    Abhinav clock face with all 12 numbers and set the hands to show 10 past 11.       If cognitive concerns identified, deferred for follow up unless specifically addressed in assessment and plan.    Fall Risk Assessment  Has the patient had two or more falls in the last year or any fall with injury in the last year?  No  If fall risk  identified, deferred for follow up unless specifically addressed in assessment and plan.    Safety Assessment  Throw rugs on floor.     Handrails on all stairs.     Good lighting in all hallways.     Difficulty hearing.     Patient counseled about all safety risks that were identified.    Functional Assessment ADLs  Are there any barriers preventing you from cooking for yourself or meeting nutritional needs?   .    Are there any barriers preventing you from driving safely or obtaining transportation?   .    Are there any barriers preventing you from using a telephone or calling for help?   .    Are there any barriers preventing you from shopping?   .    Are there any barriers preventing you from taking care of your own finances?   .    Are there any barriers preventing you from managing your medications?   .    Are there any barriers preventing you from showering, bathing or dressing yourself?   .    Are you currently engaging in any exercise or physical activity?   .     What is your perception of your health?   .    Advance Care Planning  Do you have an Advance Directive, Living Will, Durable Power of , or POLST?                 Health Maintenance Summary            Overdue - Annual Wellness Visit (Every 366 Days) Overdue - never done      No completion history exists for this topic.              Ordered - Annual Pulmonary Function Test / Spirometry (Yearly) Ordered on 3/31/2023      No completion history exists for this topic.              Overdue - IMM PNEUMOCOCCAL VACCINE: 65+ Years (2 - PPSV23 if available, else PCV20) Overdue since 5/17/2017 03/22/2017  Imm Admin: Pneumococcal Conjugate Vaccine (Prevnar/PCV-13)              Overdue - IMM ZOSTER VACCINES (2 of 3) Overdue since 6/3/2017      04/08/2017  Imm Admin: Zoster Vaccine Live (ZVL) (Zostavax) - HISTORICAL DATA              Overdue - COVID-19 Vaccine (2 - Booster for Elayne series) Overdue since 6/7/2021 04/12/2021  Imm Admin: Elayne  SARS-CoV-2 Vaccine              IMM DTaP/Tdap/Td Vaccine (2 - Td or Tdap) Next due on 2/15/2027      02/15/2017  Imm Admin: Tdap Vaccine              IMM INFLUENZA (Series Information) Completed      10/11/2022  Imm Admin: Influenza Vaccine Adult HD    12/15/2021  Imm Admin: Influenza Vaccine Adult HD    09/25/2020  Imm Admin: Influenza Vaccine Adult HD    11/05/2019  Imm Admin: Influenza Vaccine Adult HD    11/05/2018  Imm Admin: Influenza Vaccine Adult HD    Only the first 5 history entries have been loaded, but more history exists.              IMM HEP B VACCINE (Series Information) Aged Out      No completion history exists for this topic.              IMM MENINGOCOCCAL ACWY VACCINE (Series Information) Aged Out      No completion history exists for this topic.              Discontinued - COLORECTAL CANCER SCREENING  Discontinued        Frequency changed to Never automatically (Topic No Longer Applies)    07/07/2016  REFERRAL TO GI FOR COLONOSCOPY                    Patient Care Team:  Alexx Reynaga M.D. as PCP - General (Internal Medicine)    Social History     Tobacco Use    Smoking status: Some Days     Packs/day: 0.50     Types: Cigarettes    Smokeless tobacco: Never   Vaping Use    Vaping Use: Never used   Substance Use Topics    Alcohol use: Yes     Alcohol/week: 4.2 oz     Types: 7 Shots of liquor per week    Drug use: Yes     Types: Cocaine     Family History   Problem Relation Age of Onset    Heart Disease Mother     Cancer Mother     Diabetes Brother      He  has a past medical history of Hepatitis, Hyperlipemia, Hypertension, and Pulmonary emphysema (HCC).   Past Surgical History:   Procedure Laterality Date    FEMORAL FEMORAL BYPASS Bilateral 9/4/2020    Procedure: CREATION, BYPASS, ARTERIAL, FEMORAL TO FEMORAL, USING GRAFT;  Surgeon: Jean-Claude Prado M.D.;  Location: SURGERY [facility];  Service: General       Exam:   /72 (BP Location: Right arm, Patient Position: Sitting, BP Cuff Size:  "Small adult)   Pulse 97   Temp 37.2 °C (98.9 °F) (Temporal)   Ht 1.716 m (5' 7.55\")   Wt 51.4 kg (113 lb 6.4 oz)   SpO2 97%  Body mass index is 17.47 kg/m².    Hearing poor.  He needs to make appointment for hearing aids.   Dentition fair  Alert, oriented in no acute distress.  Eye contact is good, speech goal directed, affect calm      Assessment and Plan.     Annual exam.    Patient needs PFT's ordered months ago.  No need for labs for now.  Make appointment to re-establish care with a new physician.    Services suggested: No needs.  Health Care Screening recommendations as per orders if indicated.  Referrals offered: PT/OT/Nutrition counseling/Behavioral Health/Smoking cessation as per orders if indicated.    Discussion today about general wellness and lifestyle habits:    Prevent falls and reduce trip hazards; Cautioned about securing or removing rugs.  Have a working fire alarm and carbon monoxide detector;   Engage in regular physical activity and social activities     Follow-up: No follow-ups on file.   "

## 2023-05-05 ENCOUNTER — TELEPHONE (OUTPATIENT)
Dept: INTERNAL MEDICINE | Facility: OTHER | Age: 78
End: 2023-05-05
Payer: MEDICARE

## 2023-05-05 DIAGNOSIS — J44.9 CHRONIC OBSTRUCTIVE PULMONARY DISEASE, UNSPECIFIED COPD TYPE (HCC): ICD-10-CM

## 2023-05-09 RX ORDER — BUDESONIDE AND FORMOTEROL FUMARATE DIHYDRATE 160; 4.5 UG/1; UG/1
2 AEROSOL RESPIRATORY (INHALATION) 2 TIMES DAILY
Qty: 1 EACH | Refills: 1 | Status: SHIPPED | OUTPATIENT
Start: 2023-05-09 | End: 2023-08-21 | Stop reason: SDUPTHER

## 2023-08-21 DIAGNOSIS — J44.9 CHRONIC OBSTRUCTIVE PULMONARY DISEASE, UNSPECIFIED COPD TYPE (HCC): ICD-10-CM

## 2023-08-21 RX ORDER — BUDESONIDE AND FORMOTEROL FUMARATE DIHYDRATE 160; 4.5 UG/1; UG/1
2 AEROSOL RESPIRATORY (INHALATION) 2 TIMES DAILY
Qty: 1 EACH | Refills: 0 | Status: SHIPPED | OUTPATIENT
Start: 2023-08-21 | End: 2024-03-20

## 2023-09-18 RX ORDER — ATORVASTATIN CALCIUM 40 MG/1
40 TABLET, FILM COATED ORAL NIGHTLY
Qty: 90 TABLET | Refills: 2 | Status: SHIPPED | OUTPATIENT
Start: 2023-09-18 | End: 2024-03-20

## 2023-12-11 ENCOUNTER — TELEPHONE (OUTPATIENT)
Dept: MEDICAL GROUP | Age: 78
End: 2023-12-11
Payer: MEDICARE

## 2024-01-11 DIAGNOSIS — J44.9 CHRONIC OBSTRUCTIVE PULMONARY DISEASE, UNSPECIFIED COPD TYPE (HCC): ICD-10-CM

## 2024-01-12 RX ORDER — ALBUTEROL SULFATE 90 UG/1
2 AEROSOL, METERED RESPIRATORY (INHALATION) EVERY 6 HOURS PRN
Qty: 18 G | Refills: 0 | Status: SHIPPED | OUTPATIENT
Start: 2024-01-12 | End: 2024-02-08 | Stop reason: SDUPTHER

## 2024-02-08 DIAGNOSIS — J44.9 CHRONIC OBSTRUCTIVE PULMONARY DISEASE, UNSPECIFIED COPD TYPE (HCC): ICD-10-CM

## 2024-02-08 RX ORDER — ALBUTEROL SULFATE 90 UG/1
2 AEROSOL, METERED RESPIRATORY (INHALATION) EVERY 6 HOURS PRN
Qty: 18 G | Refills: 0 | Status: SHIPPED | OUTPATIENT
Start: 2024-02-08 | End: 2024-02-26

## 2024-02-09 NOTE — TELEPHONE ENCOUNTER
Received request via: Pharmacy    Was the patient seen in the last year in this department? Yes    Does the patient have an active prescription (recently filled or refills available) for medication(s) requested?  Please review last Rx that was sent in    Pharmacy Name: [facility]     Does the patient have long term Plus and need 100 day supply (blood pressure, diabetes and cholesterol meds only)? Patient does not have SCP

## 2024-02-25 DIAGNOSIS — J44.9 CHRONIC OBSTRUCTIVE PULMONARY DISEASE, UNSPECIFIED COPD TYPE (HCC): ICD-10-CM

## 2024-02-26 RX ORDER — ALBUTEROL SULFATE 90 UG/1
2 AEROSOL, METERED RESPIRATORY (INHALATION) EVERY 6 HOURS PRN
Qty: 18 G | Refills: 0 | Status: SHIPPED | OUTPATIENT
Start: 2024-02-26 | End: 2024-03-14 | Stop reason: SDUPTHER

## 2024-02-26 NOTE — TELEPHONE ENCOUNTER
Received request via: Pharmacy    Was the patient seen in the last year in this department? Yes    Does the patient have an active prescription (recently filled or refills available) for medication(s) requested?  Please review last Rx that was sent in.     Pharmacy Name: [facility]     Does the patient have long-term Plus and need 100 day supply (blood pressure, diabetes and cholesterol meds only)? Patient does not have SCP

## 2024-03-01 DIAGNOSIS — J44.9 CHRONIC OBSTRUCTIVE PULMONARY DISEASE, UNSPECIFIED COPD TYPE (HCC): ICD-10-CM

## 2024-03-01 RX ORDER — FLUTICASONE FUROATE AND VILANTEROL 100; 25 UG/1; UG/1
1 POWDER RESPIRATORY (INHALATION)
Qty: 60 EACH | Refills: 1 | Status: SHIPPED | OUTPATIENT
Start: 2024-03-01 | End: 2024-03-20

## 2024-03-01 NOTE — TELEPHONE ENCOUNTER
Patient Information     Patient Name MRN Sex Vicki Kaiser [id number] Female 1939      Progress Notes by Kirsten Guevara DO at 2017 10:00 AM     Author:  Kirsten Guevara DO Service:  (none) Author Type:  PHYS- Osteopathic     Filed:  2017  7:06 AM Encounter Date:  2017 Status:  Signed     :  Kirsten Guevara DO (PHYS- Osteopathic)            Chief Complaint     Patient presents with       Establish Care      Blood Pressure      Blood Pressure Check       HPI: Ms. Mckeon is a 78 y.o. female who presents today to [facility].  She overall is feeling good.      The patient was last seen by me we stopped her hydrochlorothiazide due to her chronic kidney disease.  It she did have an increase in her blood pressures and was started on amlodipine.  Since starting the amlodipine she has noted some lower extremity edema.  She has also had some possible weight gain and some fatigue.  She is additionally on metoprolol for her blood pressure.  Her heart rate has been in the 56-73 range.  Her blood pressure at home continues to be mildly elevated in the 140 range systolic.  She denies any headaches, chest pain or dizziness.    She has been having some ongoing issues with chronic left hip and back pain.  She did see a physician in [place] who started her on prednisone and referred her for physical therapy.  She does start this tomorrow.  She does have some concern about an x-ray that was done back in  that showed some lucency concerning for a screw loose.  I did discuss this with radiology who did not feel that further imaging was indicated at this time.    Patient does have a history of osteopenia.  Her last DEXA scan was in  and did show elevated fracture score is greater than 3% at the hip and greater than 20% overall.  She has not been on any medications prior however does take vitamin D daily and eats a significant amount of calcium.  She does participate in bone builders  Received request via: Pharmacy    Was the patient seen in the last year in this department? Yes    Does the patient have an active prescription (recently filled or refills available) for medication(s) requested?  No. Per comment on pharm, this has been discontinued     [facility]     Does the patient have MCFP Plus and need 100 day supply (blood pressure, diabetes and cholesterol meds only)? Patient does not have SCP   classes.    She is post menopausal.  She did have a mammogram done in 2017 and would like to proceed with every other year.  She did have a colonoscopy done in  that was normal and no longer would like any cancer screening.  She is up-to-date on her vaccines at this time.    History is discussed on 2017 with patient and reviewed in previous available records by myself.  It is current to the best of my knowledge as below.    Past Medical History:     Diagnosis  Date     Anemia 5/15/2013     Ankle fracture      Chronic left hip pain 2017     Essential hypertension      Hx of pregnancy      2, para 2 with 2 spontaneous vaginal deliveries      Mitral valve prolapse 2008    with normal echo and no residual prolapse       Osteopenia      Pure hypercholesterolemia      Renal insufficiency 2014    Stable. First noted when patient was taking NSAIDs regularly due to back pain.        Scoliosis      Spinal stenosis      Thyroid nodule 2012    Patient with multinodular goiter. Negative cytology and stable ultrasound findings.            Past Surgical History:      Procedure  Laterality Date     ANKLE FRACTURE TREATMENT      ORIF left ankle w/ later removal of hardware        BACK SURGERY  10/2011    Back surgery, Dr. Linda, [facility]       BREAST BIOPSY      left benign       CATARACT REMOVAL      bilateral       COLONOSCOPY SCREENING  ,    F/U N/A       PELVIC LAPAROSCOPY      Laparoscopy for pelvic pain which was negative           Current Outpatient Prescriptions     Medication  Sig     ASPIRIN LOW DOSE ORAL Take 1 Tab by mouth once daily.     cholecalciferol (VITAMIN D) 1,000 unit capsule Take 1 capsule by mouth once daily.     cyanocobalamin (VITAMIN B-12) 1,000 mcg tablet Take 1 tablet by mouth once daily.     hydrocortisone 2.5% cream Apply  topically to affected area(s) 2 times daily.     lisinopril (PRINIVIL; ZESTRIL) 10 mg tablet Take 1 tablet by mouth  once daily. To replace Amlodipine     metoprolol tartrate (LOPRESSOR) 25 mg tablet Take 1 tablet by mouth 2 times daily.     predniSONE (DELTASONE) 20 mg tablet      No current facility-administered medications for this visit.      Medications have been reviewed by me and are current to the best of my knowledge and ability.       Allergies      Allergen   Reactions     Sulfa (Sulfonamide Antibiotics)  Other - Describe In Comment Field     Doesn't remember         Family History       Problem   Relation Age of Onset     Arthritis  Mother      rheumatoid arthritis, psoriasis       Cancer  Father      renal       Cancer-breast  Sister 66     essential tremor (treated with surgery)       Heart Disease  Brother      pacemaker placement       Other  Brother      back issues         Family Status     Relation  Status     Mother  at age 68     Father  at age 77     of renal cancer      Daughter Alive     Son Alive     Sister Alive     Brother Alive     Brother Alive         Social History     Social History        Marital status:       Spouse name: N/A     Number of children:  N/A     Years of education:  N/A     Social History Main Topics          Smoking status:   Former Smoker      Quit date:  1968      Smokeless tobacco:   Never Used      Alcohol use   Yes      Comment: Occasional        Drug use:   No      Sexual activity:   Yes      Partners:  Male       Comment: monogamous       Other Topics  Concern     Not on file      Social History Narrative     Patient is , living with .  She has two grown children.  She does exercise regularly.  She is retired.        Esequiel Mckeon -     Irvin Mckeon - Son, born , lives in [facility] - Daughter, born , lives in [place]    2 grandchildren          ROS  GEN: -fevers/-chills/-night sweats/-wt change  NEURO: -headaches/-vision changes  EARS: -hearing changes/-tinnitus  NOSE:  "-drainage/-congestion  MOUTH/THROAT: - sore throat/-dysphagia/-sores  LUNGS: -sob/-cough  CARDIOVASCULAR: -cp/-palpitations  GI: -pain/-diarrhea/-constipation/-bloody stools  : -dysuria/-hematuria/-vaginal discharge or bleeding  HEMATOLOGIC/LYMPHATIC: -swollen nodes/-easy bruising  SKIN: -rashes/-lesions  MSK/RHEUM: +joint pain/-swelling  NEURO: -weakness/-parasthesias  PSYCH: -anhedonia/-depression/-anxiety       EXAM:   /65  Pulse 60  Temp 98.5  F (36.9  C) (Tympanic)  Ht 1.613 m (5' 3.5\")  Wt 83.3 kg (183 lb 9.6 oz)  Breastfeeding? No  BMI 32.01 kg/m2  Estimated body mass index is 32.01 kg/(m^2) as calculated from the following:    Height as of this encounter: 1.613 m (5' 3.5\").    Weight as of this encounter: 83.3 kg (183 lb 9.6 oz).   GEN: Vitals reviewed.  Healthy appearing. Patient is in no acute distress. Cooperative with exam.  HEENT: Normocephalic atraumatic.  Normal external eye, conjunctiva, lids, cornea with no scleral icterus or conjunctival erythema. Pupils equally round. Oropharynx with no erythema or exudates. Dentition adequate.    NECK: Supple; no thyromegaly or masses noted.  No cervical or supraclavicular lymphadenopathy.  CV: Heart regular in rate and rhythm with no murmur.  Radial and posterior tibial pulses palpable.  LUNGS: Lungs clear to auscultation bilaterally.  Chest rise equal bilaterally.  No accessory muscle use.  ABD:  Obese, nondistended  SKIN: Warm and dry to touch.  No rash on face, arms and legs.  EXT: No clubbing or cyanosis.  is bilateral lower extremity peripheral edema.  NEURO: Alert and oriented to person, place, and time.  Cranial nerves II-XII grossly intact with no focal or lateralizing deficits.  Muscle tone normal.  Gait normal. No tremor. Sensation intact to light touch.  MSK: ROM of upper and lower ext symmetric and full.  PSYCH: Mood is good. Affect appropriate. Speech fluent. Answers questions appropriately and thought process normal.    LABS: " 9/14/2017 - Personally ordered/reviewed  Results for orders placed or performed in visit on 09/14/17      BASIC METABOLIC PANEL      Result  Value Ref Range    SODIUM 139 133 - 143 mmol/L    POTASSIUM 4.4 3.5 - 5.1 mmol/L    CHLORIDE 105 98 - 107 mmol/L    CO2,TOTAL 23 21 - 31 mmol/L    ANION GAP 11 5 - 18                    GLUCOSE 108 (H) 70 - 105 mg/dL    CALCIUM 10.0 8.6 - 10.3 mg/dL    BUN 31 (H) 7 - 25 mg/dL    CREATININE 1.21 0.70 - 1.30 mg/dL    BUN/CREAT RATIO           26                    GFR if African American 52 (L) >60 ml/min/1.73m2    GFR if not  43 (L) >60 ml/min/1.73m2             ASSESSMENT AND PLAN:    1. Osteopenia of multiple sites  - no history of personal fracture  - repeat Dexa [id number]  - recommend Calcium 1200-1500mg daily between diet and supplement  - recommend Vitamin D 1000 IU daily  - recommend strengthening exercises  - check vit D and Ca levels periodically  - we did discuss the option of starting a bisphosphonate.  She will think about this and let me know if she is interested in the medication.    2. Chronic left hip pain  - at this time she should continue with physical therapy and see what kind of improvement she gets with this.  If she does not have any significant help we may need to consider MR of the back to further evaluate if this is contributing to her hip pain.  Is to call if she has any new or changing symptoms.  - Ice, elevation, gentle movement/rest as tolerated  - Tylenol as needed  - patient is to call if she has additional problems with this or if new symptoms develop    3. Hypertension  - Blood pressure today of 138/65  is at the goal of <140/90 however given side effects we will discontinue her amlodipine and change her over to lisinopril.  She is to have her labs rechecked in 1 week to assess renal function and potassium on this.  - Instructed to check BP at home.  - Cautioned patient to monitor with antibiotics, herbals and any OTC  medications  - lisinopril (PRINIVIL; ZESTRIL) 10 mg tablet; Take 1 tablet by mouth once daily. To replace Amlodipine  Dispense: 30 tablet; Refill: 2  - BASIC METABOLIC PANEL    4. Chronic kidney disease (CKD), stage 3 (moderate)  - eGFR stable, recheck every 6 months  - electrolytes stable, check RFP, H/H, PTH, and urine albumin yearly  - monitor Vitamin D level for sufficiency periodically  - avoid NSAIDS, need to renally dose medications          Return in about 2 months (around 11/14/2017) for Recheck/Follow Up, Blood pressure.         Patient Instructions   Consider starting medications for bone density - consider Fosamax (weekly pill) or Prolia (injection)    Recommend taking Vitamin D 1000 IU daily.    Also, Calcium 1200-1500mg daily from supplement and diet combined.  Be sure to take into account the amount of calcium you may be getting from food daily so as not to get too much calcium.    Recommend daily exercise with a focus on low-weight strengthening.  Yoga is a great way to do this.    Please let me know if you have any questions regarding this.       Index German Related topics   Calcium in the Diet   ________________________________________________________________________  KEY POINTS    Calcium is a mineral that is important for your heart, bones, teeth, and muscles to stay healthy.    Eating or drinking certain things can cause you to lose calcium.    You can get calcium from dairy products, calcium-fortified foods, or from supplements. If you can get enough calcium in your diet, you do not need to take calcium supplements.  ________________________________________________________________________  What is calcium?   Calcium is a mineral that is very important for:    Heart health    Bone health    Teeth    Nerves    Muscles    Blood clotting    How much calcium do I need?   Many food products list the amount of calcium per serving on the label. Food labels list calcium as a % of the Daily Value (DV)  based on 1,000 mg of calcium per day. Look for foods that provide 10% or more of the daily value for calcium.   The total amount of calcium you need, preferably from dairy foods, depends on your age and gender:      Group           Calcium/Day  --------------------------------------  Adults 19 to 50  1000 mg  Women 51 to 70   1200 mg  Men 51 to 70     1000 mg  Adults over 70   1200 mg  --------------------------------------  * mg = milligrams    What keeps me from getting enough calcium?   Here are some things that can make it harder for your body to get enough calcium:    Not getting enough vitamin D. Vitamin D increases the amount of calcium absorbed by your body. It s important to get enough sunlight to help your body make vitamin D and to choose foods that contain vitamin D. Milk contains vitamin D and some brands of cheese, yogurt, juice, and margarine have added vitamin D. Check labels for the amount of vitamin D per serving. Fish such as salmon, mackerel, and tuna are good natural sources of vitamin D. If your provider recommends that you take a calcium supplement, there are some that include vitamin D.    Too much fiber in the diet. This is more of a concern if you have low amounts of calcium in your diet. Take calcium supplements or eat calcium-fortified foods 2 hours before or after eating 100% bran products. Soaking beans in water and discarding the liquid before cooking can also help.    Soft drinks, energy drinks, tea, and coffee. People who drink these products instead of milk often don't get enough calcium.    Taking some medicines. Medicines such as some antibiotics, heartburn medicines that decrease stomach acid production, and antacids that contain aluminum can make it harder for your body to absorb calcium.  These things can cause you to lose calcium:    Eating a lot of protein foods, such as meats, poultry, and eggs. The more protein you eat, the more calcium you lose. As long as your diet is  balanced and contains enough calcium, this should not be a problem.    Eating a lot of salt. The more salt in your diet, the more calcium you lose. Limit the salt in your diet. Cutting back on salt and getting enough calcium can help lower blood pressure and help prevent fluid retention.  Milk products are one of the best sources of calcium. Calcium is also in many other foods such as vegetables, beans, nuts, seeds, and soy. However, the calcium in these foods is not absorbed as well as the calcium in milk products. Calcium has been added to some foods (fortified), which makes it easier to meet daily calcium needs, but it still can be hard for your body to get enough calcium if dairy foods are not a part of your diet.     Do I need a calcium supplement?   If you can get enough calcium in your diet, you do not need to take calcium supplements. People who get too much calcium have a higher risk for kidney stones and stroke. Men who take calcium supplements are at higher risk for a heart attack. Ask your healthcare provider if you should take calcium supplements, and which kind you should take.   Calcium supplements of 1000 mg or less per day do not prevent fractures in postmenopausal women. However, there may be some benefit from higher doses of calcium supplements. If you are a postmenopausal woman and you have never had a fracture, ask your healthcare provider if you should take calcium supplements.  You may need a supplement if you:    Have digestive problems that prevent you from absorbing calcium.    Avoid dairy products due to allergic or other reactions (such as lactose intolerance or milk allergy).    Don't eat any animal products.    Do not get enough calcium in your diet.    Are pregnant or breast-feeding.    Have osteoporosis or osteopenia (weakened bones).    Have a vitamin D deficiency.  There are many kinds of calcium supplements. The most common are calcium carbonate and calcium citrate.     Calcium  carbonate is best absorbed with a meal.    Calcium citrate can be taken on a full or empty stomach. Calcium citrate may be a better choice for older adults or younger people who have low levels of stomach acid.    Calcium phosphate, lactate, and gluconate are also well absorbed. However, the amount of calcium per pill is lower, so you may need to take many pills a day to meet your needs.  Your body absorbs calcium best if you take no more than 500 mg at a time, and take it two or more times per day as recommended by your healthcare provider. Look for calcium supplements that have the USP or Consumer Lab symbol on the label. Products with these labels have been tested to make sure they are absorbed by the body.    How can I eat the right amount of calcium?  Eat more calcium-rich foods. Here are some ideas for adding calcium to your diet.    Have low-fat or nonfat milk, cottage cheese with fruit, or yogurt for snacks.    Eat calcium-fortified breakfast cereals with rice, almond, or soy milk, or have calcium-fortified waffles or pancakes.    Cook hot cereals with milk instead of water.    Serve yogurt or milk smoothies instead of juice.    Add yogurt to lunches or use a dip made with yogurt when having a fruit snack.    Add lean shredded cheese to baked potatoes, vegetables, soups, and salads.    Use milk when making cream soups instead of water.    Serve flavored milk or hot chocolate for an evening treat.    Use Parmesan cheese topping for Italian dishes. A 2 tablespoon serving adds about 140 mg of calcium.    Serve a healthy vegetable pizza made with low-fat cheese.    Serve lean mozzarella string cheese with crackers and fruit for a snack.    Make puddings with milk.    Get plenty of exercise. Walk a mile a day if you can and do strength training exercise a few times a week. Exercise helps your body to use calcium to strengthen your bones.  Some people cannot digest milk products because their bodies lack the  enzyme needed to break down milk sugar. This problem is called lactose intolerance. If you have this problem, you can buy products such as Lactaid or Dairy Ease. These products contain lactase, which can help you digest milk products.    For more information contact:    The Academy of Nutrition and Dietetics  754.850.2785  http://www.eatright.org  Developed by [employer].  Adult Advisor 2016.3 published by [employer].  Last modified: 2015-04-17  Last reviewed: 2015-03-25  This content is reviewed periodically and is subject to change as new health information becomes available. The information is intended to inform and educate and is not a replacement for medical evaluation, advice, diagnosis or treatment by a healthcare professional.  References   Adult Advisor 2016.3 Index    Copyright   2016 [employer], a division of [employer] All rights reserved.               CHRIS HORAN DO   9/14/2017 6:40 PM    This document was prepared using voice generated softwear. While every attempt was made for accuracy, grammatical errors may exist.

## 2024-03-05 ENCOUNTER — TELEPHONE (OUTPATIENT)
Dept: INTERNAL MEDICINE | Facility: OTHER | Age: 79
End: 2024-03-05
Payer: MEDICARE

## 2024-03-05 NOTE — TELEPHONE ENCOUNTER
Phone Number Called: [phone] (home)       Call outcome: Spoke to patient regarding message below.    Message: Spoke to patient this morning regarding fluticasone. Medication was filled by Dr. SOMMER but it is no longer a formulary medication and needs an alternative but patient has not been seen in office since 5/2023. Pt stated he will be re-establishing with Dr. Reynaga on 3/11/24 and they can address this medication issue then as he will still be the PCP. Routing to Dr. Reynaga office so they are aware regarding pt's future appt. Denial uploaded as well.

## 2024-03-14 DIAGNOSIS — J44.9 CHRONIC OBSTRUCTIVE PULMONARY DISEASE, UNSPECIFIED COPD TYPE (HCC): ICD-10-CM

## 2024-03-14 RX ORDER — ALBUTEROL SULFATE 90 UG/1
2 AEROSOL, METERED RESPIRATORY (INHALATION) EVERY 6 HOURS PRN
Qty: 18 G | Refills: 0 | Status: SHIPPED | OUTPATIENT
Start: 2024-03-14 | End: 2024-03-20 | Stop reason: SDUPTHER

## 2024-03-14 NOTE — TELEPHONE ENCOUNTER
Received request via: Pharmacy    Was the patient seen in the last year in this department? Yes    Does the patient have an active prescription (recently filled or refills available) for medication(s) requested?  Please review medication that was sent in     [facility]     Does the patient have half-way Plus and need 100 day supply (blood pressure, diabetes and cholesterol meds only)? Patient does not have SCP

## 2024-03-20 ENCOUNTER — OFFICE VISIT (OUTPATIENT)
Dept: INTERNAL MEDICINE | Facility: OTHER | Age: 79
End: 2024-03-20
Payer: MEDICARE

## 2024-03-20 VITALS
OXYGEN SATURATION: 97 % | TEMPERATURE: 98.4 F | HEART RATE: 89 BPM | WEIGHT: 107.8 LBS | DIASTOLIC BLOOD PRESSURE: 82 MMHG | SYSTOLIC BLOOD PRESSURE: 124 MMHG | HEIGHT: 68 IN | BODY MASS INDEX: 16.34 KG/M2

## 2024-03-20 DIAGNOSIS — L29.9 PRURITUS: ICD-10-CM

## 2024-03-20 DIAGNOSIS — N18.30 STAGE 3 CHRONIC KIDNEY DISEASE, UNSPECIFIED WHETHER STAGE 3A OR 3B CKD: ICD-10-CM

## 2024-03-20 DIAGNOSIS — E46 PROTEIN-CALORIE MALNUTRITION, UNSPECIFIED SEVERITY (HCC): ICD-10-CM

## 2024-03-20 DIAGNOSIS — H61.21 IMPACTED CERUMEN OF RIGHT EAR: ICD-10-CM

## 2024-03-20 DIAGNOSIS — I10 ESSENTIAL HYPERTENSION: Chronic | ICD-10-CM

## 2024-03-20 DIAGNOSIS — E83.10 DISORDER OF IRON METABOLISM, UNSPECIFIED: ICD-10-CM

## 2024-03-20 DIAGNOSIS — R30.0 DYSURIA: ICD-10-CM

## 2024-03-20 DIAGNOSIS — I73.9 PERIPHERAL ARTERY DISEASE (HCC): ICD-10-CM

## 2024-03-20 DIAGNOSIS — R53.81 DEBILITY: ICD-10-CM

## 2024-03-20 DIAGNOSIS — Z72.0 TOBACCO USE: ICD-10-CM

## 2024-03-20 DIAGNOSIS — E55.9 VITAMIN D DEFICIENCY: ICD-10-CM

## 2024-03-20 DIAGNOSIS — H91.10 PRESBYCUSIS, UNSPECIFIED LATERALITY: ICD-10-CM

## 2024-03-20 DIAGNOSIS — J44.9 CHRONIC OBSTRUCTIVE PULMONARY DISEASE, UNSPECIFIED COPD TYPE (HCC): ICD-10-CM

## 2024-03-20 DIAGNOSIS — Z78.9 ALCOHOL USE: ICD-10-CM

## 2024-03-20 DIAGNOSIS — K21.9 GASTROESOPHAGEAL REFLUX DISEASE: ICD-10-CM

## 2024-03-20 DIAGNOSIS — E78.49 OTHER HYPERLIPIDEMIA: ICD-10-CM

## 2024-03-20 DIAGNOSIS — D50.8 IRON DEFICIENCY ANEMIA SECONDARY TO INADEQUATE DIETARY IRON INTAKE: ICD-10-CM

## 2024-03-20 DIAGNOSIS — R39.12 WEAK URINARY STREAM: ICD-10-CM

## 2024-03-20 PROBLEM — Z00.00 HEALTH CARE MAINTENANCE: Status: RESOLVED | Noted: 2021-12-15 | Resolved: 2024-03-20

## 2024-03-20 RX ORDER — LANOLIN ALCOHOL/MO/W.PET/CERES
100 CREAM (GRAM) TOPICAL DAILY
Qty: 90 TABLET | Refills: 3 | Status: SHIPPED | OUTPATIENT
Start: 2024-03-20

## 2024-03-20 RX ORDER — ATORVASTATIN CALCIUM 40 MG/1
40 TABLET, FILM COATED ORAL NIGHTLY
Qty: 90 TABLET | Refills: 3 | Status: CANCELLED | OUTPATIENT
Start: 2024-03-20

## 2024-03-20 RX ORDER — AMLODIPINE BESYLATE 5 MG/1
5 TABLET ORAL DAILY
Qty: 90 TABLET | Refills: 3 | Status: CANCELLED | OUTPATIENT
Start: 2024-03-20

## 2024-03-20 RX ORDER — HYDROXYZINE HYDROCHLORIDE 25 MG/1
25 TABLET, FILM COATED ORAL
Qty: 30 TABLET | Refills: 6 | Status: SHIPPED | OUTPATIENT
Start: 2024-03-20

## 2024-03-20 RX ORDER — ALBUTEROL SULFATE 90 UG/1
2 AEROSOL, METERED RESPIRATORY (INHALATION) EVERY 6 HOURS PRN
Qty: 18 G | Refills: 4 | Status: SHIPPED | OUTPATIENT
Start: 2024-03-20

## 2024-03-20 RX ORDER — FLUTICASONE FUROATE AND VILANTEROL 100; 25 UG/1; UG/1
1 POWDER RESPIRATORY (INHALATION)
Qty: 2 EACH | Refills: 2 | Status: CANCELLED | OUTPATIENT
Start: 2024-03-20

## 2024-03-20 RX ORDER — FLUTICASONE FUROATE, UMECLIDINIUM BROMIDE AND VILANTEROL TRIFENATATE 100; 62.5; 25 UG/1; UG/1; UG/1
1 POWDER RESPIRATORY (INHALATION) DAILY
Qty: 2 EACH | Refills: 4 | Status: SHIPPED | OUTPATIENT
Start: 2024-03-20

## 2024-03-20 RX ORDER — TIOTROPIUM BROMIDE 18 UG/1
CAPSULE ORAL; RESPIRATORY (INHALATION)
Qty: 90 CAPSULE | Refills: 6 | Status: CANCELLED | OUTPATIENT
Start: 2024-03-20

## 2024-03-20 RX ORDER — CLOPIDOGREL BISULFATE 75 MG/1
75 TABLET ORAL DAILY
Qty: 90 TABLET | Refills: 3 | Status: SHIPPED | OUTPATIENT
Start: 2024-03-20

## 2024-03-20 RX ORDER — OMEPRAZOLE 20 MG/1
40 CAPSULE, DELAYED RELEASE ORAL DAILY
Qty: 180 CAPSULE | Refills: 3 | Status: SHIPPED | OUTPATIENT
Start: 2024-03-20

## 2024-03-20 ASSESSMENT — PAIN SCALES - GENERAL: PAINLEVEL: NO PAIN

## 2024-03-20 ASSESSMENT — FIBROSIS 4 INDEX: FIB4 SCORE: 4.09

## 2024-03-20 ASSESSMENT — PATIENT HEALTH QUESTIONNAIRE - PHQ9: CLINICAL INTERPRETATION OF PHQ2 SCORE: 0

## 2024-03-20 NOTE — PROGRESS NOTES
"    Established Patient    Patient Care Team:  Haritha Borja M.D. as PCP - General (Internal Medicine)    HPI:  Fede Parikh Jr. is a 79 y.o. male with relevant past medical history of essential hypertension, COPD (no home O2 use), tobacco use, alcohol use, CKD stage III who presents today for routine follow up and to discuss several subacute issues that are bothering him.    For the last year, he has felt that his legs are weaker. In particular, 1-2 months ago he fell and he attributes this to increased alcohol use and not eating much. At the time, he said he was drinking 1-2 pints a day. He states that he has cut back to 0.5-1 pint a day. Denies loss of sensation, saddle paresthesia, or dizziness. Today he comes in with a cane but would like to obtain a FWW.    He is in need of refills for his inhalers. He has not had his Breo, Spiriva, or albuterol in months. He notes worsening shortness of breath, cough, and chest tightness. He does not use oxygen at home. He continues to smoke cigarettes at about 4 cigarettes a day.     He has about a week of burning on urination and describes his stream as \"spurting.\" He denies fever but endorses some chills although they are typically associated with lower temperatures. He denies abdominal, lower back, or groin pain. Denies blood in urine. He would also like to address his worsening hearing by going to an audiologist.    His blood pressure today is within normal at 124/82 despite not having any of his antihypertensive medications. Of note, patient states he stayed up very late last night and wants to leave as soon as possible so that he can go to sleep. He also does not wish to do a UA to test for UTI because he does not want to wait any longer.    Review of Systems   Constitutional:  Positive for malaise/fatigue. Negative for fever and weight loss.   HENT:  Positive for hearing loss. Negative for congestion, ear discharge, ear pain and sore throat.    Respiratory:  " "Positive for cough and shortness of breath. Negative for stridor.    Cardiovascular:  Negative for palpitations.   Gastrointestinal:  Negative for abdominal pain, diarrhea, nausea and vomiting.   Genitourinary:  Positive for dysuria and frequency. Negative for flank pain.   Musculoskeletal:  Positive for joint pain.   Skin: Negative.    Neurological:  Positive for weakness. Negative for tingling, tremors and headaches.   :    Past Medical History:   Diagnosis Date    Hepatitis     Hyperlipemia     Hypertension     Pulmonary emphysema (HCC)      Social History     Tobacco Use    Smoking status: Some Days     Current packs/day: 0.50     Types: Cigarettes    Smokeless tobacco: Never   Vaping Use    Vaping Use: Never used   Substance Use Topics    Alcohol use: Yes     Alcohol/week: 4.2 oz     Types: 7 Shots of liquor per week    Drug use: Yes     Types: Cocaine     Current Outpatient Medications   Medication Sig Dispense Refill    thiamine (THIAMINE) 100 MG tablet Take 1 Tablet by mouth every day. 90 Tablet 3    albuterol 108 (90 Base) MCG/ACT Aero Soln inhalation aerosol Inhale 2 Puffs every 6 hours as needed for Shortness of Breath. 18 g 4    clopidogrel (PLAVIX) 75 MG Tab Take 1 Tablet by mouth every day. 90 Tablet 3    hydrOXYzine HCl (ATARAX) 25 MG Tab Take 1 Tablet by mouth 1 time a day as needed for Itching. 30 Tablet 6    omeprazole (PRILOSEC) 20 MG delayed-release capsule Take 2 Capsules by mouth every day. TAKE ONE CAPSULE BY MOUTH ONE TIME DAILY 180 Capsule 3    fluticasone-umeclidinium-vilanterol (TRELEGY ELLIPTA) 100-62.5-25 mcg/act inhaler Inhale 1 Puff every day. 2 Each 4     No current facility-administered medications for this visit.       Physical Exam:  /82 (BP Location: Left arm, Patient Position: Sitting, BP Cuff Size: Adult)   Pulse 89   Temp 36.9 °C (98.4 °F) (Temporal)   Ht 1.716 m (5' 7.55\")   Wt 48.9 kg (107 lb 12.8 oz)   SpO2 97%   BMI 16.61 kg/m²   Physical Exam  Constitutional:  "      General: He is awake. He is not in acute distress.     Appearance: He is underweight.   HENT:      Head: Normocephalic and atraumatic.      Right Ear: External ear normal. There is impacted cerumen.      Left Ear: Tympanic membrane, ear canal and external ear normal. There is no impacted cerumen.   Eyes:      Extraocular Movements: Extraocular movements intact.      Conjunctiva/sclera: Conjunctivae normal.      Pupils: Pupils are equal, round, and reactive to light.   Cardiovascular:      Rate and Rhythm: Normal rate and regular rhythm.      Pulses: Normal pulses.      Heart sounds: Normal heart sounds.   Abdominal:      General: Abdomen is flat.      Palpations: Abdomen is soft.      Tenderness: There is no abdominal tenderness. There is no right CVA tenderness, left CVA tenderness or guarding.   Musculoskeletal:      Cervical back: Normal range of motion and neck supple.   Skin:     General: Skin is warm and dry.   Neurological:      General: No focal deficit present.      Mental Status: He is oriented to person, place, and time. He is lethargic.   Psychiatric:         Attention and Perception: Attention and perception normal.         Mood and Affect: Mood normal. Mood is not depressed. Affect is labile. Affect is not tearful or inappropriate.         Speech: Speech normal.         Behavior: Behavior normal. Behavior is cooperative.         Thought Content: Thought content normal.         Cognition and Memory: Cognition and memory normal.           Assessment and Plan:   #Hypertension  #Hyperlipidemia  #Peripheral artery disease  Previously taking amlodipine 5mg daily, but his blood pressure today was 124/82 despite not taking medications for months. Given his fall risk, will abstain from prescribing antihypertensive to prevent hypotension. Holding amlodipine and atorvastatin. Alos significant for Mg 1.4 and phos 1.6  -Lipid profile, CMP, HbA1c  -Clopidogrel refilled    #Fall risk  #Recent fall  #Alcohol use  "disorder  #Severe malnutrition  #Debility  #Iron deficiency anemia 2/2 low nutrition intake  Patient reports just one fall recently, but endorses occasional dizziness. He also drinks heavily and therefore his fall risk is greater. Currently drinking 0.5-1 pint of liquor and frequently forgets to at meals. Labs from 2022 showed Hgb 7.9, HCT 25.8, MCV 83.8, , low Fe 23, TIBC 349 wnl, low %sat 7, ferritin 39.6 wnl. At that time, he was hospitalized for an SHANNAN among other complaints. He is in need of updated labs.  - Thiamine 100mg daily  - CBC  - CMP, Mg, Phos  - Vitamins B1, B9, B12, D  - HbA1c  - TSH with reflex   - Iron panel  - DME walker    #COPD  Worsening of symptoms since running out of medications about a year ago. Switching patient to Trelegy to simplify medication routine. He does not have home oxygen.  - Refill albuterol  - Start Trelegy 100-62.5-25  - Schedule PFT (referral from last year still active)    #Dysuria  #Weak urinary stream  #CKD stage 3  Reports dysuria and \"spurting\" weak stream for the past week. Also has diagnosis of CKD stage 3 without clear explanation of etiology. Will repeat labs to check kidney function as well.  Concern for UTI however patient declined to test for UTI today. No suprapubic or CVA tenderness. No reported fevers, nausea, or vomiting. No delirium.  - Strict ED precautions provided  - CBC  - CMP    #Hearing loss  #Right ear cerumen impaction  Although hearing improved after large piece of ear wax removed in office today, he still has significant hearing loss, likely presbycusis. Denies tinnitus or ear pain.  - Successful ear lavage  - Referral to audiology    #GERD  He is not interested in a trial of stopping PPI.  -Refill omeprazole 20mg per patient's request    #Intermittent idiopathic pruritus  Generalized itchiness occasionally without rash.  - Refill hydroxyzine 25 mg    #Alcohol use  #Tobacco use  Patient continues to drink 1 pint a day and smoke at least 4 " cigarettes a day. He expressed very good understanding of the risks with these habits but strongly declined any assistance with quitting.    #History of Vitamin D deficiency  - Vitamin D level ordered    #Need for vaccinations  Requested that patient get the following vaccines: Hepatitis B, pneumo, zoster, flu, and COVID booster. Explained the importance for both liver and lung health. He left before pneumococcal vaccine could be administered in the office.    Patient should return in about 2 weeks to review lab work or earlier if warranted.    Haritha Borja M.D., PGY-1 Internal Medicine  [facility]    This note was created using voice recognition software.  While every attempt is made to ensure accuracy of transcription, occasionally errors occur.

## 2024-03-20 NOTE — PATIENT INSTRUCTIONS
Get the following vaccines:  Hepatitis B  Pneumococcal  Zoster (Shingles)  Influenza  COVID booster    Schedule pulmonary function test to see how your COPD/lung health is progressing.    Obtain labs that have been ordered as soon as possible. We need to monitor your vitamin levels, kidney function, liver function,

## 2024-03-23 ASSESSMENT — ENCOUNTER SYMPTOMS
FEVER: 0
NAUSEA: 0
WEIGHT LOSS: 0
COUGH: 1
SHORTNESS OF BREATH: 1
VOMITING: 0
SORE THROAT: 0
DIARRHEA: 0
HEADACHES: 0
WEAKNESS: 1
TREMORS: 0
TINGLING: 0
PALPITATIONS: 0
ABDOMINAL PAIN: 0
FLANK PAIN: 0
STRIDOR: 0

## 2024-04-15 PROCEDURE — 99285 EMERGENCY DEPT VISIT HI MDM: CPT

## 2024-04-16 ENCOUNTER — APPOINTMENT (OUTPATIENT)
Dept: RADIOLOGY | Facility: MEDICAL CENTER | Age: 79
DRG: 191 | End: 2024-04-16
Attending: EMERGENCY MEDICINE
Payer: MEDICARE

## 2024-04-16 ENCOUNTER — HOSPITAL ENCOUNTER (INPATIENT)
Facility: MEDICAL CENTER | Age: 79
LOS: 1 days | DRG: 191 | End: 2024-04-17
Attending: EMERGENCY MEDICINE | Admitting: STUDENT IN AN ORGANIZED HEALTH CARE EDUCATION/TRAINING PROGRAM
Payer: MEDICARE

## 2024-04-16 DIAGNOSIS — J44.1 ACUTE EXACERBATION OF CHRONIC OBSTRUCTIVE PULMONARY DISEASE (COPD) (HCC): ICD-10-CM

## 2024-04-16 DIAGNOSIS — R00.0 TACHYCARDIA: ICD-10-CM

## 2024-04-16 DIAGNOSIS — Z87.09 HISTORY OF COPD: ICD-10-CM

## 2024-04-16 DIAGNOSIS — R79.89 ELEVATED TROPONIN: ICD-10-CM

## 2024-04-16 PROBLEM — J96.01 ACUTE HYPOXEMIC RESPIRATORY FAILURE (HCC): Status: RESOLVED | Noted: 2024-04-16 | Resolved: 2024-04-16

## 2024-04-16 PROBLEM — Z71.89 ACP (ADVANCE CARE PLANNING): Status: ACTIVE | Noted: 2021-12-15

## 2024-04-16 PROBLEM — J96.01 ACUTE HYPOXEMIC RESPIRATORY FAILURE (HCC): Status: ACTIVE | Noted: 2024-04-16

## 2024-04-16 LAB
ALBUMIN SERPL BCP-MCNC: 4 G/DL (ref 3.2–4.9)
ALBUMIN/GLOB SERPL: 1.1 G/DL
ALP SERPL-CCNC: 66 U/L (ref 30–99)
ALT SERPL-CCNC: 11 U/L (ref 2–50)
ANION GAP SERPL CALC-SCNC: 16 MMOL/L (ref 7–16)
AST SERPL-CCNC: 28 U/L (ref 12–45)
BASOPHILS # BLD AUTO: 0.2 % (ref 0–1.8)
BASOPHILS # BLD: 0.01 K/UL (ref 0–0.12)
BILIRUB SERPL-MCNC: 0.4 MG/DL (ref 0.1–1.5)
BUN SERPL-MCNC: 18 MG/DL (ref 8–22)
CALCIUM ALBUM COR SERPL-MCNC: 8.7 MG/DL (ref 8.5–10.5)
CALCIUM SERPL-MCNC: 8.7 MG/DL (ref 8.5–10.5)
CHLORIDE SERPL-SCNC: 104 MMOL/L (ref 96–112)
CO2 SERPL-SCNC: 16 MMOL/L (ref 20–33)
CREAT SERPL-MCNC: 1.45 MG/DL (ref 0.5–1.4)
EKG IMPRESSION: NORMAL
EKG IMPRESSION: NORMAL
EOSINOPHIL # BLD AUTO: 0.03 K/UL (ref 0–0.51)
EOSINOPHIL NFR BLD: 0.6 % (ref 0–6.9)
ERYTHROCYTE [DISTWIDTH] IN BLOOD BY AUTOMATED COUNT: 51.4 FL (ref 35.9–50)
ETHANOL BLD-MCNC: <10.1 MG/DL
FLUAV RNA SPEC QL NAA+PROBE: NEGATIVE
FLUBV RNA SPEC QL NAA+PROBE: NEGATIVE
GFR SERPLBLD CREATININE-BSD FMLA CKD-EPI: 48 ML/MIN/1.73 M 2
GLOBULIN SER CALC-MCNC: 3.6 G/DL (ref 1.9–3.5)
GLUCOSE SERPL-MCNC: 132 MG/DL (ref 65–99)
HCT VFR BLD AUTO: 31.2 % (ref 42–52)
HGB BLD-MCNC: 10.1 G/DL (ref 14–18)
IMM GRANULOCYTES # BLD AUTO: 0 K/UL (ref 0–0.11)
IMM GRANULOCYTES NFR BLD AUTO: 0 % (ref 0–0.9)
LYMPHOCYTES # BLD AUTO: 0.95 K/UL (ref 1–4.8)
LYMPHOCYTES NFR BLD: 19.2 % (ref 22–41)
MCH RBC QN AUTO: 25.7 PG (ref 27–33)
MCHC RBC AUTO-ENTMCNC: 32.4 G/DL (ref 32.3–36.5)
MCV RBC AUTO: 79.4 FL (ref 81.4–97.8)
MONOCYTES # BLD AUTO: 0.72 K/UL (ref 0–0.85)
MONOCYTES NFR BLD AUTO: 14.5 % (ref 0–13.4)
NEUTROPHILS # BLD AUTO: 3.24 K/UL (ref 1.82–7.42)
NEUTROPHILS NFR BLD: 65.5 % (ref 44–72)
NRBC # BLD AUTO: 0 K/UL
NRBC BLD-RTO: 0 /100 WBC (ref 0–0.2)
NT-PROBNP SERPL IA-MCNC: 409 PG/ML (ref 0–125)
PLATELET # BLD AUTO: 251 K/UL (ref 164–446)
PMV BLD AUTO: 10 FL (ref 9–12.9)
POTASSIUM SERPL-SCNC: 4.2 MMOL/L (ref 3.6–5.5)
PROT SERPL-MCNC: 7.6 G/DL (ref 6–8.2)
RBC # BLD AUTO: 3.93 M/UL (ref 4.7–6.1)
RSV RNA SPEC QL NAA+PROBE: NEGATIVE
SARS-COV-2 RNA RESP QL NAA+PROBE: NOTDETECTED
SODIUM SERPL-SCNC: 136 MMOL/L (ref 135–145)
SPECIMEN SOURCE: NORMAL
TROPONIN T SERPL-MCNC: 35 NG/L (ref 6–19)
TROPONIN T SERPL-MCNC: 36 NG/L (ref 6–19)
WBC # BLD AUTO: 5 K/UL (ref 4.8–10.8)

## 2024-04-16 PROCEDURE — 770020 HCHG ROOM/CARE - TELE (206)

## 2024-04-16 PROCEDURE — A9270 NON-COVERED ITEM OR SERVICE: HCPCS

## 2024-04-16 PROCEDURE — 93005 ELECTROCARDIOGRAM TRACING: CPT

## 2024-04-16 PROCEDURE — 0241U HCHG SARS-COV-2 COVID-19 NFCT DS RESP RNA 4 TRGT MIC: CPT

## 2024-04-16 PROCEDURE — 700101 HCHG RX REV CODE 250: Performed by: STUDENT IN AN ORGANIZED HEALTH CARE EDUCATION/TRAINING PROGRAM

## 2024-04-16 PROCEDURE — 700117 HCHG RX CONTRAST REV CODE 255: Performed by: EMERGENCY MEDICINE

## 2024-04-16 PROCEDURE — 82077 ASSAY SPEC XCP UR&BREATH IA: CPT

## 2024-04-16 PROCEDURE — 84484 ASSAY OF TROPONIN QUANT: CPT

## 2024-04-16 PROCEDURE — 93005 ELECTROCARDIOGRAM TRACING: CPT | Performed by: STUDENT IN AN ORGANIZED HEALTH CARE EDUCATION/TRAINING PROGRAM

## 2024-04-16 PROCEDURE — A9270 NON-COVERED ITEM OR SERVICE: HCPCS | Performed by: STUDENT IN AN ORGANIZED HEALTH CARE EDUCATION/TRAINING PROGRAM

## 2024-04-16 PROCEDURE — 700105 HCHG RX REV CODE 258

## 2024-04-16 PROCEDURE — 700105 HCHG RX REV CODE 258: Performed by: STUDENT IN AN ORGANIZED HEALTH CARE EDUCATION/TRAINING PROGRAM

## 2024-04-16 PROCEDURE — 700105 HCHG RX REV CODE 258: Mod: UD | Performed by: EMERGENCY MEDICINE

## 2024-04-16 PROCEDURE — 85025 COMPLETE CBC W/AUTO DIFF WBC: CPT

## 2024-04-16 PROCEDURE — 700102 HCHG RX REV CODE 250 W/ 637 OVERRIDE(OP): Performed by: STUDENT IN AN ORGANIZED HEALTH CARE EDUCATION/TRAINING PROGRAM

## 2024-04-16 PROCEDURE — 700111 HCHG RX REV CODE 636 W/ 250 OVERRIDE (IP): Mod: UD | Performed by: EMERGENCY MEDICINE

## 2024-04-16 PROCEDURE — 99497 ADVNCD CARE PLAN 30 MIN: CPT | Performed by: STUDENT IN AN ORGANIZED HEALTH CARE EDUCATION/TRAINING PROGRAM

## 2024-04-16 PROCEDURE — 71045 X-RAY EXAM CHEST 1 VIEW: CPT

## 2024-04-16 PROCEDURE — 94760 N-INVAS EAR/PLS OXIMETRY 1: CPT

## 2024-04-16 PROCEDURE — 96374 THER/PROPH/DIAG INJ IV PUSH: CPT

## 2024-04-16 PROCEDURE — 36415 COLL VENOUS BLD VENIPUNCTURE: CPT

## 2024-04-16 PROCEDURE — 80053 COMPREHEN METABOLIC PANEL: CPT

## 2024-04-16 PROCEDURE — 94640 AIRWAY INHALATION TREATMENT: CPT

## 2024-04-16 PROCEDURE — 99222 1ST HOSP IP/OBS MODERATE 55: CPT | Mod: 25,AI | Performed by: STUDENT IN AN ORGANIZED HEALTH CARE EDUCATION/TRAINING PROGRAM

## 2024-04-16 PROCEDURE — 700102 HCHG RX REV CODE 250 W/ 637 OVERRIDE(OP)

## 2024-04-16 PROCEDURE — 71275 CT ANGIOGRAPHY CHEST: CPT

## 2024-04-16 PROCEDURE — 83880 ASSAY OF NATRIURETIC PEPTIDE: CPT

## 2024-04-16 RX ORDER — PREDNISONE 20 MG/1
40 TABLET ORAL ONCE
Status: COMPLETED | OUTPATIENT
Start: 2024-04-16 | End: 2024-04-16

## 2024-04-16 RX ORDER — CLOPIDOGREL BISULFATE 75 MG/1
75 TABLET ORAL EVERY EVENING
Status: DISCONTINUED | OUTPATIENT
Start: 2024-04-16 | End: 2024-04-17 | Stop reason: HOSPADM

## 2024-04-16 RX ORDER — SODIUM CHLORIDE 9 MG/ML
500 INJECTION, SOLUTION INTRAVENOUS ONCE
Status: COMPLETED | OUTPATIENT
Start: 2024-04-16 | End: 2024-04-16

## 2024-04-16 RX ORDER — IPRATROPIUM BROMIDE AND ALBUTEROL SULFATE 2.5; .5 MG/3ML; MG/3ML
3 SOLUTION RESPIRATORY (INHALATION)
Status: DISCONTINUED | OUTPATIENT
Start: 2024-04-16 | End: 2024-04-17

## 2024-04-16 RX ORDER — PREDNISONE 20 MG/1
40 TABLET ORAL DAILY
Status: DISCONTINUED | OUTPATIENT
Start: 2024-04-17 | End: 2024-04-17 | Stop reason: HOSPADM

## 2024-04-16 RX ORDER — GAUZE BANDAGE 2" X 2"
100 BANDAGE TOPICAL EVERY EVENING
Status: DISCONTINUED | OUTPATIENT
Start: 2024-04-16 | End: 2024-04-17 | Stop reason: HOSPADM

## 2024-04-16 RX ORDER — CHOLECALCIFEROL (VITAMIN D3) 125 MCG
5 CAPSULE ORAL NIGHTLY PRN
Status: DISCONTINUED | OUTPATIENT
Start: 2024-04-16 | End: 2024-04-17 | Stop reason: HOSPADM

## 2024-04-16 RX ORDER — ENOXAPARIN SODIUM 100 MG/ML
40 INJECTION SUBCUTANEOUS DAILY
Status: DISCONTINUED | OUTPATIENT
Start: 2024-04-17 | End: 2024-04-16

## 2024-04-16 RX ORDER — ENOXAPARIN SODIUM 100 MG/ML
40 INJECTION SUBCUTANEOUS DAILY
Status: DISCONTINUED | OUTPATIENT
Start: 2024-04-16 | End: 2024-04-16

## 2024-04-16 RX ORDER — OMEPRAZOLE 20 MG/1
40 CAPSULE, DELAYED RELEASE ORAL DAILY
Status: DISCONTINUED | OUTPATIENT
Start: 2024-04-16 | End: 2024-04-17 | Stop reason: HOSPADM

## 2024-04-16 RX ORDER — SODIUM CHLORIDE 9 MG/ML
INJECTION, SOLUTION INTRAVENOUS CONTINUOUS
Status: DISCONTINUED | OUTPATIENT
Start: 2024-04-16 | End: 2024-04-17 | Stop reason: HOSPADM

## 2024-04-16 RX ORDER — SODIUM CHLORIDE 9 MG/ML
INJECTION, SOLUTION INTRAVENOUS CONTINUOUS
Status: DISCONTINUED | OUTPATIENT
Start: 2024-04-16 | End: 2024-04-16

## 2024-04-16 RX ORDER — LORAZEPAM 2 MG/ML
0.5 INJECTION INTRAMUSCULAR ONCE
Status: COMPLETED | OUTPATIENT
Start: 2024-04-16 | End: 2024-04-16

## 2024-04-16 RX ORDER — ACETAMINOPHEN 325 MG/1
650 TABLET ORAL EVERY 6 HOURS PRN
Status: DISCONTINUED | OUTPATIENT
Start: 2024-04-16 | End: 2024-04-17 | Stop reason: HOSPADM

## 2024-04-16 RX ORDER — HEPARIN SODIUM 5000 [USP'U]/ML
5000 INJECTION, SOLUTION INTRAVENOUS; SUBCUTANEOUS EVERY 8 HOURS
Status: DISCONTINUED | OUTPATIENT
Start: 2024-04-17 | End: 2024-04-17 | Stop reason: HOSPADM

## 2024-04-16 RX ADMIN — CLOPIDOGREL BISULFATE 75 MG: 75 TABLET ORAL at 17:12

## 2024-04-16 RX ADMIN — SODIUM CHLORIDE: 9 INJECTION, SOLUTION INTRAVENOUS at 21:36

## 2024-04-16 RX ADMIN — SODIUM CHLORIDE: 9 INJECTION, SOLUTION INTRAVENOUS at 08:31

## 2024-04-16 RX ADMIN — IPRATROPIUM BROMIDE AND ALBUTEROL SULFATE 3 ML: 2.5; .5 SOLUTION RESPIRATORY (INHALATION) at 06:50

## 2024-04-16 RX ADMIN — IPRATROPIUM BROMIDE AND ALBUTEROL SULFATE 3 ML: 2.5; .5 SOLUTION RESPIRATORY (INHALATION) at 23:15

## 2024-04-16 RX ADMIN — SODIUM CHLORIDE: 9 INJECTION, SOLUTION INTRAVENOUS at 13:00

## 2024-04-16 RX ADMIN — Medication 5 MG: at 23:41

## 2024-04-16 RX ADMIN — FLUTICASONE FUROATE, UMECLIDINIUM BROMIDE AND VILANTEROL TRIFENATATE 1 PUFF: 100; 62.5; 25 POWDER RESPIRATORY (INHALATION) at 11:42

## 2024-04-16 RX ADMIN — PREDNISONE 40 MG: 20 TABLET ORAL at 02:13

## 2024-04-16 RX ADMIN — Medication 100 MG: at 17:12

## 2024-04-16 RX ADMIN — IOHEXOL 70 ML: 350 INJECTION, SOLUTION INTRAVENOUS at 05:58

## 2024-04-16 RX ADMIN — LORAZEPAM 0.5 MG: 2 INJECTION INTRAMUSCULAR; INTRAVENOUS at 03:09

## 2024-04-16 RX ADMIN — OMEPRAZOLE 40 MG: 20 CAPSULE, DELAYED RELEASE ORAL at 09:21

## 2024-04-16 RX ADMIN — SODIUM CHLORIDE 500 ML: 9 INJECTION, SOLUTION INTRAVENOUS at 03:09

## 2024-04-16 ASSESSMENT — COGNITIVE AND FUNCTIONAL STATUS - GENERAL
SUGGESTED CMS G CODE MODIFIER DAILY ACTIVITY: CH
DAILY ACTIVITIY SCORE: 24
MOBILITY SCORE: 24
SUGGESTED CMS G CODE MODIFIER MOBILITY: CH

## 2024-04-16 ASSESSMENT — PAIN DESCRIPTION - PAIN TYPE
TYPE: ACUTE PAIN
TYPE: ACUTE PAIN

## 2024-04-16 ASSESSMENT — PATIENT HEALTH QUESTIONNAIRE - PHQ9
2. FEELING DOWN, DEPRESSED, IRRITABLE, OR HOPELESS: NOT AT ALL
SUM OF ALL RESPONSES TO PHQ9 QUESTIONS 1 AND 2: 0
1. LITTLE INTEREST OR PLEASURE IN DOING THINGS: NOT AT ALL

## 2024-04-16 ASSESSMENT — ENCOUNTER SYMPTOMS
EYES NEGATIVE: 1
NEUROLOGICAL NEGATIVE: 1
PSYCHIATRIC NEGATIVE: 1
MUSCULOSKELETAL NEGATIVE: 1
CONSTITUTIONAL NEGATIVE: 1
COUGH: 1
GASTROINTESTINAL NEGATIVE: 1

## 2024-04-16 ASSESSMENT — LIFESTYLE VARIABLES
ALCOHOL_USE: NO
HAVE YOU EVER FELT YOU SHOULD CUT DOWN ON YOUR DRINKING: NO

## 2024-04-16 ASSESSMENT — FIBROSIS 4 INDEX: FIB4 SCORE: 4.09

## 2024-04-16 NOTE — PROGRESS NOTES
Progress Note Hospitalist Services After Midnight    Date: 04/16/24    HPI:   Fede Parikh Jr. is a 79 y.o. male with a past medical history of hepatitis, hyperlipidemia, hypertension, pulmonary emphysema, COPD who presented 4/16/2024 with shortness of breath.      He is somewhat poor historian.  For the last 2 weeks, he has reported to have cough with occasional white phlegm.  For the last few days, he has noted progressively worsening shortness of breath and difficulties gasping for air sometimes.  EMS was called, to which patient was noted to be tachycardic with heart rate at 130.  He was administered DuoNebs and albuterol and transferred to [facility] ED.     In ER, patient tachycardic and hypoxic.  Chest x-ray showing no acute cardiopulmonary abnormality.  CTA chest shows no pulmonary emboli appreciated. Atherosclerosis and atherosclerotic coronary artery disease. Significant labs show CO2 16, creatinine 1.45.    Patient seen and examined while in the emergency department.  He was sleeping and upon waking up he was not very good at providing history.  At the time of examination patient is denying any shortness of breath, chest pain, lightheadedness.  Upon examination he is still having some coarse crackles in bilateral bases of his lungs.    Plan of Care:   -Continue prednisone 40 mg p.o. daily   -Continue inhalers and breathing treatments  -Continue IV fluids at 75 mL/hr due to acute kidney injury  -Recheck CMP and CBC in a.m.    Disposition:   Patient being admitted inpatient to the telemetry floor for further monitoring of COPD exacerbation.    Abby Vázquez  Hospitalist Services  [phone]  [email or web address]

## 2024-04-16 NOTE — CARE PLAN
Problem: Bronchoconstriction  Goal: Improve in air movement and diminished wheezing  Description: Target End Date:  2 to 3 days    1.  Implement inhaled treatments  2.  Evaluate and manage medication effects  Outcome: Not Progressing     Duoneb Q4 hours      Problem: Bronchopulmonary Hygiene  Goal: Increase mobilization of retained secretions  Description: Target End Date:  2 to 3 days    1.  Perform bronchopulmonary therapy as indicated by assessment  2.  Perform airway suctioning  3.  Perform actions to maintain patient airway  Outcome: Not Progressing     Flutter QID

## 2024-04-16 NOTE — ASSESSMENT & PLAN NOTE
History of COPD, minimal expiratory wheezing and rhonchi heard upon physical exam  Given nebulizers and prednisone in ED  Continue prednisone/DuoNebs  Continue home medications  COVID swab pending  CTPA pending  U tox pending, previously positive for cocaine

## 2024-04-16 NOTE — ASSESSMENT & PLAN NOTE
16 minutes spent discussing goals of care with patient.  He was explained that he has suspected COPD exacerbation and he will have CTPA to rule out pulmonary embolism.  He was asked about CODE STATUS, to which she states that he would like to be full code and to have everything done.

## 2024-04-16 NOTE — ED NOTES
"Medication history reviewed with PT's home [facility] ([phone]) over the phone    Med rec is complete per [facility] reporting    Allergies reviewed.     [facility] denies any outpatient antibiotics in the last 30 days.     Patient is not taking anticoagulants.    PT was not able to adequately participate in Med Rec interview. PT could not verify his correct birth date and repeatedly told me that his Home Pharmacy was \"[facility]\". PT's emergency contact did not answer his phone. Therefore, last dose times are not available.    Preferred pharmacy for this visit - [facility] ([phone])                "

## 2024-04-16 NOTE — ED NOTES
.Bedside report received from off going RN, assumed care of patient.  POC discussed with patient. Call light within reach, all needs addressed at this time.       Fall risk interventions in place: Move the patient closer to the nurse's station, Place fall risk sign on patient's door, and Keep floor surfaces clean and dry (all applicable per [place] Fall risk assessment)   Continuous monitoring: Cardiac Leads, Pulse Ox, or Blood Pressure  IVF/IV medications: Infusion per MAR (List Med(s)) ns  Oxygen: Room Air  Bedside sitter: Not Applicable   Isolation: Not Applicable

## 2024-04-16 NOTE — ED NOTES
Respiratory swab performed and sent to lab. Pt resting with even chest rise and fall, reports no needs at this time, call light available and in reach.

## 2024-04-16 NOTE — ED NOTES
Checked on bed, connected to monitor, asleep  with unlabored respirations. No current needs identified.  Gurney in low position, side rail up for pt safety. Call light within reach.

## 2024-04-16 NOTE — ED NOTES
Report received from LYNDON Mccarthy. Patient on monitor, bed low and locked, pt on RA. Pt resting with even chest rise and fall, reports no needs at this time, call light available and in reach.

## 2024-04-16 NOTE — ED NOTES
Telephone report given to Anne HANEY; patient from CT to Y62 via gurney with CT tech; AOX4 GCS15 on room air; UDS and EKG pending

## 2024-04-16 NOTE — ASSESSMENT & PLAN NOTE
History of COPD, minimal expiratory wheezing and rhonchi heard upon physical exam  Given nebulizers and prednisone in ED  Continue prednisone/DuoNebs  Continue home medications  COVID swab pending  CTPA pending

## 2024-04-16 NOTE — PROGRESS NOTES
"Pt is oriented x 4.  Pt currently refusing 2 RN skin check.  Would like to wait until \"later.\" Pt comes to unit in room air.  Call light within reach.    "

## 2024-04-16 NOTE — ED PROVIDER NOTES
ER Provider Note    Scribed for Pierre Stallworth Ii, M.d. by Ori Hernandez. 4/16/2024  12:50 AM    Primary Care Provider: Haritha Borja M.D.    CHIEF COMPLAINT   Chief Complaint   Patient presents with    Shortness of Breath     Pt BIB EMS from home for SOB x2 days. Pt given Albuterol and DuoNeb PTA with some relief. HR of 129 in triage. Hx of COPD and chronic kidney disease.      EXTERNAL RECORDS REVIEWED  Care everywhere Review of lab results reveals the patient was positive for cocaine 2 years ago.     HPI/ROS  LIMITATION TO HISTORY   Select: : None  OUTSIDE HISTORIAN(S):  None    Fede Parikh Jr. is a 79 y.o. male with a history of COPD and Chronic kidney disease who presents to the ED via EMS for evaluation of shortness of breath onset 2 days ago. The patient reports associated symptoms of cough over the last 2 weeks, bilateral lower abdominal pain. He reports improvement of his shortness of breath following medication with Albuterol and DuoNeb by EMS prior to arrival. He notes no exacerbating factors.     PAST MEDICAL HISTORY  Past Medical History:   Diagnosis Date    Hepatitis     Hyperlipemia     Hypertension     Pulmonary emphysema (HCC)      SURGICAL HISTORY  Past Surgical History:   Procedure Laterality Date    FEMORAL FEMORAL BYPASS Bilateral 9/4/2020    Procedure: CREATION, BYPASS, ARTERIAL, FEMORAL TO FEMORAL, USING GRAFT;  Surgeon: Jean-Claude Prado M.D.;  Location: SURGERY [facility];  Service: General     FAMILY HISTORY  Family History   Problem Relation Age of Onset    Heart Disease Mother     Cancer Mother     Diabetes Brother      SOCIAL HISTORY   reports that he has been smoking cigarettes. He has never used smokeless tobacco. He reports current alcohol use of about 4.2 oz of alcohol per week. He reports current drug use. Drug: Cocaine.    CURRENT MEDICATIONS  Previous Medications    ALBUTEROL 108 (90 BASE) MCG/ACT AERO SOLN INHALATION AEROSOL    Inhale 2 Puffs every 6 hours as needed  "for Shortness of Breath.    CLOPIDOGREL (PLAVIX) 75 MG TAB    Take 1 Tablet by mouth every day.    FLUTICASONE-UMECLIDINIUM-VILANTEROL (TRELEGY ELLIPTA) 100-62.5-25 MCG/ACT INHALER    Inhale 1 Puff every day.    HYDROXYZINE HCL (ATARAX) 25 MG TAB    Take 1 Tablet by mouth 1 time a day as needed for Itching.    OMEPRAZOLE (PRILOSEC) 20 MG DELAYED-RELEASE CAPSULE    Take 2 Capsules by mouth every day. TAKE ONE CAPSULE BY MOUTH ONE TIME DAILY    THIAMINE (THIAMINE) 100 MG TABLET    Take 1 Tablet by mouth every day.     ALLERGIES  Ace inhibitors    PHYSICAL EXAM  /86   Pulse (!) 129   Temp 36.9 °C (98.4 °F) (Temporal)   Resp 18   Ht 1.702 m (5' 7\")   Wt 48.5 kg (107 lb)   SpO2 95%   BMI 16.76 kg/m²   Physical Exam  Vitals and nursing note reviewed.   Constitutional:       Comments: 79 year old man laying in left lateral decubitus position   HENT:      Head: Normocephalic.      Ears:      Comments: Hard of hearing     Mouth/Throat:      Mouth: Mucous membranes are moist.   Eyes:      Extraocular Movements: Extraocular movements intact.      Pupils: Pupils are equal, round, and reactive to light.   Cardiovascular:      Rate and Rhythm: Regular rhythm. Tachycardia present.   Pulmonary:      Comments: Increased respiratory rate. No wheezes or rails.   Abdominal:      General: There is no distension.      Tenderness: There is no abdominal tenderness.   Musculoskeletal:         General: No swelling.      Cervical back: Normal range of motion.   Neurological:      General: No focal deficit present.      DIAGNOSTIC STUDIES    EKG/LABS  Results for orders placed or performed during the hospital encounter of 04/16/24   CBC with Differential   Result Value Ref Range    WBC 5.0 4.8 - 10.8 K/uL    RBC 3.93 (L) 4.70 - 6.10 M/uL    Hemoglobin 10.1 (L) 14.0 - 18.0 g/dL    Hematocrit 31.2 (L) 42.0 - 52.0 %    MCV 79.4 (L) 81.4 - 97.8 fL    MCH 25.7 (L) 27.0 - 33.0 pg    MCHC 32.4 32.3 - 36.5 g/dL    RDW 51.4 (H) 35.9 - 50.0 " fL    Platelet Count 251 164 - 446 K/uL    MPV 10.0 9.0 - 12.9 fL    Neutrophils-Polys 65.50 44.00 - 72.00 %    Lymphocytes 19.20 (L) 22.00 - 41.00 %    Monocytes 14.50 (H) 0.00 - 13.40 %    Eosinophils 0.60 0.00 - 6.90 %    Basophils 0.20 0.00 - 1.80 %    Immature Granulocytes 0.00 0.00 - 0.90 %    Nucleated RBC 0.00 0.00 - 0.20 /100 WBC    Neutrophils (Absolute) 3.24 1.82 - 7.42 K/uL    Lymphs (Absolute) 0.95 (L) 1.00 - 4.80 K/uL    Monos (Absolute) 0.72 0.00 - 0.85 K/uL    Eos (Absolute) 0.03 0.00 - 0.51 K/uL    Baso (Absolute) 0.01 0.00 - 0.12 K/uL    Immature Granulocytes (abs) 0.00 0.00 - 0.11 K/uL    NRBC (Absolute) 0.00 K/uL   Comp Metabolic Panel   Result Value Ref Range    Sodium 136 135 - 145 mmol/L    Potassium 4.2 3.6 - 5.5 mmol/L    Chloride 104 96 - 112 mmol/L    Co2 16 (L) 20 - 33 mmol/L    Anion Gap 16.0 7.0 - 16.0    Glucose 132 (H) 65 - 99 mg/dL    Bun 18 8 - 22 mg/dL    Creatinine 1.45 (H) 0.50 - 1.40 mg/dL    Calcium 8.7 8.5 - 10.5 mg/dL    Correct Calcium 8.7 8.5 - 10.5 mg/dL    AST(SGOT) 28 12 - 45 U/L    ALT(SGPT) 11 2 - 50 U/L    Alkaline Phosphatase 66 30 - 99 U/L    Total Bilirubin 0.4 0.1 - 1.5 mg/dL    Albumin 4.0 3.2 - 4.9 g/dL    Total Protein 7.6 6.0 - 8.2 g/dL    Globulin 3.6 (H) 1.9 - 3.5 g/dL    A-G Ratio 1.1 g/dL   proBrain Natriuretic Peptide, NT   Result Value Ref Range    NT-proBNP 409 (H) 0 - 125 pg/mL   TROPONIN   Result Value Ref Range    Troponin T 35 (H) 6 - 19 ng/L   DIAGNOSTIC ALCOHOL   Result Value Ref Range    Diagnostic Alcohol <10.1 <10.1 mg/dL   ESTIMATED GFR   Result Value Ref Range    GFR (CKD-EPI) 48 (A) >60 mL/min/1.73 m 2   TROPONIN   Result Value Ref Range    Troponin T 36 (H) 6 - 19 ng/L   CoV-2, Flu A/B, And RSV by PCR ([employer] [facility])    Specimen: Respirate   Result Value Ref Range    Influenza virus A RNA Negative Negative    Influenza virus B, PCR Negative Negative    RSV, PCR Negative Negative    SARS-CoV-2 by PCR NotDetected     SARS-CoV-2 Source NP Wash/Asp     EKG   Result Value Ref Range    Report       [facility] Emergency Dept.    Test Date:  2024  Pt Name:    SADE ISSA               Department: ER  MRN:        [id number]                      Room:       [facility]  Gender:     Male                         Technician: [id number]  :        1945                   Requested By:ERVIN CARO  Order #:    [id number]                    Reading MD: Pierre Stallworth II, MD    Measurements  Intervals                                Axis  Rate:       136                          P:          67  ND:         180                          QRS:        -70  QRSD:       74                           T:          32  QT:         296  QTc:        446    Interpretive Statements  Sinus tachycardia  Multiform ventricular premature complexes  Inferior infarct, old  Anterior infarct, old  Impression: sinus tachycardia with  pvcs  Compared to ECG 10/11/2022 15:36:44    Electronically Signed On 2024 08:48:52 PDT by Pierre Stallworth II, MD     EKG (NOW)   Result Value Ref Range    Report       [facility] Emergency Dept.    Test Date:  2024  Pt Name:    SADE ISSA               Department: ER  MRN:        [id number]                      Room:       [facility]  Gender:     Male                         Technician: [id number]  :        1945                   Requested By:ERVIN CARO  Order #:    [id number]                    Reading MD: Pierre Stallworth II, MD    Measurements  Intervals                                Axis  Rate:       100                          P:          82  ND:         205                          QRS:        -74  QRSD:       75                           T:          87  QT:         342  QTc:        442    Interpretive Statements  Sinus tachycardia  Inferior infarct, old  Anterior infarct, old  Lateral leads are also involved  Impression: SInus tachycardia  Compared to ECG 2024 00:02:29    Electronically  Signed On 04- 08:49:31 PDT by Pierre Stallworth II, MD        I have independently interpreted this EKG    RADIOLOGY/PROCEDURES   The attending emergency physician has independently interpreted the diagnostic imaging associated with this visit and am waiting the final reading from the radiologist.   My preliminary interpretation is a follows: Reviewed outside imaging    Radiologist interpretation:  CT-CTA CHEST PULMONARY ARTERY W/ RECONS   Final Result         1.  No pulmonary embolus appreciated.   2.  Atherosclerosis and atherosclerotic coronary artery disease.      DX-CHEST-PORTABLE (1 VIEW)   Final Result         1.  No acute cardiopulmonary disease.   2.  Atherosclerosis        COURSE & MEDICAL DECISION MAKING     ASSESSMENT, COURSE AND PLAN  Care Narrative: 12:54 AM - Patient seen and examined at bedside. This is an emergent evaluation of a 79 year old man with COPD who presents with concerns of cough x2 weeks and shortness of breath this evening. Given duoneb treatment by EMS and reports feeling better. Heart rate is increased and respirations increased. Unclear if tachycardia is secondary to nebulizer treatment prior to arrival. Lungs without wheezing, saturations are 95% on room air.  Plan for CXR, cbc, cmp, pro bnp, trop to look for signs of CHF, infection, worsening renal failure, MI. Patient verbalizes understanding and agreement to this plan of care. CXR done at bedside and I reviewed it. It shows hyperinflation. I do not see focal pneumonia.     2:16 AM -  Patient was reevaluated at bedside. The patient reports feeling improved following medication. The patient reports he has not had any alcohol in 2 days. Reports he typically had 1/2 pint daily previously. Concerns for alcohol withdrawal. Persistent tachycardia. Reexamination reveals mild tremors.     3:05 AM - Patient was reevaluated at bedside after nursing informed me the patient expressed a desire to leave AMA. Following discussion with the  patient, he is amenable to staying for treatment of elevated heart rate.     5:11 AM - Patient was reevaluated at bedside. The patient is sleeping but wakes to voice. The patient agrees with my plan for hospitalization given  persistent tachycardia and elevated troponin.     5:37 AM - I discussed the patient's case and the above findings with Dr. Wilkinson (Hospitalist) who agrees to evaluate the patient for hospitalization.     PROBLEM LIST  #Shortness of breath   -possible COPD exacerbation but by time of arrival no wheezing   -given prednisone 40mg PO   -CTA ordered at time of admission. Hospitalist team to follow up    #Tachycardia   -unknown etiology or prognosis   -initially thought it was secondary to bronchodilator treatment but it has been persistent    -alcohol withdrawal or cocaine use?  UDS pending. He said he recently stopped drinking 2 days ago    -given ativan and fluids, heart rate had some improvement   -troponin 35->36 HEART score     DISPOSITION AND DISCUSSIONS  I have discussed management of the patient with the following physicians and DEEPAK's:  Dr. Wilkinson (Hospitalist)     Discussion of management with other [facility] or appropriate source(s): None     Barriers to care at this time, including but not limited to:  None known at this time .         DISPOSITION:  Patient will be hospitalized by Dr. Wilkinson in guarded condition.     FINAL DIANGOSIS  1. Tachycardia    2. Elevated troponin    3. History of COPD          Ori CASTILLO (Scribluisito), am scribing for, and in the presence of, KRISTINA Helton II.    Electronically signed by: Ori Hernandez (Sobeidaibe), 4/16/2024    Pierre CASTILLO II, M* personally performed the services described in this documentation, as scribed by Ori Hernandez in my presence, and it is both accurate and complete.     The note accurately reflects work and decisions made by me.  Pierre Stallworth II, M.D.  4/16/2024  8:47 AM

## 2024-04-16 NOTE — ED NOTES
Bedside report received from off going RN/tech: eleazar abreu, assumed care of patient.  POC discussed with patient. Call light within reach, all needs addressed at this time.       Fall risk interventions in place: Move the patient closer to the nurse's station, Patient's personal possessions are with in their safe reach, and Place fall risk sign on patient's door (all applicable per [place] Fall risk assessment)   Continuous monitoring: Cardiac Leads, Pulse Ox, or Blood Pressure  IVF/IV medications: Not Applicable   Oxygen: Room Air  Bedside sitter: Not Applicable   Isolation: Not Applicable

## 2024-04-16 NOTE — ED TRIAGE NOTES
"Chief Complaint   Patient presents with    Shortness of Breath     Pt BIB EMS from home for SOB x2 days. Pt given Albuterol and DuoNeb PTA with some relief. HR of 129 in triage. Hx of COPD and chronic kidney disease.      Blood Pressure : 133/86, Pulse: (!) 129, Respiration: 18, Temperature: 36.9 °C (98.4 °F), Height: 170.2 cm (5' 7\"), Weight: 48.5 kg (107 lb), BMI (Calculated): 16.76, BSA (Calculated): 1.5, Pulse Oximetry: 95 %      "

## 2024-04-16 NOTE — H&P
Hospital Medicine History & Physical Note    Date of Service  4/16/2024    Primary Care Physician  Haritha Borja M.D.    Consultants  None    Code Status  Full Code    Chief Complaint  Chief Complaint   Patient presents with    Shortness of Breath     Pt BIB EMS from home for SOB x2 days. Pt given Albuterol and DuoNeb PTA with some relief. HR of 129 in triage. Hx of COPD and chronic kidney disease.        History of Presenting Illness  Fede Parikh Jr. is a 79 y.o. male who presented 4/16/2024 with shortness of breath.  Patient has history of COPD.  He is somewhat poor historian.  For the last 2 weeks, he has reported to have cough with occasional white phlegm.  For the last few days, he has noted progressively worsening shortness of breath and difficulties gasping for air sometimes.  EMS was called, to which patient was noted to be tachycardic with heart rate at 130.  He was administered DuoNebs and albuterol and transferred to [facility] ED.    In ER, patient tachycardic and hypoxic.  Chest x-ray showing no acute cardiopulmonary abnormality.  He is admitted for COPD exacerbation.    I discussed the plan of care with patient.    Review of Systems  Review of Systems   Constitutional: Negative.    HENT: Negative.     Eyes: Negative.    Respiratory:  Positive for cough.    Cardiovascular:  Positive for chest pain.   Gastrointestinal: Negative.    Genitourinary: Negative.    Musculoskeletal: Negative.    Skin: Negative.    Neurological: Negative.    Endo/Heme/Allergies: Negative.    Psychiatric/Behavioral: Negative.         Past Medical History   has a past medical history of Hepatitis, Hyperlipemia, Hypertension, and Pulmonary emphysema (HCC).    Surgical History   has a past surgical history that includes femoral femoral bypass (Bilateral, 9/4/2020).     Family History  family history includes Cancer in his mother; Diabetes in his brother; Heart Disease in his mother.   Family history reviewed with patient.  There is no family history that is pertinent to the chief complaint.     Social History   reports that he has been smoking cigarettes. He has never used smokeless tobacco. He reports current alcohol use of about 4.2 oz of alcohol per week. He reports current drug use. Drug: Cocaine.    Allergies  Allergies   Allergen Reactions    Ace Inhibitors Swelling       Medications  Prior to Admission Medications   Prescriptions Last Dose Informant Patient Reported? Taking?   albuterol 108 (90 Base) MCG/ACT Aero Soln inhalation aerosol   No No   Sig: Inhale 2 Puffs every 6 hours as needed for Shortness of Breath.   clopidogrel (PLAVIX) 75 MG Tab   No No   Sig: Take 1 Tablet by mouth every day.   fluticasone-umeclidinium-vilanterol (TRELEGY ELLIPTA) 100-62.5-25 mcg/act inhaler   No No   Sig: Inhale 1 Puff every day.   hydrOXYzine HCl (ATARAX) 25 MG Tab   No No   Sig: Take 1 Tablet by mouth 1 time a day as needed for Itching.   omeprazole (PRILOSEC) 20 MG delayed-release capsule   No No   Sig: Take 2 Capsules by mouth every day. TAKE ONE CAPSULE BY MOUTH ONE TIME DAILY   thiamine (THIAMINE) 100 MG tablet   No No   Sig: Take 1 Tablet by mouth every day.      Facility-Administered Medications: None       Physical Exam  Temp:  [36.9 °C (98.4 °F)] 36.9 °C (98.4 °F)  Pulse:  [109-129] 109  Resp:  [17-20] 18  BP: (133-175)/(67-86) 153/82  SpO2:  [92 %-95 %] 92 %  Blood Pressure : (!) 153/82   Temperature: 36.9 °C (98.4 °F)   Pulse: (!) 109   Respiration: 18   Pulse Oximetry: 92 %       Physical Exam  Constitutional:       Appearance: He is normal weight.      Comments: Thin appearing male  Follows simple commands  Questionable historian   HENT:      Head: Normocephalic.      Nose: Nose normal.      Mouth/Throat:      Mouth: Mucous membranes are moist.   Eyes:      Pupils: Pupils are equal, round, and reactive to light.   Cardiovascular:      Rate and Rhythm: Regular rhythm. Tachycardia present.   Pulmonary:      Comments: Tachypneic  with respiratory rate between 20 and 28  Mild expiratory wheezing and rhonchi heard  Abdominal:      General: Abdomen is flat. Bowel sounds are normal.      Palpations: Abdomen is soft.   Musculoskeletal:         General: No swelling. Normal range of motion.      Cervical back: Neck supple.   Skin:     General: Skin is warm.   Neurological:      General: No focal deficit present.      Mental Status: He is oriented to person, place, and time. Mental status is at baseline.   Psychiatric:         Mood and Affect: Mood normal.         Behavior: Behavior normal.         Thought Content: Thought content normal.         Judgment: Judgment normal.         Laboratory:  Recent Labs     04/16/24  0021   WBC 5.0   RBC 3.93*   HEMOGLOBIN 10.1*   HEMATOCRIT 31.2*   MCV 79.4*   MCH 25.7*   MCHC 32.4   RDW 51.4*   PLATELETCT 251   MPV 10.0     Recent Labs     04/16/24  0021   SODIUM 136   POTASSIUM 4.2   CHLORIDE 104   CO2 16*   GLUCOSE 132*   BUN 18   CREATININE 1.45*   CALCIUM 8.7     Recent Labs     04/16/24  0021   ALTSGPT 11   ASTSGOT 28   ALKPHOSPHAT 66   TBILIRUBIN 0.4   GLUCOSE 132*         Recent Labs     04/16/24  0119   NTPROBNP 409*         Recent Labs     04/16/24  0119 04/16/24  0341   TROPONINT 35* 36*       Imaging:  DX-CHEST-PORTABLE (1 VIEW)   Final Result         1.  No acute cardiopulmonary disease.   2.  Atherosclerosis      CT-CTA CHEST PULMONARY ARTERY W/ RECONS    (Results Pending)       X-Ray:  I have personally reviewed the images and compared with prior images.    Assessment/Plan:  Justification for Admission Status  I anticipate this patient will require at least two midnights for appropriate medical management, necessitating inpatient admission because pt has COPD exacerbation    Patient will need a Med/Surg bed on EMERGENCY service .  The need is secondary to COPD exacerbation.    * Acute exacerbation of chronic obstructive pulmonary disease (COPD) (HCC)- (present on admission)  Assessment &  Plan  History of COPD, minimal expiratory wheezing and rhonchi heard upon physical exam  Given nebulizers and prednisone in ED  Continue prednisone/DuoNebs  Continue home medications  COVID swab pending  CTPA pending  U tox pending, previously positive for cocaine      Assessment & Plan  History of COPD, minimal expiratory wheezing and rhonchi heard upon physical exam  Given nebulizers and prednisone in ED  Continue prednisone/DuoNebs  Continue home medications  COVID swab pending  CTPA pending        ACP (advance care planning)  Assessment & Plan  16 minutes spent discussing goals of care with patient.  He was explained that he has suspected COPD exacerbation and he will have CTPA to rule out pulmonary embolism.  He was asked about CODE STATUS, to which she states that he would like to be full code and to have everything done.    SHANNAN (acute kidney injury) due to contrast-induced nephropathy (HCC)- (present on admission)  Assessment & Plan  Fluids  Serial BMP  Avoid nephrotoxic meds        VTE prophylaxis: enoxaparin ppx

## 2024-04-17 ENCOUNTER — PHARMACY VISIT (OUTPATIENT)
Dept: PHARMACY | Facility: MEDICAL CENTER | Age: 79
End: 2024-04-17
Payer: COMMERCIAL

## 2024-04-17 VITALS
HEIGHT: 67 IN | RESPIRATION RATE: 15 BRPM | BODY MASS INDEX: 16.79 KG/M2 | TEMPERATURE: 98.1 F | WEIGHT: 107 LBS | DIASTOLIC BLOOD PRESSURE: 73 MMHG | SYSTOLIC BLOOD PRESSURE: 136 MMHG | HEART RATE: 103 BPM | OXYGEN SATURATION: 98 %

## 2024-04-17 LAB
ALBUMIN SERPL BCP-MCNC: 3.7 G/DL (ref 3.2–4.9)
ALBUMIN/GLOB SERPL: 1.2 G/DL
ALP SERPL-CCNC: 60 U/L (ref 30–99)
ALT SERPL-CCNC: 8 U/L (ref 2–50)
ANION GAP SERPL CALC-SCNC: 14 MMOL/L (ref 7–16)
AST SERPL-CCNC: 40 U/L (ref 12–45)
BASOPHILS # BLD AUTO: 0.2 % (ref 0–1.8)
BASOPHILS # BLD: 0.01 K/UL (ref 0–0.12)
BILIRUB SERPL-MCNC: 0.2 MG/DL (ref 0.1–1.5)
BUN SERPL-MCNC: 20 MG/DL (ref 8–22)
CALCIUM ALBUM COR SERPL-MCNC: 9 MG/DL (ref 8.5–10.5)
CALCIUM SERPL-MCNC: 8.8 MG/DL (ref 8.5–10.5)
CHLORIDE SERPL-SCNC: 108 MMOL/L (ref 96–112)
CO2 SERPL-SCNC: 19 MMOL/L (ref 20–33)
CREAT SERPL-MCNC: 1.3 MG/DL (ref 0.5–1.4)
EOSINOPHIL # BLD AUTO: 0.01 K/UL (ref 0–0.51)
EOSINOPHIL NFR BLD: 0.2 % (ref 0–6.9)
ERYTHROCYTE [DISTWIDTH] IN BLOOD BY AUTOMATED COUNT: 50 FL (ref 35.9–50)
GFR SERPLBLD CREATININE-BSD FMLA CKD-EPI: 55 ML/MIN/1.73 M 2
GLOBULIN SER CALC-MCNC: 3.1 G/DL (ref 1.9–3.5)
GLUCOSE SERPL-MCNC: 106 MG/DL (ref 65–99)
HCT VFR BLD AUTO: 27.2 % (ref 42–52)
HGB BLD-MCNC: 9.1 G/DL (ref 14–18)
IMM GRANULOCYTES # BLD AUTO: 0.01 K/UL (ref 0–0.11)
IMM GRANULOCYTES NFR BLD AUTO: 0.2 % (ref 0–0.9)
LYMPHOCYTES # BLD AUTO: 1.08 K/UL (ref 1–4.8)
LYMPHOCYTES NFR BLD: 24.1 % (ref 22–41)
MCH RBC QN AUTO: 25.9 PG (ref 27–33)
MCHC RBC AUTO-ENTMCNC: 33.5 G/DL (ref 32.3–36.5)
MCV RBC AUTO: 77.3 FL (ref 81.4–97.8)
MONOCYTES # BLD AUTO: 0.68 K/UL (ref 0–0.85)
MONOCYTES NFR BLD AUTO: 15.2 % (ref 0–13.4)
NEUTROPHILS # BLD AUTO: 2.69 K/UL (ref 1.82–7.42)
NEUTROPHILS NFR BLD: 60.1 % (ref 44–72)
NRBC # BLD AUTO: 0 K/UL
NRBC BLD-RTO: 0 /100 WBC (ref 0–0.2)
PLATELET # BLD AUTO: 195 K/UL (ref 164–446)
PMV BLD AUTO: 10.1 FL (ref 9–12.9)
POTASSIUM SERPL-SCNC: 4.6 MMOL/L (ref 3.6–5.5)
PROT SERPL-MCNC: 6.8 G/DL (ref 6–8.2)
RBC # BLD AUTO: 3.52 M/UL (ref 4.7–6.1)
SODIUM SERPL-SCNC: 141 MMOL/L (ref 135–145)
WBC # BLD AUTO: 4.5 K/UL (ref 4.8–10.8)

## 2024-04-17 PROCEDURE — 85025 COMPLETE CBC W/AUTO DIFF WBC: CPT

## 2024-04-17 PROCEDURE — A9270 NON-COVERED ITEM OR SERVICE: HCPCS | Performed by: INTERNAL MEDICINE

## 2024-04-17 PROCEDURE — A9270 NON-COVERED ITEM OR SERVICE: HCPCS | Performed by: STUDENT IN AN ORGANIZED HEALTH CARE EDUCATION/TRAINING PROGRAM

## 2024-04-17 PROCEDURE — 80053 COMPREHEN METABOLIC PANEL: CPT

## 2024-04-17 PROCEDURE — 700102 HCHG RX REV CODE 250 W/ 637 OVERRIDE(OP): Performed by: INTERNAL MEDICINE

## 2024-04-17 PROCEDURE — 700102 HCHG RX REV CODE 250 W/ 637 OVERRIDE(OP): Performed by: STUDENT IN AN ORGANIZED HEALTH CARE EDUCATION/TRAINING PROGRAM

## 2024-04-17 PROCEDURE — 700111 HCHG RX REV CODE 636 W/ 250 OVERRIDE (IP): Performed by: STUDENT IN AN ORGANIZED HEALTH CARE EDUCATION/TRAINING PROGRAM

## 2024-04-17 PROCEDURE — RXMED WILLOW AMBULATORY MEDICATION CHARGE: Performed by: INTERNAL MEDICINE

## 2024-04-17 PROCEDURE — 700111 HCHG RX REV CODE 636 W/ 250 OVERRIDE (IP): Performed by: INTERNAL MEDICINE

## 2024-04-17 PROCEDURE — 99239 HOSP IP/OBS DSCHRG MGMT >30: CPT | Performed by: INTERNAL MEDICINE

## 2024-04-17 RX ORDER — PREDNISONE 20 MG/1
40 TABLET ORAL DAILY
Qty: 8 TABLET | Refills: 0 | Status: SHIPPED | OUTPATIENT
Start: 2024-04-17 | End: 2024-04-21

## 2024-04-17 RX ORDER — ALBUTEROL SULFATE 90 UG/1
2 AEROSOL, METERED RESPIRATORY (INHALATION)
Status: DISCONTINUED | OUTPATIENT
Start: 2024-04-17 | End: 2024-04-17 | Stop reason: HOSPADM

## 2024-04-17 RX ADMIN — PREDNISONE 40 MG: 20 TABLET ORAL at 05:53

## 2024-04-17 RX ADMIN — ACETAMINOPHEN 650 MG: 325 TABLET, FILM COATED ORAL at 04:52

## 2024-04-17 RX ADMIN — HEPARIN SODIUM 5000 UNITS: 5000 INJECTION, SOLUTION INTRAVENOUS; SUBCUTANEOUS at 05:53

## 2024-04-17 RX ADMIN — ALBUTEROL SULFATE 2 PUFF: 90 AEROSOL, METERED RESPIRATORY (INHALATION) at 10:15

## 2024-04-17 RX ADMIN — FLUTICASONE FUROATE, UMECLIDINIUM BROMIDE AND VILANTEROL TRIFENATATE 1 PUFF: 100; 62.5; 25 POWDER RESPIRATORY (INHALATION) at 06:25

## 2024-04-17 RX ADMIN — OMEPRAZOLE 40 MG: 20 CAPSULE, DELAYED RELEASE ORAL at 05:53

## 2024-04-17 NOTE — RESPIRATORY CARE
"COPD EDUCATION by COPD CLINICAL EDUCATOR  4/17/2024 at 3:54 PM by Elodia Yao RRT     Patient interviewed by COPD education team. Patient refused COPD program and Smoking Cessation education and literature at this time. An Action Plan was completed in the EMR to reflect current Respiratory Medication use.                       COPD Screen  COPD Risk Screening  Do you have a history of COPD?: Yes  Do you have a Pulmonologist?: Yes    COPD Assessment  COPD Clinical Specialists ONLY  COPD Education Initiated: Yes--Short Intervention (Very hard of hearing, given COPD booklet, pt declined education and spacer.)  Is this a COPD exacerbation patient?: Yes (per H&P, order set used but excluded COPD and IP Pulm consults)  DME Company: none  DME Equipment Type: none  Physician Name: SARITA - declined apt assistance  Pulmonologist Name: declined pulmonologist list  Referrals Initiated:  (refused all)  $ Demo/Eval of SVN's, MDI's and Aerosols:  (refused spacer)  (OP) Pulmonary Function Testing:  (none on file)  Interdisciplinary Rounds: Attendance at Rounds (30 Min)    PFT Results    No results found for: \"PFT\"    Meds to [facility] provides bedside medication delivery for all eligible patients at discharge.  Would you like to opt out of this program for any reason?: No - Stay Opted In     MY COPD ACTION PLAN     It is recommended that patients and physicians /healthcare providers complete this action plan together. This plan should be discussed at each physician visit and updated as needed.    The green, yellow and red zones show groups of symptoms of COPD. This list of symptoms is not comprehensive, and you may experience other symptoms. In the \"Actions\" column, your healthcare provider has recommended actions for you to take based on your symptoms.    Patient Name: Fede Parikh .   YOB: 1945   Last Updated on:     Green Zone:  I am doing well today Actions     Usual activitiy and exercise level   Take " "daily medications     Usual amounts of cough and phlegm/mucus   Use oxygen as prescribed     Sleep well at night   Continue regular exercise/diet plan     Appetite is good   At all times avoid cigarette smoke, inhaled irritants     Daily Medications (these medications are taken every day):   Fluticasone and Umeclidinium and Vilanterol (Trelogy) 1 Puff Once daily     Additional Information:  Remember to rinse mouth after using.    Yellow Zone:  I am having a bad day or a COPD flare Actions     More breathless than usual   Continue daily medications     I have less energy for my daily activities   Use quick relief inhaler as ordered     Increased or thicker phlegm/mucus   Use oxygen as prescribed     Using quick relief inhaler/nebulizer more often   Get plenty of rest     Swelling of ankles more than usual   Use pursed lip breathing     More coughing than usual   At all times avoid cigarette smoke, inhaled irritants     I feel like I have a \"chest cold\"     Poor sleep and my symptoms woke me up     My appetite is not good     My medicine is not helping      Call provider immediately if symptoms don’t improve     Continue daily medications, add rescue medications:   Albuterol 2 Puffs Every 6 hours PRN       Medications to be used during a flare up, (as Discussed with Provider):           Additional Information:  Best to use with a spacer.    Red Zone:  I need urgent medical care Actions     Severe shortness of breath even at rest   Call 911 or seek medical care immediately     Not able to do any activity because of breathing      Fever or shaking chills      Feeling confused or very drowsy       Chest pains      Coughing up blood                  "

## 2024-04-17 NOTE — DISCHARGE INSTRUCTIONS
Chronic Obstructive Pulmonary Disease (COPD) is a long-term, progressive lung disease that makes it harder to breathe. It includes chronic bronchitis, emphysema, and refractory (non-reversible) asthma. With COPD, the airways in your lungs become inflamed and thicken, and the tissue where oxygen is exchanged is destroyed. The flow of air in and out of your lungs decreases. When that happens, less oxygen gets into your body tissues, and it becomes harder to get rid of the waste gas carbon dioxide. As the disease gets worse, shortness of breath makes it harder to remain active. There is no cure for COPD, but it is preventable and treatable.    COPD Patient Discharge Instructions    Diet  Follow a low fat, low cholesterol, low salt diet unless instructed otherwise by your Doctor. Read the labels on the back of food products and track your intake of fat, cholesterol and salt.  No smoking  Discontinuing smoking will have the biggest impact on preventing progression of disease.  To participate in [employer]’s Quit Tobacco Program, call [phone] or visit [email or web address]  Oxygen  If your doctor has order that you wear oxygen at home, it is important to wear it as ordered.  Do not smoke, vape, or use e-cigarettes with oxygen on.  Store in an appropriate location: upright in its jang or laid flat down, away from open flames and stoves.   Do not use oil-based creams and moisturizers (ie: petroleum products, oil-based lip moisturizers) or aerosol sprays (ie: hair sprays or deodorants) when using your oxygen equipment.  Be careful with tubing placement to prevent yourself and others from tripping.  Medications  Refer to your personalized Action Plan to manage your symptoms.  Warning signs of an exacerbation  Breathing fast and shallow, worsening shortness of breath (like you just finished exercising)  Chest tightness  Increases in sputum production  Changes in sputum color  Lower oxygen levels than baseline  When  to call your doctor  If the warning signs of an exacerbation do not improve  Refer to your personalized Action Plan   Pulmonary Rehab  Your doctor has ordered you a referral to Pulmonary Rehab. Call [phone] to schedule an appointment  Attend your follow-up appointment with your PCP and/or Pulmonologist  Remote Monitoring: At the direction of the remote monitoring on-call provider, you may increase your oxygen by 2 liters above your baseline.     See the educational handout provided by your COPD Navigator for more information. This also explains more about COPD, symptoms of an exacerbation, and some of the tests that you have undergone.

## 2024-04-17 NOTE — PROGRESS NOTES
Monitor Summary  Rhythm: Normal Sinus Rhythm  Rate: 92-98  Ectopy: PVCs, Couplets  .21 / .04 / .29

## 2024-04-17 NOTE — DISCHARGE SUMMARY
Discharge Summary    CHIEF COMPLAINT ON ADMISSION  Chief Complaint   Patient presents with    Shortness of Breath     Pt BIB EMS from home for SOB x2 days. Pt given Albuterol and DuoNeb PTA with some relief. HR of 129 in triage. Hx of COPD and chronic kidney disease.        Reason for Admission  sob     Admission Date  4/16/2024    CODE STATUS  Full Code    HPI & HOSPITAL COURSE  This is a 79 y.o. male here with shortness of breath.    79 y.o. male who presented 4/16/2024 with shortness of breath.  Patient has history of COPD.  He is somewhat poor historian.  For the last 2 weeks, he has reported to have cough with occasional white phlegm.  For the last few days, he has noted progressively worsening shortness of breath and difficulties gasping for air sometimes.  EMS was called, to which patient was noted to be tachycardic with heart rate at 130.  He was administered DuoNebs and albuterol and transferred to [facility] ED.     In ER, patient tachycardic and hypoxic.  Chest x-ray showing no acute cardiopulmonary abnormality.  He is admitted for COPD exacerbation.  Patient was started on steroids and SVNs.  Today patient's vital signs are stable on room air, he is not wheezing on exam.  Patient requesting to be discharged home today.  I will prescribe him 4 more days of prednisone.  Patient reports he does not like nebulizer treatments with prefer to take his albuterol inhaler.  He is to follow-up outpatient with his primary care physician for further management.  He is to return to the ER if his condition worsens.    Therefore, he is discharged in good and stable condition to home with close outpatient follow-up.    The patient recovered much more quickly than anticipated on admission.    Discharge Date  4/17/2024    FOLLOW UP ITEMS POST DISCHARGE  Follow up with primary care     DISCHARGE DIAGNOSES  Principal Problem:    Acute exacerbation of chronic obstructive pulmonary disease (COPD) ([facility]) (POA: Yes)  Active  Problems:    SHANNAN (acute kidney injury) due to contrast-induced nephropathy (HCC) (POA: Yes)    ACP (advance care planning) (POA: Unknown)  Resolved Problems:    Acute hypoxemic respiratory failure (HCC) (POA: Yes)      FOLLOW UP  No future appointments.  Haritha Borja M.D.  [address] [facility] [address]  [phone]    Follow up  Follow up with primary care in 1-2 weeks      MEDICATIONS ON DISCHARGE     Medication List        START taking these medications        Instructions   predniSONE 20 MG Tabs  Commonly known as: Deltasone   Take 2 Tablets by mouth every day for 4 days.  Dose: 40 mg            CONTINUE taking these medications        Instructions   albuterol 108 (90 Base) MCG/ACT Aers inhalation aerosol   Inhale 2 Puffs every 6 hours as needed for Shortness of Breath.  Dose: 2 Puff     clopidogrel 75 MG Tabs  Commonly known as: Plavix   Take 1 Tablet by mouth every day.  Dose: 75 mg     hydrOXYzine HCl 25 MG Tabs  Commonly known as: Atarax   Take 1 Tablet by mouth 1 time a day as needed for Itching.  Dose: 25 mg     omeprazole 20 MG delayed-release capsule  Commonly known as: PriLOSEC   Take 2 Capsules by mouth every day. TAKE ONE CAPSULE BY MOUTH ONE TIME DAILY  Dose: 40 mg     thiamine 100 MG tablet  Commonly known as: Thiamine   Take 1 Tablet by mouth every day.  Dose: 100 mg     Trelegy Ellipta 100-62.5-25 MCG/ACT inhaler  Generic drug: fluticasone-umeclidinium-vilanterol   Inhale 1 Puff every day.  Dose: 1 Puff              Allergies  Allergies   Allergen Reactions    Ace Inhibitors Swelling       DIET  Orders Placed This Encounter   Procedures    Diet Order Diet: Regular     Standing Status:   Standing     Number of Occurrences:   1     Order Specific Question:   Diet:     Answer:   Regular [1]       ACTIVITY  As tolerated.  Weight bearing as tolerated    CONSULTATIONS  None     PROCEDURES  None     LABORATORY  Lab Results   Component Value Date    SODIUM 141 04/17/2024    POTASSIUM 4.6  04/17/2024    CHLORIDE 108 04/17/2024    CO2 19 (L) 04/17/2024    GLUCOSE 106 (H) 04/17/2024    BUN 20 04/17/2024    CREATININE 1.30 04/17/2024    CREATININE 1.4 08/23/2005        Lab Results   Component Value Date    WBC 4.5 (L) 04/17/2024    HEMOGLOBIN 9.1 (L) 04/17/2024    HEMATOCRIT 27.2 (L) 04/17/2024    PLATELETCT 195 04/17/2024        Total time of the discharge process exceeds 34 minutes.

## 2024-04-17 NOTE — CARE PLAN
The patient is Stable - Low risk of patient condition declining or worsening    Shift Goals  Clinical Goals: neb treatments, monitor spo2, IVF  Patient Goals: sleep  Family Goals: n/a    Progress made toward(s) clinical / shift goals:  neb treatments, IVF, spo2    Patient is not progressing towards the following goals: pt uncooperative

## 2024-04-17 NOTE — PROGRESS NOTES
4 Eyes Skin Assessment:    Unable to complete 4 Eyes Skin Assessment due to patient refusal. Patient educated on importance of assessment due to immediate intervention if needed. Patient still refused skin assessment. Charge RN notified.

## 2024-04-17 NOTE — PROGRESS NOTES
Monitor summary:        Rhythm: SR-ST  Rate:   Ectopy: (r)PVC  Measurements: .20/.08/.34              12hr chart check

## 2024-04-17 NOTE — PROGRESS NOTES
Bedside report received from Camryn HANEY. Pt assessment complete. Pt AO x 4. Reviewed plan of care with pt. Pt tele monitored. Chart and labs reviewed. Bed in lowest position, and 3 side rails up. Pt educated on call lights, call light within reach. Hourly rounding in place

## 2024-05-30 DIAGNOSIS — J44.9 CHRONIC OBSTRUCTIVE PULMONARY DISEASE, UNSPECIFIED COPD TYPE (HCC): ICD-10-CM

## 2024-05-30 NOTE — TELEPHONE ENCOUNTER
Received request via: Pharmacy    Was the patient seen in the last year in this department? Yes    Does the patient have an active prescription (recently filled or refills available) for medication(s) requested? No    Pharmacy Name: [id number] - [place], [place] - [address]     Does the patient have [facility] and need 100 day supply (blood pressure, diabetes and cholesterol meds only)? Patient does not have SCP

## 2024-06-03 RX ORDER — ALBUTEROL SULFATE 90 UG/1
2 AEROSOL, METERED RESPIRATORY (INHALATION) EVERY 6 HOURS PRN
Qty: 18 G | Refills: 2 | Status: SHIPPED | OUTPATIENT
Start: 2024-06-03

## 2024-07-18 RX ORDER — FLUTICASONE FUROATE, UMECLIDINIUM BROMIDE AND VILANTEROL TRIFENATATE 100; 62.5; 25 UG/1; UG/1; UG/1
1 POWDER RESPIRATORY (INHALATION) DAILY
Qty: 28 EACH | Refills: 0 | Status: SHIPPED | OUTPATIENT
Start: 2024-07-18

## 2024-07-22 ENCOUNTER — TELEPHONE (OUTPATIENT)
Dept: INTERNAL MEDICINE | Facility: OTHER | Age: 79
End: 2024-07-22
Payer: MEDICARE

## 2024-07-24 ENCOUNTER — TELEPHONE (OUTPATIENT)
Dept: INTERNAL MEDICINE | Facility: OTHER | Age: 79
End: 2024-07-24
Payer: MEDICARE

## 2024-08-15 ENCOUNTER — OFFICE VISIT (OUTPATIENT)
Dept: FAMILY MEDICINE | Facility: CLINIC | Age: 79
End: 2024-08-15
Payer: MEDICARE

## 2024-08-15 ENCOUNTER — TELEPHONE (OUTPATIENT)
Dept: FAMILY MEDICINE | Facility: CLINIC | Age: 79
End: 2024-08-15

## 2024-08-15 VITALS
SYSTOLIC BLOOD PRESSURE: 156 MMHG | TEMPERATURE: 97.8 F | OXYGEN SATURATION: 99 % | BODY MASS INDEX: 18.27 KG/M2 | RESPIRATION RATE: 16 BRPM | DIASTOLIC BLOOD PRESSURE: 80 MMHG | WEIGHT: 116.4 LBS | HEIGHT: 67 IN | HEART RATE: 96 BPM

## 2024-08-15 DIAGNOSIS — J43.9 PULMONARY EMPHYSEMA, UNSPECIFIED EMPHYSEMA TYPE (H): ICD-10-CM

## 2024-08-15 DIAGNOSIS — Z00.00 MEDICARE ANNUAL WELLNESS VISIT, SUBSEQUENT: Primary | ICD-10-CM

## 2024-08-15 DIAGNOSIS — F41.9 ANXIETY: ICD-10-CM

## 2024-08-15 DIAGNOSIS — I73.9 PAD (PERIPHERAL ARTERY DISEASE) (H): ICD-10-CM

## 2024-08-15 DIAGNOSIS — I10 ESSENTIAL HYPERTENSION: ICD-10-CM

## 2024-08-15 DIAGNOSIS — Z59.82 LACK OF ACCESS TO TRANSPORTATION: ICD-10-CM

## 2024-08-15 DIAGNOSIS — R10.13 DYSPEPSIA: ICD-10-CM

## 2024-08-15 DIAGNOSIS — H91.13 PRESBYCUSIS OF BOTH EARS: ICD-10-CM

## 2024-08-15 DIAGNOSIS — Z87.898 HISTORY OF SUBSTANCE USE DISORDER: ICD-10-CM

## 2024-08-15 PROCEDURE — 99214 OFFICE O/P EST MOD 30 MIN: CPT | Mod: 25 | Performed by: FAMILY MEDICINE

## 2024-08-15 PROCEDURE — G0438 PPPS, INITIAL VISIT: HCPCS | Performed by: FAMILY MEDICINE

## 2024-08-15 RX ORDER — HYDROXYZINE HYDROCHLORIDE 25 MG/1
25 TABLET, FILM COATED ORAL
COMMUNITY
Start: 2024-03-20 | End: 2024-08-15

## 2024-08-15 RX ORDER — FLUTICASONE FUROATE, UMECLIDINIUM BROMIDE AND VILANTEROL TRIFENATATE 100; 62.5; 25 UG/1; UG/1; UG/1
POWDER RESPIRATORY (INHALATION)
COMMUNITY
End: 2024-08-15

## 2024-08-15 RX ORDER — AMLODIPINE BESYLATE 5 MG/1
5 TABLET ORAL DAILY
Qty: 90 TABLET | Refills: 3 | Status: SHIPPED | OUTPATIENT
Start: 2024-08-15

## 2024-08-15 RX ORDER — HYDROXYZINE HYDROCHLORIDE 25 MG/1
25 TABLET, FILM COATED ORAL EVERY 8 HOURS PRN
Qty: 90 TABLET | Refills: 3 | Status: SHIPPED | OUTPATIENT
Start: 2024-08-15

## 2024-08-15 RX ORDER — CLOPIDOGREL BISULFATE 75 MG/1
1 TABLET ORAL DAILY
COMMUNITY
Start: 2024-03-20 | End: 2024-08-15

## 2024-08-15 RX ORDER — ALBUTEROL SULFATE 90 UG/1
2 AEROSOL, METERED RESPIRATORY (INHALATION) EVERY 6 HOURS PRN
COMMUNITY
End: 2024-08-15

## 2024-08-15 RX ORDER — ALBUTEROL SULFATE 90 UG/1
2 AEROSOL, METERED RESPIRATORY (INHALATION) EVERY 6 HOURS PRN
Qty: 18 G | Refills: 3 | Status: SHIPPED | OUTPATIENT
Start: 2024-08-15 | End: 2024-09-24

## 2024-08-15 RX ORDER — OMEPRAZOLE 40 MG/1
40 CAPSULE, DELAYED RELEASE ORAL DAILY
Qty: 90 CAPSULE | Refills: 3 | Status: SHIPPED | OUTPATIENT
Start: 2024-08-15

## 2024-08-15 RX ORDER — FLUTICASONE FUROATE, UMECLIDINIUM BROMIDE AND VILANTEROL TRIFENATATE 100; 62.5; 25 UG/1; UG/1; UG/1
1 POWDER RESPIRATORY (INHALATION) DAILY
Qty: 60 EACH | Refills: 11 | Status: SHIPPED | OUTPATIENT
Start: 2024-08-15

## 2024-08-15 RX ORDER — LANOLIN ALCOHOL/MO/W.PET/CERES
1 CREAM (GRAM) TOPICAL DAILY
COMMUNITY
Start: 2024-03-20 | End: 2024-08-15

## 2024-08-15 RX ORDER — CLOPIDOGREL BISULFATE 75 MG/1
75 TABLET ORAL DAILY
Qty: 90 TABLET | Refills: 3 | Status: SHIPPED | OUTPATIENT
Start: 2024-08-15

## 2024-08-15 RX ORDER — LANOLIN ALCOHOL/MO/W.PET/CERES
100 CREAM (GRAM) TOPICAL DAILY
Qty: 90 TABLET | Refills: 3 | Status: SHIPPED | OUTPATIENT
Start: 2024-08-15

## 2024-08-15 SDOH — ECONOMIC STABILITY - TRANSPORTATION SECURITY: TRANSPORTATION INSECURITY: Z59.82

## 2024-08-15 NOTE — LETTER
August 15, 2024      Geoff Araujo  [address]  [facility] [address]        Dear Geoff,     We have been notified that the prescription for hydroxyzine is not covered by your insurance. We tried to call the number in your chart, [phone] , but it is listed as not in service, to discuss this with you.   Please call us at [phone] to schedule an appointment with Dr. Mendez to discuss other options if you wish.       Sincerely,    EMILI Calvert

## 2024-08-15 NOTE — TELEPHONE ENCOUNTER
Pharmacy states that the medication hydrOXYzine HCl (ATARAX) 25 MG tablet is not vovered by insurance plan. Preferred alternatives include CETIRZINEHCL, CYPROHEPTADINEHCL.    Order Information    Ordered Status Priority Ordering User Department   08/15/24 Sent (none) Naveen Mendez MD  FAMILY PRACTICE     Order History  Outpatient  Date/Time Action Taken User Additional Information   08/15/24 1306 Pend Parth Retana, CMA    08/15/24 1329 Sign Naveen Mendez MD Reorder from Order:[id number]   08/15/24 1329 Taking Flag Checked Naveen Mendez MD [id number]     Outpatient Medication Detail     Disp Refills Start End WANG   hydrOXYzine HCl (ATARAX) 25 MG tablet 90 tablet 3 8/15/2024 -- No   Sig - Route: Take 1 tablet (25 mg) by mouth every 8 hours as needed for anxiety - Oral   Sent to pharmacy as: hydrOXYzine HCl 25 MG Oral Tablet (ATARAX)   Class: E-Prescribe   Order: [id number]   E-Prescribing Status: Receipt confirmed by pharmacy (8/15/2024  1:29 PM CDT)     Printout Tracking    External Result Report     Pharmacy    [id number] - [place], [place] - [address]     Associated Diagnoses    Anxiety [F41.9]

## 2024-08-15 NOTE — TELEPHONE ENCOUNTER
Recommend stopping hydroxyzine.  Patient would like to discuss alternative anxiety treatments, we can squeeze him in for a telephone call otherwise continue current medical management.    PEE

## 2024-08-15 NOTE — PROGRESS NOTES
Preventive Care Visit  [facility]  Naveen Mendez MD, Family Medicine  Aug 15, 2024      Assessment & Plan     Medicare annual wellness visit, subsequent  Establishing care, PCP updated.  Patient moved from [facility].    Pulmonary emphysema, unspecified emphysema type (H)  Continue current medical management, no acute exacerbation.  Consider PFTs in future.  - albuterol (PROAIR HFA/PROVENTIL HFA/VENTOLIN HFA) 108 (90 Base) MCG/ACT inhaler  Dispense: 18 g; Refill: 3  - TRELEGY ELLIPTA 100-62.5-25 MCG/ACT oral inhaler  Dispense: 60 each; Refill: 11    PAD (peripheral artery disease) (H24)  Continue current medical management, smoking cessation reviewed.  - clopidogrel (PLAVIX) 75 MG tablet  Dispense: 90 tablet; Refill: 3    Dyspepsia  Chronic stable, refilled.  - omeprazole (PRILOSEC) 40 MG DR capsule  Dispense: 90 capsule; Refill: 3    History of substance use disorder  History of alcohol and cocaine use, continue thiamine.  - thiamine (B-1) 100 MG tablet  Dispense: 90 tablet; Refill: 3    Essential hypertension  Controlled, I reviewed the previous resident physician note at his previous internal medicine doctor, he was not on antihypertensive therapy at that visit with a normotensive reading.  Today repeat readings show elevation, recommend starting Norvasc.  Follow-up in a month for recheck.  - amLODIPine (NORVASC) 5 MG tablet  Dispense: 90 tablet; Refill: 3    Presbycusis of both ears  Previously seeing ENT, known history of diagnosed diagnosed presbycusis, recommend ENT referral and fit him for hearing aids.  - Adult ENT  Referral    Lack of access to transportation  Coordination referral placed for transportation difficulty.  - Primary Care - Care Coordination Referral    Anxiety  Continue hydroxyzine as needed for anxiety.  - hydrOXYzine HCl (ATARAX) 25 MG tablet  Dispense: 90 tablet; Refill: 3            Nicotine/Tobacco Cessation  He reports that he has been smoking  cigarettes. He started smoking about 61 years ago. He has a 15.4 pack-year smoking history. He has never used smokeless tobacco.  Nicotine/Tobacco Cessation Plan  Information offered: Patient not interested at this time            Missy Tellez is a 79 year old, presenting for the following:  Wellness Visit        8/15/2024    12:49 PM   Additional Questions   Roomed by Parth   Accompanied by self         Health Care Directive  Patient does not have a Health Care Directive or Living Will: Discussed advance care planning with patient; information given to patient to review.    History of Present Illness       Reason for visit:  Med check     Patient 79-year-old male presents for annual Medicare wellness visit establish care and medication follow-up for emphysema hypertension, GERD, peripheral artery disease, presbycusis.     He is feeling well today, he wishes to regain strength in his legs and better hearing.  His transportation is currently his friend who he feels guilt asking him to take him to medical appointments.  He is currently living in [place] and cannot drive.    He continues to smoke about a few cigarettes a day.             No data to display                   No data to display                   No data to display                     8/15/2024   Fall Risk   Fallen 2 or more times in the past year? Yes   Trouble with walking or balance? Yes   Gait Speed Test (Document in seconds) 3.4   Gait Speed Test Interpretation Less than or equal to 5.00 seconds - PASS              No data to display                   No data to display                   No data to display                     No data to display                     Today's PHQ-2 Score:       8/15/2024    12:41 PM   PHQ-2 ( 1999 [employer])   Q1: Little interest or pleasure in doing things 0   Q2: Feeling down, depressed or hopeless 0   PHQ-2 Score 0   Q1: Little interest or pleasure in doing things Not at all   Q2: Feeling down, depressed  "or hopeless Not at all   PHQ-2 Score 0            No data to display              Social History     Tobacco Use    Smoking status: Every Day     Current packs/day: 0.25     Average packs/day: 0.3 packs/day for 61.6 years (15.4 ttl pk-yrs)     Types: Cigarettes     Start date: 1965    Smokeless tobacco: Never   Vaping Use    Vaping status: Never Used                 Reviewed and updated as needed this visit by Provider                      Current providers sharing in care for this patient include:  Patient Care Team:  Naveen Mendez MD as PCP - General (Family Medicine)    The following health maintenance items are reviewed in Epic and correct as of today:  Health Maintenance   Topic Date Due    NICOTINE/TOBACCO CESSATION COUNSELING Q 1 YR  Never done    SPIROMETRY  Never done    ANNUAL REVIEW OF HM ORDERS  Never done    COPD ACTION PLAN  Never done    RSV VACCINE (Pregnancy & 60+) (1 - 1-dose 60+ series) Never done    Pneumococcal Vaccine: 65+ Years (2 of 2 - PPSV23 or PCV20) 05/17/2017    ZOSTER IMMUNIZATION (2 of 3) 06/03/2017    COVID-19 Vaccine (1 - 2023-24 season) Never done    MEDICARE ANNUAL WELLNESS VISIT  05/03/2024    INFLUENZA VACCINE (1) 09/01/2024    FALL RISK ASSESSMENT  08/15/2025    DTAP/TDAP/TD IMMUNIZATION (2 - Td or Tdap) 02/15/2027    ADVANCE CARE PLANNING  08/15/2029    PHQ-2 (once per calendar year)  Completed    IPV IMMUNIZATION  Aged Out    HPV IMMUNIZATION  Aged Out    MENINGITIS IMMUNIZATION  Aged Out    RSV MONOCLONAL ANTIBODY  Aged Out            Objective    Exam  BP (!) 156/80   Pulse 96   Temp 97.8  F (36.6  C) (Oral)   Resp 16   Ht 1.702 m (5' 7\")   Wt 52.8 kg (116 lb 6.4 oz)   SpO2 99%   BMI 18.23 kg/m     Estimated body mass index is 18.23 kg/m  as calculated from the following:    Height as of this encounter: 1.702 m (5' 7\").    Weight as of this encounter: 52.8 kg (116 lb 6.4 oz).    Physical Exam  Vitals reviewed.   Constitutional:       Appearance: He is not " ill-appearing.      Comments: Fat and muscular wasting   HENT:      Head: Atraumatic.   Cardiovascular:      Rate and Rhythm: Normal rate and regular rhythm.   Pulmonary:      Effort: Pulmonary effort is normal.      Comments: Decreased global air entry, no expiratory wheeze.  No conversational dyspnea or accessory muscle use.  Psychiatric:      Comments: Patient in good spirits, well-groomed, linear, nonpressured speech.  Thought content and judgment appear normal.                8/15/2024   Mini Cog   Clock Draw Score 2 Normal   3 Item Recall 3 objects recalled   Mini Cog Total Score 5                 Signed Electronically by: Naveen Mendez MD

## 2024-08-19 ENCOUNTER — PATIENT OUTREACH (OUTPATIENT)
Dept: CARE COORDINATION | Facility: CLINIC | Age: 79
End: 2024-08-19
Payer: MEDICARE

## 2024-08-19 NOTE — Clinical Note
Sundar Snowden,  Patient's number is not in service. There is no alternative phone number to call and patient's MyChart hasn't been set up yet.    I will mail out care coordination introduction letter and wait to hear back from patient until end of this week or early next week. I will keep you posted.   Thank you,  Lizette

## 2024-08-19 NOTE — LETTER
[facility] CARE COORDINATION  [address]  [facility] [address]     August 19, 2024    Geoff Araujo  [address]  [facility] [address]      Dear Geoff,    I am a clinic community health worker who works with Naveen Mednez MD with the [facility]. I recently tried to call and was unable to reach you at [phone]. I wanted to introduce myself and provide you with my contact information for you to be able to call me with any questions or concerns.     We understand that you're experiencing transportation difficulties and hope to assist you with resources/support for transportation. When you have a chance, please call us back at [phone].    Below is a description of clinic care coordination and how I can further assist you.       The clinic care coordination team is made up of a registered nurse, , financial resource worker and community health worker who understand the health care system. The goal of clinic care coordination is to help you manage your health and improve access to the health care system. Our team works alongside your provider to assist you in determining your health and social needs. We can help you obtain health care and community resources, providing you with necessary information and education. We can work with you through any barriers and develop a care plan that helps coordinate and strengthen the communication between you and your care team.  Our services are voluntary and are offered without charge to you personally.    Please feel free to contact me with any questions or concerns regarding care coordination and what we can offer.      We are focused on providing you with the highest-quality healthcare experience possible.    Sincerely,     Lizette Apple  Community Health Worker   [email or web address]   Clinic Care Coordination / Ambulatory Care Management  [place], and [place]  [facility]  [email or web address]  Office: [phone]  Gender Pronouns: She/her/hers  Employed by [facility]      Enclosed: Clinic Care Coordination Information (via USPS mail)

## 2024-08-19 NOTE — LETTER
[facility]ORD[facility]  [address]  [facility] [address]     August 20, 2024    Geoff Araujo  [address]  [facility] [address]      Dear Geoff,    As I understand that you have transportation difficulty, I'm enclosing resources for transportation ([employer] and [employer]). Please review them and reach out to us if you need further help from us.      [employer] is the most affordable option. You need to submit [employer] application and have your doctor fill out a part of it. Usually, it takes about a month to process the application, so it's not an immediate option. However, once it's processed,  the [employer] can bring you to medical appointments and errands, etc.     [employer] is a service that uses Smart Devices and Uber. Participants can use the [employer] hotline to call rather than using a smart phone to make trips. Kids and received text messages to alert them of pick ups and drop offs. Small convenience fee is added to overall trip fare.     Please feel free to contact me with any questions or concerns regarding care coordination and what we can offer.      Sincerely,     Lizette Apple  Community Health Worker   [email or web address]   Clinic Care Coordination / Ambulatory Care Management  [facility], and [facility]  [email or web address]  Office: [phone]  Gender Pronouns: She/her/hers  Employed by [facility] [employer]      Enclosed:   Information and application form for [employer]  Information for [employer]

## 2024-08-19 NOTE — PROGRESS NOTES
Clinic Care Coordination Contact  [facility]/Voicemail    Clinical Data: Care Coordinator Outreach    Outreach Documentation Number of Outreach Attempt   8/19/2024  10:29 AM 2     CHW called patient; however, couldn't leave message on patient's voicemail due to the number not in service. There is no alternative phone number and patient's MyChart hasn't been set up yet.     Plan: CHW/Care Coordinator will send care coordination introduction letter with care coordinator contact information and explanation of care coordination services via mail. CHW/Care Coordinator will try to reach patient again in 3-5 business days.    Lizette Apple  Community Health Worker   [email or web address]   Clinic Care Coordination / Ambulatory Care Management  [facility], and [facility]  [email or web address]  Office: [phone]  Gender Pronouns: She/her/hers  Employed by [employer]

## 2024-08-19 NOTE — TELEPHONE ENCOUNTER
Patient called. States that he received a letter about hydroxyzine not being covered by insurance and asking him to make another appointment with PCP.   Patient states that he would like to keep his appointment with PCP:    Sep 18, 2024 2:00 PM  (Arrive by 1:40 PM)  Provider Visit with Naveen Mendez MD  [facility] ([facility] ) [phone]     Patient updated his phone number: [phone]

## 2024-08-21 NOTE — PROGRESS NOTES
Clinic Care Coordination Contact  Community Health Worker Initial Outreach    CHW Initial Information Gathering:  Referral Source: PCP  Preferred Hospital: Other ([facility])  Type of residence:: Private home - stairs  No PCP office visit in Past Year: No  CHW Additional Questions  If ED/Hospital discharge, follow-up appointment scheduled as recommended?: N/A  Medication changes made following ED/Hospital discharge?: N/A  MyChart active?: No    Patient accepts CC: Yes. Patient scheduled for assessment with RN CC on 8/22/24 at 1pm. Patient noted desire to discuss resources/options for transportation and hearing issues/hearing aids.     CHW called patient at [phone] and was able to reach him after calling twice.     Transportation: Patient reported lack of access to transportation and would like resources/options for transportation due to ongoing transportation difficulty. Patient has his next medical appointment at [facility] on 9/24/2024 at 7:30am and would like [facility] support to set up transportation for the appointment as well as future appointments. Patient's MyChart is not set up, so CHW mailed out transportation resources ([employer] and [employer]) to patient yesterday, 8/20/24.     Hearing issues/hearing aids: Patient's hearing loss/hard of hearing was noticeable over the phone. Patient reported his ongoing hearing issues and noted that he needs hearing aids. Patient needs [facility] support to make an appointment to have his hearing checked and obtain hearing aids.      Due to his hearing issues/hard of hearing, CHW was able to obtain limited information during CHW Initial Information Gathering.     Lizette Apple  Community Health Worker   [email or web address]   Clinic Care Coordination / Ambulatory Care Management  [facility]  [email or web address]  Office: [phone]  Gender Pronouns:  She/her/hers  Employed by [employer]

## 2024-08-22 ENCOUNTER — PATIENT OUTREACH (OUTPATIENT)
Dept: NURSING | Facility: CLINIC | Age: 79
End: 2024-08-22
Payer: MEDICARE

## 2024-08-22 ASSESSMENT — ACTIVITIES OF DAILY LIVING (ADL): DEPENDENT_IADLS:: COOKING;CLEANING;TRANSPORTATION

## 2024-08-22 NOTE — LETTER
[facility] CARE COORDINATION  [id number]  [facility] [address]    August 22, 2024    Geoff Araujo  [address]  [facility] [address]      Dear Geoff,    I am a clinic care coordinator who works with Naveen Mendez MD with the [facility]. I wanted to introduce myself and provide you with my contact information for you to be able to call me with any questions or concerns. I wanted to thank you for spending the time to talk with me.  Below is a description of clinic care coordination and how I can further assist you.       The clinic care coordination team is made up of a registered nurse, , financial resource worker and community health worker who understand the health care system. The goal of clinic care coordination is to help you manage your health and improve access to the health care system. Our team works alongside your provider to assist you in determining your health and social needs. We can help you obtain health care and community resources, providing you with necessary information and education. We can work with you through any barriers and develop a care plan that helps coordinate and strengthen the communication between you and your care team.  Our services are voluntary and are offered without charge to you personally.    Please feel free to contact me with any questions or concerns regarding care coordination and what we can offer.      We are focused on providing you with the highest-quality healthcare experience possible.    Sincerely,     Mary Burgess RN, BSN, CPHN, CCM  [facility] Ambulatory Care Management  [facility]  Phone: [phone]  Email: [email or web address]    Enclosed: I was able to schedule an appointment with [facility] ([phone]). The appointment is on Tuesday, November 12th at their [place] location. They do not have a [facility] location. Their address is:    [address], [place] [address]  You will be seeing Mariela - Audiologist at 8am for your hearing test.  You will then be seeing Olive Andrew MD at 9:10am. Please arrive by 8:55am.  Bring your photo ID, insurance card, medication list and co-pay (if required).   They have you on a wait list for an earlier appointment if one becomes available.   I have enclosed a copy of the Patient Centered Plan of Care. This has helpful information and goals that we have talked about. Please keep this in an easy to access place to use as needed. Also included are resources for acquiring hear aids, Honoring Choices, Transportation resources and information on Utility assistance.     [facility] Honoring Choices    Honoring Choices provides support to [facility] [place], [facility], and [facility] locations for ensuring system Advance Care Planning (ACP) and Surrogate Decision-Maker processes comply with Federal and State regulatory requirements and align with system goals and focus areas.    HOURS: Monday-Friday 6a to 5p (with exception of 8 department holidays as indicated on dept email/voicemail)  AFTER HOURS: For emergency validation of documents contact your assigned  per site protocol    CONTACT:    EMAIL: [email or web address]   PHONE: [phone]  EPIC: Larry Grace Orange    Office: [address] [place] [address] (We are mostly remote with varying on-site hours)    [email or web address]      Attached is information on hear aids, Transportation resources and information on Utility assistance.

## 2024-08-22 NOTE — LETTER
[facility] CARE COORDINATION  [id number]  [facility] [address]    August 22, 2024    Geoff Araujo  [address]  [facility] [address]      Dear Geoff,    I am a clinic care coordinator who works with Naveen Mendez MD with the [facility]. I wanted to introduce myself and provide you with my contact information for you to be able to call me with any questions or concerns. I wanted to thank you for spending the time to talk with me.  Below is a description of clinic care coordination and how I can further assist you.       The clinic care coordination team is made up of a registered nurse, , financial resource worker and community health worker who understand the health care system. The goal of clinic care coordination is to help you manage your health and improve access to the health care system. Our team works alongside your provider to assist you in determining your health and social needs. We can help you obtain health care and community resources, providing you with necessary information and education. We can work with you through any barriers and develop a care plan that helps coordinate and strengthen the communication between you and your care team.  Our services are voluntary and are offered without charge to you personally.    Please feel free to contact me with any questions or concerns regarding care coordination and what we can offer.      We are focused on providing you with the highest-quality healthcare experience possible.    Sincerely,     Mary Burgess RN, BSN, CPHN, CCM  [facility] Ambulatory Care Management  [facility]  Phone: [phone]  Email: [email or web address]    Lizette Apple  Community Health Worker   [email or web address]   Clinic Care Coordination / Ambulatory Care Management  [facility]  [email or web address]  Office:  [phone]  Gender Pronouns: She/her/hers  Employed by [employer]        Enclosed: I was able to schedule an appointment with [facility] ([phone]). The appointment is on Tuesday, November 12th at their [place] location. They do not have a [facility] location. Their address is:   [address] [place] [address], [address], [place] [address]  You will be seeing Mariela - Audiologist at 8am for your hearing test.  You will then be seeing Olive Andrew MD at 9:10am. Please arrive by 8:55am.  Bring your photo ID, insurance card, medication list and co-pay (if required).   They have you on a wait list for an earlier appointment if one becomes available.   I have enclosed a copy of the Patient Centered Plan of Care. This has helpful information and goals that we have talked about. Please keep this in an easy to access place to use as needed. Also included are resources for acquiring hear aids, Honoring Lesly, Transportation resources and information on Utility assistance.     [facility] Honoring Choices    Honoring Lesly provides support to ealth [place], [facility], and [facility] locations for ensuring system Advance Care Planning (ACP) and Surrogate Decision-Maker processes comply with Federal and State regulatory requirements and align with system goals and focus areas.    HOURS: Monday-Friday 6a to 5p (with exception of 8 department holidays as indicated on dept email/voicemail)  AFTER HOURS: For emergency validation of documents contact your assigned  per site protocol    CONTACT:    EMAIL: [email or web address]   PHONE: [phone]  EPIC: Larry Grace [place]    Office: [address] [place] [address] (We are mostly remote with varying on-site hours)    [email or web address]      Attached is information on hear aids, Transportation resources and information on Utility assistance.

## 2024-08-22 NOTE — PROGRESS NOTES
Clinic Care Coordination Contact  Clinic Care Coordination Contact  OUTREACH    Referral Information: RN CC contacted patient at scheduled time to complete assessment for enrollment in Care Coordination. Patient has a profound hearing loss and was unable to understand RN NANCY. He asked his significant other, Rohit Rob, to answer assessment questions. She did refer to him on responses she didn't know.   Referral Source: Care Team     Chief Complaint   Patient presents with    Clinic Care Coordination - Initial      Universal Utilization: Risk of admission or ED visit 49.3%  Clinic Utilization  Difficulty keeping appointments:: No  Compliance Concerns: No  No-Show Concerns: No  No PCP office visit in Past Year: No  Utilization      No Show Count (past year)  0             ED Visits  0             Hospital Admissions  0                    Current as of: 8/22/2024  1:38 PM              Clinical Concerns:  Current Medical Concerns:  Significant hearing loss. Needed assistance with making appointment with ENT to determine need for hearing aids.     Current Behavioral Concerns: Frustrated with not understanding conversations.     Education Provided to patient: Educated patient/SO on Care Coordination: outreach schedule; resources available; shared responsibility of goals & goals to achieve.    Pain  Pain (GOAL):: No  Health Maintenance Reviewed: Due/Overdue (Discussed with patient's SO)  Clinical Pathway: None    Medication Management:  Medication review status: Medications reviewed and no changes reported per patient.        Current Outpatient Medications   Medication Sig Dispense Refill    albuterol (PROAIR HFA/PROVENTIL HFA/VENTOLIN HFA) 108 (90 Base) MCG/ACT inhaler Inhale 2 puffs into the lungs every 6 hours as needed for shortness of breath 18 g 3    amLODIPine (NORVASC) 5 MG tablet Take 1 tablet (5 mg) by mouth daily 90 tablet 3    clopidogrel (PLAVIX) 75 MG tablet Take 1 tablet (75 mg) by mouth daily 90 tablet  3    hydrOXYzine HCl (ATARAX) 25 MG tablet Take 1 tablet (25 mg) by mouth every 8 hours as needed for anxiety 90 tablet 3    omeprazole (PRILOSEC) 40 MG DR capsule Take 1 capsule (40 mg) by mouth daily 90 capsule 3    thiamine (B-1) 100 MG tablet Take 1 tablet (100 mg) by mouth daily 90 tablet 3    TRELEGY ELLIPTA 100-62.5-25 MCG/ACT oral inhaler Inhale 1 puff into the lungs daily 60 each 11     No current facility-administered medications for this visit.      Allergies   Allergen Reactions    Ace Inhibitors Swelling     Functional Status:  Dependent ADLs:: Independent  Dependent IADLs:: Cooking, Cleaning, Transportation  Bed or wheelchair confined:: No  Mobility Status: Independent  Fallen 2 or more times in the past year?: No  Any fall with injury in the past year?: No    Living Situation:  Current living arrangement:: Other (with someone)  Type of residence:: Apartment    Lifestyle & Psychosocial Needs:    Social Determinants of Health     Food Insecurity: Low Risk  (8/22/2024)    Food Insecurity     Within the past 12 months, did you worry that your food would run out before you got money to buy more?: No     Within the past 12 months, did the food you bought just not last and you didn t have money to get more?: No   Depression: Not at risk (8/15/2024)    PHQ-2     PHQ-2 Score: 0   Housing Stability: Low Risk  (8/22/2024)    Housing Stability     Do you have housing? : Yes     Are you worried about losing your housing?: No   Tobacco Use: High Risk (8/15/2024)    Patient History     Smoking Tobacco Use: Every Day     Smokeless Tobacco Use: Never     Passive Exposure: Not on file   Financial Resource Strain: High Risk (8/22/2024)    Financial Resource Strain     Within the past 12 months, have you or your family members you live with been unable to get utilities (heat, electricity) when it was really needed?: Yes   Alcohol Use: Not At Risk (9/24/2020)    Received from [employer]    AUDIT-C     Frequency of  Alcohol Consumption: Not on file     Average Number of Drinks: 1 or 2     Frequency of Binge Drinking: Never   Transportation Needs: High Risk (8/22/2024)    Transportation Needs     Within the past 12 months, has lack of transportation kept you from medical appointments, getting your medicines, non-medical meetings or appointments, work, or from getting things that you need?: Yes   Physical Activity: Not on file   Interpersonal Safety: Low Risk  (8/15/2024)    Interpersonal Safety     Do you feel physically and emotionally safe where you currently live?: Yes     Within the past 12 months, have you been hit, slapped, kicked or otherwise physically hurt by someone?: No     Within the past 12 months, have you been humiliated or emotionally abused in other ways by your partner or ex-partner?: No   Stress: Not on file   Social Connections: Not on file   Health Literacy: Not on file     Diet:: Regular  Inadequate nutrition (GOAL):: No  Tube Feeding: No  Inadequate activity/exercise (GOAL):: No  Significant changes in sleep pattern (GOAL): No  Transportation means:: Accessible car     Sikh or spiritual beliefs that impact treatment:: No  Mental health DX:: No  Mental health management concern (GOAL):: No  Chemical Dependency Status: No Current Concerns  Chemical Dependency Management:  (N/A)  Informal Support system:: Significant other      Resources and Interventions:  Current Resources:      Community Resources: None  Supplies Currently Used at Home: None  Equipment Currently Used at Home: none  Employment Status: disabled     Advance Care Plan/Directive  Advanced Care Plans/Directives on file:: No  Discussed with patient/caregiver:: Referral to Larry Grace    Referrals Placed: [facility]    Care Plan:  Care Plan: Advance Care Plan       Problem: Patient does not have a valid Health Care Directive       Goal: Complete Health Care Directive       Start Date: 8/22/2024 Expected End Date: 2/20/2025    This  Visit's Progress: 0%    Note:     Barriers: Hearing loss; Limited transportation; Provider availability - wait time to complete appointments.   Strengths: Motivated; Agreeable to Care Coordination.   Patient expressed understanding of goal: Yes.  Action steps to achieve this goal:  1. I will review the resources on Honoring Choices.   2. I will contact Honoring Choices with any questions.   3. I will contact my care team with questions, concerns or support needs. I will use the clinic as a resource and I understand I can contact my clinic with 24/7 after hours services available. Care Coordinator will remain available as needed.                               Care Plan: Health Maintenance       Problem: Health Maintenance Due or Overdue       Goal: Become up-to-date with health maintenance visit(s)       Start Date: 8/22/2024 Expected End Date: 2/20/2025    This Visit's Progress: 10%    Note:     Barriers: Hearing loss; Limited transportation; Provider availability - wait time to complete appointments.   Strengths: Motivated; Agreeable to Care Coordination.   Patient expressed understanding of goal: Yes.  Action steps to achieve this goal:  1. I will take my  medications as prescribed.   2. I will discuss, review, schedule and complete recommended overdue health maintenance with my Primary Care Provider.   3. I will contact my care team with questions, concerns or support needs. I will use the clinic as a resource and I understand I can contact my clinic with 24/7 after hours services available. Care Coordinator will remain available as needed.                            Care Plan: Resources       Problem: Resources       Goal: Resources for Low cost hearing aides, Transportation and Utilities.       Start Date: 8/22/2024 Expected End Date: 2/20/2025    This Visit's Progress: 0%    Note:     Barriers: Hearing loss; Limited transportation; Provider availability - wait time to complete appointments.   Strengths:  Motivated; Agreeable to Care Coordination.   Patient expressed understanding of goal: Yes.  Action steps to achieve this goal:  1. I will review the resources for hearing aides, transportation services and utility assistance.   2. I will contact my Care Coordinator with any questions or if additional resources are needed.   3. I will contact my care team with questions, concerns or support needs. I will use the clinic as a resource and I understand I can contact my clinic with 24/7 after hours services available. Care Coordinator will remain available as needed.                            Patient/Caregiver understanding: Patient/caregiver verbalized understanding and denies any additional questions or concerns at this time. RNCC engaged in [employer] communications during encounter.     Outreach Frequency: monthly, more frequently as needed    Future Appointments                In 1 month Naveen Mendez MD [facility]    In 2 months Mariela Echevarria AuD [facility]    In 2 months Olive Andrew MD [facility]          Plan: RN CC will mail patient introduction letter with contact information and resources on Honoring Choices. RN CC printed resources for hearing aids, transportation and utility assistance and mailed with introduction letter. Also included is the Patient-centered Plan of Care. RN CC verified address in chart is valid.     Mary Burgess RN, BSN, CPHN, CCM  [facility] Ambulatory Care Management  [facility]  Phone: [phone]  Email: [email or web address]

## 2024-08-22 NOTE — LETTER
[facility]  Patient Centered Plan of Care  About Me:        Patient Name:  Geoff Araujo    YOB: 1945  Age:         79 year old   [place] MRN:    [id number] Telephone Information:  Home Phone [phone]   Mobile Not on file.       Address:  [address]  [facility] [address] Email address:  No e-mail address on record      Emergency Contact(s)    Name Relationship Lgl Grd Work Phone Home Phone Mobile Phone           Primary language:  English     needed? No   [place] [employer]:  [phone] op. 1  Other communication barriers:Hearing impairment    Preferred Method of Communication:     Current living arrangement: Other (with someone)    Mobility Status/ Medical Equipment: Independent    Health Maintenance  Health Maintenance Reviewed: Due/Overdue (Discussed with patient's SO)    My Access Plan  Medical Emergency 911   Primary Clinic Line [facility] [phone]   24 Hour Appointment Line [phone] or  [address] [place] [address] [phone]) (toll-free)   24 Hour Nurse Line [phone] (toll-free)   Preferred Urgent Care No data recorded   Preferred Hospital [facility]  [phone] ([facility])     [facility]erred [id number] [address] [facility] [address]     Behavioral Health Crisis Line The National Suicide Prevention Lifeline at 1-326.861.4326 or Text/Call 428     My Care Team Members  Patient Care Team         Relationship Specialty Notifications Start End    Naveen Mendez MD PCP - General Family Medicine  8/15/24     Phone: [phone] Fax: [phone] [address] [facility] [address]    Isi Wilson LICSW Lead Care Coordinator  Admissions 8/19/24 8/22/24    Phone: [phone] Pager: [phone]        Mary Burgess, RN Lead Care Coordinator Primary Care -  Admissions 8/21/24     Phone: [phone]          Mariela Echevarria AuD Audiologist Audiology  8/22/24     Phone: [phone] Fax: [phone]         [address] [facility] [address]    Olive Andrew MD MD Otolaryngology  8/22/24     Phone: [phone] Fax: [phone]         [address] [facility] [address] [facility] [address]    Lizette Apple CHW Community Health Worker   8/22/24               My Care Plans  Self Management and Treatment Plan    Care Plan  Care Plan: Advance Care Plan       Problem: Patient does not have a valid Health Care Directive       Goal: Complete Health Care Directive       Start Date: 8/22/2024 Expected End Date: 2/20/2025    This Visit's Progress: 0%    Note:     Barriers: Hearing loss; Limited transportation; Provider availability - wait time to complete appointments.   Strengths: Motivated; Agreeable to Care Coordination.   Patient expressed understanding of goal: Yes.  Action steps to achieve this goal:  1. I will review the resources on [employer].   2. I will contact [employer] with any questions.   3. I will contact my care team with questions, concerns or support needs. I will use the clinic as a resource and I understand I can contact my clinic with 24/7 after hours services available. Care Coordinator will remain available as needed.                               Care Plan: Health Maintenance       Problem: Health Maintenance Due or Overdue       Goal: Become up-to-date with health maintenance visit(s)       Start Date: 8/22/2024 Expected End Date: 2/20/2025    This Visit's Progress: 10%    Note:     Barriers: Hearing loss; Limited transportation; Provider availability - wait time to complete appointments.   Strengths: Motivated; Agreeable to Care Coordination.   Patient expressed understanding of goal: Yes.  Action steps to achieve this goal:  1. I will take my  medications as prescribed.   2. I will discuss, review, schedule and complete recommended overdue health maintenance with my Primary Care  Provider.   3. I will contact my care team with questions, concerns or support needs. I will use the clinic as a resource and I understand I can contact my clinic with 24/7 after hours services available. Care Coordinator will remain available as needed.                            Care Plan: Resources       Problem: Resources       Goal: Resources for Low cost hearing aides, Transportation and Utilities.       Start Date: 8/22/2024 Expected End Date: 2/20/2025    This Visit's Progress: 0%    Note:     Barriers: Hearing loss; Limited transportation; Provider availability - wait time to complete appointments.   Strengths: Motivated; Agreeable to Care Coordination.   Patient expressed understanding of goal: Yes.  Action steps to achieve this goal:  1. I will review the resources for hearing aides, transportation services and utility assistance.   2. I will contact my Care Coordinator with any questions or if additional resources are needed.   3. I will contact my care team with questions, concerns or support needs. I will use the clinic as a resource and I understand I can contact my clinic with 24/7 after hours services available. Care Coordinator will remain available as needed.                            Action Plans on File:     Advance Care Plans/Directives:   Advanced Care Plan/Directives on file:   No    Discussed with patient/caregiver(s):   Referral to [facility]           My Medical and Care Information  Problem List   There is no problem list on file for this patient.     Current Medications and Allergies:    Current Outpatient Medications   Medication Sig Dispense Refill    albuterol (PROAIR HFA/PROVENTIL HFA/VENTOLIN HFA) 108 (90 Base) MCG/ACT inhaler Inhale 2 puffs into the lungs every 6 hours as needed for shortness of breath 18 g 3    amLODIPine (NORVASC) 5 MG tablet Take 1 tablet (5 mg) by mouth daily 90 tablet 3    clopidogrel (PLAVIX) 75 MG tablet Take 1 tablet (75 mg) by mouth daily 90 tablet  3    hydrOXYzine HCl (ATARAX) 25 MG tablet Take 1 tablet (25 mg) by mouth every 8 hours as needed for anxiety 90 tablet 3    omeprazole (PRILOSEC) 40 MG DR capsule Take 1 capsule (40 mg) by mouth daily 90 capsule 3    thiamine (B-1) 100 MG tablet Take 1 tablet (100 mg) by mouth daily 90 tablet 3    TRELEGY ELLIPTA 100-62.5-25 MCG/ACT oral inhaler Inhale 1 puff into the lungs daily 60 each 11     No current facility-administered medications for this visit.      Allergies   Allergen Reactions    Ace Inhibitors Swelling     Care Coordination Start Date: 8/15/2024   Frequency of Care Coordination: monthly, more frequently as needed     Form Last Updated: 08/22/2024

## 2024-09-12 ENCOUNTER — ALLIED HEALTH/NURSE VISIT (OUTPATIENT)
Dept: FAMILY MEDICINE | Facility: CLINIC | Age: 79
End: 2024-09-12
Payer: MEDICARE

## 2024-09-12 DIAGNOSIS — Z23 NEED FOR PROPHYLACTIC VACCINATION AND INOCULATION AGAINST INFLUENZA: Primary | ICD-10-CM

## 2024-09-12 PROCEDURE — 90662 IIV NO PRSV INCREASED AG IM: CPT | Performed by: FAMILY MEDICINE

## 2024-09-12 PROCEDURE — G0008 ADMIN INFLUENZA VIRUS VAC: HCPCS | Performed by: FAMILY MEDICINE

## 2024-09-12 PROCEDURE — 99207 PR NO CHARGE NURSE ONLY: CPT | Performed by: FAMILY MEDICINE

## 2024-09-12 NOTE — TELEPHONE ENCOUNTER
FUTURE VISIT INFORMATION      FUTURE VISIT INFORMATION:  Date: 11/12/24  Time: 9:10 AM  [place] [facility] [place]  REFERRAL INFORMATION:  Referring provider:  Naveen Mendez MD  Referring providers clinic:  [facility]   Reason for visit/diagnosis:  DX H91.13 (ICD-10-CM) - Presbycusis of both ears  REF'D by Naveen Mendez MD  SCHEDULED by Care Coordinator Mary  [facility] verified     RECORDS REQUESTED FROM      Clinic name Comments Records Status Imaging Status   [facility]  8/15/24 note/ referral- Naveen Mendez MD Epic            10/10/2024 8:37 AM:  Left the patient a voice message to discuss outside records, look like he was previously seen by ENT per referring provider OV notes. Left my direct number for call back -MARGY Abad

## 2024-09-23 ENCOUNTER — PATIENT OUTREACH (OUTPATIENT)
Dept: CARE COORDINATION | Facility: CLINIC | Age: 79
End: 2024-09-23
Payer: MEDICARE

## 2024-09-23 NOTE — PROGRESS NOTES
Clinic Care Coordination Contact  Follow Up Progress Note      Assessment: RN CC contacted patient for monthly. He stated his legs are bothering him. They are sore. He's not able to walk very far. He's very hard of hearing and needs to follow up on getting hearing aids. He needs transportation to any appointments. RN CC has previously sent him transportation resources.     Care Gaps:    Health Maintenance Due   Topic Date Due    BMP  Never done    LIPID  Never done    MICROALBUMIN  Never done    SPIROMETRY  Never done    COPD ACTION PLAN  Never done    HEMOGLOBIN  Never done    URINALYSIS  Never done    Pneumococcal Vaccine: 65+ Years (2 of 2 - PPSV23 or PCV20) 05/17/2017    ZOSTER IMMUNIZATION (2 of 3) 06/03/2017    RSV VACCINE (1 - 1-dose 75+ series) Never done     Patient plans to schedule an appointment with his Primary Care Provider about his legs and to discuss his required vaccinations.     Care Plans  Care Plan: Advance Care Plan       Problem: Patient does not have a valid Health Care Directive       Goal: Complete Health Care Directive       Start Date: 8/22/2024 Expected End Date: 2/20/2025    This Visit's Progress: 0%    Note:     Barriers: Hearing loss; Limited transportation; Provider availability - wait time to complete appointments.   Strengths: Motivated; Agreeable to Care Coordination.   Patient expressed understanding of goal: Yes.  Action steps to achieve this goal:  1. I will review the resources on [employer].   2. I will contact [employer] with any questions.   3. I will contact my care team with questions, concerns or support needs. I will use the clinic as a resource and I understand I can contact my clinic with 24/7 after hours services available. Care Coordinator will remain available as needed.                               Care Plan: Health Maintenance       Problem: Health Maintenance Due or Overdue       Goal: Become up-to-date with health maintenance visit(s)       Start  Date: 8/22/2024 Expected End Date: 2/20/2025    This Visit's Progress: 10%    Note:     Barriers: Hearing loss; Limited transportation; Provider availability - wait time to complete appointments.   Strengths: Motivated; Agreeable to Care Coordination.   Patient expressed understanding of goal: Yes.  Action steps to achieve this goal:  1. I will take my  medications as prescribed.   2. I will discuss, review, schedule and complete recommended overdue health maintenance with my Primary Care Provider.   3. I will contact my care team with questions, concerns or support needs. I will use the clinic as a resource and I understand I can contact my clinic with 24/7 after hours services available. Care Coordinator will remain available as needed.                            Care Plan: Resources       Problem: Resources       Goal: Resources for Low cost hearing aides, Transportation and Utilities.       Start Date: 8/22/2024 Expected End Date: 2/20/2025    This Visit's Progress: 0%    Note:     Barriers: Hearing loss; Limited transportation; Provider availability - wait time to complete appointments.   Strengths: Motivated; Agreeable to Care Coordination.   Patient expressed understanding of goal: Yes.  Action steps to achieve this goal:  1. I will review the resources for hearing aides, transportation services and utility assistance.   2. I will contact my Care Coordinator with any questions or if additional resources are needed.   3. I will contact my care team with questions, concerns or support needs. I will use the clinic as a resource and I understand I can contact my clinic with 24/7 after hours services available. Care Coordinator will remain available as needed.                            Intervention/Education provided during outreach: Discussed patients plan of care. CC RN asked open ended questions, provided support, resources, and encouragement as needed. Patient will reach out to care team sooner than planned  with new questions or concerns.     Plan: RN CC will re-send transportation resources and hearing aid resources to patient's address. Verified address in chart is valid.     Care Coordinator will follow up in 1 month.     Mary Burgess RN, BSN, CPHN, [facility] Amb[facility] Care [facility] and [facility]  [email or web address]  Office: [phone]  Employed by [facility] Care Coordination Contact  [facility]/Voicemail    Clinical Data: Care Coordinator Outreach    Outreach Documentation Number of Outreach Attempt   8/19/2024  10:29 AM 2   9/23/2024  10:40 AM 1     RN CC attempted to reach patient for monthly outreach (assisting CHW). Unable to leave a message as his voicemail box wasn't set up.     Plan: Care Coordinator will try to reach patient again in 10 business days.    Mary Burgess RN, BSN, CPELISEO, [facility] Ambulatory Care [facility] - [facility] [place]  Phone: [phone]  Email: [email or web address]

## 2024-09-23 NOTE — LETTER
Good afternoon, Geoff.     It was nice speaking to you today. I have enclosed resources for hearing aides and a list of transportation resources for you to look at. Please use them as needed. If you need additional information, give me a call and we can re-focus on whatever you need.     Take care. I'll talk to you in a month, if not before.     Mary Burgess, RN, BSN, CPHN, [facility] Ambulatory Care Management  [facility], and [facility]  [email or web address]  Office: [phone]  Employed by [facility]

## 2024-09-24 ENCOUNTER — OFFICE VISIT (OUTPATIENT)
Dept: FAMILY MEDICINE | Facility: CLINIC | Age: 79
End: 2024-09-24
Payer: MEDICARE

## 2024-09-24 VITALS
OXYGEN SATURATION: 100 % | SYSTOLIC BLOOD PRESSURE: 136 MMHG | RESPIRATION RATE: 16 BRPM | BODY MASS INDEX: 18.21 KG/M2 | WEIGHT: 116 LBS | HEART RATE: 85 BPM | TEMPERATURE: 97.8 F | DIASTOLIC BLOOD PRESSURE: 70 MMHG | HEIGHT: 67 IN

## 2024-09-24 DIAGNOSIS — E46 MALNUTRITION, UNSPECIFIED TYPE (H): ICD-10-CM

## 2024-09-24 DIAGNOSIS — H91.93 DECREASED HEARING OF BOTH EARS: ICD-10-CM

## 2024-09-24 DIAGNOSIS — I10 ESSENTIAL HYPERTENSION: Primary | ICD-10-CM

## 2024-09-24 DIAGNOSIS — J43.9 PULMONARY EMPHYSEMA, UNSPECIFIED EMPHYSEMA TYPE (H): ICD-10-CM

## 2024-09-24 DIAGNOSIS — N18.31 STAGE 3A CHRONIC KIDNEY DISEASE (H): ICD-10-CM

## 2024-09-24 PROCEDURE — 91320 SARSCV2 VAC 30MCG TRS-SUC IM: CPT | Performed by: FAMILY MEDICINE

## 2024-09-24 PROCEDURE — 90480 ADMN SARSCOV2 VAC 1/ONLY CMP: CPT | Performed by: FAMILY MEDICINE

## 2024-09-24 PROCEDURE — 99213 OFFICE O/P EST LOW 20 MIN: CPT | Performed by: FAMILY MEDICINE

## 2024-09-24 PROCEDURE — G2211 COMPLEX E/M VISIT ADD ON: HCPCS | Performed by: FAMILY MEDICINE

## 2024-09-24 RX ORDER — ALBUTEROL SULFATE 90 UG/1
2 AEROSOL, METERED RESPIRATORY (INHALATION) EVERY 6 HOURS PRN
Qty: 18 G | Refills: 5 | Status: SHIPPED | OUTPATIENT
Start: 2024-09-24

## 2024-09-24 NOTE — PROGRESS NOTES
"  Assessment & Plan     Essential hypertension  Controlled, continue amlodipine.    Pulmonary emphysema, unspecified emphysema type (H)  No acute exacerbation, continue current medical management, avoid smoking.  - REVIEW OF HEALTH MAINTENANCE PROTOCOL ORDERS  - COVID-19 12+ (PFIZER)  - albuterol (PROAIR HFA/PROVENTIL HFA/VENTOLIN HFA) 108 (90 Base) MCG/ACT inhaler  Dispense: 18 g; Refill: 5    Decreased hearing of both ears  Has follow-up with audiology and ENT    Malnutrition, unspecified type (H24)  Continue to monitor, may need supplementation in future.    Stage 3a chronic kidney disease (H)  Last GFR 55, stable, continue to maximize hypertension treatment.  Avoid nephrotoxic agents.                  Missy Tellez is a 79 year old, presenting for the following health issues:    Hypertension and Hearing Problem        9/24/2024     7:00 AM   Additional Questions   Roomed by Bell [facility]OCK[place]       Hypertension Follow-up    Do you check your blood pressure regularly outside of the clinic? No   Are you following a low salt diet? No  Are your blood pressures ever more than 140 on the top number (systolic) OR more   than 90 on the bottom number (diastolic), for example 140/90? No                  Objective    /70 (Cuff Size: Adult Regular)   Pulse 85   Temp 97.8  F (36.6  C) (Oral)   Resp 16   Ht 1.702 m (5' 7\")   Wt 52.6 kg (116 lb)   SpO2 100%   BMI 18.17 kg/m    Body mass index is 18.17 kg/m .    Physical Exam  Vitals reviewed.   Constitutional:       Appearance: He is not ill-appearing.   Cardiovascular:      Rate and Rhythm: Normal rate and regular rhythm.   Pulmonary:      Effort: Pulmonary effort is normal.      Breath sounds: Normal breath sounds.            Last Comprehensive Metabolic Panel:              Signed Electronically by: Naveen Mendez MD    "

## 2024-10-10 ENCOUNTER — PATIENT OUTREACH (OUTPATIENT)
Dept: CARE COORDINATION | Facility: CLINIC | Age: 79
End: 2024-10-10
Payer: MEDICARE

## 2024-10-10 NOTE — PROGRESS NOTES
Clinic Care Coordination Contact  Care Coordination Clinician Chart Review    Situation: Patient chart reviewed by Care Coordinator.       Background: Care Coordination Program started: 8/15/2024. Initial assessment completed and patient-centered care plan(s) were developed with participation from patient. Lead CC handed patient off to CHW for continued outreaches.       Assessment: Per chart review, patient outreach completed by RN CC (assisting CHW) on 9/23/24.  Patient is actively working to accomplish goal(s). Patient's goal(s) appropriate and relevant at this time. Patient is not due for updated Plan of Care.  Assessments will be completed annually or as needed/with change of patient status.      Care Plan: Advance Care Plan       Problem: Patient does not have a valid Health Care Directive       Goal: Complete Health Care Directive       Start Date: 8/22/2024 Expected End Date: 2/20/2025    This Visit's Progress: 0%    Note:     Barriers: Hearing loss; Limited transportation; Provider availability - wait time to complete appointments.   Strengths: Motivated; Agreeable to Care Coordination.   Patient expressed understanding of goal: Yes.  Action steps to achieve this goal:  1. I will review the resources on [employer].   2. I will contact [employer] with any questions.   3. I will contact my care team with questions, concerns or support needs. I will use the clinic as a resource and I understand I can contact my clinic with 24/7 after hours services available. Care Coordinator will remain available as needed.                               Care Plan: Health Maintenance       Problem: Health Maintenance Due or Overdue       Goal: Become up-to-date with health maintenance visit(s)       Start Date: 8/22/2024 Expected End Date: 2/20/2025    This Visit's Progress: 10%    Note:     Barriers: Hearing loss; Limited transportation; Provider availability - wait time to complete appointments.   Strengths:  Motivated; Agreeable to Care Coordination.   Patient expressed understanding of goal: Yes.  Action steps to achieve this goal:  1. I will take my  medications as prescribed.   2. I will discuss, review, schedule and complete recommended overdue health maintenance with my Primary Care Provider.   3. I will contact my care team with questions, concerns or support needs. I will use the clinic as a resource and I understand I can contact my clinic with 24/7 after hours services available. Care Coordinator will remain available as needed.                            Care Plan: Resources       Problem: Resources       Goal: Resources for Low cost hearing aides, Transportation and Utilities.       Start Date: 8/22/2024 Expected End Date: 2/20/2025    This Visit's Progress: 0%    Note:     Barriers: Hearing loss; Limited transportation; Provider availability - wait time to complete appointments.   Strengths: Motivated; Agreeable to Care Coordination.   Patient expressed understanding of goal: Yes.  Action steps to achieve this goal:  1. I will review the resources for hearing aides, transportation services and utility assistance.   2. I will contact my Care Coordinator with any questions or if additional resources are needed.   3. I will contact my care team with questions, concerns or support needs. I will use the clinic as a resource and I understand I can contact my clinic with 24/7 after hours services available. Care Coordinator will remain available as needed.                                 Plan/Recommendations: The patient will continue working with Care Coordination to achieve goal(s) as above. CHW will continue outreaches at minimum every 30 days and will involve Lead CC as needed or if patient is ready to move to Maintenance. Lead CC will continue to monitor CHW outreaches and patient's progress to goal(s) every 6 weeks.     Plan of Care updated and sent to patient: Laura Burgess RN, BSN, CPHN, [facility]  [place] [facility]  [facility], and [facility]  [email or web address]  Office: [phone]  Employed by [facility]

## 2024-10-14 ENCOUNTER — TELEPHONE (OUTPATIENT)
Dept: OTOLARYNGOLOGY | Facility: CLINIC | Age: 79
End: 2024-10-14
Payer: MEDICARE

## 2024-10-14 NOTE — TELEPHONE ENCOUNTER
Patient Contacted for the patient to call back and schedule the following:    Appointment type: ent audio  Provider: any [facility] audiologist  Return date: next available     Specialty phone number: writer's direct  Additional appointment(s) needed: n/a reschedule ent audio to ASAP  Additional Notes: per Dr. Andrew

## 2024-10-15 NOTE — TELEPHONE ENCOUNTER
Chief Complaint   Patient presents with    Knee Pain     right, shooting pains on/off. seen @[facility] in june xray showed no concerned findings    Shoulder Pain     right       History obtained from the patient. SUBJECTIVE:  Flower Xiao is a 64 y.o. female that presents today for     -R Knee Pain:    HPI:   Saw [facility] in June  Had xrays, then, per pt, and were ok  Had been doing ok since then  However a few wks ago started getting pain in the R knee  Pain is mostly around the knee, lateral more the medial  No pain at rest, typically. Hurts to walk on and off, and will all of a sudden get shooting pain in the knee. Will eventually goes away on its own  Will radiate up the leg  Doesn't feel like spasm/or cramp  No recent injury  S/p R TKA in 2009  L TKA ~2004  L knee feeling ok    Inciting injury or history of trauma? No  Pain is relieved by - as above  Pain is aggravated by - as above  Locking or catching of the knee? No  Radiation of the pain? No  Paresthesias of the extremities? No  Decreased ROM? No  Edema? No  Difficulty bearing weight? Yes - as above  Worse with stairs? No  Treatments tried - otc meds, no better      -R shoulder pain:  Fell on shoulder a few months ago  Is better than it was, but still having pain on and off  Worse when laying on shoulder or moving around  Doing HEP which has helped some      Age/Gender Health Maintenance    Lipid -   Lab Results   Component Value Date    CHOL 266 (H) 04/12/2021    CHOL 198 06/24/2020    CHOL 234 (H) 04/01/2019     Lab Results   Component Value Date    TRIG 174 04/12/2021    TRIG 122 06/24/2020    TRIG 167 04/01/2019     Lab Results   Component Value Date    HDL 60 04/12/2021    HDL 76 06/24/2020    HDL 57 04/01/2019     Lab Results   Component Value Date    LDLCALC 171 04/12/2021    [address] 98 06/24/2020    LDLCALC 144 04/01/2019       ;  LDL 82; HDL 74;  (MARCH 2018)    DM Screen -   Lab Results   Component Value Date    GLUCOSE 95 Spoke with patient, henrietta sooner audio, pt stated he would like to keep as scheduled as he will have a hard time finding someone to drive him in. Message sent to Ears team.    Selena Gamboa on 10/15/2024 at 12:09 PM'     04/19/2021    GLUCOSE 99 05/03/2018       Colon Cancer Screening -NEG SEPT 2018, no repeat (Melissa Elizondo)  Lung Cancer Screening (Age 54 to [de-identified] with 30 pack year hx, current smoker or quit within past 15 years) - never smoker    Tetanus - UTD July 2018  Influenza Vaccine - UTD FALL 2021  Pneumonia Vaccine - UTD PCV 13 OCT 2015. UTD PPV 23 APR 2018  Zostavax - Zostavax competed APR 2013   Shingrix - UTD x 2    Breast Cancer Screening - NEG SEPT 2020  Cervical Cancer Screening - Aged out  Osteoporosis Screening - osteopenia AUG 2018 and AUG 2020      Current Outpatient Medications   Medication Sig Dispense Refill    predniSONE (DELTASONE) 20 MG tablet Take 2 tablets by mouth daily for 5 days 10 tablet 0    tiZANidine (ZANAFLEX) 4 MG tablet Take 1 tablet by mouth 3 times daily as needed (R knee pain/spasm) 45 tablet 0    meloxicam (MOBIC) 7.5 MG tablet Take 1 tablet by mouth daily as needed for Pain 90 tablet 3    lisinopril-hydroCHLOROthiazide (PRINZIDE;ZESTORETIC) 10-12.5 MG per tablet TAKE 1 TABLET BY MOUTH DAILY 90 tablet 3    omeprazole (PRILOSEC) 20 MG delayed release capsule Take 1 capsule by mouth daily 90 capsule 3    vitamin C (ASCORBIC ACID) 500 MG tablet Take 500 mg by mouth daily      Handicap Placard MISC by Does not apply route Expires 29 June 2023 1 each 0    Multiple Vitamins-Minerals (PRESERVISION AREDS PO) Take by mouth      Calcium Carbonate-Vit D-Min (CALCIUM 1200 PO) Take by mouth daily      Cholecalciferol (VITAMIN D-3 PO) Take by mouth daily      hydroxychloroquine (PLAQUENIL) 200 MG tablet TAKE 1 AND 1/2 TABLETS BY MOUTH DAILY 135 tablet 1     No current facility-administered medications for this visit.      Orders Placed This Encounter   Medications    predniSONE (DELTASONE) 20 MG tablet     Sig: Take 2 tablets by mouth daily for 5 days     Dispense:  10 tablet     Refill:  0    tiZANidine (ZANAFLEX) 4 MG tablet     Sig: Take 1 tablet by mouth 3 times daily as needed (R knee pain/spasm)     Dispense:  45 tablet     Refill:  0         All medications reviewed and reconciled, including OTC and herbal medications. Updated list given to patient.        Patient Active Problem List    Diagnosis Date Noted    Ganglion cyst of finger of left hand 2017     Priority: High     Class: Chronic    Lumbar spondylosis     Erosive osteoarthritis of both hands     Positive WICHO (antinuclear antibody) 2018    Dyslipidemia     History of skin cancer      BCC AND SCC      Hypertension     Osteoarthritis     DDD (degenerative disc disease), lumbar     Chronic low back pain     Hyperkalemia     CKD (chronic kidney disease) stage 3, GFR 30-59 ml/min (HCC)        Past Medical History:   Diagnosis Date    Chronic low back pain     CKD (chronic kidney disease) stage 3, GFR 30-59 ml/min (HCC)     DDD (degenerative disc disease), lumbar     Dyslipidemia     History of skin cancer     BCC AND SCC    Hyperlipidemia     Hypertension     Osteoarthritis     PONV (postoperative nausea and vomiting)        Past Surgical History:   Procedure Laterality Date    BACK SURGERY      lower lumbar fusion    CARPAL TUNNEL RELEASE Bilateral      SECTION      CHOLECYSTECTOMY, LAPAROSCOPIC  10/03/2017    COLONOSCOPY      CYST REMOVAL Left 2017    left  5th digit-Dr Bowman    DILATION AND CURETTAGE OF UTERUS      FACET JOINT INJECTION Bilateral 2020    B/L L3-4 medial branch and L5 dorsal rami injection performed by Rachel Cottrell MD at [address] [place] [address] Bilateral     HYSTERECTOMY      JOINT REPLACEMENT Bilateral     KNEES    NERVE SURGERY N/A 2019    left L3, L4 medial branch and L5 dorsal rami injection performed by Rachel Cottrell MD at [place] [address] Left 2020    left L3, L4 medial branch and L5 dorsal rami injection [facility] 09/28/21 1101 09/28/21 1207   BP: (!) 150/70 132/71   Pulse: 68    Resp: 14    Temp: 98.1 °F (36.7 °C)    TempSrc: Oral    SpO2: 98%    Weight: 162 lb (73.5 kg)    Height: 5' 2.5\" (1.588 m)      Body mass index is 29.16 kg/m². Pain Score:   3 (R shoulder)    VS Reviewed  General Appearance: A&O x 3, No acute distress,well developed and well- nourished  Eyes: pupils equal, round, and reactive to light, extraocular eye movements intact, conjunctivae and eye lids without erythema  ENT: external ear and ear canal clear bilaterally, TMs intact and regular, nose without deformity, nasal mucosa and turbinates normal without polyps, oropharynx normal, dentition is normal for age  Neck: supple and non-tender without mass, no thyromegaly or thyroid nodules, no cervical lymphadenopathy  Pulmonary/Chest: clear to auscultation bilaterally- no wheezes, rales or rhonchi, normal air movement, no respiratory distress or retractions  Cardiovascular: S1 and S2 auscultated w/ RRR. No murmurs, rubs, clicks, or gallops, distal pulses intact. Abdomen: soft, non-tender, non-distended, bowel sounds physiologic,  no rebound or guarding, no masses or hernias noted. Liver and spleen without enlargement. Extremities: no cyanosis, clubbing or edema of the lower extremities.    Skin: warm and dry, no rash or erythema  R Shoulder:  SWELLING: none  DEFORMITY: none  TENDERNESS: mild  ROM: dec to int/ext rotation  STRENGTH: external rotation grade 5 of 5, internal rotation grade 5 of 5, supraspinatus grade 5 of 5, infraspinatus grade 5 of 5, belly press grade 5 of 5 and deltoid grade 5 of 5  STABILITY: normal  SPECIAL TESTS: Lamona Angelucci' test: positive, Cross-chest abduction: positive, Yergason's test: negative, Speed's test: negative and Christine's test: negative  Neurologic Exam: normal sensation and reflexes  Vascular Exam: radial pulse present and good color & capillary refill  Neck: supple, tender no and ROM: normal  R KNEE EXAM:  Examination of the right  knee shows: The alignment of the knee is neutral.   There is not erythema. There none soft tissue swelling. There is none effusion. ROM-  Extension -7           -   Flexion  140   There none pain associated with ROM testing. Medial joint line moderate tender to palpation. Lateral joint line moderate tender to palpation. Retro patellar crepitus is present. There is none crepitus along the joint line with ROM testing. Varus Stress testing does not produce pain,                                     does not show laxity. Valgus Stress testing does not produce pain,                                       does not show laxity. Lachman's test is negative. Anterior Drawer test is Negative. Posterior Drawer test is Negative  Hollis's Test is Negative. Patellar Compression testing does not produce pain. Extensor Mechanism is  intact. ASSESSMENT & PLAN  1. Acute pain of right knee    Hx and exam most c/w muscular spasm around the R knee  Structurally intact    Will get xray to ensure no changes  Short course pred and tiz  con't HEP  F/u here or [facility] if no better    - predniSONE (DELTASONE) 20 MG tablet; Take 2 tablets by mouth daily for 5 days  Dispense: 10 tablet; Refill: 0  - tiZANidine (ZANAFLEX) 4 MG tablet; Take 1 tablet by mouth 3 times daily as needed (R knee pain/spasm)  Dispense: 45 tablet; Refill: 0  - XR KNEE RIGHT (MIN 4 VIEWS); Future    2. Muscle spasm of right lower extremity    As above    - predniSONE (DELTASONE) 20 MG tablet; Take 2 tablets by mouth daily for 5 days  Dispense: 10 tablet; Refill: 0  - tiZANidine (ZANAFLEX) 4 MG tablet; Take 1 tablet by mouth 3 times daily as needed (R knee pain/spasm)  Dispense: 45 tablet; Refill: 0  - XR KNEE RIGHT (MIN 4 VIEWS); Future    3.  Chronic right shoulder pain    Discussed options  Pt wants to con't HEP  If not improving, she will call and will get setup with PT/OT  Get xray d/t remote fall hx    - XR SHOULDER RIGHT (MIN 2 VIEWS); Future    4. Needs flu shot    - INFLUENZA, QUADV, ADJUVANTED, 65 YRS =, IM, PF, PREFILL SYR, 0.5ML (FLUAD)          DISPOSITION    Return if symptoms worsen or fail to improve. Jeffery Rios released without restrictions. Future Appointments   Date Time Provider Radha Paulson   11/3/2021 11:00 AM Jazmine Samuel DO [facility] [place]   4/27/2022  9:40 AM Kathy Hyatt DO [facility] [address]     PATIENT COUNSELING    Barriers to learning and self management: none    Discussed use, benefit, and side effects of prescribed medications. Barriers to medication compliance addressed. All patient questions answered. Pt voiced understanding.        Electronically signed by Kathy Hyatt DO on 9/28/2021 at 12:07 PM

## 2024-10-21 NOTE — TELEPHONE ENCOUNTER
FUTURE VISIT INFORMATION      FUTURE VISIT INFORMATION:  Date: 11/12/24  Time: 11:20am  Location: [facility]  REFERRAL INFORMATION:  Referring provider:  Naveen Mendez MD  Referring providers clinic:  [facility]   Reason for visit/diagnosis  DX H91.13 (ICD-10-CM) - Presbycusis of both ears  REF'D by Naveen Mendez MD  SCHEDULED by Care Coordinator Mary LINDA verified     RECORDS REQUESTED FROM:       Clinic name Comments Records Status Imaging Status   [facility]  8/15/24 note/ referral- Naveen Mendez MD Epic

## 2024-11-12 ENCOUNTER — PRE VISIT (OUTPATIENT)
Dept: OTOLARYNGOLOGY | Facility: CLINIC | Age: 79
End: 2024-11-12

## 2024-11-12 ENCOUNTER — OFFICE VISIT (OUTPATIENT)
Dept: AUDIOLOGY | Facility: CLINIC | Age: 79
End: 2024-11-12
Attending: FAMILY MEDICINE
Payer: MEDICARE

## 2024-11-12 ENCOUNTER — OFFICE VISIT (OUTPATIENT)
Dept: OTOLARYNGOLOGY | Facility: CLINIC | Age: 79
End: 2024-11-12
Attending: FAMILY MEDICINE
Payer: MEDICARE

## 2024-11-12 VITALS
TEMPERATURE: 97.6 F | DIASTOLIC BLOOD PRESSURE: 66 MMHG | HEART RATE: 70 BPM | SYSTOLIC BLOOD PRESSURE: 119 MMHG | OXYGEN SATURATION: 100 % | WEIGHT: 119 LBS | BODY MASS INDEX: 18.64 KG/M2

## 2024-11-12 DIAGNOSIS — H90.3 SENSORINEURAL HEARING LOSS (SNHL), BILATERAL: Primary | ICD-10-CM

## 2024-11-12 DIAGNOSIS — H90.3 BILATERAL SENSORINEURAL HEARING LOSS: Primary | ICD-10-CM

## 2024-11-12 DIAGNOSIS — H91.13 PRESBYCUSIS OF BOTH EARS: ICD-10-CM

## 2024-11-12 PROCEDURE — 99203 OFFICE O/P NEW LOW 30 MIN: CPT | Performed by: REGISTERED NURSE

## 2024-11-12 ASSESSMENT — PAIN SCALES - GENERAL: PAINLEVEL_OUTOF10: EXTREME PAIN (8)

## 2024-11-12 NOTE — NURSING NOTE
Chief Complaint   Patient presents with    Consult     Bilateral Presbycusis      Blood pressure 119/66, pulse 70, temperature 97.6  F (36.4  C), weight 54 kg (119 lb), SpO2 100%.  Jeevan Asencio LPN

## 2024-11-12 NOTE — PROGRESS NOTES
AUDIOLOGY REPORT    SUMMARY: Audiology visit completed. See audiogram for results.      RECOMMENDATIONS: Follow-up with ENT.    Natalie Lerma, CCC-A  Clinical Audiologist  [place] #[id number]

## 2024-11-12 NOTE — LETTER
11/12/2024       RE: Geoff Araujo Jr.  [address] [place] [place] [address]     Dear Colleague,    Thank you for referring your patient, Geoff Araujo Jr., to the [facility] [place] at [facility]. Please see a copy of my visit note below.      Otolaryngology Clinic  November 12, 2024    Chief Complaint:   Hearing loss       History of Present Illness:   Geoff Araujo Jr. is a 79 year old male who presents today for evaluation of bilateral hearing loss. Patient reports a gradual hearing loss. Now with a very difficult time understanding speech, especially on the phone. Reports that hearing loss is affecting relationships as those around him are getting frustrated that patient cannot hear. 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 Patient denies any otalgia, otorrhea, dizziness, facial numbness/weakness, history of frequent ear infections, or ear surgeries. Denies family history of hearing loss.    Past Medical History:  No past medical history on file.    Past Surgical History:  No past surgical history on file.    Medications:  Current Outpatient Medications   Medication Sig Dispense Refill     albuterol (PROAIR HFA/PROVENTIL HFA/VENTOLIN HFA) 108 (90 Base) MCG/ACT inhaler Inhale 2 puffs into the lungs every 6 hours as needed for shortness of breath. 18 g 5     amLODIPine (NORVASC) 5 MG tablet Take 1 tablet (5 mg) by mouth daily 90 tablet 3     clopidogrel (PLAVIX) 75 MG tablet Take 1 tablet (75 mg) by mouth daily 90 tablet 3     hydrOXYzine HCl (ATARAX) 25 MG tablet Take 1 tablet (25 mg) by mouth every 8 hours as needed for anxiety 90 tablet 3     omeprazole (PRILOSEC) 40 MG DR capsule Take 1 capsule (40 mg) by mouth daily 90 capsule 3     thiamine (B-1) 100 MG tablet Take 1 tablet (100 mg) by mouth daily 90 tablet 3     TRELEGY ELLIPTA 100-62.5-25 MCG/ACT oral inhaler Inhale 1 puff into the lungs daily 60  each 11       Allergies:  Allergies   Allergen Reactions     Ace Inhibitors Swelling        Social History:  Social History     Tobacco Use     Smoking status: Every Day     Current packs/day: 0.25     Average packs/day: 0.3 packs/day for 61.9 years (15.5 ttl pk-yrs)     Types: Cigarettes     Start date: 1963     Smokeless tobacco: Never   Vaping Use     Vaping status: Never Used       ROS: 10 point ROS neg other than the symptoms noted above in the HPI.    Physical Exam:    There were no vitals taken for this visit.     Constitutional:  The patient was unaccompanied, well-groomed, and in no acute distress.     Skin: Normal:  warm and pink without rash    Neurologic: Alert and oriented x 3.  CN's III-XII within normal limits.  Voice normal.    Psychiatric: The patient's affect was calm, cooperative, and appropriate.     Communication:  Normal; communicates verbally, normal voice quality.    Respiratory: Breathing comfortably without stridor or exertion of accessory muscles.    Ears: Pinnae and tragus non-tender.  EAC's and TM's were clear.         Audiogram: 11/12/2024 - data independently reviewed  Mild sloping to moderately severe sensorineural hearing loss bilaterally  WR right: 24% left: 44% Binaural: 68%  Acoustic Reflexes: Absent in right ipsi and left contra. Present in other conditions.  Tympanograms: type A bilaterally      Assessment and Plan:  Patient with bilateral sensorineural hearing loss. Reviewed audiogram with patient today. Patient is a hearing aid candidate bilaterally.  Discussed with patient that there is a trial period prior to committing to purchase hearing aids. Patient can use this trial to determine if hearing aid benefit would be worth the cost of these devices. Patient will proceed with hearing aid consult. Patient is medically cleared for hearing aids.  If word recognition score continues to decline, patient may be a CI candidate in the future.  Recommend to start with hearing aids at  this time.     Patient will follow up with audiology for hearing aid consult.    Purvi Bautista DNP, APRN, CNP  Otolaryngology  Head & Neck Surgery  [phone]    30 minutes spent by me on the date of the encounter doing chart review, history and exam, documentation and further activities per the note      Again, thank you for allowing me to participate in the care of your patient.      Sincerely,    Becca Bautista, NP

## 2024-11-12 NOTE — PROGRESS NOTES
Otolaryngology Clinic  November 12, 2024    Chief Complaint:   Hearing loss       History of Present Illness:   Geoff Araujo Jr. is a 79 year old male who presents today for evaluation of bilateral hearing loss. Patient reports a gradual hearing loss. Now with a very difficult time understanding speech, especially on the phone. Reports that hearing loss is affecting relationships as those around him are getting frustrated that patient cannot hear. 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 Patient denies any otalgia, otorrhea, dizziness, facial numbness/weakness, history of frequent ear infections, or ear surgeries. Denies family history of hearing loss.    Past Medical History:  No past medical history on file.    Past Surgical History:  No past surgical history on file.    Medications:  Current Outpatient Medications   Medication Sig Dispense Refill    albuterol (PROAIR HFA/PROVENTIL HFA/VENTOLIN HFA) 108 (90 Base) MCG/ACT inhaler Inhale 2 puffs into the lungs every 6 hours as needed for shortness of breath. 18 g 5    amLODIPine (NORVASC) 5 MG tablet Take 1 tablet (5 mg) by mouth daily 90 tablet 3    clopidogrel (PLAVIX) 75 MG tablet Take 1 tablet (75 mg) by mouth daily 90 tablet 3    hydrOXYzine HCl (ATARAX) 25 MG tablet Take 1 tablet (25 mg) by mouth every 8 hours as needed for anxiety 90 tablet 3    omeprazole (PRILOSEC) 40 MG DR capsule Take 1 capsule (40 mg) by mouth daily 90 capsule 3    thiamine (B-1) 100 MG tablet Take 1 tablet (100 mg) by mouth daily 90 tablet 3    TRELEGY ELLIPTA 100-62.5-25 MCG/ACT oral inhaler Inhale 1 puff into the lungs daily 60 each 11       Allergies:  Allergies   Allergen Reactions    Ace Inhibitors Swelling        Social History:  Social History     Tobacco Use    Smoking status: Every Day     Current packs/day: 0.25     Average packs/day: 0.3 packs/day for 61.9 years (15.5 ttl pk-yrs)     Types: Cigarettes     Start date: 1963    Smokeless tobacco: Never    Vaping Use    Vaping status: Never Used       ROS: 10 point ROS neg other than the symptoms noted above in the HPI.    Physical Exam:    There were no vitals taken for this visit.     Constitutional:  The patient was unaccompanied, well-groomed, and in no acute distress.     Skin: Normal:  warm and pink without rash    Neurologic: Alert and oriented x 3.  CN's III-XII within normal limits.  Voice normal.    Psychiatric: The patient's affect was calm, cooperative, and appropriate.     Communication:  Normal; communicates verbally, normal voice quality.    Respiratory: Breathing comfortably without stridor or exertion of accessory muscles.    Ears: Pinnae and tragus non-tender.  EAC's and TM's were clear.         Audiogram: 11/12/2024 - data independently reviewed  Mild sloping to moderately severe sensorineural hearing loss bilaterally  WR right: 24% left: 44% Binaural: 68%  Acoustic Reflexes: Absent in right ipsi and left contra. Present in other conditions.  Tympanograms: type A bilaterally      Assessment and Plan:  Patient with bilateral sensorineural hearing loss. Reviewed audiogram with patient today. Patient is a hearing aid candidate bilaterally.  Discussed with patient that there is a trial period prior to committing to purchase hearing aids. Patient can use this trial to determine if hearing aid benefit would be worth the cost of these devices. Patient will proceed with hearing aid consult. Patient is medically cleared for hearing aids.  If word recognition score continues to decline, patient may be a CI candidate in the future.  Recommend to start with hearing aids at this time.     Patient will follow up with audiology for hearing aid consult.    Purvi Bautista DNP, APRN, CNP  Otolaryngology  Head & Neck Surgery  [phone]    30 minutes spent by me on the date of the encounter doing chart review, history and exam, documentation and further activities per the note

## 2024-11-13 ENCOUNTER — PATIENT OUTREACH (OUTPATIENT)
Dept: CARE COORDINATION | Facility: CLINIC | Age: 79
End: 2024-11-13
Payer: MEDICARE

## 2024-11-13 NOTE — PROGRESS NOTES
Clinic Care Coordination Contact  [facility]/Voicemail    Clinical Data: Care Coordinator Outreach    Outreach Documentation Number of Outreach Attempt   11/13/2024  10:22 AM 1     Unable to leave a message due to: Voicemail is not set up.    Plan: Care Coordinator will try to reach patient again in 10 business days.    Mary Burgess RN, BSN, CPHN, [facility] Ambulatory Care Management  [facility], and [facility]  [email or web address]  Office: [phone]  Employed by [employer]

## 2024-12-11 ENCOUNTER — PATIENT OUTREACH (OUTPATIENT)
Dept: CARE COORDINATION | Facility: CLINIC | Age: 79
End: 2024-12-11
Payer: MEDICARE

## 2024-12-11 NOTE — LETTER
[facility]  [id number]  [facility] [address]    December 11, 2024        Geoff Araujo Jr.  [address] [facility] [address]          Dear Geoff,     Attached is an updated Patient Centered Plan of Care for your continued enrollment in Care Coordination. Please let us know if you have additional questions, concerns, or goals that we can assist with.    Sincerely,  Mary Burgess, RN, BSN, CPHN, CCM  [facility] Ambulatory Care Management  [facility], and [facility]  [email or web address]  Office: [phone]  Employed by [employer] [facility]  Patient Centered Plan of Care  About Me:        Patient Name:  Geoff Araujo Jr.    YOB: 1945  Age:         79 year old   [place] MRN:    [id number] Telephone Information:  Home Phone [phone]   Mobile [phone]       Address:  [address] [facility] [address] [facility] [address] Email address:  No e-mail address on record      Emergency Contact(s)    Name Relationship Lgl Grd Work Phone Home Phone Mobile Phone           Primary language:  English     needed? No   [place] [employer]:  [phone] op. 1  Other communication barriers:Hearing impairment    Preferred Method of Communication:     Current living arrangement: Other (with someone)    Mobility Status/ Medical Equipment: Independent    Health Maintenance  Health Maintenance Reviewed: Due/Overdue (Discussed with patient's SO)    My Access Plan  Medical Emergency 911   Primary Clinic Line [facility] - [phone]   24 Hour Appointment Line [phone] or  [phone]) (toll-free)   24 Hour Nurse Line [phone] (toll-free)   Preferred Urgent Care No data recorded   Preferred Hospital [facility]  [phone] ([facility])     Preferred Pharmacy [id number] - [place], [place] - [address]     Behavioral Health Crisis Line The National Suicide Prevention Lifeline at 1-592.600.5345 or Text/Call 988     My Care Team Members  Patient Care Team         Relationship Specialty Notifications Start End    Nvaeen Mendez MD PCP - General Family Medicine  8/15/24     Phone: [phone] Fax: [phone] [address] [facility] [address]    Mary Burgess RN Lead Care Coordinator Primary Care - CC Admissions 8/21/24     Phone: [phone]         Mariela Echevarria AuD Audiologist Audiology  8/22/24     Phone: [phone] Fax: [phone]         [address] [place] [address]    Olive Andrew MD MD Otolaryngology  8/22/24     Phone: [phone] Fax: [phone]         [address] [place] [address]    Lizette Apple, ERIC Community Health Worker   8/22/24     Naveen Mendez MD Assigned PCP   8/23/24     Phone: [phone] Fax: [phone] [address] [facility] [address]    Becca Bautista NP Assigned Surgical Provider   11/23/24     Phone: [phone] Fax: [phone]         [address] [place] [address]              My Care Plans  Self Management and Treatment Plan    Care Plan  Care Plan: Advance Care Plan       Problem: Patient does not have a valid Health Care Directive       Goal: Complete Health Care Directive       Start Date: 8/22/2024 Expected End Date: 2/20/2025    This Visit's Progress: 10% Recent Progress: 0%    Note:     Barriers: Hearing loss; Limited transportation; Provider availability - wait time to complete appointments.   Strengths: Motivated; Agreeable to Care Coordination.   Patient expressed understanding of goal: Yes.  Action steps to achieve this goal:  1. I will review the resources on [employer].   2. I will contact [employer] with any questions.   3. I will contact my care team with questions, concerns or support needs. I will use the clinic as a resource and I understand I  can contact my clinic with 24/7 after hours services available. Care Coordinator will remain available as needed.                               Care Plan: Health Maintenance       Problem: Health Maintenance Due or Overdue       Goal: Become up-to-date with health maintenance visit(s)       Start Date: 8/22/2024 Expected End Date: 2/20/2025    This Visit's Progress: 20% Recent Progress: 10%    Note:     Barriers: Hearing loss; Limited transportation; Provider availability - wait time to complete appointments.   Strengths: Motivated; Agreeable to Care Coordination.   Patient expressed understanding of goal: Yes.  Action steps to achieve this goal:  1. I will take my  medications as prescribed.   2. I will discuss, review, schedule and complete recommended overdue health maintenance with my Primary Care Provider.   3. I will contact my care team with questions, concerns or support needs. I will use the clinic as a resource and I understand I can contact my clinic with 24/7 after hours services available. Care Coordinator will remain available as needed.                            Care Plan: Resources       Problem: Resources       Goal: Resources for Low cost hearing aides, Transportation and Utilities.       Start Date: 8/22/2024 Expected End Date: 2/20/2025    This Visit's Progress: 10% Recent Progress: 0%    Note:     Barriers: Hearing loss; Limited transportation; Provider availability - wait time to complete appointments.   Strengths: Motivated; Agreeable to Care Coordination.   Patient expressed understanding of goal: Yes.  Action steps to achieve this goal:  1. I will review the resources for hearing aides, transportation services and utility assistance.   2. I will contact my Care Coordinator with any questions or if additional resources are needed.   3. I will contact my care team with questions, concerns or support needs. I will use the clinic as a resource and I understand I can contact my clinic with 24/7  after hours services available. Care Coordinator will remain available as needed.                            Action Plans on File:     Advance Care Plans/Directives:   Advanced Care Plan/Directives on file: No    Discussed with patient/caregiver(s): Referral to Larry Grace           My Medical and Care Information  Problem List   Patient Active Problem List   Diagnosis    Malnutrition, unspecified type (H)    Stage 3a chronic kidney disease (H)      Current Medications:    Current Outpatient Medications   Medication Sig Dispense Refill    albuterol (PROAIR HFA/PROVENTIL HFA/VENTOLIN HFA) 108 (90 Base) MCG/ACT inhaler Inhale 2 puffs into the lungs every 6 hours as needed for shortness of breath. 18 g 5    amLODIPine (NORVASC) 5 MG tablet Take 1 tablet (5 mg) by mouth daily 90 tablet 3    clopidogrel (PLAVIX) 75 MG tablet Take 1 tablet (75 mg) by mouth daily 90 tablet 3    hydrOXYzine HCl (ATARAX) 25 MG tablet Take 1 tablet (25 mg) by mouth every 8 hours as needed for anxiety 90 tablet 3    omeprazole (PRILOSEC) 40 MG DR capsule Take 1 capsule (40 mg) by mouth daily 90 capsule 3    thiamine (B-1) 100 MG tablet Take 1 tablet (100 mg) by mouth daily 90 tablet 3    TRELEGY ELLIPTA 100-62.5-25 MCG/ACT oral inhaler Inhale 1 puff into the lungs daily 60 each 11     No current facility-administered medications for this visit.      Allergies   Allergen Reactions    Ace Inhibitors Swelling     Care Coordination Start Date: 8/15/2024   Frequency of Care Coordination: monthly, more frequently as needed     Form Last Updated: 12/11/2024

## 2024-12-26 ENCOUNTER — PATIENT OUTREACH (OUTPATIENT)
Dept: CARE COORDINATION | Facility: CLINIC | Age: 79
End: 2024-12-26
Payer: MEDICARE

## 2024-12-26 NOTE — PROGRESS NOTES
Clinic Care Coordination Contact  Community Health Worker Follow Up    CHW spoke with patient's friend Priscilla (verbal consent provided over phone by patient).     Care Gaps:     Health Maintenance Due   Topic Date Due    BMP  Never done    LIPID  Never done    MICROALBUMIN  Never done    SPIROMETRY  Never done    COPD ACTION PLAN  Never done    HEMOGLOBIN  Never done    URINALYSIS  Never done    Pneumococcal Vaccine: 50+ Years (2 of 2 - PPSV23) 05/17/2017    ZOSTER IMMUNIZATION (2 of 3) 06/03/2017    RSV VACCINE (1 - 1-dose 75+ series) Never done     Care Gap Goal set: Yes and Postponed to discuss care gaps/HM due at future CHW follow-up outreach.      Care Plan:   Care Plan: Advance Care Plan       Problem: Patient does not have a valid Health Care Directive       Goal: Complete Health Care Directive       Start Date: 8/22/2024 Expected End Date: 2/20/2025    This Visit's Progress: 10% Recent Progress: 10%    Note:     Barriers: Hearing loss; Limited transportation; Provider availability - wait time to complete appointments.   Strengths: Motivated; Agreeable to Care Coordination.   Patient expressed understanding of goal: Yes.  Action steps to achieve this goal:  1. I will review the resources on [employer]oring [employer] in next 6 months. (Needs to get hearing aids first) (In Progress)   2. I will contact [employer] with any questions in next 6 months. (Needs to get hearing aids first) (In Progress)   3. I will contact my care team with questions, concerns or support needs. I will use the clinic as a resource and I understand I can contact my clinic with 24/7 after hours services available. Care Coordinator will remain available as needed. (Ongoing)                               Care Plan: Health Maintenance       Problem: Health Maintenance Due or Overdue       Goal: Become up-to-date with health maintenance visit(s)       Start Date: 8/22/2024 Expected End Date: 2/20/2025    This Visit's Progress: 30% Recent  Progress: 20%    Note:     Barriers: Hearing loss; Limited transportation; Provider availability - wait time to complete appointments.   Strengths: Motivated; Agreeable to Care Coordination.   Patient expressed understanding of goal: Yes.  Action steps to achieve this goal:  1. I will take my medications as prescribed. (Ongoing)   2. I will discuss, review, schedule and complete recommended overdue health maintenance with my Primary Care Provider. (In Progress)   3. I will attend my hearing aid consultation appointment on 2/7/25 at 9:30am. (In Progress)   4. I will attend my hearing aid fitting appointment on 3/28/25 at 8:30am. (In Progress)   5. I will attend my hearing aid initial check appointment on 4/18/25 at 8:30am. (In Progress)   6. I will contact my care team with questions, concerns or support needs. I will use the clinic as a resource and I understand I can contact my clinic with 24/7 after hours services available. Care Coordinator will remain available as needed. (Ongoing)                             Care Plan: Resources       Problem: Resources       Goal: Resources for Low cost hearing aides, Transportation and Utilities.       Start Date: 8/22/2024 Expected End Date: 2/20/2025    This Visit's Progress: 10% Recent Progress: 10%    Note:     Barriers: Hearing loss; Limited transportation; Provider availability - wait time to complete appointments.   Strengths: Motivated; Agreeable to Care Coordination.   Patient expressed understanding of goal: Yes.  Action steps to achieve this goal:  1. I will review the resources for hearing aides, transportation services and utility assistance. (In Progress)   2. I will contact my Care Coordinator with any questions or if additional resources are needed. (Ongoing)   3. I will contact my care team with questions, concerns or support needs. I will use the clinic as a resource and I understand I can contact my clinic with 24/7 after hours services available. Care  Coordinator will remain available as needed. (Ongoing)                             Intervention and Education during outreach:     Reminded patient of his upcoming hearing aid consultation appointment on 2/7/25 at 9:30am. Verified he has clinic address and transportation for the appointment (friend Priscilla said she will help).     Unable to communicate over phone with patient without hearing aids at this time.     CHW Plan: Next CHW follow-up outreach in one month.     To follow-up on:     [facility] in [place]   Transportation resources   Utility needs/resources   Status of dental appointment   Any other new need/concern     Lizette Apple  Community Health Worker   [email or web address]   Clinic Care Coordination / Ambulatory Care Management  [facility], and [facility]  [email or web address]  Office: [phone]  Gender Pronouns: She/her/hers  Employed by [facility] [employer]

## 2025-01-22 ENCOUNTER — PATIENT OUTREACH (OUTPATIENT)
Dept: CARE COORDINATION | Facility: CLINIC | Age: 80
End: 2025-01-22
Payer: MEDICARE

## 2025-01-22 NOTE — PROGRESS NOTES
Clinic Care Coordination Contact  Care Coordination Clinician Chart Review    Situation: Patient chart reviewed by Care Coordinator.       Background: Care Coordination Program started: 8/15/2024. Initial assessment completed and patient-centered care plan(s) were developed with participation from patient. Lead CC handed patient off to CHW for continued outreaches.       Assessment: Per chart review, patient outreach completed by CC CHW on 12/26/24.  Patient is actively working to accomplish goal(s). Patient's goal(s) appropriate and relevant at this time. Patient is not due for updated Plan of Care.  Assessments will be completed annually or as needed/with change of patient status.      Care Plan: Advance Care Plan       Problem: Patient does not have a valid Health Care Directive       Goal: Complete Health Care Directive       Start Date: 8/22/2024 Expected End Date: 2/20/2025    This Visit's Progress: 10% Recent Progress: 10%    Note:     Barriers: Hearing loss; Limited transportation; Provider availability - wait time to complete appointments.   Strengths: Motivated; Agreeable to Care Coordination.   Patient expressed understanding of goal: Yes.  Action steps to achieve this goal:  1. I will review the resources on [employer] in next 6 months. (Needs to get hearing aids first) (In Progress)   2. I will contact [employer] with any questions in next 6 months. (Needs to get hearing aids first) (In Progress)   3. I will contact my care team with questions, concerns or support needs. I will use the clinic as a resource and I understand I can contact my clinic with 24/7 after hours services available. Care Coordinator will remain available as needed. (Ongoing)                               Care Plan: Health Maintenance       Problem: Health Maintenance Due or Overdue       Goal: Become up-to-date with health maintenance visit(s)       Start Date: 8/22/2024 Expected End Date: 2/20/2025    This Visit's  Progress: 30% Recent Progress: 20%    Note:     Barriers: Hearing loss; Limited transportation; Provider availability - wait time to complete appointments.   Strengths: Motivated; Agreeable to Care Coordination.   Patient expressed understanding of goal: Yes.  Action steps to achieve this goal:  1. I will take my medications as prescribed. (Ongoing)   2. I will discuss, review, schedule and complete recommended overdue health maintenance with my Primary Care Provider. (In Progress)   3. I will attend my hearing aid consultation appointment on 2/7/25 at 9:30am. (In Progress)   4. I will attend my hearing aid fitting appointment on 3/28/25 at 8:30am. (In Progress)   5. I will attend my hearing aid initial check appointment on 4/18/25 at 8:30am. (In Progress)   6. I will contact my care team with questions, concerns or support needs. I will use the clinic as a resource and I understand I can contact my clinic with 24/7 after hours services available. Care Coordinator will remain available as needed. (Ongoing)                             Care Plan: Resources       Problem: Resources       Goal: Resources for Low cost hearing aides, Transportation and Utilities.       Start Date: 8/22/2024 Expected End Date: 2/20/2025    This Visit's Progress: 10% Recent Progress: 10%    Note:     Barriers: Hearing loss; Limited transportation; Provider availability - wait time to complete appointments.   Strengths: Motivated; Agreeable to Care Coordination.   Patient expressed understanding of goal: Yes.  Action steps to achieve this goal:  1. I will review the resources for hearing aides, transportation services and utility assistance. (In Progress)   2. I will contact my Care Coordinator with any questions or if additional resources are needed. (Ongoing)   3. I will contact my care team with questions, concerns or support needs. I will use the clinic as a resource and I understand I can contact my clinic with 24/7 after hours services  available. Care Coordinator will remain available as needed. (Ongoing)                                  Plan/Recommendations: The patient will continue working with Care Coordination to achieve goal(s) as above. CHW will continue outreaches at minimum every 30 days and will involve Lead CC as needed or if patient is ready to move to Maintenance. Lead CC will continue to monitor CHW outreaches and patient's progress to goal(s) every 6 weeks.     Plan of Care updated and sent to patient: No.    Mary Burgess RN, BSN, CPHN, [facility] Ambulatory Care Management  [facility], and [facility]  [email or web address]  Office: [phone]  Employed by [employer]

## 2025-01-26 DIAGNOSIS — J43.9 PULMONARY EMPHYSEMA, UNSPECIFIED EMPHYSEMA TYPE (H): ICD-10-CM

## 2025-01-27 ENCOUNTER — PATIENT OUTREACH (OUTPATIENT)
Dept: CARE COORDINATION | Facility: CLINIC | Age: 80
End: 2025-01-27
Payer: MEDICARE

## 2025-01-27 RX ORDER — ALBUTEROL SULFATE 90 UG/1
2 INHALANT RESPIRATORY (INHALATION) EVERY 6 HOURS PRN
Qty: 18 G | Refills: 5 | OUTPATIENT
Start: 2025-01-27

## 2025-01-27 NOTE — LETTER
[facility]  [address]  [facility] [address]     January 27, 2025    Geoff Araujo Jr.  [address] [facility], [address] [address]      Dear Geoff,    I am a clinic community health worker who works with Naveen Mendez with the [facility]. I wanted to thank you for spending the time to talk with me.      Upcoming appointments at [facility] in [place] ([address] [place] [address] 4th floor, [place] [address], [phone])    Future Appointments 1/27/2025 - 7/26/2025        Date Visit Type Length Department Provider     2/7/2025  9:30 AM HEARING AID CONSULT 60 min [facility]UDI[facility]ina Dunham AuD    Location Instructions:     The [facility] ([facility] [place] area with multiple transportation and parking options. You may wish to review options for  service and self-parking in more detail on the [facility] website at [email or web address].               3/28/2025  8:30 AM HEARING AID FITTING  60 min [facility] AUDIOLOGGeorgina Dunham AuD    Location Instructions:     The [facility] ([facility] is in a dense urban area with multiple transportation and parking options. You may wish to review options for  service and self-parking in more detail on the [facility] website at [email or web address].               4/18/2025  8:30 AM HEARING AID INITIAL CHECK 60 min [facility] AUDIOLOGGeorgina Dunham AuD    Location Instructions:     The [facility] ([facility] is in a dense urban area with multiple transportation and parking options. You may wish to review options for  service and self-parking in more detail on the [facility] website at [email or web address].                     2. Advance Care Planning and Health Care Directive resources     Please review enclosed resources for Advance Care Planning and Health Care Directive and call me back with questions.       Do you need help to  make an appointment at [facility] in [place]?     What questions do you have on resources for hearing aides?     What questions do you have on transportation resources?     Are you behind your utility bills? What questions do you have on utility resources?     Have you been able to make a dental appointment?     Do you have any other new need/concern that we might be able to help you with?     Please feel free to contact me with any questions or concerns.       Sincerely,      Lizette Apple  Community Health Worker   [email or web address]   Clinic Care Coordination / Ambulatory Care Management  [facility], and [facility]  [email or web address]  Office: [phone]  Gender Pronouns: She/her/hers  Employed by [facility] [facility] [employer]         Enclosed: (sent via USPS mail)  Advance Care Planning and Health Care Directive resources

## 2025-01-27 NOTE — PROGRESS NOTES
Clinic Care Coordination Contact  Community Health Worker Follow Up    ** brief encounter **     Care Gaps:     Health Maintenance Due   Topic Date Due    BMP  Never done    LIPID  Never done    MICROALBUMIN  Never done    SPIROMETRY  Never done    COPD ACTION PLAN  Never done    HEMOGLOBIN  Never done    URINALYSIS  Never done    Pneumococcal Vaccine: 50+ Years (2 of 2 - PPSV23) 05/17/2017    ZOSTER IMMUNIZATION (2 of 3) 06/03/2017    RSV VACCINE (1 - 1-dose 75+ series) Never done    PHQ-2 (once per calendar year)  01/01/2025     Care Gap Goal set: Yes and Scheduled for a hearing aid consultation appointment on 2/7/2025 at 9:30 AM, [facility].       Postponed to discuss care gaps/HM due/overdue due to ongoing hearing issues.     Care Plan:   Care Plan: Advance Care Plan       Problem: Patient does not have a valid Health Care Directive       Goal: Complete Health Care Directive       Start Date: 8/22/2024 Expected End Date: 2/20/2025    This Visit's Progress: 10% Recent Progress: 10%    Note:     Barriers: Hearing loss; Limited transportation; Provider availability - wait time to complete appointments.   Strengths: Motivated; Agreeable to Care Coordination.   Patient expressed understanding of goal: Yes.    Action steps to achieve this goal:  I will review the resources on Honoring Choices in next 6 months. (Needs to get hearing aids first) (In Progress)   I will contact Honoring Choices with any questions in next 6 months. (Needs to get hearing aids first) (In Progress)   I will contact my care team with questions, concerns or support needs. I will use the clinic as a resource and I understand I can contact my clinic with 24/7 after hours services available. Care Coordinator will remain available as needed. (Ongoing)                               Care Plan: Health Maintenance       Problem: Health Maintenance Due or Overdue       Goal: Become up-to-date with health  maintenance visit(s)       Start Date: 8/22/2024 Expected End Date: 2/20/2025    This Visit's Progress: 30% Recent Progress: 30%    Note:     Barriers: Hearing loss; Limited transportation; Provider availability - wait time to complete appointments.   Strengths: Motivated; Agreeable to Care Coordination.   Patient expressed understanding of goal: Yes.    Action steps to achieve this goal:  I will take my medications as prescribed. (Ongoing)   I will discuss, review, schedule and complete recommended overdue health maintenance with my Primary Care Provider. (In Progress)   I will attend my hearing aid consultation appointment on 2/7/25 at 9:30am. (In Progress)   I will attend my hearing aid fitting appointment on 3/28/25 at 8:30am. (In Progress)   I will attend my hearing aid initial check appointment on 4/18/25 at 8:30am. (In Progress)   I will contact my care team with questions, concerns or support needs. I will use the clinic as a resource and I understand I can contact my clinic with 24/7 after hours services available. Care Coordinator will remain available as needed. (Ongoing)                             Care Plan: Resources       Problem: Resources       Goal: Resources for Low cost hearing aides, Transportation and Utilities.       Start Date: 8/22/2024 Expected End Date: 2/20/2025    Recent Progress: 10%    Note:     Barriers: Hearing loss; Limited transportation; Provider availability - wait time to complete appointments.   Strengths: Motivated; Agreeable to Care Coordination.   Patient expressed understanding of goal: Yes.    Action steps to achieve this goal:  I will review the resources for hearing aides, transportation services and utility assistance. (In Progress)   I will contact my Care Coordinator with any questions or if additional resources are needed. (Ongoing)   I will contact my care team with questions, concerns or support needs. I will use the clinic as a resource and I understand I can  contact my clinic with 24/7 after hours services available. Care Coordinator will remain available as needed. (Ongoing)                             Intervention and Education during outreach:     Unable to communicate with patient due to ongoing [place]/hearing issues.     Attempted to obtain consent to communicate with his family member over phone. The family member was asleep and not available.     Resources sent via USPS mail:     Upcoming appointments at [facility]  ACP/HCD resources     CHW Plan: Next CHW follow-up outreach in 10 days.     To follow-up on:      [facility] in [place]   Resources for hearing aides  Transportation resources   Utility needs/resources   Status of dental appointment   Any other new need/concern     Lizette Apple  Community Health Worker   [email or web address]   Clinic Care Coordination / Ambulatory Care Management  [facility], and [facility]  [email or web address]  Office: [phone]  Gender Pronouns: She/her/hers  Employed by [employer]

## 2025-02-07 ENCOUNTER — TRANSFERRED RECORDS (OUTPATIENT)
Dept: HEALTH INFORMATION MANAGEMENT | Facility: CLINIC | Age: 80
End: 2025-02-07

## 2025-02-11 ENCOUNTER — TELEPHONE (OUTPATIENT)
Dept: FAMILY MEDICINE | Facility: CLINIC | Age: 80
End: 2025-02-11
Payer: MEDICARE

## 2025-02-11 DIAGNOSIS — H91.93 DECREASED HEARING OF BOTH EARS: Primary | ICD-10-CM

## 2025-02-11 DIAGNOSIS — H91.13 PRESBYCUSIS OF BOTH EARS: ICD-10-CM

## 2025-02-11 DIAGNOSIS — E46 MALNUTRITION, UNSPECIFIED TYPE: ICD-10-CM

## 2025-02-11 NOTE — TELEPHONE ENCOUNTER
----- Message from Yanely IZAGUIRRE sent at 2/11/2025  2:49 PM CST -----  Regarding: FW: Patient needs an order to help him sign up for [place] Medicaid    ----- Message -----  From: Naveen Mendez MD  Sent: 2/11/2025   2:47 PM CST  To: Fer Osman; #  Subject: RE: Patient needs an order to help him sign #    Thanks for letting me know, I will have the staff reach out to him for care coordination referral for insurance coverage.    For our [facility] staff, Please contact patient and inform him that we are happy to set him up with a care coordination referral for insurance.  If he is agreeable for this please send this message back to me and cue up a care coordination referral.    Dr JIMENEZ  ----- Message -----  From: Georgina Lam AuD  Sent: 2/11/2025   9:23 AM CST  To: Naveen Mendez MD  Subject: Patient needs an order to help him sign up f#    Good morning. This patient came to Audiology for a Hearing aid Consult on Friday. He gave me his [place] medicaid card. I do not think he understands that all of [facility] is not contracted with this insurance. He thinks he can still use those benefits here. He needs to transfer to [place] Medicaid. He desperately needs hearing aids, and Medicare never covers hearing aids. From what I understand Primary care can put in an order for someone to help him sign up for Medicaid. Can you please do this for this patient?    Georgina

## 2025-02-20 ENCOUNTER — PATIENT OUTREACH (OUTPATIENT)
Dept: CARE COORDINATION | Facility: CLINIC | Age: 80
End: 2025-02-20
Payer: MEDICARE

## 2025-02-20 DIAGNOSIS — Z71.89 OTHER SPECIFIED COUNSELING: Primary | Chronic | ICD-10-CM

## 2025-02-20 NOTE — PROGRESS NOTES
Clinic Care Coordination Contact  Community Health Worker Follow Up    Care Gaps:     Health Maintenance Due   Topic Date Due    BMP  Never done    LIPID  Never done    MICROALBUMIN  Never done    SPIROMETRY  Never done    COPD ACTION PLAN  Never done    HEMOGLOBIN  Never done    URINALYSIS  Never done    Pneumococcal Vaccine: 50+ Years (2 of 2 - PPSV23) 05/17/2017    ZOSTER IMMUNIZATION (2 of 3) 06/03/2017    RSV VACCINE (1 - 1-dose 75+ series) Never done    PHQ-2 (once per calendar year)  01/01/2025     Care Gaps Last addressed on 8/15/24 by PCP and Care Gap Goal set: Yes    Postponed to discuss care gaps/HM overdue due to ongoing hearing issues.     Care Plan:   Care Plan: Advance Care Plan       Problem: Patient does not have a valid Health Care Directive       Goal: Complete Health Care Directive       Start Date: 8/22/2024 Expected End Date: 2/20/2025    This Visit's Progress: 10% Recent Progress: 10%    Note:     Barriers: Hearing loss; Limited transportation; Provider availability - wait time to complete appointments.   Strengths: Motivated; Agreeable to Care Coordination.   Patient expressed understanding of goal: Yes.    Action steps to achieve this goal:  I will review the resources on Honoring Choices in next 6 months in 2025. (Needs to get hearing aids first) (In Progress)   I will contact [employer]oring Choices with any questions in next 6 months in 2025. (Needs to get hearing aids first) (In Progress)   I will contact my care team with questions, concerns or support needs. I will use the clinic as a resource and I understand I can contact my clinic with 24/7 after hours services available. Care Coordinator will remain available as needed. (Ongoing)                               Care Plan: Health Maintenance       Problem: Health Maintenance Due or Overdue       Goal: Become up-to-date with health maintenance visit(s)       Start Date: 8/22/2024 Expected End Date: 2/20/2025    This Visit's Progress: 40%  Recent Progress: 30%    Note:     Barriers: Hearing loss; Limited transportation; Provider availability - wait time to complete appointments.   Strengths: Motivated; Agreeable to Care Coordination.   Patient expressed understanding of goal: Yes.    Action steps to achieve this goal:  I will take my medications as prescribed. (Ongoing)   I will discuss, review, schedule and complete recommended overdue health maintenance with my Primary Care Provider. (In Progress)   I will attend my hearing aid consultation appointment on 2/7/25 at 9:30am. (Completed)   I will talk to and get help from [place] Financial Resource Worker to transfer [place] Medicaid to [place] Medicaid in next 1-2 months in 2025. (New)   I will attend my hearing aid fitting appointment on 3/28/25 at 8:30am. (In Progress)   I will attend my hearing aid initial check appointment on 4/18/25 at 8:30am. (In Progress)   I will contact my care team with questions, concerns or support needs. I will use the clinic as a resource and I understand I can contact my clinic with 24/7 after hours services available. Care Coordinator will remain available as needed. (Ongoing)                             Care Plan: Resources       Problem: Resources       Goal: Resources for Low cost hearing aides, Transportation and Utilities.       Start Date: 8/22/2024 Expected End Date: 2/20/2025    This Visit's Progress: 10% Recent Progress: 10%    Note:     Barriers: Hearing loss; Limited transportation; Provider availability - wait time to complete appointments.   Strengths: Motivated; Agreeable to Care Coordination.   Patient expressed understanding of goal: Yes.    Action steps to achieve this goal:  I will review the resources for hearing aides, transportation services and utility assistance in 2025. (In Progress)   I will contact my Care Coordinator with any questions or if additional resources are needed. (Ongoing)   I will contact my care team with questions, concerns or  support needs. I will use the clinic as a resource and I understand I can contact my clinic with 24/7 after hours services available. Care Coordinator will remain available as needed. (Ongoing)                             Intervention and Education during outreach:     Discussed patient needing help to transfer his [place] Medicaid to [place] Medicaid so he can obtain hearing aids he desperately needs.     FRW referral entered for further assistance on transfer of Medicaid/insurance coverage. Patient informed.     Screening for Energy Assistance and other financial assistance also requested to FRW as help needing for energy/utilities bills previously discussed with CCC team.     CHW Plan: Next CHW follow-up outreach in one month.     To follow-up on:      FRW for transfer of [place] Medicaid to [place] Medicaid and Energy Assistance  [facility] in [place]   Resources for hearing aides  Transportation resources   Utility needs/resources   Status of dental appointment   ACP/HCD resources   Any other new need/concern     Lizette Apple  Community Health Worker   [email or web address]   Clinic Care Coordination / Ambulatory Care Management  [facility], and [facility]  [email or web address]  Office: [phone]  Gender Pronouns: She/her/hers  Employed by [employer]

## 2025-02-21 DIAGNOSIS — J44.9 CHRONIC OBSTRUCTIVE PULMONARY DISEASE, UNSPECIFIED COPD TYPE (HCC): ICD-10-CM

## 2025-02-21 RX ORDER — ALBUTEROL SULFATE 90 UG/1
2 INHALANT RESPIRATORY (INHALATION) EVERY 6 HOURS PRN
Qty: 18 G | Refills: 0 | Status: SHIPPED | OUTPATIENT
Start: 2025-02-21

## 2025-02-21 NOTE — TELEPHONE ENCOUNTER
Received request via: Pharmacy    Was the patient seen in the last year in this department? Yes    Does the patient have an active prescription (recently filled or refills available) for medication(s) requested? No    Pharmacy Name: [id number] - [place], [place] - [address] AT [address] [[id number]]     Does the patient have FDC Plus and need 100-day supply? (This applies to ALL medications) Patient does not have SCP

## 2025-02-24 ENCOUNTER — TELEPHONE (OUTPATIENT)
Dept: FAMILY MEDICINE | Facility: CLINIC | Age: 80
End: 2025-02-24
Payer: MEDICARE

## 2025-02-24 ENCOUNTER — PATIENT OUTREACH (OUTPATIENT)
Dept: CARE COORDINATION | Facility: CLINIC | Age: 80
End: 2025-02-24
Payer: MEDICARE

## 2025-02-24 DIAGNOSIS — J43.9 PULMONARY EMPHYSEMA, UNSPECIFIED EMPHYSEMA TYPE (H): ICD-10-CM

## 2025-02-24 RX ORDER — ALBUTEROL SULFATE 90 UG/1
2 INHALANT RESPIRATORY (INHALATION) EVERY 6 HOURS PRN
Qty: 18 G | Refills: 2 | Status: SHIPPED | OUTPATIENT
Start: 2025-02-24

## 2025-02-24 NOTE — TELEPHONE ENCOUNTER
Patient calling for refill on albuterol inhaler, patient is hard of hearing on the phone and was frustrated that we have not had this taken care. He states he needs this to go to [facility] in [place] where he is living. I was trying to verify that as we did not have this pharmacy on file. He passed the phone to someone  as he could not hear and she states they do not live in [place] and that he uses the [email or web address] in [place] where they do live which was on his chart.     Daya Hernández,

## 2025-02-24 NOTE — TELEPHONE ENCOUNTER
Medication Question or Refill        What medication are you calling about (include dose and sig)?: ALBUTEROL INHALER     Preferred Pharmacy:  [id number]  [facility] [address] AT [place] [address] & [address]  [address] [address]  Phone: [phone] Fax: [phone]      Controlled Substance Agreement on file:   CSA -- Patient Level:    CSA: None found at the patient level.       Who prescribed the medication?:Andrea     Do you need a refill? Yes    When did you use the medication last? Needs ASAP out of Albuterol       Patient offered an appointment? No    Do you have any questions or concerns?  No      Okay to leave a detailed message?: Yes at Cell number on file:    Telephone Information:   Mobile [phone]

## 2025-02-25 NOTE — TELEPHONE ENCOUNTER
Refill signed yesterday in separate refill encounter.    Thank you,  Donavon, Triage RN [place]    8:00 AM 2/25/2025

## 2025-03-04 ENCOUNTER — PATIENT OUTREACH (OUTPATIENT)
Dept: CARE COORDINATION | Facility: CLINIC | Age: 80
End: 2025-03-04
Payer: MEDICARE

## 2025-03-04 NOTE — LETTER
[facility] CARE COORDINATION        March 4, 2025      Geoff Araujo Jr.  [address] [id number]        Dear Geoff,                                                                      The Financial Resource team has attempted to reach to you to assist you with any financial barriers you may be facing in your healthcare journey.  We would like to provide any additional support you may need related to financial support for your healthcare.  Our team are Certified MNSure Navigators, and we offer support with application assistance for Medical Assistance, MNSure Applications, Energy Assistance, Emergency Assistance, Food Assistance and many other initial applications for [facility] benefits.     I would appreciate if you would call me at [phone] to let me know if you would like assistance.      Sincerely,                                                                         Anali Burgess MA

## 2025-03-04 NOTE — PROGRESS NOTES
FRW Update  3/4/25 Outreach attempted x 2. Unable to leave a  message on voicemail indicating last outreach attempt. VM not set up yet  Plan: W closed the FRW program, sent letter. Patient can be referred back to [facility] if needed.

## 2025-03-05 ENCOUNTER — PATIENT OUTREACH (OUTPATIENT)
Dept: CARE COORDINATION | Facility: CLINIC | Age: 80
End: 2025-03-05
Payer: MEDICARE

## 2025-03-05 NOTE — PROGRESS NOTES
Clinic Care Coordination Contact  Care Coordination Clinician Chart Review    Situation: Patient chart reviewed by Care Coordinator.       Background: Care Coordination Program started: 8/15/2024. Initial assessment completed and patient-centered care plan(s) were developed with participation from patient. Lead CC handed patient off to CHW for continued outreaches.       Assessment: Per chart review, patient outreach completed by CC CHW on 2/20/25.  Patient is actively working to accomplish goal(s). Patient's goal(s) appropriate and relevant at this time. Patient is due for updated Plan of Care.  Assessments will be completed annually or as needed/with change of patient status.      Care Plan: Advance Care Plan       Problem: Patient does not have a valid Health Care Directive       Goal: Complete Health Care Directive       Start Date: 8/22/2024 Expected End Date: 2/20/2025    This Visit's Progress: 20% Recent Progress: 10%    Note:     Barriers: Hearing loss; Limited transportation; Provider availability - wait time to complete appointments.   Strengths: Motivated; Agreeable to Care Coordination.   Patient expressed understanding of goal: Yes.    Action steps to achieve this goal:  I will review the resources on Honoring Choices in next 6 months in 2025. (Needs to get hearing aids first) (In Progress)   I will contact [facility] Choices with any questions in next 6 months in 2025. (Needs to get hearing aids first) (In Progress)   I will contact my care team with questions, concerns or support needs. I will use the clinic as a resource and I understand I can contact my clinic with 24/7 after hours services available. Care Coordinator will remain available as needed. (Ongoing)                               Care Plan: Health Maintenance       Problem: Health Maintenance Due or Overdue       Goal: Become up-to-date with health maintenance visit(s)       Start Date: 8/22/2024 Expected End Date: 2/20/2025    This Visit's  Progress: 40% Recent Progress: 30%    Note:     Barriers: Hearing loss; Limited transportation; Provider availability - wait time to complete appointments.   Strengths: Motivated; Agreeable to Care Coordination.   Patient expressed understanding of goal: Yes.    Action steps to achieve this goal:  I will take my medications as prescribed. (Ongoing)   I will discuss, review, schedule and complete recommended overdue health maintenance with my Primary Care Provider. (In Progress)   I will attend my hearing aid consultation appointment on 2/7/25 at 9:30am. (Completed)   I will talk to and get help from [employer] Resource Worker to transfer [place] to [place] Medicaid in next 1-2 months in 2025. (New)   I will attend my hearing aid fitting appointment on 3/28/25 at 8:30am. (In Progress)   I will attend my hearing aid initial check appointment on 4/18/25 at 8:30am. (In Progress)   I will contact my care team with questions, concerns or support needs. I will use the clinic as a resource and I understand I can contact my clinic with 24/7 after hours services available. Care Coordinator will remain available as needed. (Ongoing)                             Care Plan: Resources       Problem: Resources       Goal: Resources for Low cost hearing aides, Transportation and Utilities.       Start Date: 8/22/2024 Expected End Date: 2/20/2025    This Visit's Progress: 20% Recent Progress: 10%    Note:     Barriers: Hearing loss; Limited transportation; Provider availability - wait time to complete appointments.   Strengths: Motivated; Agreeable to Care Coordination.   Patient expressed understanding of goal: Yes.    Action steps to achieve this goal:  I will review the resources for hearing aides, transportation services and utility assistance in 2025. (In Progress)   I will contact my Care Coordinator with any questions or if additional resources are needed. (Ongoing)   I will contact my care team with questions,  concerns or support needs. I will use the clinic as a resource and I understand I can contact my clinic with 24/7 after hours services available. Care Coordinator will remain available as needed. (Ongoing)                                Plan/Recommendations: The patient will continue working with Care Coordination to achieve goal(s) as above. CHW will continue outreaches at minimum every 30 days and will involve Lead CC as needed or if patient is ready to move to Maintenance. Lead CC will continue to monitor CHW outreaches and patient's progress to goal(s) every 6 weeks.     Plan of Care updated and sent to patient: Yes, via mail.    Addendum: RN CC printed updated Care Plan while in clinic on 3/6/25 and mailed to patient via [employer] Postal Service.     Mary Burgess RN, BSN, CPHN, [facility] Ambulatory Care Management  [facility], and [facility]  [email or web address]  Office: [phone]  Employed by [employer]

## 2025-03-05 NOTE — LETTER
[facility] CARE COORDINATION  [id number]  [facility] [address]    March 5, 2025        Geoff Araujo Jr.  [address] [place] [address]          Dear Geoff,     Attached is an updated Patient Centered Plan of Care for your continued enrollment in Care Coordination. Please let us know if you have additional questions, concerns, or goals that we can assist with.    Sincerely,  Mary Burgess RN, BSN, CPHN, CCM  [facility] Ambulatory Care Management  [facility], and [facility]  [email or web address]  Office: [phone]  Employed by [employer]  Patient Centered Plan of Care  About Me:        Patient Name:  Geoff BERNARD Araujo Jr.    YOB: 1945  Age:         80 year old   [place] MRN:    [id number] Telephone Information:  Home Phone [phone]   Mobile [phone]       Address:  [address] [place] [address] Email address:  No e-mail address on record      Emergency Contact(s)    Name Relationship Lgl Grd Work Phone Home Phone Mobile Phone           Primary language:  English     needed? No   [place] [employer]:  [phone] op. 1  Other communication barriers:Hearing impairment    Preferred Method of Communication:     Current living arrangement: Other (with someone)    Mobility Status/ Medical Equipment: Independent    Health Maintenance  Health Maintenance Reviewed: Due/Overdue (Discussed with patient's SO)    My Access Plan  Medical Emergency 911   Primary Clinic Line [facility] - [phone]   24 Hour Appointment Line [phone] or  [phone]) (toll-free)   24 Hour Nurse Line [phone] (toll-free)   Preferred Urgent Care No data recorded   Preferred Hospital [facility]  [phone] ([facility])     Preferred Pharmacy [id number]  [facility] [address]     Behavioral Health Crisis Line The National Suicide Prevention Lifeline at 1-705.831.7178 or Text/Call 988     My Care Team Members  Patient Care Team         Relationship Specialty Notifications Start End    Naveen Mendez MD PCP - General Family Medicine  8/15/24     Phone: [phone] Fax: [phone] [address] [facility] [address]    Mary Burgess, EMILI Lead Care Coordinator Primary Care - CC Admissions 8/21/24     Phone: [phone]         Mariela Echevarria AuD Audiologist Audiology  8/22/24     Phone: [phone] Fax: [phone]         [address] [place] [address]    Olive Andrew MD MD Otolaryngology  8/22/24     Phone: [phone] Fax: [phone]         [address] [place] [address]    Lizette Apple, ERIC Community Health Worker  Admissions 8/22/24     Phone: [phone]         Naveen Mendez MD Assigned PCP   8/23/24     Phone: [phone] Fax: [phone] [address] [facility] [address]    Becca Bautista NP Assigned Surgical Provider   11/23/24     Phone: [phone] Fax: [phone]          [facility] [address]              My Care Plans  Self Management and Treatment Plan    Care Plan  Care Plan: Advance Care Plan       Problem: Patient does not have a valid Health Care Directive       Goal: Complete Health Care Directive       Start Date: 8/22/2024 Expected End Date: 2/20/2025    This Visit's Progress: 20% Recent Progress: 10%    Note:     Barriers: Hearing loss; Limited transportation; Provider availability - wait time to complete appointments.   Strengths: Motivated; Agreeable to Care Coordination.   Patient expressed understanding of goal: Yes.    Action steps to achieve this goal:  I will review the resources on [employer] in next 6 months in 2025. (Needs to get hearing aids first) (In Progress)   I will contact [employer] with any  questions in next 6 months in 2025. (Needs to get hearing aids first) (In Progress)   I will contact my care team with questions, concerns or support needs. I will use the clinic as a resource and I understand I can contact my clinic with 24/7 after hours services available. Care Coordinator will remain available as needed. (Ongoing)                               Care Plan: Health Maintenance       Problem: Health Maintenance Due or Overdue       Goal: Become up-to-date with health maintenance visit(s)       Start Date: 8/22/2024 Expected End Date: 2/20/2025    This Visit's Progress: 40% Recent Progress: 30%    Note:     Barriers: Hearing loss; Limited transportation; Provider availability - wait time to complete appointments.   Strengths: Motivated; Agreeable to Care Coordination.   Patient expressed understanding of goal: Yes.    Action steps to achieve this goal:  I will take my medications as prescribed. (Ongoing)   I will discuss, review, schedule and complete recommended overdue health maintenance with my Primary Care Provider. (In Progress)   I will attend my hearing aid consultation appointment on 2/7/25 at 9:30am. (Completed)   I will talk to and get help from [employer] Resource Worker to transfer [place] Medicaid to [place] Medicaid in next 1-2 months in 2025. (New)   I will attend my hearing aid fitting appointment on 3/28/25 at 8:30am. (In Progress)   I will attend my hearing aid initial check appointment on 4/18/25 at 8:30am. (In Progress)   I will contact my care team with questions, concerns or support needs. I will use the clinic as a resource and I understand I can contact my clinic with 24/7 after hours services available. Care Coordinator will remain available as needed. (Ongoing)                             Care Plan: Resources       Problem: Resources       Goal: Resources for Low cost hearing aides, Transportation and Utilities.       Start Date: 8/22/2024 Expected End Date: 2/20/2025     This Visit's Progress: 20% Recent Progress: 10%    Note:     Barriers: Hearing loss; Limited transportation; Provider availability - wait time to complete appointments.   Strengths: Motivated; Agreeable to Care Coordination.   Patient expressed understanding of goal: Yes.    Action steps to achieve this goal:  I will review the resources for hearing aides, transportation services and utility assistance in 2025. (In Progress)   I will contact my Care Coordinator with any questions or if additional resources are needed. (Ongoing)   I will contact my care team with questions, concerns or support needs. I will use the clinic as a resource and I understand I can contact my clinic with 24/7 after hours services available. Care Coordinator will remain available as needed. (Ongoing)                             Action Plans on File:     Advance Care Plans/Directives:   Advanced Care Plan/Directives on file: No    Discussed with patient/caregiver(s): Referral to Larry Grace           My Medical and Care Information  Problem List   Patient Active Problem List   Diagnosis    Malnutrition, unspecified type    Stage 3a chronic kidney disease (H)      Current Medications:  Please refer to the most recent medication list provided to you by your medical team and reach out to your provider with any questions or to make any corrections.  Current Outpatient Medications   Medication Sig Dispense Refill    albuterol (PROAIR HFA/PROVENTIL HFA/VENTOLIN HFA) 108 (90 Base) MCG/ACT inhaler Inhale 2 puffs into the lungs every 6 hours as needed for shortness of breath. 18 g 2    amLODIPine (NORVASC) 5 MG tablet Take 1 tablet (5 mg) by mouth daily 90 tablet 3    clopidogrel (PLAVIX) 75 MG tablet Take 1 tablet (75 mg) by mouth daily 90 tablet 3    hydrOXYzine HCl (ATARAX) 25 MG tablet Take 1 tablet (25 mg) by mouth every 8 hours as needed for anxiety 90 tablet 3    omeprazole (PRILOSEC) 40 MG DR capsule Take 1 capsule (40 mg) by mouth daily  90 capsule 3    thiamine (B-1) 100 MG tablet Take 1 tablet (100 mg) by mouth daily 90 tablet 3    TRELEGY ELLIPTA 100-62.5-25 MCG/ACT oral inhaler Inhale 1 puff into the lungs daily 60 each 11     No current facility-administered medications for this visit.      Allergies   Allergen Reactions    Ace Inhibitors Swelling     Care Coordination Start Date: 8/15/2024   Frequency of Care Coordination: monthly, more frequently as needed     Form Last Updated: 03/05/2025

## 2025-03-24 ENCOUNTER — PATIENT OUTREACH (OUTPATIENT)
Dept: CARE COORDINATION | Facility: CLINIC | Age: 80
End: 2025-03-24
Payer: MEDICARE

## 2025-03-24 NOTE — PROGRESS NOTES
FRW Update  3/24/25 FRW called and patient talked to FRW for a few min and ended the call so FRW will follow up in one week to try to help patient with insurance

## 2025-03-25 NOTE — ASSESSMENT & PLAN NOTE
-Well controlled, no alarming symptoms  -Continue current medications -amlodipine 5 mg OD  -Continue to monitor BP at home, maintain a log and bring to next visit  -Life style modifications: Daily exercise for at least 30 min for 5 days a week, low salt diet discussed  -Ordered CMP, follow-up next visit   no

## 2025-03-27 DIAGNOSIS — K21.9 GASTROESOPHAGEAL REFLUX DISEASE: ICD-10-CM

## 2025-03-27 NOTE — TELEPHONE ENCOUNTER
Received request via: Pharmacy    Was the patient seen in the last year in this department? no    Does the patient have an active prescription (recently filled or refills available) for medication(s) requested? Yes.     Pharmacy Name: [facility]    Does the patient have long term Plus and need 100-day supply? (This applies to ALL medications) Patient does not have SCP

## 2025-03-31 RX ORDER — OMEPRAZOLE 20 MG/1
40 CAPSULE, DELAYED RELEASE ORAL DAILY
Qty: 180 CAPSULE | Refills: 3 | Status: SHIPPED | OUTPATIENT
Start: 2025-03-31

## 2025-04-02 ENCOUNTER — PATIENT OUTREACH (OUTPATIENT)
Dept: CARE COORDINATION | Facility: CLINIC | Age: 80
End: 2025-04-02
Payer: MEDICARE

## 2025-04-02 NOTE — PROGRESS NOTES
FRW Update  4/2/25 ANN-MARIE called and talked to patient over the phone and he was having a hard time hearing the FRW. ANN-MARIE is going to mail out application to patient and will follow up in 30 day

## 2025-04-03 ENCOUNTER — TELEPHONE (OUTPATIENT)
Dept: FAMILY MEDICINE | Facility: CLINIC | Age: 80
End: 2025-04-03
Payer: MEDICARE

## 2025-04-03 NOTE — TELEPHONE ENCOUNTER
Patient calls.    Saying Dr. Mendez is not answering faxes from pharmacy.     No record of us receiving a fax.     Patient should have refills on file.     Called pharmacy - [facility] in [place]. They state he has 20 mg - 2 tablets daily - ready at [facility] in [facility]. Phone# [phone]    Called patient - he is in [place], wanting to fill at [facility] in [place]. Ph #[phone]     Called [facility] in [place]. They will fill the omeprazole 40 mg capsules.     Called patient - informed patient they are getting this ready. Patient stated an understanding and agreed with plan.     TATIANA SORIANO RN on 4/3/2025 at 3:33 PM   [facility]

## 2025-04-16 ENCOUNTER — PATIENT OUTREACH (OUTPATIENT)
Dept: CARE COORDINATION | Facility: CLINIC | Age: 80
End: 2025-04-16
Payer: MEDICARE

## 2025-04-16 NOTE — PROGRESS NOTES
Clinic Care Coordination Contact  Care Coordination Clinician Chart Review    Situation: Patient chart reviewed by Care Coordinator.       Background: Care Coordination Program started: 8/15/2024. Initial assessment completed and patient-centered care plan(s) were developed with participation from patient. Lead CC handed patient off to CHW for continued outreaches.       Assessment: Per chart review, patient outreach completed by CC CHW on 3/21/25.  Patient is actively working to accomplish goal(s). Patient's goal(s) appropriate and relevant at this time. Patient is not due for updated Plan of Care.  Assessments will be completed annually or as needed/with change of patient status.      Care Plan: Advance Care Plan       Problem: Patient does not have a valid Health Care Directive       Goal: Complete Health Care Directive       Start Date: 8/22/2024 Expected End Date: 2/20/2025    Recent Progress: 20%    Note:     Barriers: Hearing loss; Limited transportation; Provider availability - wait time to complete appointments.   Strengths: Motivated; Agreeable to Care Coordination.   Patient expressed understanding of goal: Yes.    Action steps to achieve this goal:  I will review the resources on [employer] in next 6 months in 2025. (Needs to get hearing aids first) (In Progress)   I will contact [employer] with any questions in next 6 months in 2025. (Needs to get hearing aids first) (In Progress)                               Care Plan: Health Maintenance       Problem: Health Maintenance Due or Overdue       Goal: Become up-to-date with health maintenance visit(s)       Start Date: 8/22/2024 Expected End Date: 2/20/2025    This Visit's Progress: 40% Recent Progress: 40%    Note:     Barriers: Hearing loss; Limited transportation; Provider availability - wait time to complete appointments.   Strengths: Motivated; Agreeable to Care Coordination.   Patient expressed understanding of goal: Yes.    Action steps  to achieve this goal:    I will discuss, review, schedule and complete recommended overdue health maintenance with my Primary Care Provider. (In Progress)   I will attend my hearing aid consultation appointment on 2/7/25 at 9:30am. (Completed)   I will talk to and get help from [employer] Resource Worker to transfer [place] Medicaid to [place] Medicaid in next 1-2 months in 2025. (In Progress)   I will attend my hearing aid fitting appointment on 3/28/25 at 8:30am. (In Progress)   I will attend my hearing aid initial check appointment on 4/18/25 at 8:30am. (In Progress)                               Care Plan: Resources       Problem: Resources       Goal: Resources for Low cost hearing aides, Transportation and Utilities.       Start Date: 8/22/2024 Expected End Date: 2/20/2025    This Visit's Progress: 20% Recent Progress: 20%    Note:     Barriers: Hearing loss; Limited transportation; Provider availability - wait time to complete appointments.   Strengths: Motivated; Agreeable to Care Coordination.   Patient expressed understanding of goal: Yes.    Action steps to achieve this goal:  I will review the resources for hearing aides, transportation services and utility assistance in 2025. (In Progress)                                Plan/Recommendations: The patient will continue working with Care Coordination to achieve goal(s) as above. CHW will continue outreaches at minimum every 30 days and will involve Lead CC as needed or if patient is ready to move to Maintenance. Lead CC will continue to monitor CHW outreaches and patient's progress to goal(s) every 6 weeks.     Plan of Care updated and sent to patient: No.    Mary Burgess, RN, BSN, CPHN, [facility] Ambulatory Care Management  [facility], and [facility]  [email or web address]  Office: [phone]  Employed by [employer]

## 2025-04-28 ENCOUNTER — PATIENT OUTREACH (OUTPATIENT)
Dept: CARE COORDINATION | Facility: CLINIC | Age: 80
End: 2025-04-28
Payer: MEDICARE

## 2025-04-28 NOTE — PROGRESS NOTES
Clinic Care Coordination Contact  Community Health Worker Follow Up    CHW spoke with patient's friend Pau (verbal C2C obtained over phone) for this CHW call.     Care Gaps:     Health Maintenance Due   Topic Date Due    BMP  Never done    LIPID  Never done    MICROALBUMIN  Never done    SPIROMETRY  Never done    COPD ACTION PLAN  Never done    HEMOGLOBIN  Never done    URINALYSIS  Never done    Pneumococcal Vaccine: 50+ Years (2 of 2 - PPSV23) 05/17/2017    ZOSTER IMMUNIZATION (2 of 3) 06/03/2017    RSV VACCINE (1 - 1-dose 75+ series) Never done    PHQ-2 (once per calendar year)  01/01/2025    COVID-19 Vaccine (2 - 2024-25 season) 03/24/2025     Care Gaps Last addressed on 8/15/24 and Care Gap Goal set: Yes    Care Plan:   Care Plan: Advance Care Plan       Problem: Patient does not have a valid Health Care Directive       Goal: Complete Health Care Directive       Start Date: 8/22/2024 Expected End Date: 2/20/2025    This Visit's Progress: 20% Recent Progress: 20%    Note:     Barriers: Hearing loss; Limited transportation; Provider availability - wait time to complete appointments.   Strengths: Motivated; Agreeable to Care Coordination.   Patient expressed understanding of goal: Yes.    Action steps to achieve this goal:  I will review the resources on [employer] in next 6 months in 2025. (Needs to get hearing aids first) (In Progress)   I will contact [employer] with any questions in next 6 months in 2025. (Needs to get hearing aids first) (In Progress)                               Care Plan: Health Maintenance       Problem: Health Maintenance Due or Overdue       Goal: Become up-to-date with health maintenance visit(s)       Start Date: 8/22/2024 Expected End Date: 2/20/2025    This Visit's Progress: 30% Recent Progress: 40%    Note:     Barriers: Hearing loss; Limited transportation; Provider availability - wait time to complete appointments.   Strengths: Motivated; Agreeable to Care  Coordination.   Patient expressed understanding of goal: Yes.    Action steps to achieve this goal:    I will discuss, review, schedule and complete recommended overdue health maintenance with my Primary Care Provider. (In Progress)   I will attend my hearing aid consultation appointment on 2/7/25 at 9:30am. (Completed)   I will talk to and get help from [employer] Resource Worker to transfer [place] Medicaid to [place] Medicaid in next 1-2 months in 2025. (In Progress)   I will attend my hearing aid fitting appointment on 3/28/25 at 8:30am. (Not completed/No show)   I will attend my hearing aid initial check appointment on 4/18/25 at 8:30am. (Not completed/No show)   I will reschedule my Audiology appointments for my hearing aid fitting and hearing aid initial check once [place] Medicaid is approved in 2025 (New)                               Care Plan: Resources       Problem: Resources       Goal: Resources for Low cost hearing aides, Transportation and Utilities.       Start Date: 8/22/2024 Expected End Date: 2/20/2025    This Visit's Progress: 20% Recent Progress: 20%    Note:     Barriers: Hearing loss; Limited transportation; Provider availability - wait time to complete appointments.   Strengths: Motivated; Agreeable to Care Coordination.   Patient expressed understanding of goal: Yes.    Action steps to achieve this goal:  I will review the resources for hearing aides, transportation services and utility assistance in 2025. (In Progress)                           Intervention and Education during outreach:     Per chart review, patient was no show for his Audiology appointments on 3/28/25 and 4/18/25.     Per patient's friend Pau, patient has completed his Medicaid application form; however, is not sure where to submit the completed Medicaid application form to. Informed of upcoming FRW outreach pending for 5/2/25 and encouraged to discuss with FRW further.     Discussed rescheduling the 2 missed  Audiology appointments for patient's hearing aid fitting and hearing aid initial check once [place] Medicaid is approved in 2025.     Addendum on 4/28/25:     Patient called CHW back requesting mailing address for his completed Medicaid application form. Informed patient that FRW will be in touch with him for further assistance. FRW notified today, 4/28/25 via [employer] message.     CHW Plan: Next CHW follow-up outreach call in one month.      To follow-up on:      Status of Medication application submission   Update on transfer status of [place] Medicaid to [place] Medicaid  FRW for Energy Assistance  [facility] for hearing aides  Transportation resources   Utility needs/resources   Status of dental appointment   ACP/HCD resources   Any other new need/concern    Lizette Apple  Community Health Worker   [email or web address]   Clinic Care Coordination / Ambulatory Care Management  [facility], and [facility]  [email or web address]  Office: [phone]  Gender Pronouns: She/her/hers  Employed by [employer]

## 2025-05-05 ENCOUNTER — PATIENT OUTREACH (OUTPATIENT)
Dept: CARE COORDINATION | Facility: CLINIC | Age: 80
End: 2025-05-05
Payer: MEDICARE

## 2025-05-05 NOTE — PROGRESS NOTES
FRW Update  5/5/25 Patient picked up the phone and ended call FRW try to follow up in one week to tell him were to send the application

## 2025-05-05 NOTE — PROGRESS NOTES
New Patient Consultation requested by [facility] for   1. Nausea    2. Abdominal pain, generalized    3. Esophageal dysphagia    4. Constipation, unspecified constipation type      CC: Abdominal pain, regurgitation    HPI: Gume is a 7-year-old male who comes in today with his mother for initial consultation regarding multiple GI symptoms.  Mother notes that he has issues with chronic abdominal pain for the past 5 years he has also struggled with intermittent regurgitation, and constipation alternating with diarrhea.    He reports his abdominal pain is generalized in location and occurs all day every day, he describes it as getting punched in the stomach, it feels better if he is not doing anything at all or distracted with watching something.  He reports red sauces, chili and garlic products seem to make the pain worse.  Having bowel movement sometimes makes it slightly better.  Mother notes in the past this has been attributed to constipation, he has never trialed MiraLAX, he has trialed more fiber in the diet.    He reports a feeling of regurgitation 1-3 times per week, he reports esophageal dysphagia at once per week or once per month, he cannot think of any particular foods.  He complains of nausea frequently often associated with abdominal pain.  There is not been any vomiting.    He reports stooling twice per day, he reports sometimes stools are painful to pass but most of the time they are not.  Mother notes Jerome type II or VI stools.  He denies any blood in his stools.  Mother denies any toilet clogging stools.  He reports 1-2 times per month he may have a smear of stool in his underwear this happens only at home and not at school.    He has a history of exercise-induced asthma as well as viral induced asthma and uses inhalers as needed.  He has a history of environmental allergies to dander.    No growth concerns.  No issues with persistent fevers, joint pain or  Discharge instructions reviewed and signed, all questions answered, PIV removed on unit, Meds to Beds delivered.   swelling, mouth sores or rash.    Review of records:  Hospital Outpatient Visit on 11/28/2023   Component Date Value Ref Range Status    FVC-Pred 11/28/2023 1.61  L Final    FVC-Pre 11/28/2023 1.78  L Final    FVC-%Pred-Pre 11/28/2023 110  % Final    FEV1-Pre 11/28/2023 1.51  L Final    FEV1-%Pred-Pre 11/28/2023 105  % Final    FEV1FVC-Pred 11/28/2023 90  % Final    FEV1FVC-Pre 11/28/2023 85  % Final    FEFMax-Pred 11/28/2023 3.89  L/sec Final    FEFMax-Pre 11/28/2023 3.14  L/sec Final    FEFMax-%Pred-Pre 11/28/2023 80  % Final    FEF2575-Pred 11/28/2023 1.77  L/sec Final    FEF2575-Pre 11/28/2023 1.51  L/sec Final    TLR6341-%Pred-Pre 11/28/2023 85  % Final    FEF2575-Post 11/28/2023 1.31  L/sec Final    PQG9854-%Pred-Post 11/28/2023 73  % Final    ExpTime-Pre 11/28/2023 3.29  sec Final    FIFMax-Pre 11/28/2023 1.31  L/sec Final    FEV1FEV6-Pre 11/28/2023 83  % Final       I personally reviewed results of laboratory evaluation, imaging studies and past medical records that were available during this outpatient visit    Review of Systems: 10 point ROS neg other than the symptoms noted above in the HPI.    Allergies: Patient has no known allergies.    Dietary restrictions: None    Medications  Current Outpatient Medications   Medication Sig Dispense Refill    ADVAIR HFA 45-21 MCG/ACT inhaler Inhale 2 puffs into the lungs 2 times daily 12 g 6    albuterol (PROAIR HFA/PROVENTIL HFA/VENTOLIN HFA) 108 (90 Base) MCG/ACT inhaler Inhale 2 puffs into the lungs every 6 hours as needed for shortness of breath, wheezing or cough 18 g 0    albuterol (PROVENTIL) (2.5 MG/3ML) 0.083% neb solution Take 1 vial (2.5 mg) by nebulization every 4 hours as needed for shortness of breath, wheezing or cough 90 mL 1    Respiratory Therapy Supplies (NEBULIZER/TUBING/MOUTHPIECE) KIT Use with albuterol neb solution 1 kit 0       Past Medical History: I have reviewed this patient's past medical history today and updated as appropriate.   No past  "medical history on file.     Past Surgical History: I have reviewed this patient's past surgical history today and updated as appropriate.   No past surgical history on file.     Family History: No known family history of celiac disease or inflammatory bowel disease.  Father with a history of constipation    Social History: Lives at home with mother, father and 4 siblings.    Physical exam:    Vital Signs: BP 97/61 (BP Location: Right arm, Patient Position: Sitting, Cuff Size: Child)   Pulse 87   Ht 1.321 m (4' 4\")   Wt 27.3 kg (60 lb 3 oz)   SpO2 96%   BMI 15.65 kg/m  . (21 %ile (Z= -0.81) based on CDC (Boys, 2-20 Years) Stature-for-age data based on Stature recorded on 5/5/2025. 22 %ile (Z= -0.78) based on CDC (Boys, 2-20 Years) weight-for-age data using data from 5/5/2025. Body mass index is 15.65 kg/m . 32 %ile (Z= -0.48) based on CDC (Boys, 2-20 Years) BMI-for-age based on BMI available on 5/5/2025.)  Constitutional: Healthy, alert, and no distress  Head: Normocephalic. No masses, lesions, tenderness or abnormalities  Neck: Neck supple.  EYE: ESPERANZA  ENT: Ears: Normal position, Nose: No discharge, and Mouth: Normal, moist mucous membranes  Cardiovascular: Heart: Regular rate and rhythm  Respiratory: Lungs clear to auscultation bilaterally.  Gastrointestinal: Abdomen:, Soft, Nontender, Nondistended, Normal bowel sounds, No hepatomegaly, No splenomegaly, Rectal: Deferred  Musculoskeletal: Extremities warm, well perfused.   Skin: No suspicious lesions or rashes  Neurologic: negative  Hematologic/Lymphatic/Immunologic: Normal cervical lymph nodes    Assessment:  Gume is a 7-year-old male with a history of multiple GI symptoms including generalized abdominal pain, regurgitation, dysphagia, nausea, and alternating constipation and diarrhea.  Will get abdominal x-ray today to assess stool burden, abdominal pain may be secondary to constipation.  If significant stool burden will plan for a bowel cleanout.  In the " meantime would like him to start 1 capful of MiraLAX daily to see if this improves abdominal pain.  Will get laboratory screens today to rule an underlying cause for constipation given the chronic nature of symptoms as well as a fecal calprotectin stool study to screen for inflammation of the lower GI tract.  He does report intermittent smears of stool, these are quite rare but could be possible encopresis.  No significant stool in x-ray will plan for bowel cleanout.  If x-ray is reassuring and MiraLAX is not helpful would consider trial of lansoprazole for possible GERD, if he continues with regurgitation and dysphagia symptoms or is unable to wean off medications would consider proceeding with upper endoscopy with biopsies to screen for mucosal disease, particularly eosinophilic esophagitis given his history of asthma and environmental allergies.  Will plan for follow-up in clinic in 2 months or sooner as needed depending on symptoms.  Mother will reach out in 1 month if MiraLAX is not helpful and we can plan for trial of lansoprazole.  Family verbalized understanding of the above plan and had no further questions at this time.    Orders Placed This Encounter   Procedures    X-ray Abdomen 1 vw    Comprehensive metabolic panel    Erythrocyte sedimentation rate auto    CRP inflammation    IgA    Tissue transglutaminase ashley IgA and IgG    TSH with free T4 reflex    Lipase    Calprotectin Feces    CBC with Platelets & Differential       Plan:  Labs today  Fecal calprotectin stool study  Abdominal x-ray to assess stool burden and need for possible cleanout.  Bowel cleanout  Bowel Clean Out For Constipation: Do on one day at home when you don't need to go anywhere   the following, available without a prescription:    Miralax (generic is fine)  Ex-lax chocolate squares (15mg senna/square)   (Dosing: Kids less than two (1/2 square), Age 2-6 (1/2-1 square), Age 6-12 (1-1.5 squares), Age>12 (1-2 squares)    Also pick  up any flavor of regular Gatorade (NOT sugar free Gatorade)    Start a clear liquid diet in the morning of the clean out (any fluid you can see through as well as jello).    Mix the Miralax/Gatorade according to weight below.  Start the clean out any time before noon      Children 50-75 pounds  Take 1.5 Ex-lax 30 minutes prior to starting the cleanout.  Mix 11.5 capfuls of Miralax into 48 oz of PowerAde or Gatorade.  About 30 minutes after taking the Ex-lax, drink 8-12oz. of the Miralax-electrolyte solution mixture every 15-20 minutes until the entire 48 oz are consumed. Slow down a little or take a break if your child is very nauseous.  Resume a normal diet slowly after the clean out is complete    What to expect from the clean out: Stools should be quite loose or watery, hopefully they will become lighter in color towards the end of the stool production.  Stool production can take several hours or longer to begin after the clean out is complete.     5. Start daily stool medication: 1/2-1 capful of miralax mixed in 4-8oz of liquid - drink this as early in the day as possible.      6. Adjust stool meds as needed, the goal is 1-2 applesauce or mashed potato consistency stools everyday.    7. Practice daily toilet sitting 2-3 times per day after meals for 5-10 minutes to push using blowing exercises (blowing a pinwheel/bubble/etc).  Use a step stool for feet so that knees are above the hips and a toilet seat insert if needed.    8. If no significant stool on x-ray or miralax not helping with the pain, then would consider lansoprazole 30mg once daily for 2-4 weeks.    9.  Follow-up in 2 months.  If issues persist and above not helpful may need to consider scopes to rule out EoE, especially if continued regurgitation or difficulty swallowing.    45 minutes spent on the date of the encounter doing chart review, history and exam, documentation and further activities as noted above.    Kary Gottlieb DNP, APRN,  CPNP-PC  Pediatric Nurse Practitioner  Pediatric Gastroenterology, Hepatology and Nutrition  [facility]    Call Center: [phone]    Disclaimer: This note consists of words and symbols derived from keyboarding and dictation using voice recognition software.  As a result, there may be errors that have gone undetected.  Please consider this when interpreting information found in this note.

## 2025-05-14 ENCOUNTER — PATIENT OUTREACH (OUTPATIENT)
Dept: CARE COORDINATION | Facility: CLINIC | Age: 80
End: 2025-05-14
Payer: MEDICARE

## 2025-05-22 DIAGNOSIS — J43.9 PULMONARY EMPHYSEMA, UNSPECIFIED EMPHYSEMA TYPE (H): ICD-10-CM

## 2025-05-22 RX ORDER — ALBUTEROL SULFATE 90 UG/1
2 INHALANT RESPIRATORY (INHALATION) EVERY 6 HOURS PRN
Qty: 6.7 G | Refills: 5 | Status: SHIPPED | OUTPATIENT
Start: 2025-05-22

## 2025-05-28 ENCOUNTER — PATIENT OUTREACH (OUTPATIENT)
Dept: CARE COORDINATION | Facility: CLINIC | Age: 80
End: 2025-05-28
Payer: MEDICARE

## 2025-05-28 NOTE — PROGRESS NOTES
Clinic Care Coordination Contact  Care Coordination Clinician Chart Review    Situation: Patient chart reviewed by Care Coordinator.       Background: Care Coordination Program started: 8/15/2024. Initial assessment completed and patient-centered care plan(s) were developed with participation from patient. Lead CC handed patient off to CHW for continued outreaches.       Assessment: Per chart review, patient outreach completed by CC CHW on 4/28/25.  Patient is actively working to accomplish goal(s). Patient's goal(s) appropriate and relevant at this time. Patient is due for updated Plan of Care.  Assessments will be completed annually or as needed/with change of patient status.      Care Plan: Advance Care Plan       Problem: Patient does not have a valid Health Care Directive       Goal: Complete Health Care Directive       Start Date: 8/22/2024 Expected End Date: 2/20/2025    This Visit's Progress: 30% Recent Progress: 20%    Note:     Barriers: Hearing loss; Limited transportation; Provider availability - wait time to complete appointments.   Strengths: Motivated; Agreeable to Care Coordination.   Patient expressed understanding of goal: Yes.    Action steps to achieve this goal:  I will review the resources on [employer] in next 6 months in 2025. (Needs to get hearing aids first) (In Progress)   I will contact [employer] with any questions in next 6 months in 2025. (Needs to get hearing aids first) (In Progress)                               Care Plan: Health Maintenance       Problem: Health Maintenance Due or Overdue       Goal: Become up-to-date with health maintenance visit(s)       Start Date: 8/22/2024 Expected End Date: 2/20/2025    This Visit's Progress: 40% Recent Progress: 30%    Note:     Barriers: Hearing loss; Limited transportation; Provider availability - wait time to complete appointments.   Strengths: Motivated; Agreeable to Care Coordination.   Patient expressed understanding of  goal: Yes.    Action steps to achieve this goal:    I will discuss, review, schedule and complete recommended overdue health maintenance with my Primary Care Provider. (In Progress)   I will attend my hearing aid consultation appointment on 2/7/25 at 9:30am. (Completed)   I will talk to and get help from [employer] Resource Worker to transfer [place] Medicaid [place] Medicaid in next 1-2 months in 2025. (In Progress)   I will attend my hearing aid fitting appointment on 3/28/25 at 8:30am. (Not completed/No show)   I will attend my hearing aid initial check appointment on 4/18/25 at 8:30am. (Not completed/No show)   I will reschedule my Audiology appointments for my hearing aid fitting and hearing aid initial check once [place] Medicaid is [place] in 2025 (New)                               Care Plan: Resources       Problem: Resources       Goal: Resources for Low cost hearing aides, Transportation and Utilities.       Start Date: 8/22/2024 Expected End Date: 2/20/2025    This Visit's Progress: 30% Recent Progress: 20%    Note:     Barriers: Hearing loss; Limited transportation; Provider availability - wait time to complete appointments.   Strengths: Motivated; Agreeable to Care Coordination.   Patient expressed understanding of goal: Yes.    Action steps to achieve this goal:  I will review the resources for hearing aides, transportation services and utility assistance in 2025. (In Progress)                                Plan/Recommendations: The patient will continue working with Care Coordination to achieve goal(s) as above. CHW will continue outreaches at minimum every 30 days and will involve Lead CC as needed or if patient is ready to move to Maintenance. Lead CC will continue to monitor CHW outreaches and patient's progress to goal(s) every 6 weeks.     Plan of Care updated and sent to patient: Yes, via mail.    Addendum: RN CC printed updated Care Plan while in clinic on 5/29/25 and mailed to patient via  [employer] Postal Service.     Mary Burgess RN, BSN, CPHN, [facility] Ambulatory Care Management  [facility], and [facility]  [email or web address]  Office: [phone]  Employed by [facility]

## 2025-05-28 NOTE — LETTER
[facility] CARE COORDINATION  [id number]  [facility] [address]    May 28, 2025        Geoff Araujo Jr.  [id number] [place] [address]          Dear Geoff,     Attached is an updated Patient Centered Plan of Care for your continued enrollment in Care Coordination. Please let us know if you have additional questions, concerns, or goals that we can assist with.    Sincerely,  Mary Burgess RN, BSN, CPHN, CCM  [facility] Ambulatory Care Management  [facility], and [facility]  [email or web address]  Office: [phone]  Employed by [facility] [facility]  Patient Centered Plan of Care  About Me:        Patient Name:  Geoff BERNARD Araujo Jr.    YOB: 1945  Age:         80 year old   [place] MRN:    [id number] Telephone Information:  Home Phone [phone]   Mobile [phone]       Address:  [address] [place] [address] Email address:  No e-mail address on record      Emergency Contact(s)    Name Relationship Lgl Grd Work Phone Home Phone Mobile Phone           Primary language:  English     needed? No   [employer]:  [phone] op. 1  Other communication barriers:Hearing impairment    Preferred Method of Communication:     Current living arrangement: Other (with someone)    Mobility Status/ Medical Equipment: Independent    Health Maintenance  Health Maintenance Reviewed: Due/Overdue (Discussed with patient's SO)    My Access Plan  Medical Emergency 911   Primary Clinic Line [facility] - [phone]   24 Hour Appointment Line [phone] or  [phone]) (toll-free)   24 Hour Nurse Line [phone] (toll-free)   Preferred Urgent Care No data recorded   Preferred Hospital [facility]  [phone] ([facility])     Preferred Pharmacy [id number]  [facility] [address]     Behavioral Health Crisis Line The National Suicide Prevention Lifeline at 1-189.838.7886 or Text/Call 988     My Care Team Members  Patient Care Team         Relationship Specialty Notifications Start End    Naveen Mendez MD PCP - General Family Medicine  8/15/24     Phone: [phone] Fax: [phone] [address] [facility] [address]    Mary Burgess, EMILI Lead Care Coordinator Primary Care - CC Admissions 8/21/24     Phone: [phone]         Mariela Echevarria AuD Audiologist Audiology  8/22/24     Phone: [phone] Fax: [phone]         [address] [place] [address]    Olive Andrew MD MD Otolaryngology  8/22/24     Phone: [phone] Fax: [phone]         [address] [place] [address]    Lizette Apple, ERIC Community Health Worker  Admissions 8/22/24     Phone: [phone]         Naveen Mendez MD Assigned PCP   8/23/24     Phone: [phone] Fax: [phone] [address] [facility] [address]    Becca Bautista NP Assigned Surgical Provider   11/23/24     Phone: [phone] Fax: [phone]         [address] [facility] [address]              My Care Plans  Self Management and Treatment Plan    Care Plan  Care Plan: Advance Care Plan       Problem: Patient does not have a valid Health Care Directive       Goal: Complete Health Care Directive       Start Date: 8/22/2024 Expected End Date: 2/20/2025    This Visit's Progress: 30% Recent Progress: 20%    Note:     Barriers: Hearing loss; Limited transportation; Provider availability - wait time to complete appointments.   Strengths: Motivated; Agreeable to Care Coordination.   Patient expressed understanding of goal: Yes.    Action steps to achieve this goal:  I will review the resources on [employer] in next 6 months in 2025. (Needs to get hearing aids first) (In Progress)   I will contact [employer] with any  questions in next 6 months in 2025. (Needs to get hearing aids first) (In Progress)                               Care Plan: Health Maintenance       Problem: Health Maintenance Due or Overdue       Goal: Become up-to-date with health maintenance visit(s)       Start Date: 8/22/2024 Expected End Date: 2/20/2025    This Visit's Progress: 40% Recent Progress: 30%    Note:     Barriers: Hearing loss; Limited transportation; Provider availability - wait time to complete appointments.   Strengths: Motivated; Agreeable to Care Coordination.   Patient expressed understanding of goal: Yes.    Action steps to achieve this goal:    I will discuss, review, schedule and complete recommended overdue health maintenance with my Primary Care Provider. (In Progress)   I will attend my hearing aid consultation appointment on 2/7/25 at 9:30am. (Completed)   I will talk to and get help from [employer] Resource Worker to transfer [place] Medicaid [place] Medicaid in next 1-2 months in 2025. (In Progress)   I will attend my hearing aid fitting appointment on 3/28/25 at 8:30am. (Not completed/No show)   I will attend my hearing aid initial check appointment on 4/18/25 at 8:30am. (Not completed/No show)   I will reschedule my Audiology appointments for my hearing aid fitting and hearing aid initial check once [place] Medicaid is approved in 2025 (New)                               Care Plan: Resources       Problem: Resources       Goal: Resources for Low cost hearing aides, Transportation and Utilities.       Start Date: 8/22/2024 Expected End Date: 2/20/2025    This Visit's Progress: 30% Recent Progress: 20%    Note:     Barriers: Hearing loss; Limited transportation; Provider availability - wait time to complete appointments.   Strengths: Motivated; Agreeable to Care Coordination.   Patient expressed understanding of goal: Yes.    Action steps to achieve this goal:  I will review the resources for hearing aides, transportation services  and utility assistance in 2025. (In Progress)                             Action Plans on File:     Advance Care Plans/Directives:   Advanced Care Plan/Directives on file: No    Discussed with patient/caregiver(s): Referral to Larry Grace           My Medical and Care Information  Problem List   Patient Active Problem List   Diagnosis    Malnutrition, unspecified type    Stage 3a chronic kidney disease (H)      Current Medications:  Please refer to the most recent medication list provided to you by your medical team and reach out to your provider with any questions or to make any corrections.  Current Outpatient Medications   Medication Sig Dispense Refill    albuterol (PROAIR HFA/PROVENTIL HFA/VENTOLIN HFA) 108 (90 Base) MCG/ACT inhaler INHALE 2 PUFFS INTO THE LUNGS EVERY 6 HOURS AS NEEDED FOR SHORTNESS OF BREATH 6.7 g 5    amLODIPine (NORVASC) 5 MG tablet Take 1 tablet (5 mg) by mouth daily 90 tablet 3    clopidogrel (PLAVIX) 75 MG tablet Take 1 tablet (75 mg) by mouth daily 90 tablet 3    hydrOXYzine HCl (ATARAX) 25 MG tablet Take 1 tablet (25 mg) by mouth every 8 hours as needed for anxiety 90 tablet 3    omeprazole (PRILOSEC) 40 MG DR capsule Take 1 capsule (40 mg) by mouth daily 90 capsule 3    thiamine (B-1) 100 MG tablet Take 1 tablet (100 mg) by mouth daily 90 tablet 3    TRELEGY ELLIPTA 100-62.5-25 MCG/ACT oral inhaler Inhale 1 puff into the lungs daily 60 each 11     No current facility-administered medications for this visit.      Allergies   Allergen Reactions    Ace Inhibitors Swelling     Care Coordination Start Date: 8/15/2024   Frequency of Care Coordination: monthly, more frequently as needed     Form Last Updated: 05/28/2025

## 2025-06-17 ENCOUNTER — PATIENT OUTREACH (OUTPATIENT)
Dept: CARE COORDINATION | Facility: CLINIC | Age: 80
End: 2025-06-17
Payer: MEDICARE

## 2025-06-17 NOTE — PROGRESS NOTES
Clinic Care Coordination Contact  [facility]/Voicemail    Clinical Data: Care Coordinator Outreach    Outreach Documentation Number of Outreach Attempt   6/17/2025   1:28 PM 1     Unable to leave a message due to: Voicemail is not set up.    Plan: Care Coordinator will send unable to contact letter with care coordinator contact information via mail. Care Coordinator will try to reach patient again in 10 business days.    Status/update on [place] Medicaid application from [facility]  Scheduling patient's audiology appointments for his hearing aid fitting and hearing aid initial check   FRW for Energy Assistance  [facility] in [facility] for hearing aides  Transportation resources   Utility needs/resources   Status of dental appointment   ACP/HCD resources   Any other new need/concern    Lizette Apple  Community Health Worker   [email or web address]   Clinic Care Coordination / Ambulatory Care Management  [facility], and [facility]  [email or web address]  Office: [phone]  Gender Pronouns: She/her/hers  Employed by [facility]

## 2025-06-17 NOTE — LETTER
[facility]  [id number]  [facility] [address]     June 17, 2025    Geoff Araujo Jr.  [address] [facility], [address] [address]      Dear Geoff,    I have attempted to reach you but could not.     Have you received update on [place] application from [facility] yet?     If you have not received update from [facility] on your [place] application, please follow up with them at [phone].    Beside ongoing hearing problem, what are other concerns/needs you need or want to work on in next 1-3 months?     I would like to continue to work with you and provide any additional support you may need on achieving your health care related goals. I would appreciate if you would give me a call at [phone] to let me know if you would like to continue working together. I know that there are many things that can affect our ability to communicate and I hope we can continue to work together.    We are focused on providing you with the highest-quality healthcare experience possible.    Sincerely,    Lizette Apple  Community Health Worker   [email or web address]   Clinic Care Coordination / Ambulatory Care Management  [facility], and [facility]  [email or web address]  Office: [phone]  Gender Pronouns: She/her/hers  Employed by [employer]

## 2025-07-03 ENCOUNTER — PATIENT OUTREACH (OUTPATIENT)
Dept: CARE COORDINATION | Facility: CLINIC | Age: 80
End: 2025-07-03
Payer: MEDICARE

## 2025-07-03 NOTE — PROGRESS NOTES
Clinic Care Coordination Contact  Community Health Worker Follow Up    CHW spoke with patient's significant other Pau (verbal C2C obtained over phone) for this CHW call.     Care Gaps:     Health Maintenance Due   Topic Date Due    BMP  Never done    LIPID  Never done    MICROALBUMIN  Never done    SPIROMETRY  Never done    COPD ACTION PLAN  Never done    HEMOGLOBIN  Never done    URINALYSIS  Never done    PNEUMOCOCCAL VACCINE 50+ YEARS (2 of 2 - PPSV23) 05/17/2017    ZOSTER VACCINE (2 of 3) 06/03/2017    RSV VACCINE (1 - 1-dose 75+ series) Never done    PHQ-2 (once per calendar year)  01/01/2025    COVID-19 VACCINE (2 - 2024-25 season) 03/24/2025     Care Gaps Last addressed on 8/15/24 by PCP and Care Gap Goal set: Yes     Care Plan:   Care Plan: Advance Care Plan       Problem: Patient does not have a valid Health Care Directive       Goal: Complete Health Care Directive       Start Date: 8/22/2024 Expected End Date: 2/20/2025    This Visit's Progress: On Hold Recent Progress: 30%    Note:     Barriers: Hearing loss; Limited transportation; Provider availability - wait time to complete appointments.   Strengths: Motivated; Agreeable to Care Coordination.   Patient expressed understanding of goal: Yes.    Action steps to achieve this goal:  I will review the resources on [employer] in next 6 months in 2025. (Needs to get hearing aids first) (In Progress)   I will contact [employer] with any questions in next 6 months in 2025. (Needs to get hearing aids first) (In Progress)                               Care Plan: Health Maintenance       Problem: Health Maintenance Due or Overdue       Goal: Become up-to-date with health maintenance visit(s)       Start Date: 8/22/2024 Expected End Date: 2/20/2025    This Visit's Progress: 60% Recent Progress: 50%    Note:     Barriers: Hearing loss; Limited transportation; Provider availability - wait time to complete appointments.   Strengths: Motivated;  Agreeable to Care Coordination.   Patient expressed understanding of goal: Yes.    Action steps to achieve this goal:    I will discuss, review, schedule and complete recommended overdue health maintenance with my Primary Care Provider. (In Progress)   I will attend my hearing aid consultation appointment on 2/7/25 at 9:30am. (Completed)   I will talk to and get help from [employer] Resource Worker to transfer [place] Medicaid [place] Medicaid in next 1-2 months in 2025. (Not Completed/Closed)   I will attend my hearing aid fitting appointment on 3/28/25 at 8:30am. (Not Completed/No show)   I will attend my hearing aid initial check appointment on 4/18/25 at 8:30am. (Not Completed/No show)   I will call [facility] to verify [place] status with Community Health Worker in next 1-2 months in 2025 (Completed)  I will call [facility] to verify [place] status on 7/7/25 (New)  I will reschedule my Audiology appointments for my hearing aid fitting and hearing aid initial check once [place] is [place] in 2025 (In Progress)                             Care Plan: Resources       Problem: Resources       Goal: Resources for Low cost hearing aides, Transportation and Utilities.       Start Date: 8/22/2024 Expected End Date: 2/20/2025    This Visit's Progress: 30% Recent Progress: 30%    Note:     Barriers: Hearing loss; Limited transportation; Provider availability - wait time to complete appointments.   Strengths: Motivated; Agreeable to Care Coordination.   Patient expressed understanding of goal: Yes.    Action steps to achieve this goal:  I will review the resources for hearing aides, transportation services and utility assistance in 2025 (Needs to get hearing aids first) (In Progress)                           Intervention and Education during outreach:     Patient's significant other Angellete reported the following:    Patient/his  significant other Pau called [facility] [employer] but didn't know how to navigate their phone prompt.     CHW called [facility] with patient/his significant other Pau to verify status of [place] ([place] of [place]) today, 7/3/25. We were on hold with [facility] for over one hour and had to end the call before getting through to a [facility] representative.     Patient/his significant other Pau will call back [facility] [employer] on 7/7/25. Contact for CHW provided over phone for follow-up if needed.     CHW Plan: Next CHW follow-up outreach in one month.     To follow-up on:      [place]/update on [place] from [facility]    To follow-up on (later):    Scheduling patient's audiology appointments for his hearing aid fitting and hearing aid initial check   FRW for Energy Assistance  [facility] in [place]   Resources for hearing aides  Transportation resources   Utility needs/resources   Status of dental appointment   ACP/HCD resources   Any other new need/concern    Lizette Apple  Community Health Worker   [email or web address]   Clinic Care Coordination / Ambulatory Care Management  [facility], and [facility]  [email or web address]  Office: [phone]  Gender Pronouns: She/her/hers  Employed by [employer]

## 2025-07-09 ENCOUNTER — PATIENT OUTREACH (OUTPATIENT)
Dept: CARE COORDINATION | Facility: CLINIC | Age: 80
End: 2025-07-09
Payer: MEDICARE

## 2025-07-09 NOTE — PROGRESS NOTES
Clinic Care Coordination Contact  Care Coordination Clinician Chart Review    Situation: Patient chart reviewed by Care Coordinator.       Background: Care Coordination Program started: 8/15/2024. Initial assessment completed and patient-centered care plan(s) were developed with participation from patient. Lead CC handed patient off to CHW for continued outreaches.       Assessment: Per chart review, patient outreach completed by CC CHW on 7/3/25.  Patient is actively working to accomplish goal(s). Patient's goal(s) appropriate and relevant at this time. Patient is not due for updated Plan of Care.  Assessments will be completed annually or as needed/with change of patient status.      Care Plan: Advance Care Plan       Problem: Patient does not have a valid Health Care Directive       Goal: Complete Health Care Directive       Start Date: 8/22/2024 Expected End Date: 2/20/2025    This Visit's Progress: On Hold Recent Progress: 30%    Note:     Barriers: Hearing loss; Limited transportation; Provider availability - wait time to complete appointments.   Strengths: Motivated; Agreeable to Care Coordination.   Patient expressed understanding of goal: Yes.    Action steps to achieve this goal:  I will review the resources on [employer] in next 6 months in 2025. (Needs to get hearing aids first) (In Progress)   I will contact [employer] with any questions in next 6 months in 2025. (Needs to get hearing aids first) (In Progress)                               Care Plan: Health Maintenance       Problem: Health Maintenance Due or Overdue       Goal: Become up-to-date with health maintenance visit(s)       Start Date: 8/22/2024 Expected End Date: 2/20/2025    This Visit's Progress: 60% Recent Progress: 50%    Note:     Barriers: Hearing loss; Limited transportation; Provider availability - wait time to complete appointments.   Strengths: Motivated; Agreeable to Care Coordination.   Patient expressed understanding  of goal: Yes.    Action steps to achieve this goal:    I will discuss, review, schedule and complete recommended overdue health maintenance with my Primary Care Provider. (In Progress)   I will attend my hearing aid consultation appointment on 2/7/25 at 9:30am. (Completed)   I will talk to and get help from [employer] Resource Worker to transfer [place] in next 1-2 months in 2025. (Not Completed/Closed)   I will attend my hearing aid fitting appointment on 3/28/25 at 8:30am. (Not Completed/No show)   I will attend my hearing aid initial check appointment on 4/18/25 at 8:30am. (Not Completed/No show)   I will call [facility] to verify [place] status with Community Health Worker in next 1-2 months in 2025 (Completed)  I will call [facility] to verify [place] status on 7/7/25 (New)  I will reschedule my Audiology appointments for my hearing aid fitting and hearing aid initial check once [place] in 2025 (In Progress)                             Care Plan: Resources       Problem: Resources       Goal: Resources for Low cost hearing aides, Transportation and Utilities.       Start Date: 8/22/2024 Expected End Date: 2/20/2025    This Visit's Progress: 30% Recent Progress: 30%    Note:     Barriers: Hearing loss; Limited transportation; Provider availability - wait time to complete appointments.   Strengths: Motivated; Agreeable to Care Coordination.   Patient expressed understanding of goal: Yes.    Action steps to achieve this goal:  I will review the resources for hearing aides, transportation services and utility assistance in 2025 (Needs to get hearing aids first) (In Progress)                                Plan/Recommendations: The patient will continue working with Care Coordination to achieve goal(s) as above. CHW will continue outreaches at minimum every 30 days and will involve Lead CC  as needed or if patient is ready to move to Maintenance. Lead CC will continue to monitor CHW outreaches and patient's progress to goal(s) every 6 weeks.     Plan of Care updated and sent to patient: Laura Burgess RN, BSN, CPHN, [facility] Ambulatory Care Management  [facility], and [facility]  [email or web address]  Office: [phone]  Employed by [employer]

## 2025-07-21 ENCOUNTER — PATIENT OUTREACH (OUTPATIENT)
Dept: CARE COORDINATION | Facility: CLINIC | Age: 80
End: 2025-07-21
Payer: MEDICARE

## 2025-07-21 NOTE — PROGRESS NOTES
Clinic Care Coordination Contact  Community Health Worker Follow Up    CHW spoke with patient's significant other Pau (verbal C2C obtained over phone) for this CHW call.     Care Gaps:     Health Maintenance Due   Topic Date Due    BMP  Never done    LIPID  Never done    MICROALBUMIN  Never done    SPIROMETRY  Never done    COPD ACTION PLAN  Never done    HEMOGLOBIN  Never done    URINALYSIS  Never done    PNEUMOCOCCAL VACCINE 50+ YEARS (2 of 2 - PPSV23) 05/17/2017    ZOSTER VACCINE (2 of 3) 06/03/2017    RSV VACCINE (1 - 1-dose 75+ series) Never done    PHQ-2 (once per calendar year)  01/01/2025    COVID-19 VACCINE (2 - 2024-25 season) 03/24/2025    NICOTINE/TOBACCO CESSATION COUNSELING Q 1 YR  08/15/2025    MEDICARE ANNUAL WELLNESS VISIT  08/15/2025     Care Gaps Last addressed on 8/15/24 by PCP and Care Gap Goal set: Yes     Care Plan:   Care Plan: Advance Care Plan       Problem: Patient does not have a valid Health Care Directive       Goal: Complete Health Care Directive       Start Date: 8/22/2024 Expected End Date: 2/20/2025    This Visit's Progress: On Hold Recent Progress: On Hold    Note:     Barriers: Hearing loss; Limited transportation; Provider availability - wait time to complete appointments.   Strengths: Motivated; Agreeable to Care Coordination.   Patient expressed understanding of goal: Yes.    Action steps to achieve this goal:  I will review the resources on [employer] in next 6 months in 2025. (Needs to get hearing aids first) (In Progress)   I will contact [employer] with any questions in next 6 months in 2025. (Needs to get hearing aids first) (In Progress)                               Care Plan: Health Maintenance       Problem: Health Maintenance Due or Overdue       Goal: Become up-to-date with health maintenance visit(s)       Start Date: 8/22/2024 Expected End Date: 2/20/2025    This Visit's Progress: 60% Recent Progress: 60%    Note:     Barriers: Hearing loss;  Limited transportation; Provider availability - wait time to complete appointments.   Strengths: Motivated; Agreeable to Care Coordination.   Patient expressed understanding of goal: Yes.    Action steps to achieve this goal:    I will discuss, review, schedule and complete recommended overdue health maintenance with my Primary Care Provider. (In Progress)   I will attend my hearing aid consultation appointment on 2/7/25 at 9:30am. (Completed)   I will talk to and get help from [place] [employer] Resource Worker to transfer [place] in next 1-2 months in 2025. (Not Completed/Closed)   I will attend my hearing aid fitting appointment on 3/28/25 at 8:30am. (Not Completed/No show)   I will attend my hearing aid initial check appointment on 4/18/25 at 8:30am. (Not Completed/No show)   I will call [facility] to verify [place] with Community Health Worker in next 1-2 months in 2025 (Completed)  I will call [facility] to verify [place] in next 1-2 months in 2025 (In Progress)  I will reschedule my Audiology appointments for my hearing aid fitting and hearing aid initial check once [place] is [place] in 2025 (In Progress)                             Care Plan: Resources       Problem: Resources       Goal: Resources for Low cost hearing aides, Transportation and Utilities.       Start Date: 8/22/2024 Expected End Date: 2/20/2025    This Visit's Progress: 30% Recent Progress: 30%    Note:     Barriers: Hearing loss; Limited transportation; Provider availability - wait time to complete appointments.   Strengths: Motivated; Agreeable to Care Coordination.   Patient expressed understanding of goal: Yes.    Action steps to achieve this goal:  I will review the resources for hearing aides, transportation services and utility assistance in 2025 (Needs to get hearing aids first) (In Progress)                            Intervention and Education during outreach:     Patient's significant other Ulysseslete reported the following:    Patient/his significant other Angellete have called [facility] twice to verify status of [place] after the last CHW call on 7/3/25. Both times, they were placed on a long hold by the [facility] again.     Patient/his significant other Angellete called the [facility] in mid/late afternoon.     Encouraged patient/his significant other Angellete to call the [facility] early morning around 8am/9am as it tends to be less wait time in early morning. They are in agreement.     Patient/his significant other Angellete will follow up with CHW again if they continue to have difficult time getting through to [facility] customer representative over the phone.     CHW Plan: Next CHW follow-up outreach in one month.     To follow-up on:      Status/update on [place] application from [facility]     To follow-up on (later):     Scheduling patient's audiology appointments for his hearing aid fitting and hearing aid initial check   FRW for Energy Assistance  [facility] in [facility] for hearing aides  Transportation resources   Utility needs/resources   Status of dental appointment   ACP/HCD resources   Any other new need/concern     Lizette Apple  Community Health Worker   [facility]   Clinic Care Coordination / Ambulatory Care Management  [facility], and [facility]  [email or web address]  Office: [phone]  Gender Pronouns: She/her/hers  Employed by [employer]

## 2025-07-28 ENCOUNTER — PATIENT OUTREACH (OUTPATIENT)
Dept: CARE COORDINATION | Facility: CLINIC | Age: 80
End: 2025-07-28
Payer: MEDICARE

## 2025-07-28 NOTE — LETTER
[facility]  [address]  [facility] [address]     July 28, 2025    Geoff Araujo Jr.  [address] [address] [address]      Dear Geoff (attention to girlfriend),    I have attempted to reach you because there were 2 missed calls on 7/26/25 but no voicemail left.     I called you back but couldn't leave a voicemail to you because your voicemail was not set up yet. Please set it up soon so that important messages can be left for you.     Have you been able to get through to [facility] to verify status of Geoff's [place] application yet?     I would like to continue to work with you and provide any additional support you may need on achieving your health care related goals. I would appreciate if you would give me a call at [phone] to let me know if you would like to continue working together. I know that there are many things that can affect our ability to communicate and I hope we can continue to work together.    We are focused on providing you with the highest-quality healthcare experience possible.    Sincerely,    Lizette Apple  Community Health Worker   [facility].org   Clinic Care Coordination / Ambulatory Care Management  [facility], and [facility]  [email or web address]  Office: [phone]  Gender Pronouns: She/her/hers  Employed by [employer]

## 2025-07-28 NOTE — PROGRESS NOTES
Clinic Care Coordination Contact  [facility]    Clinical Data: Care Coordinator Outreach    Outreach Documentation Number of Outreach Attempt   7/28/2025   9:57 AM 1     Unable to leave a message due to: Voicemail is not set up.    Plan: Care Coordinator will send unable to contact letter with care coordinator contact information via mail. Care Coordinator will try to reach patient again in 10 business days.    To follow-up on:      Status/update on [place] application from [facility]     To follow-up on (later):     Scheduling patient's audiology appointments for his hearing aid fitting and hearing aid initial check   FRW for Energy Assistance  [facility] in [place]   Resources for hearing aides  Transportation resources   Utility needs/resources   Status of dental appointment   ACP/HCD resources   Any other new need/concern     iLzette Apple  Community Health Worker   [facility] Plan[facility].org   Clinic Care Coordination / Ambulatory Care Management  [facility], and [facility]  [email or web address]  Office: [phone]  Gender Pronouns: She/her/hers  Employed by [employer]

## 2025-08-11 ENCOUNTER — PATIENT OUTREACH (OUTPATIENT)
Dept: CARE COORDINATION | Facility: CLINIC | Age: 80
End: 2025-08-11
Payer: MEDICARE

## 2025-08-20 ENCOUNTER — PATIENT OUTREACH (OUTPATIENT)
Dept: CARE COORDINATION | Facility: CLINIC | Age: 80
End: 2025-08-20
Payer: MEDICARE

## 2025-08-23 DIAGNOSIS — J43.9 PULMONARY EMPHYSEMA, UNSPECIFIED EMPHYSEMA TYPE (H): ICD-10-CM

## 2025-08-25 RX ORDER — FLUTICASONE FUROATE, UMECLIDINIUM BROMIDE AND VILANTEROL TRIFENATATE 100; 62.5; 25 UG/1; UG/1; UG/1
1 POWDER RESPIRATORY (INHALATION) DAILY
Qty: 28 EACH | Refills: 0 | Status: SHIPPED | OUTPATIENT
Start: 2025-08-25

## 2025-08-26 ENCOUNTER — PATIENT OUTREACH (OUTPATIENT)
Dept: CARE COORDINATION | Facility: CLINIC | Age: 80
End: 2025-08-26
Payer: MEDICARE

## (undated) DEVICE — GOWN SURGEONS X-LARGE - DISP. (30/CA)

## (undated) DEVICE — SET FLUID WARMING STANDARD FLOW - (10/CA)

## (undated) DEVICE — STOPCOCK 3-WAY W/SWIVEL LEVER LOCK (50EA/CA)

## (undated) DEVICE — SUCTION TIP STRAIGHT ARGYLE - 50EA/CA

## (undated) DEVICE — SUTURE 2-0 PROLENE SH D/A (36PK/BX)

## (undated) DEVICE — PAD LAP STERILE 18 X 18 - (5/PK 40PK/CA)

## (undated) DEVICE — TUBE NG SALEM SUMP 16FR (50EA/CA)

## (undated) DEVICE — SUTURE 6-0 PROLENE BV-1 D/A 24 (36PK/BX)"

## (undated) DEVICE — CONTAINER SPECIMEN BAG OR - STERILE 4 OZ W/LID (100EA/CA)

## (undated) DEVICE — Device

## (undated) DEVICE — AMPLATZ SUPER STIFF 180CM - (5/BX)

## (undated) DEVICE — STOPCOCK MALE 4-WAY - (50/CA)

## (undated) DEVICE — SUTURE 2-0 VICRYL PLUS CT-1 36 (36PK/BX)"

## (undated) DEVICE — CATHETER EMBOLECTOMY 3FR (1EA)

## (undated) DEVICE — CATHETER RUBICON 35 65CM

## (undated) DEVICE — SHEATH RO 7F 25CM (10EA/BX)

## (undated) DEVICE — ELECTRODE 850 FOAM ADHESIVE - HYDROGEL RADIOTRNSPRNT (50/PK)

## (undated) DEVICE — SLEEVE, VASO, THIGH, MED

## (undated) DEVICE — GLOVE BIOGEL SZ 8 SURGICAL PF LTX - (50PR/BX 4BX/CA)

## (undated) DEVICE — DECANTER FLD BLS - (50/CA)

## (undated) DEVICE — SODIUM CHL IRRIGATION 0.9% 1000ML (12EA/CA)

## (undated) DEVICE — SUTURE GENERAL

## (undated) DEVICE — LACTATED RINGERS INJ 1000 ML - (14EA/CA 60CA/PF)

## (undated) DEVICE — SUTURE 4-0 30CM STRATAFIX SPIRAL PS-2 (12EA/BX)

## (undated) DEVICE — GUIDEWIRE HYDROPHILIC COATED ANGLED TIP .035 X 260CM (5EA/BX)"

## (undated) DEVICE — GLOVE BIOGEL SZ 6.5 SURGICAL PF LTX (50PR/BX 4BX/CA)

## (undated) DEVICE — ELECTRODE DUAL RETURN W/ CORD - (50/PK)

## (undated) DEVICE — PAD PREP 24 X 48 CUFFED - (100/CA)

## (undated) DEVICE — CATHETER EMBOLECTOMY 4FR (5EA/CA)

## (undated) DEVICE — BAG RESUSCITATION DISPOSABLE - WITH MASK (10 EA/CA)

## (undated) DEVICE — DEVICE INFLATION DIGITAL BLUE DIAMOND (5EA/BX)

## (undated) DEVICE — WRAP CO-FLEX 4IN X 5YD STERIL - SELF-ADHERENT (18/CA)

## (undated) DEVICE — TRAY SURESTEP FOLEY TEMP SENSING 16FR (10EA/CA) ORDER  #18764 FOR TEMP FOLEY ONLY

## (undated) DEVICE — CLIP MED LG INTNL HRZN TI ESCP - (20/BX)

## (undated) DEVICE — SHEET CARDIOVASCULAR - (4/CA)

## (undated) DEVICE — SUTURE 3-0 SILK 12 X 18 IN - (36/BX)

## (undated) DEVICE — SYSTEM PEEL & PLACE 13CM INCISIONS

## (undated) DEVICE — SUTURE 0 SILK 12 X 18 (36PK/BX)

## (undated) DEVICE — GOWN WARMING STANDARD FLEX - (30/CA)

## (undated) DEVICE — CLIP MED INTNL HRZN TI ESCP - (25/BX)

## (undated) DEVICE — SET LEADWIRE 5 LEAD BEDSIDE DISPOSABLE ECG (1SET OF 5/EA)

## (undated) DEVICE — BLADE SURGICAL CLIPPER - (50EA/CA)

## (undated) DEVICE — SUCTION INSTRUMENT YANKAUER BULBOUS TIP W/O VENT (50EA/CA)

## (undated) DEVICE — SYRINGE SAFETY 3 ML 18 GA X 1 1/2 BLUNT LL (100/BX 8BX/CA)

## (undated) DEVICE — KIT SURGIFLO W/OUT THROMBIN - (6EA/CA)

## (undated) DEVICE — CHLORAPREP 26 ML APPLICATOR - ORANGE TINT(25/CA)

## (undated) DEVICE — SUTURE 2-0 SILK 12 X 18" (36PK/BX)"

## (undated) DEVICE — CLIP SM INTNL HRZN TI ESCP LGT - (24EA/PK 25PK/BX)

## (undated) DEVICE — KIT INTROPERCUTANEOUS SHEATH - 8.5 FR W/MAX BARRIER AND BIOPATCH  (5/CA)

## (undated) DEVICE — GLOVE BIOGEL SZ 6 PF LATEX - (50EA/BX 4BX/CA)

## (undated) DEVICE — SYRINGE SAFETY 10 ML 18 GA X 1 1/2 BLUNT LL (100/BX 4BX/CA)

## (undated) DEVICE — GLIDECATH ANGLE 4F X 70CM (5EA/BX)

## (undated) DEVICE — CANISTER SUCTION 3000ML MECHANICAL FILTER AUTO SHUTOFF MEDI-VAC NONSTERILE LF DISP  (40EA/CA)

## (undated) DEVICE — DRAPE MAYO STAND - (30/CA)

## (undated) DEVICE — WATER IRRIGATION STERILE 1000ML (12EA/CA)

## (undated) DEVICE — DRAPE IOBAN II 23 IN X 33 IN - (10/BX)

## (undated) DEVICE — SOD. CHL. INJ. 0.9% 1000 ML - (14EA/CA 60CA/PF)

## (undated) DEVICE — SPONGE KITTNER DISSECTORS - (5EA/PK 50PK/CA)

## (undated) DEVICE — SET BIFURCATED BLOOD - (48EA/CS)

## (undated) DEVICE — DRAPE SURGICAL U 77X120 - (10/CA)

## (undated) DEVICE — GLOVE BIOGEL PI INDICATOR SZ 7.0 SURGICAL PF LF - (50/BX 4BX/CA)

## (undated) DEVICE — HEAD HOLDER JUNIOR/ADULT

## (undated) DEVICE — DRAPE LARGE 3 QUARTER - (20/CA)

## (undated) DEVICE — FILTER BLOOD TRANSFUSION - (40/CA) (PALL)

## (undated) DEVICE — SUTURE 2-0 VICRYL PLUS CT-1 - 8 X 18 INCH(12/BX)

## (undated) DEVICE — SET EXTENSION WITH 2 PORTS (48EA/CA) ***PART #2C8610 IS A SUBSTITUTE*****

## (undated) DEVICE — SODIUM CHL. INJ. 0.9% 500ML (24EA/CA 50CA/PF)

## (undated) DEVICE — DRAPE IOBAN II INCISE 23X17 - (10EA/BX 4BX/CA)

## (undated) DEVICE — GLOVE SZ 6.5 BIOGEL PI MICRO - PF LF (50PR/BX)

## (undated) DEVICE — POLY UMBILICAL TAPE 1/8X30 - (36/BX)

## (undated) DEVICE — BALLOON ARMADA 6X40X80CM

## (undated) DEVICE — VESSELOOP MINI BLUE STERILE - SURG-I-LOOP (10EA/BX)

## (undated) DEVICE — TUBING CLEARLINK DUO-VENT - C-FLO (48EA/CA)

## (undated) DEVICE — SUTURE 0 SILK TIES (36PK/BX)

## (undated) DEVICE — BAG ISOLATION 20X20 INVISI - SHEILD (10EA/BX)

## (undated) DEVICE — KIT ANESTHESIA W/CIRCUIT & 3/LT BAG W/FILTER (20EA/CA)

## (undated) DEVICE — TRANSDUCER ADULT DISP. SINGLE BONDED STERILE - (20EA/CA)

## (undated) DEVICE — NEPTUNE 4 PORT MANIFOLD - (20/PK)

## (undated) DEVICE — PTFE PLED STER - (250/CA)

## (undated) DEVICE — PACK MAJOR BASIN - (2EA/CA)

## (undated) DEVICE — TOWELS CLOTH SURGICAL - (4/PK 20PK/CA)

## (undated) DEVICE — PROTECTOR ULNA NERVE - (36PR/CA)

## (undated) DEVICE — SYRINGE 30 ML LL (56/BX)

## (undated) DEVICE — SENSOR SPO2 NEO LNCS ADHESIVE (20/BX) SEE USER NOTES

## (undated) DEVICE — VESSELOOP MAXI BLUE STERILE- SURG-I-LOOP (10EA/BX)

## (undated) DEVICE — KIT RADIAL ARTERY 20GA W/MAX BARRIER AND BIOPATCH  (5EA/CA) #10740 IS FOR THE SET RADIAL ARTERIAL

## (undated) DEVICE — GUIDEWIRES STARTER (PTFE COATED) ROSEN 0.035 260CM 4 1.5 MM J

## (undated) DEVICE — GOWN SURGEONS LARGE - (32/CA)

## (undated) DEVICE — BOVIE  BLADE 6 EXTENDED - (50/PK)

## (undated) DEVICE — CLIP LG INTNL HRZN TI ESCP LGT - (20/BX)

## (undated) DEVICE — SUTURE 3-0 PROLENE SH 36 (36PK/BX)"

## (undated) DEVICE — STAPLER SKIN DISP - (6/BX 10BX/CA) VISISTAT

## (undated) DEVICE — MASK ANESTHESIA ADULT  - (100/CA)

## (undated) DEVICE — SUTURE CV

## (undated) DEVICE — BLADE SURGICAL #11 - (50/BX)